# Patient Record
Sex: MALE | Race: WHITE | NOT HISPANIC OR LATINO | Employment: OTHER | URBAN - METROPOLITAN AREA
[De-identification: names, ages, dates, MRNs, and addresses within clinical notes are randomized per-mention and may not be internally consistent; named-entity substitution may affect disease eponyms.]

---

## 2023-12-28 DIAGNOSIS — N18.9 CHRONIC KIDNEY DISEASE, UNSPECIFIED CKD STAGE: Primary | ICD-10-CM

## 2023-12-28 RX ORDER — FUROSEMIDE 20 MG/1
20 TABLET ORAL 2 TIMES DAILY
Qty: 180 TABLET | Refills: 3 | Status: SHIPPED | OUTPATIENT
Start: 2023-12-28 | End: 2024-01-15 | Stop reason: WASHOUT

## 2024-01-01 ENCOUNTER — APPOINTMENT (OUTPATIENT)
Dept: RADIOLOGY | Facility: HOSPITAL | Age: 69
DRG: 870 | End: 2024-01-01
Payer: MEDICARE

## 2024-01-01 ENCOUNTER — APPOINTMENT (OUTPATIENT)
Dept: CARDIOLOGY | Facility: HOSPITAL | Age: 69
DRG: 870 | End: 2024-01-01
Payer: MEDICARE

## 2024-01-01 PROCEDURE — 71045 X-RAY EXAM CHEST 1 VIEW: CPT

## 2024-01-01 PROCEDURE — 93005 ELECTROCARDIOGRAM TRACING: CPT

## 2024-01-01 PROCEDURE — 93308 TTE F-UP OR LMTD: CPT

## 2024-01-01 PROCEDURE — 74018 RADEX ABDOMEN 1 VIEW: CPT

## 2024-01-01 PROCEDURE — 32555 ASPIRATE PLEURA W/ IMAGING: CPT

## 2024-01-01 PROCEDURE — 93321 DOPPLER ECHO F-UP/LMTD STD: CPT | Performed by: INTERNAL MEDICINE

## 2024-01-01 PROCEDURE — 93308 TTE F-UP OR LMTD: CPT | Performed by: INTERNAL MEDICINE

## 2024-01-01 PROCEDURE — 93321 DOPPLER ECHO F-UP/LMTD STD: CPT

## 2024-01-01 PROCEDURE — 70491 CT SOFT TISSUE NECK W/DYE: CPT

## 2024-01-01 PROCEDURE — 70450 CT HEAD/BRAIN W/O DYE: CPT

## 2024-01-10 ENCOUNTER — TRANSCRIBE ORDERS (OUTPATIENT)
Dept: NEPHROLOGY | Facility: CLINIC | Age: 69
End: 2024-01-10
Payer: MEDICARE

## 2024-01-10 DIAGNOSIS — E87.1 HYPONATREMIA: ICD-10-CM

## 2024-01-10 DIAGNOSIS — N18.2 CHRONIC RENAL FAILURE, STAGE 2 (MILD): ICD-10-CM

## 2024-01-11 ENCOUNTER — LAB (OUTPATIENT)
Dept: LAB | Facility: LAB | Age: 69
End: 2024-01-11
Payer: MEDICARE

## 2024-01-11 DIAGNOSIS — N18.2 CHRONIC RENAL FAILURE, STAGE 2 (MILD): ICD-10-CM

## 2024-01-11 DIAGNOSIS — E87.1 HYPONATREMIA: ICD-10-CM

## 2024-01-11 LAB
ANION GAP SERPL CALC-SCNC: 12 MMOL/L (ref 10–20)
BUN SERPL-MCNC: 20 MG/DL (ref 6–23)
CALCIUM SERPL-MCNC: 9.6 MG/DL (ref 8.6–10.3)
CHLORIDE SERPL-SCNC: 99 MMOL/L (ref 98–107)
CO2 SERPL-SCNC: 28 MMOL/L (ref 21–32)
CREAT SERPL-MCNC: 1.16 MG/DL (ref 0.5–1.3)
EGFRCR SERPLBLD CKD-EPI 2021: 68 ML/MIN/1.73M*2
GLUCOSE SERPL-MCNC: 106 MG/DL (ref 74–99)
POTASSIUM SERPL-SCNC: 4.4 MMOL/L (ref 3.5–5.3)
SODIUM SERPL-SCNC: 135 MMOL/L (ref 136–145)

## 2024-01-11 PROCEDURE — 80048 BASIC METABOLIC PNL TOTAL CA: CPT

## 2024-01-11 PROCEDURE — 36415 COLL VENOUS BLD VENIPUNCTURE: CPT

## 2024-01-15 ENCOUNTER — OFFICE VISIT (OUTPATIENT)
Dept: NEPHROLOGY | Facility: CLINIC | Age: 69
End: 2024-01-15
Payer: MEDICARE

## 2024-01-15 VITALS
DIASTOLIC BLOOD PRESSURE: 86 MMHG | BODY MASS INDEX: 35.31 KG/M2 | HEART RATE: 108 BPM | HEIGHT: 71 IN | SYSTOLIC BLOOD PRESSURE: 148 MMHG | WEIGHT: 252.2 LBS

## 2024-01-15 DIAGNOSIS — E87.1 HYPONATREMIA: Primary | ICD-10-CM

## 2024-01-15 PROBLEM — N18.2 CHRONIC KIDNEY DISEASE, STAGE 2 (MILD): Status: ACTIVE | Noted: 2024-01-15

## 2024-01-15 PROCEDURE — 1159F MED LIST DOCD IN RCRD: CPT | Performed by: CLINICAL NURSE SPECIALIST

## 2024-01-15 PROCEDURE — 99213 OFFICE O/P EST LOW 20 MIN: CPT | Performed by: CLINICAL NURSE SPECIALIST

## 2024-01-15 RX ORDER — SODIUM CHLORIDE 1 G/1
2 TABLET ORAL 2 TIMES DAILY
Qty: 240 TABLET | Refills: 11 | Status: SHIPPED | OUTPATIENT
Start: 2024-01-15

## 2024-01-15 RX ORDER — FUROSEMIDE 20 MG/1
1 TABLET ORAL 2 TIMES DAILY
COMMUNITY
Start: 2022-03-28 | End: 2024-04-15

## 2024-01-15 RX ORDER — SODIUM CHLORIDE 1 G/1
2 TABLET ORAL 2 TIMES DAILY
COMMUNITY
Start: 2022-03-28 | End: 2024-01-15 | Stop reason: SDUPTHER

## 2024-01-15 ASSESSMENT — ENCOUNTER SYMPTOMS
ENDOCRINE NEGATIVE: 1
NEUROLOGICAL NEGATIVE: 1
CARDIOVASCULAR NEGATIVE: 1
GASTROINTESTINAL NEGATIVE: 1
CONSTITUTIONAL NEGATIVE: 1
PSYCHIATRIC NEGATIVE: 1
RESPIRATORY NEGATIVE: 1
MUSCULOSKELETAL NEGATIVE: 1

## 2024-01-15 NOTE — ASSESSMENT & PLAN NOTE
Renal function is stable, his blood pressure is slightly high today in the office however he states he has not been watching it at home and while at work in 140s we will start to monitor his blood pressure at home and will call us if his blood pressure is greater than 140/80

## 2024-01-15 NOTE — PROGRESS NOTES
Subjective   Patient ID: Khoa Mitchell is a 68 y.o. male who presents for Follow-up (1 year ck/Review labs).  Being seen in follow-up for chronic kidney disease stage II and hyponatremia    Labs reviewed  Glucose 106  Electrolytes sodium 135, potassium 4.4, chloride 99, bicarb 28  Renal function with BUN of 20 and creatinine of 1.16, GFR is 68  Calcium 9.6    He is doing well  He is staying very active  He is not routinely watching his blood pressure at home however he states that it has been okay  He has no swelling, occasionally if he does when he is on his feet a lot he will wear his compression stockings which does help with this  He is tolerating his salt tablets and Lasix without difficulty          Review of Systems   Constitutional: Negative.    Respiratory: Negative.     Cardiovascular: Negative.    Gastrointestinal: Negative.    Endocrine: Negative.    Genitourinary: Negative.    Musculoskeletal: Negative.    Skin: Negative.    Neurological: Negative.    Psychiatric/Behavioral: Negative.         Objective   Physical Exam  Vitals reviewed.   Constitutional:       Appearance: Normal appearance.   HENT:      Head: Normocephalic.   Cardiovascular:      Rate and Rhythm: Normal rate and regular rhythm.   Pulmonary:      Effort: Pulmonary effort is normal.      Breath sounds: Normal breath sounds.   Abdominal:      Palpations: Abdomen is soft.   Musculoskeletal:         General: Normal range of motion.   Skin:     General: Skin is warm and dry.   Neurological:      Mental Status: He is alert and oriented to person, place, and time.   Psychiatric:         Mood and Affect: Mood normal.         Behavior: Behavior normal.         Assessment/Plan   Problem List Items Addressed This Visit             ICD-10-CM    Hyponatremia - Primary E87.1     Sodium is stable at 135, will continue with his salt tablet and Lasix, will continue to monitor his fluid intake  Repeat labs in 1 year         Relevant Medications    sodium  chloride 1,000 mg tablet    Other Relevant Orders    Basic metabolic panel    Follow Up In Nephrology     Hyponatremia/mixed picture  Cellulitis of the right lower extremity  Nicotine abuse  Chronic renal failure stage II        NICOLE Dobbs-EDI, DNP 01/15/24 10:08 AM

## 2024-01-15 NOTE — ASSESSMENT & PLAN NOTE
Sodium is stable at 135, will continue with his salt tablet and Lasix, will continue to monitor his fluid intake  Repeat labs in 1 year

## 2024-04-15 DIAGNOSIS — E87.1 HYPONATREMIA: Primary | ICD-10-CM

## 2024-04-15 RX ORDER — FUROSEMIDE 20 MG/1
20 TABLET ORAL 2 TIMES DAILY
Qty: 180 TABLET | Refills: 3 | Status: SHIPPED | OUTPATIENT
Start: 2024-04-15

## 2024-09-03 ENCOUNTER — TELEPHONE (OUTPATIENT)
Dept: NEPHROLOGY | Facility: CLINIC | Age: 69
End: 2024-09-03
Payer: MEDICARE

## 2024-09-03 NOTE — PROGRESS NOTES
Pt called states he is currently under the care of a Chiropractor for his frozen shoulder. He was seen today and noted to have swelling in his right arm, opposite the frozen shoulder. The Chiropractor advised him to contact his provider who prescribes his Lasix and see if a few extras could be sent to the pharmacy.

## 2024-09-07 ENCOUNTER — APPOINTMENT (OUTPATIENT)
Dept: RADIOLOGY | Facility: HOSPITAL | Age: 69
End: 2024-09-07
Payer: MEDICARE

## 2024-09-07 ENCOUNTER — HOSPITAL ENCOUNTER (INPATIENT)
Facility: HOSPITAL | Age: 69
End: 2024-09-07
Admitting: INTERNAL MEDICINE
Payer: MEDICARE

## 2024-09-07 ENCOUNTER — HOSPITAL ENCOUNTER (EMERGENCY)
Facility: HOSPITAL | Age: 69
Discharge: OTHER INSTITUTION | End: 2024-09-07
Attending: EMERGENCY MEDICINE
Payer: MEDICARE

## 2024-09-07 ENCOUNTER — APPOINTMENT (OUTPATIENT)
Dept: CARDIOLOGY | Facility: HOSPITAL | Age: 69
End: 2024-09-07
Payer: MEDICARE

## 2024-09-07 VITALS
DIASTOLIC BLOOD PRESSURE: 63 MMHG | RESPIRATION RATE: 18 BRPM | HEIGHT: 72 IN | WEIGHT: 213 LBS | TEMPERATURE: 97.5 F | SYSTOLIC BLOOD PRESSURE: 132 MMHG | HEART RATE: 87 BPM | OXYGEN SATURATION: 94 % | BODY MASS INDEX: 28.85 KG/M2

## 2024-09-07 DIAGNOSIS — I31.4 CARDIAC TAMPONADE: ICD-10-CM

## 2024-09-07 DIAGNOSIS — M84.411A: ICD-10-CM

## 2024-09-07 DIAGNOSIS — R91.8 LUNG MASS: Primary | ICD-10-CM

## 2024-09-07 DIAGNOSIS — J96.01 ACUTE HYPOXEMIC RESPIRATORY FAILURE (MULTI): ICD-10-CM

## 2024-09-07 DIAGNOSIS — J96.01 ACUTE HYPOXIC RESPIRATORY FAILURE (MULTI): ICD-10-CM

## 2024-09-07 DIAGNOSIS — I31.39 PERICARDIAL EFFUSION (HHS-HCC): ICD-10-CM

## 2024-09-07 DIAGNOSIS — J90 PLEURAL EFFUSION: ICD-10-CM

## 2024-09-07 DIAGNOSIS — C79.9 METASTATIC MALIGNANT NEOPLASM, UNSPECIFIED SITE (MULTI): ICD-10-CM

## 2024-09-07 DIAGNOSIS — C34.90 SMALL CELL LUNG CANCER: ICD-10-CM

## 2024-09-07 DIAGNOSIS — R60.9 SWELLING: ICD-10-CM

## 2024-09-07 DIAGNOSIS — C79.9 METASTATIC MALIGNANT NEOPLASM, UNSPECIFIED SITE (MULTI): Primary | ICD-10-CM

## 2024-09-07 DIAGNOSIS — R06.02 SHORTNESS OF BREATH: ICD-10-CM

## 2024-09-07 DIAGNOSIS — I45.9 HEART BLOCK: ICD-10-CM

## 2024-09-07 DIAGNOSIS — I48.0 PAROXYSMAL ATRIAL FIBRILLATION (MULTI): ICD-10-CM

## 2024-09-07 DIAGNOSIS — I87.1 SVC SYNDROME: ICD-10-CM

## 2024-09-07 DIAGNOSIS — J90 BILATERAL PLEURAL EFFUSION: ICD-10-CM

## 2024-09-07 LAB
ALBUMIN SERPL BCP-MCNC: 3.7 G/DL (ref 3.4–5)
ALP SERPL-CCNC: 75 U/L (ref 33–136)
ALT SERPL W P-5'-P-CCNC: 17 U/L (ref 10–52)
ANION GAP SERPL CALC-SCNC: 12 MMOL/L (ref 10–20)
APPEARANCE UR: CLEAR
AST SERPL W P-5'-P-CCNC: 21 U/L (ref 9–39)
BASE EXCESS BLDA CALC-SCNC: 11.1 MMOL/L (ref -2–3)
BASOPHILS # BLD AUTO: 0.04 X10*3/UL (ref 0–0.1)
BASOPHILS NFR BLD AUTO: 0.3 %
BILIRUB SERPL-MCNC: 0.8 MG/DL (ref 0–1.2)
BILIRUB UR STRIP.AUTO-MCNC: NEGATIVE MG/DL
BNP SERPL-MCNC: 224 PG/ML (ref 0–99)
BODY TEMPERATURE: 37 DEGREES CELSIUS
BUN SERPL-MCNC: 28 MG/DL (ref 6–23)
CALCIUM SERPL-MCNC: 9.6 MG/DL (ref 8.6–10.3)
CARDIAC TROPONIN I PNL SERPL HS: 10 NG/L (ref 0–20)
CHLORIDE SERPL-SCNC: 92 MMOL/L (ref 98–107)
CO2 SERPL-SCNC: 33 MMOL/L (ref 21–32)
COLOR UR: ABNORMAL
CREAT SERPL-MCNC: 1.08 MG/DL (ref 0.5–1.3)
EGFRCR SERPLBLD CKD-EPI 2021: 74 ML/MIN/1.73M*2
EOSINOPHIL # BLD AUTO: 0.06 X10*3/UL (ref 0–0.7)
EOSINOPHIL NFR BLD AUTO: 0.5 %
ERYTHROCYTE [DISTWIDTH] IN BLOOD BY AUTOMATED COUNT: 15.9 % (ref 11.5–14.5)
FLUAV RNA RESP QL NAA+PROBE: NOT DETECTED
FLUBV RNA RESP QL NAA+PROBE: NOT DETECTED
GLUCOSE SERPL-MCNC: 152 MG/DL (ref 74–99)
GLUCOSE UR STRIP.AUTO-MCNC: NORMAL MG/DL
HCO3 BLDA-SCNC: 37.4 MMOL/L (ref 22–26)
HCT VFR BLD AUTO: 35.8 % (ref 41–52)
HGB BLD-MCNC: 11.4 G/DL (ref 13.5–17.5)
HOLD SPECIMEN: NORMAL
HYALINE CASTS #/AREA URNS AUTO: ABNORMAL /LPF
IMM GRANULOCYTES # BLD AUTO: 0.07 X10*3/UL (ref 0–0.7)
IMM GRANULOCYTES NFR BLD AUTO: 0.6 % (ref 0–0.9)
INHALED O2 CONCENTRATION: 32 %
KETONES UR STRIP.AUTO-MCNC: NEGATIVE MG/DL
LACTATE SERPL-SCNC: 1.8 MMOL/L (ref 0.4–2)
LEUKOCYTE ESTERASE UR QL STRIP.AUTO: NEGATIVE
LYMPHOCYTES # BLD AUTO: 0.49 X10*3/UL (ref 1.2–4.8)
LYMPHOCYTES NFR BLD AUTO: 3.9 %
MCH RBC QN AUTO: 29.8 PG (ref 26–34)
MCHC RBC AUTO-ENTMCNC: 31.8 G/DL (ref 32–36)
MCV RBC AUTO: 94 FL (ref 80–100)
MONOCYTES # BLD AUTO: 0.91 X10*3/UL (ref 0.1–1)
MONOCYTES NFR BLD AUTO: 7.3 %
MUCOUS THREADS #/AREA URNS AUTO: ABNORMAL /LPF
NEUTROPHILS # BLD AUTO: 10.85 X10*3/UL (ref 1.2–7.7)
NEUTROPHILS NFR BLD AUTO: 87.4 %
NITRITE UR QL STRIP.AUTO: NEGATIVE
NRBC BLD-RTO: 0 /100 WBCS (ref 0–0)
OXYHGB MFR BLDA: 79.3 % (ref 94–98)
PCO2 BLDA: 55 MM HG (ref 38–42)
PH BLDA: 7.44 PH (ref 7.38–7.42)
PH UR STRIP.AUTO: 6 [PH]
PLATELET # BLD AUTO: 298 X10*3/UL (ref 150–450)
PO2 BLDA: 51 MM HG (ref 85–95)
POTASSIUM SERPL-SCNC: 4.2 MMOL/L (ref 3.5–5.3)
PROT SERPL-MCNC: 7.1 G/DL (ref 6.4–8.2)
PROT UR STRIP.AUTO-MCNC: ABNORMAL MG/DL
RBC # BLD AUTO: 3.82 X10*6/UL (ref 4.5–5.9)
RBC # UR STRIP.AUTO: NEGATIVE /UL
RBC #/AREA URNS AUTO: ABNORMAL /HPF
SAO2 % BLDA: 85 % (ref 94–100)
SARS-COV-2 RNA RESP QL NAA+PROBE: NOT DETECTED
SODIUM SERPL-SCNC: 133 MMOL/L (ref 136–145)
SP GR UR STRIP.AUTO: 1.02
UROBILINOGEN UR STRIP.AUTO-MCNC: NORMAL MG/DL
WBC # BLD AUTO: 12.4 X10*3/UL (ref 4.4–11.3)
WBC #/AREA URNS AUTO: ABNORMAL /HPF

## 2024-09-07 PROCEDURE — 70491 CT SOFT TISSUE NECK W/DYE: CPT | Performed by: RADIOLOGY

## 2024-09-07 PROCEDURE — 36600 WITHDRAWAL OF ARTERIAL BLOOD: CPT

## 2024-09-07 PROCEDURE — 87040 BLOOD CULTURE FOR BACTERIA: CPT | Mod: SAMLAB | Performed by: EMERGENCY MEDICINE

## 2024-09-07 PROCEDURE — 99285 EMERGENCY DEPT VISIT HI MDM: CPT

## 2024-09-07 PROCEDURE — 94762 N-INVAS EAR/PLS OXIMTRY CONT: CPT

## 2024-09-07 PROCEDURE — 2550000001 HC RX 255 CONTRASTS: Performed by: EMERGENCY MEDICINE

## 2024-09-07 PROCEDURE — 83880 ASSAY OF NATRIURETIC PEPTIDE: CPT | Performed by: EMERGENCY MEDICINE

## 2024-09-07 PROCEDURE — 71275 CT ANGIOGRAPHY CHEST: CPT

## 2024-09-07 PROCEDURE — 93005 ELECTROCARDIOGRAM TRACING: CPT

## 2024-09-07 PROCEDURE — 81001 URINALYSIS AUTO W/SCOPE: CPT | Performed by: EMERGENCY MEDICINE

## 2024-09-07 PROCEDURE — S4991 NICOTINE PATCH NONLEGEND: HCPCS | Performed by: EMERGENCY MEDICINE

## 2024-09-07 PROCEDURE — 2500000002 HC RX 250 W HCPCS SELF ADMINISTERED DRUGS (ALT 637 FOR MEDICARE OP, ALT 636 FOR OP/ED): Performed by: EMERGENCY MEDICINE

## 2024-09-07 PROCEDURE — 96374 THER/PROPH/DIAG INJ IV PUSH: CPT | Mod: 59

## 2024-09-07 PROCEDURE — 2500000004 HC RX 250 GENERAL PHARMACY W/ HCPCS (ALT 636 FOR OP/ED): Performed by: EMERGENCY MEDICINE

## 2024-09-07 PROCEDURE — 82805 BLOOD GASES W/O2 SATURATION: CPT | Performed by: EMERGENCY MEDICINE

## 2024-09-07 PROCEDURE — 80053 COMPREHEN METABOLIC PANEL: CPT | Performed by: EMERGENCY MEDICINE

## 2024-09-07 PROCEDURE — 2500000005 HC RX 250 GENERAL PHARMACY W/O HCPCS: Performed by: EMERGENCY MEDICINE

## 2024-09-07 PROCEDURE — 84100 ASSAY OF PHOSPHORUS: CPT

## 2024-09-07 PROCEDURE — 36415 COLL VENOUS BLD VENIPUNCTURE: CPT | Performed by: EMERGENCY MEDICINE

## 2024-09-07 PROCEDURE — 2020000001 HC ICU ROOM DAILY

## 2024-09-07 PROCEDURE — 99285 EMERGENCY DEPT VISIT HI MDM: CPT | Mod: 25

## 2024-09-07 PROCEDURE — 85025 COMPLETE CBC W/AUTO DIFF WBC: CPT | Performed by: EMERGENCY MEDICINE

## 2024-09-07 PROCEDURE — 87077 CULTURE AEROBIC IDENTIFY: CPT | Mod: SAMLAB | Performed by: EMERGENCY MEDICINE

## 2024-09-07 PROCEDURE — 99222 1ST HOSP IP/OBS MODERATE 55: CPT | Performed by: STUDENT IN AN ORGANIZED HEALTH CARE EDUCATION/TRAINING PROGRAM

## 2024-09-07 PROCEDURE — 83605 ASSAY OF LACTIC ACID: CPT | Performed by: EMERGENCY MEDICINE

## 2024-09-07 PROCEDURE — 87636 SARSCOV2 & INF A&B AMP PRB: CPT | Performed by: EMERGENCY MEDICINE

## 2024-09-07 PROCEDURE — 84484 ASSAY OF TROPONIN QUANT: CPT | Performed by: EMERGENCY MEDICINE

## 2024-09-07 PROCEDURE — 74177 CT ABD & PELVIS W/CONTRAST: CPT

## 2024-09-07 RX ORDER — POLYETHYLENE GLYCOL 3350 17 G/17G
17 POWDER, FOR SOLUTION ORAL DAILY
Status: DISCONTINUED | OUTPATIENT
Start: 2024-09-07 | End: 2024-09-07

## 2024-09-07 RX ORDER — DEXTROMETHORPHAN HYDROBROMIDE, GUAIFENESIN 5; 100 MG/5ML; MG/5ML
650 LIQUID ORAL EVERY 8 HOURS PRN
Status: ON HOLD | COMMUNITY

## 2024-09-07 RX ORDER — IBUPROFEN 200 MG
1 TABLET ORAL ONCE
Status: DISCONTINUED | OUTPATIENT
Start: 2024-09-07 | End: 2024-09-07 | Stop reason: HOSPADM

## 2024-09-07 RX ORDER — LORAZEPAM 2 MG/ML
0.5 INJECTION INTRAMUSCULAR ONCE
Status: COMPLETED | OUTPATIENT
Start: 2024-09-07 | End: 2024-09-07

## 2024-09-07 RX ORDER — IBUPROFEN 200 MG
200 TABLET ORAL EVERY 8 HOURS PRN
Status: ON HOLD | COMMUNITY

## 2024-09-07 RX ADMIN — IOHEXOL 80 ML: 350 INJECTION, SOLUTION INTRAVENOUS at 20:29

## 2024-09-07 RX ADMIN — IOHEXOL 70 ML: 350 INJECTION, SOLUTION INTRAVENOUS at 11:09

## 2024-09-07 RX ADMIN — Medication 3 L/MIN: at 09:15

## 2024-09-07 RX ADMIN — Medication 3 L/MIN: at 09:32

## 2024-09-07 RX ADMIN — NICOTINE 1 PATCH: 21 PATCH, EXTENDED RELEASE TRANSDERMAL at 12:47

## 2024-09-07 RX ADMIN — LORAZEPAM 0.5 MG: 2 INJECTION INTRAMUSCULAR; INTRAVENOUS at 12:47

## 2024-09-07 ASSESSMENT — LIFESTYLE VARIABLES
HAVE PEOPLE ANNOYED YOU BY CRITICIZING YOUR DRINKING: NO
EVER HAD A DRINK FIRST THING IN THE MORNING TO STEADY YOUR NERVES TO GET RID OF A HANGOVER: NO
HAVE YOU EVER FELT YOU SHOULD CUT DOWN ON YOUR DRINKING: NO
TOTAL SCORE: 0
EVER FELT BAD OR GUILTY ABOUT YOUR DRINKING: NO

## 2024-09-07 ASSESSMENT — ENCOUNTER SYMPTOMS
FEVER: 0
DYSURIA: 0
CHILLS: 0
CHEST TIGHTNESS: 0
HEADACHES: 0
ABDOMINAL PAIN: 0
NAUSEA: 0
SHORTNESS OF BREATH: 0
COUGH: 1

## 2024-09-07 ASSESSMENT — PAIN DESCRIPTION - LOCATION: LOCATION: SHOULDER

## 2024-09-07 ASSESSMENT — PAIN SCALES - GENERAL
PAINLEVEL_OUTOF10: 2
PAINLEVEL_OUTOF10: 7

## 2024-09-07 ASSESSMENT — PAIN DESCRIPTION - ORIENTATION: ORIENTATION: RIGHT

## 2024-09-07 ASSESSMENT — COLUMBIA-SUICIDE SEVERITY RATING SCALE - C-SSRS

## 2024-09-07 ASSESSMENT — PAIN - FUNCTIONAL ASSESSMENT: PAIN_FUNCTIONAL_ASSESSMENT: 0-10

## 2024-09-07 NOTE — ED PROVIDER NOTES
"HPI   Chief Complaint   Patient presents with    Shortness of Breath       HPI    This is a 69 year old male with no significant past medical history other than use of Lasix for occasional  lower extremity edema presented to [facility] today with right shoulder pain, swelling in the right arm, right facial swelling and subjective \"swelling throughout his whole body\"   He has also been more dyspneic with a weight loss of 30 lbs.   While at [facility], labs were significant for glucose level of 152, Na of 133, BNP of 224 with normal kidney function, negative troponin, WBC of 12.4  with H&H of 11.4 & 35.8  CT abdomen/pelvis showed,   Extensive malignancy/metastatic disease including presumed metastatic lesions in the liver, spleen, both adrenal glands, and the pancreas, multiple peritoneal nodules, soft tissue mass in the right gluteal region and lytic probable metastatic lesion in the right acetabulum.  CT angio chest showed,   Heterogenous soft tissue consistent with malignancy/neoplasm throughout the central/left upper chest with probable SVC obstruction, encasement and narrowing of the trachea/jose antonio,  left chest wall invasion. Multifocal likely metastatic lymphadenopathy and  permeative lytic lesion in the right scapula with pathologic fracture. Further evaluation recommended.  No gross central PE identified. Limited assessment for peripheral/basilar PE. Bibasilar infiltrates or atelectasis with pleural effusions, left-greater-than-right.  Pericardial effusion.    He was transferred to [facility] for further management.     Patient History   Past Medical History:   Diagnosis Date    Other specified health status     No pertinent past medical history     Past Surgical History:   Procedure Laterality Date    OTHER SURGICAL HISTORY  05/16/2022    No history of surgery     No family history on file.  Social History     Tobacco Use    Smoking status: Every Day     Types: Cigarettes    Smokeless tobacco: Never   Substance Use " Topics    Alcohol use: Never    Drug use: Never       Physical Exam   ED Triage Vitals [09/07/24 1640]   Temp Heart Rate Respirations BP   -- 67 16 138/76      Pulse Ox Temp src Heart Rate Source Patient Position   (!) 93 % -- -- Sitting      BP Location FiO2 (%)     -- --       Physical Exam  Constitutional:       Appearance: He is well-developed. He is obese.   HENT:      Head: Normocephalic and atraumatic.      Mouth/Throat:      Mouth: Mucous membranes are moist.      Comments: Right facial swelling   Eyes:      Extraocular Movements: Extraocular movements intact.      Pupils: Pupils are equal, round, and reactive to light.   Cardiovascular:      Rate and Rhythm: Normal rate and regular rhythm.   Pulmonary:      Effort: Pulmonary effort is normal.      Comments: Diminished   NO stridor   No drooling     Abdominal:      Palpations: Abdomen is soft.   Musculoskeletal:         General: Normal range of motion.      Cervical back: Normal range of motion and neck supple.   Skin:     General: Skin is warm and dry.   Neurological:      General: No focal deficit present.      Mental Status: He is alert and oriented to person, place, and time.   Psychiatric:         Mood and Affect: Mood normal.         Behavior: Behavior normal.           ED Course & MDM   ED Course as of 09/07/24 1823   Sat Sep 07, 2024   1649 Patient is a 69-year-old male who presents emergency department for shortness of breath.  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  States he initially went to [facility] as he was having right arm swelling.  States he has not had much of an appetite for the past few months.  He does have a cough that has had for quite some time.  Currently denies chest pain, shortness of breath, nausea, vomiting.  Patient was transferred from [facility] for admission with oncology and thoracic surgery evaluation.    General: Resting comfortably in bed. No  acute distress. Non-toxic.   Head: Atraumatic, normocephalic.  4 x 4 soft tissue mass right cheek anterior to the right ear.  Eyes: Sclera anicteric.  ENT: Mucous membranes moist.   Heart: Regular rate and rhythm.   Lungs: Coarse breath sounds bilaterally. normal respiratory pattern without conversational dyspnea or respiratory distress.   Abdomen: Soft, non-tender, non-distended, no guarding or rebound.   Neurologic: Awake and alert, normal speech and mental status. Moves all extremities equally well.  Skin: Warm and dry, no appreciable rash.   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 CT imaging from [facility] shows heterogeneous soft tissue malignancy/neoplasm in the central/left upper chest with probable SVC obstruction and encasement and narrowing of the trachea/jose antonio with left chest wall invasion.  There are multifocal metastatic lymphadenopathy and pathologic right scapular fracture.  Abdomen with extensive malignancy/metastatic disease with presumed metastatic lesions of the liver, spleen, both adrenal glands, pancreas, and multiple peritoneal nodules with a soft tissue mass in the right gluteal region and likely a metastatic lesion in the right acetabulum.  Pt to be admitted for further evaluation and monitoring. 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   Upon arrival to the ED, he is on a simple mask at 8L with O2 sat of 94% with no subjective dyspnea, no  stridor, no drooling and no obvious airway compromise.   I spoke with thoracis surgery who advised that they will see the patient as a consult but not the primary team. I spoke with oncology who advised me to reach out to medicine.   I reached out to medicine , Dr. Reyes who came to the ED and saw the patient and as long as ENT sees the patient and deemed his airway stable, they will admit him to the floor.   We also ordered a neck CT with contrast as well as a head CT to further evaluate the extend of the tumor.   He is currently hemodynamically stable with family at the bedside.   Awaiting the additional imaging.     ENT evaluated and his airway is patent though has secretions. I spoke with medicine who accepted the patient. Will continue to monitor the patient until a bed becomes available. The patient and family have been updated.   Staffed with Dr. Story.     Procedure  Procedures     Angi Jaquez, NICOLE-CNP, DNP  09/07/24 [id number]

## 2024-09-07 NOTE — ED TRIAGE NOTES
Pt arrived to ED via EMS transfer from [facility]. Pt endorsed from facial swelling and difficulty breathing. A mass was found in the pt lung with other mets. Pt vitals are stable and denies any needs at this time.

## 2024-09-07 NOTE — ED PROVIDER NOTES
HPI   Chief Complaint   Patient presents with    Facial Swelling     To ED per wheelchair c/o swelling all over into his face and SOB for weeks to months. He states that he started with R shoulder pain and R arm swelling about 3 months ago and has progressively gotten worse. He states that he has not really been eating and has lost about 30 lbs in 3 months. RA SpO2 is 77-80% so placed on 3L O2/NC and encouraged breathing techniques. Wet cough present and speaking in short sentances. EKG done at         Limitations to History: None    HPI:  69-year-old male presents with concern for multiple complaints.  Patient states that 3 months ago he began having right shoulder pain.  States he began having swelling in this arm.  Since that time had swelling throughout his body including his face.  Patient states he has become more short of breath.  Has had approximately a 30 pound weight loss.  Denies any fever, chills, nausea, vomiting, abdominal pain, urinary symptoms.      Additional History Obtained from: Wife at the bedside.    ------------------------------------------------------------------------------------------------------------------------------------------  Physical Exam:    VS: As documented in the triage note and EMR flowsheet from this visit were reviewed.    Appearance: Alert. cooperative,  in no acute distress.   Skin: Intact,  dry skin, no lesions, rash, petechiae or purpura.  Cyanotic.  Firm mass in the superior mandibular region.  Eyes: PERRLA, EOMs intact,  Conjunctiva pink with no redness or exudates.   HENT: Normocephalic, atraumatic. Nares patent. No intraoral lesions.   Neck: Supple, without meningismus. Trachea at midline. No lymphadenopathy.  Pulmonary: Clear bilaterally with good chest wall excursion. No rales, rhonchi or wheezing. No accessory muscle use or stridor.  Tachypneic.  Cardiac: Tachycardic and regular rhythm, no rubs, murmurs, or gallops.   Abdomen: Abdomen is soft, nontender, and  nondistended.  No palpable organomegaly.  No rebound or guarding.  No CVA tenderness. Nonsurgical abdomen.  Genitourinary: Exam deferred.  Musculoskeletal: Full range of motion.  Pulses full and equal. No cyanosis, clubbing.  Bilateral 2-3+ pitting edema.  Neurological:  Cranial nerves are grossly intact, grossly normal sensation, no weakness, no focal findings identified.  Psychiatric: Appropriate mood and affect.                Patient History   Past Medical History:   Diagnosis Date    Other specified health status     No pertinent past medical history     Past Surgical History:   Procedure Laterality Date    OTHER SURGICAL HISTORY  05/16/2022    No history of surgery     No family history on file.  Social History     Tobacco Use    Smoking status: Every Day     Types: Cigarettes    Smokeless tobacco: Never   Substance Use Topics    Alcohol use: Never    Drug use: Never       Physical Exam   ED Triage Vitals [09/07/24 0923]   Temperature Heart Rate Respirations BP   36.4 °C (97.5 °F) 100 (!) 28 137/68      Pulse Ox Temp src Heart Rate Source Patient Position   (!) 78 % -- -- --      BP Location FiO2 (%)     -- --       Physical Exam      ED Course & MDM   ED Course as of 09/07/24 1445   Sat Sep 07, 2024   1230 Case discussed with Dr. Weller, vascular surgery, who advised CT surgery would need consulted.   Then discussed with [facility] Dr. Seth, who accepted the patient for emergency evaluation.  [CL]   1253 Case discussed with Paloma Sky and Kendrick, oncology and thoracic surgery who advised admission to MICU with palliative radiation and possible airway intervention if needed.  [CL]      ED Course User Index  [CL] Gregorio Marshall DO         Diagnoses as of 09/07/24 1445   Lung mass   SVC syndrome   Bilateral pleural effusion   Acute hypoxemic respiratory failure (Multi)   Metastatic malignant neoplasm, unspecified site (Multi)   Pathological fracture of right scapula, initial encounter                 No data  recorded     [place] Coma Scale Score: 15 (09/07/24 0924 : Flori Reyes RN)                           Medical Decision Making  Labs Reviewed  CBC WITH AUTO DIFFERENTIAL - Abnormal     WBC                           12.4 (*)               nRBC                          0.0                    RBC                           3.82 (*)               Hemoglobin                    11.4 (*)               Hematocrit                    35.8 (*)               MCV                           94                     MCH                           29.8                   MCHC                          31.8 (*)               RDW                           15.9 (*)               Platelets                     298                    Neutrophils %                 87.4                   Immature Granulocytes %, Automated   0.6                    Lymphocytes %                 3.9                    Monocytes %                   7.3                    Eosinophils %                 0.5                    Basophils %                   0.3                    Neutrophils Absolute          10.85 (*)               Immature Granulocytes Absolute, Au*   0.07                   Lymphocytes Absolute          0.49 (*)               Monocytes Absolute            0.91                   Eosinophils Absolute          0.06                   Basophils Absolute            0.04                COMPREHENSIVE METABOLIC PANEL - Abnormal     Glucose                       152 (*)                Sodium                        133 (*)                Potassium                     4.2                    Chloride                      92 (*)                 Bicarbonate                   33 (*)                 Anion Gap                     12                     Urea Nitrogen                 28 (*)                 Creatinine                    1.08                   eGFR                          74                     Calcium                       9.6                    Albumin                        3.7                    Alkaline Phosphatase          75                     Total Protein                 7.1                    AST                           21                     Bilirubin, Total              0.8                    ALT                           17                  B-TYPE NATRIURETIC PEPTIDE - Abnormal     BNP                           224 (*)                    Narrative:    <100 pg/mL - Heart failure unlikely                  100-299 pg/mL - Intermediate probability of acute heart                                  failure exacerbation. Correlate with clinical                                  context and patient history.                    >=300 pg/mL - Heart Failure likely. Correlate with clinical                                  context and patient history.                                    BNP testing is performed using different testing methodology at [facility] than at other [facility]. Direct result comparisons should only be made within the same method.                     URINALYSIS WITH REFLEX CULTURE AND MICROSCOPIC - Abnormal     Color, Urine                                         Appearance, Urine             Clear                  Specific Gravity, Urine       1.019                  pH, Urine                     6.0                    Protein, Urine                30 (1+) (*)               Glucose, Urine                Normal                 Blood, Urine                  NEGATIVE                Ketones, Urine                NEGATIVE                Bilirubin, Urine              NEGATIVE                Urobilinogen, Urine           Normal                 Nitrite, Urine                NEGATIVE                Leukocyte Esterase, Urine     NEGATIVE             BLOOD GAS ARTERIAL - Abnormal     POCT pH, Arterial             7.44 (*)               POCT pCO2, Arterial           55 (*)                 POCT pO2, Arterial            51 (*)                  POCT SO2, Arterial            85 (*)                 POCT Oxy Hemoglobin, Arterial   79.3 (*)               POCT Base Excess, Arterial    11.1 (*)               POCT HCO3 Calculated, Arterial   37.4 (*)               Patient Temperature           37.0                   FiO2                          32                  URINALYSIS MICROSCOPIC WITH REFLEX CULTURE - Abnormal     WBC, Urine                    1-5                    RBC, Urine                    1-2                    Mucus, Urine                  FEW                    Hyaline Casts, Urine          OCCASIONAL (*)            LACTATE - Normal     Lactate                       1.8                        Narrative: Venipuncture immediately after or during the administration of Metamizole may lead to falsely low results. Testing should be performed immediately                  prior to Metamizole dosing.  TROPONIN I, HIGH SENSITIVITY - Normal     Troponin I, High Sensitivity   10                         Narrative: Less than 99th percentile of normal range cutoff-                  Female and children under 18 years old <14 ng/L; Male <21 ng/L: Negative                  Repeat testing should be performed if clinically indicated.                                     Female and children under 18 years old 14-50 ng/L; Male 21-50 ng/L:                  Consistent with possible cardiac damage and possible increased clinical                   risk. Serial measurements may help to assess extent of myocardial damage.                                     >50 ng/L: Consistent with cardiac damage, increased clinical risk and                  myocardial infarction. Serial measurements may help assess extent of                   myocardial damage.                                      NOTE: Children less than 1 year old may have higher baseline troponin                   levels and results should be interpreted in conjunction with the overall                   clinical  context.                                     NOTE: Troponin I testing is performed using a different                   testing methodology at [facility] than at other                   [facility]. Direct result comparisons should only                   be made within the same method.  SARS-COV-2 PCR - Normal     Coronavirus 2019, PCR                                    Narrative: This assay has received FDA Emergency Use Authorization (EUA) and is only authorized for the duration of time that circumstances exist to justify the authorization of the emergency use of in vitro diagnostic tests for the detection of SARS-CoV-2 virus and/or diagnosis of COVID-19 infection under section 564(b)(1) of the Act, 21 U.S.C. 360bbb-3(b)(1). This assay is an in vitro diagnostic nucleic acid amplification test for the qualitative detection of SARS-CoV-2 from nasopharyngeal specimens and has been validated for use at [facility]. Negative results do not preclude COVID-19 infections and should not be used as the sole basis for diagnosis, treatment, or other management decisions.                    INFLUENZA A AND B PCR - Normal     Flu A Result                                         Flu B Result                                             Narrative: This assay is an in vitro diagnostic multiplex nucleic acid amplification test for the detection and discrimination of Influenza A & B from nasopharyngeal specimens, and has been validated for use at [facility]. Negative results do not preclude Influenza A/B infections, and should not be used as the sole basis for diagnosis, treatment, or other management decisions. If Influenza A/B and RSV PCR results are negative, testing for Parainfluenza virus, Adenovirus and Metapneumovirus is routinely performed for [facility] [place] [place] inpatients, and is available on other patients by placing an add-on  request.  BLOOD CULTURE  BLOOD CULTURE  URINALYSIS WITH REFLEX CULTURE AND MICROSCOPIC         Narrative: The following orders were created for panel order Urinalysis with Reflex Culture and Microscopic.                  Procedure                               Abnormality         Status                                     ---------                               -----------         ------                                     Urinalysis with Reflex C...[[id number]]  Abnormal            Final result                               Extra Urine Gray Tube[[id number]]                            In process                                                   Please view results for these tests on the individual orders.  EXTRA URINE GRAY TUBE  CT angio chest for pulmonary embolism   Final Result    CHEST:          Heterogenous soft tissue consistent with malignancy/neoplasm    throughout the central/left upper chest with probable SVC    obstruction, encasement and narrowing of the trachea/jose antonio,  left    chest wall invasion. Multifocal likely metastatic lymphadenopathy and    permeative lytic lesion in the right scapula with pathologic    fracture. Further evaluation recommended.          No gross central PE identified. Limited assessment for    peripheral/basilar PE. Bibasilar infiltrates or atelectasis with    pleural effusions, left-greater-than-right.          Pericardial effusion.          ABDOMEN AND PELVIS:          Extensive malignancy/metastatic disease including presumed metastatic    lesions in the liver, spleen, both adrenal glands, and the pancreas,    multiple peritoneal nodules, soft tissue mass in the right gluteal    region and lytic probable metastatic lesion in the right acetabulum.          MACRO:    None.          Signed by: Natali Daigle 9/7/2024 11:56 AM    Dictation workstation:   [id number]     CT abdomen pelvis w IV contrast   Final Result    CHEST:          Heterogenous soft tissue consistent with  malignancy/neoplasm    throughout the central/left upper chest with probable SVC    obstruction, encasement and narrowing of the trachea/jose antonio,  left    chest wall invasion. Multifocal likely metastatic lymphadenopathy and    permeative lytic lesion in the right scapula with pathologic    fracture. Further evaluation recommended.          No gross central PE identified. Limited assessment for    peripheral/basilar PE. Bibasilar infiltrates or atelectasis with    pleural effusions, left-greater-than-right.          Pericardial effusion.          ABDOMEN AND PELVIS:          Extensive malignancy/metastatic disease including presumed metastatic    lesions in the liver, spleen, both adrenal glands, and the pancreas,    multiple peritoneal nodules, soft tissue mass in the right gluteal    region and lytic probable metastatic lesion in the right acetabulum.          MACRO:    None.          Signed by: Natali Daigle 9/7/2024 11:56 AM    Dictation workstation:   [id number]     Medical Decision Making:    Appears tachypneic and is hypoxemic upon arrival.  Placed on nasal cannula 3 L.  ABG shows hypoxemia without hypercapnia.  Lab work unremarkable.  Patient found to have a left-sided lung lesion with bilateral pleural effusions as well as SVC syndrome and encasement of the trachea as well as jose antonio.  Case discussed first with vascular surgery and then thoracic surgery as well as oncology in the emergency department to [facility].  Patient will be transferred for urgent evaluation.  Patient was switched to OxyMask during his stay given he is primarily breathing through his mouth.  Patient was given Ativan given some anxiety he was having as well as a nicotine patch.  Patient also found to have a pathologic right scapular fracture.  Patient is agreeable with transfer to [facility].  Transferred in stable condition.    Differential Diagnoses Considered: Pneumonia, pneumothorax,  mass, heart failure, pulmonary embolism    Independent Interpretation of Studies:  I independently interpreted: CT of the chest shows left-sided lung mass.    Escalation of Care:  Appropriate for transfer to higher level of care for urgent evaluation.    Discussion of Management with Other Providers:   I discussed the patient/results with: Emergency department physician, vascular surgery, oncology, thoracic surgery.          Procedure  ECG 12 lead    Performed by: Gregorio Mortensen DO  Authorized by: Gregorio Mortensen DO    ECG interpreted by ED Physician in the absence of a cardiologist: yes    Comments:      EKG interpreted by Dr. Gregorio Mortensen: Sinus tachycardia at a rate of 105 bpm.  Left bundle branch block.  CO interval 140 ms.  QTc of 523 ms.  Artifact.       Gregorio Mortensen DO  09/07/24 0522

## 2024-09-07 NOTE — Clinical Note
Patient Clipped and Prepped: apical chest. Prepped with ChloraPrep, a minimum of 3 minute dry time, longer if needed, no pooling noted, patient draped in sterile fashion.

## 2024-09-07 NOTE — Clinical Note
Patient Clipped and Prepped: subxiphoid process. Prepped with ChloraPrep, a minimum of 3 minute dry time, longer if needed, no pooling noted, patient draped in sterile fashion.

## 2024-09-07 NOTE — Clinical Note
R cheek soft tissue mass bx complete. 4 cores obtained. Dressing CDI. Total 1mg versed, 25mcg fentanyl given ivp. VSS t/o procedure. Report to ICU RN.

## 2024-09-08 ENCOUNTER — APPOINTMENT (OUTPATIENT)
Dept: RADIOLOGY | Facility: HOSPITAL | Age: 69
End: 2024-09-08
Payer: MEDICARE

## 2024-09-08 LAB
ABO GROUP (TYPE) IN BLOOD: NORMAL
ALBUMIN SERPL BCP-MCNC: 3.3 G/DL (ref 3.4–5)
ALBUMIN SERPL BCP-MCNC: 3.4 G/DL (ref 3.4–5)
ANION GAP BLDV CALCULATED.4IONS-SCNC: 9 MMOL/L (ref 10–25)
ANION GAP SERPL CALC-SCNC: 12 MMOL/L (ref 10–20)
ANION GAP SERPL CALC-SCNC: 16 MMOL/L (ref 10–20)
ANTIBODY SCREEN: NORMAL
APTT PPP: 28 SECONDS (ref 27–38)
BACTERIA BLD AEROBE CULT: ABNORMAL
BACTERIA BLD CULT: ABNORMAL
BACTERIA BLD CULT: NORMAL
BACTERIA SPEC RESP CULT: ABNORMAL
BASE EXCESS BLDV CALC-SCNC: 6.3 MMOL/L (ref -2–3)
BASOPHILS # BLD AUTO: 0.03 X10*3/UL (ref 0–0.1)
BASOPHILS NFR BLD AUTO: 0.3 %
BODY TEMPERATURE: 37 DEGREES CELSIUS
BUN SERPL-MCNC: 27 MG/DL (ref 6–23)
BUN SERPL-MCNC: 29 MG/DL (ref 6–23)
CA-I BLDV-SCNC: 1.27 MMOL/L (ref 1.1–1.33)
CALCIUM SERPL-MCNC: 9.1 MG/DL (ref 8.6–10.6)
CALCIUM SERPL-MCNC: 9.8 MG/DL (ref 8.6–10.6)
CHLORIDE BLDV-SCNC: 93 MMOL/L (ref 98–107)
CHLORIDE SERPL-SCNC: 93 MMOL/L (ref 98–107)
CHLORIDE SERPL-SCNC: 93 MMOL/L (ref 98–107)
CO2 SERPL-SCNC: 33 MMOL/L (ref 21–32)
CO2 SERPL-SCNC: 36 MMOL/L (ref 21–32)
CREAT SERPL-MCNC: 0.84 MG/DL (ref 0.5–1.3)
CREAT SERPL-MCNC: 0.98 MG/DL (ref 0.5–1.3)
EGFRCR SERPLBLD CKD-EPI 2021: 83 ML/MIN/1.73M*2
EGFRCR SERPLBLD CKD-EPI 2021: >90 ML/MIN/1.73M*2
EOSINOPHIL # BLD AUTO: 0.03 X10*3/UL (ref 0–0.7)
EOSINOPHIL NFR BLD AUTO: 0.3 %
ERYTHROCYTE [DISTWIDTH] IN BLOOD BY AUTOMATED COUNT: 15.9 % (ref 11.5–14.5)
EST. AVERAGE GLUCOSE BLD GHB EST-MCNC: 148 MG/DL
GLUCOSE BLD MANUAL STRIP-MCNC: 113 MG/DL (ref 74–99)
GLUCOSE BLD MANUAL STRIP-MCNC: 119 MG/DL (ref 74–99)
GLUCOSE BLD MANUAL STRIP-MCNC: 130 MG/DL (ref 74–99)
GLUCOSE BLD MANUAL STRIP-MCNC: 136 MG/DL (ref 74–99)
GLUCOSE BLD MANUAL STRIP-MCNC: 84 MG/DL (ref 74–99)
GLUCOSE BLDV-MCNC: 114 MG/DL (ref 74–99)
GLUCOSE SERPL-MCNC: 117 MG/DL (ref 74–99)
GLUCOSE SERPL-MCNC: 133 MG/DL (ref 74–99)
GRAM STN SPEC: ABNORMAL
HBA1C MFR BLD: 6.8 %
HCO3 BLDV-SCNC: 35.4 MMOL/L (ref 22–26)
HCT VFR BLD AUTO: 32.8 % (ref 41–52)
HCT VFR BLD EST: 34 % (ref 41–52)
HGB BLD-MCNC: 10.2 G/DL (ref 13.5–17.5)
HGB BLDV-MCNC: 11.2 G/DL (ref 13.5–17.5)
HOLD SPECIMEN: NORMAL
IMM GRANULOCYTES # BLD AUTO: 0.06 X10*3/UL (ref 0–0.7)
IMM GRANULOCYTES NFR BLD AUTO: 0.5 % (ref 0–0.9)
INHALED O2 CONCENTRATION: 100 %
INR PPP: 1.2 (ref 0.9–1.1)
LACTATE BLDV-SCNC: 2.7 MMOL/L (ref 0.4–2)
LDH SERPL L TO P-CCNC: 194 U/L (ref 84–246)
LYMPHOCYTES # BLD AUTO: 0.34 X10*3/UL (ref 1.2–4.8)
LYMPHOCYTES NFR BLD AUTO: 3.1 %
MAGNESIUM SERPL-MCNC: 2.12 MG/DL (ref 1.6–2.4)
MAGNESIUM SERPL-MCNC: 2.17 MG/DL (ref 1.6–2.4)
MCH RBC QN AUTO: 29.1 PG (ref 26–34)
MCHC RBC AUTO-ENTMCNC: 31.1 G/DL (ref 32–36)
MCV RBC AUTO: 94 FL (ref 80–100)
MONOCYTES # BLD AUTO: 0.82 X10*3/UL (ref 0.1–1)
MONOCYTES NFR BLD AUTO: 7.4 %
NEUTROPHILS # BLD AUTO: 9.82 X10*3/UL (ref 1.2–7.7)
NEUTROPHILS NFR BLD AUTO: 88.4 %
NRBC BLD-RTO: 0 /100 WBCS (ref 0–0)
OSMOLALITY SERPL: 294 MOSM/KG (ref 280–300)
OXYHGB MFR BLDV: 24.9 % (ref 45–75)
PCO2 BLDV: 77 MM HG (ref 41–51)
PEEP CMH2O: 8 CM H2O
PH BLDV: 7.27 PH (ref 7.33–7.43)
PHOSPHATE SERPL-MCNC: 3.1 MG/DL (ref 2.5–4.9)
PHOSPHATE SERPL-MCNC: 3.8 MG/DL (ref 2.5–4.9)
PHOSPHATE SERPL-MCNC: 4.4 MG/DL (ref 2.5–4.9)
PLATELET # BLD AUTO: 273 X10*3/UL (ref 150–450)
PO2 BLDV: 18 MM HG (ref 35–45)
POTASSIUM BLDV-SCNC: 4.7 MMOL/L (ref 3.5–5.3)
POTASSIUM SERPL-SCNC: 4.4 MMOL/L (ref 3.5–5.3)
POTASSIUM SERPL-SCNC: 4.6 MMOL/L (ref 3.5–5.3)
PROCALCITONIN SERPL-MCNC: 4.49 NG/ML
PROTHROMBIN TIME: 13.3 SECONDS (ref 9.8–12.8)
RBC # BLD AUTO: 3.5 X10*6/UL (ref 4.5–5.9)
RH FACTOR (ANTIGEN D): NORMAL
SAO2 % BLDV: 25 % (ref 45–75)
SODIUM BLDV-SCNC: 133 MMOL/L (ref 136–145)
SODIUM SERPL-SCNC: 136 MMOL/L (ref 136–145)
SODIUM SERPL-SCNC: 138 MMOL/L (ref 136–145)
TIDAL VOLUME: 500 ML
URATE SERPL-MCNC: 6.6 MG/DL (ref 4–7.5)
VENTILATOR RATE: 20 BPM
WBC # BLD AUTO: 11.1 X10*3/UL (ref 4.4–11.3)

## 2024-09-08 PROCEDURE — 31500 INSERT EMERGENCY AIRWAY: CPT | Mod: GC | Performed by: STUDENT IN AN ORGANIZED HEALTH CARE EDUCATION/TRAINING PROGRAM

## 2024-09-08 PROCEDURE — 2500000004 HC RX 250 GENERAL PHARMACY W/ HCPCS (ALT 636 FOR OP/ED)

## 2024-09-08 PROCEDURE — 0B938ZZ DRAINAGE OF RIGHT MAIN BRONCHUS, VIA NATURAL OR ARTIFICIAL OPENING ENDOSCOPIC: ICD-10-PCS | Performed by: STUDENT IN AN ORGANIZED HEALTH CARE EDUCATION/TRAINING PROGRAM

## 2024-09-08 PROCEDURE — 99291 CRITICAL CARE FIRST HOUR: CPT

## 2024-09-08 PROCEDURE — 0B918ZZ DRAINAGE OF TRACHEA, VIA NATURAL OR ARTIFICIAL OPENING ENDOSCOPIC: ICD-10-PCS | Performed by: STUDENT IN AN ORGANIZED HEALTH CARE EDUCATION/TRAINING PROGRAM

## 2024-09-08 PROCEDURE — 87116 MYCOBACTERIA CULTURE: CPT

## 2024-09-08 PROCEDURE — 36415 COLL VENOUS BLD VENIPUNCTURE: CPT

## 2024-09-08 PROCEDURE — 85025 COMPLETE CBC W/AUTO DIFF WBC: CPT

## 2024-09-08 PROCEDURE — 87077 CULTURE AEROBIC IDENTIFY: CPT

## 2024-09-08 PROCEDURE — 99223 1ST HOSP IP/OBS HIGH 75: CPT | Performed by: OTOLARYNGOLOGY

## 2024-09-08 PROCEDURE — 94002 VENT MGMT INPAT INIT DAY: CPT

## 2024-09-08 PROCEDURE — 0BH17EZ INSERTION OF ENDOTRACHEAL AIRWAY INTO TRACHEA, VIA NATURAL OR ARTIFICIAL OPENING: ICD-10-PCS | Performed by: STUDENT IN AN ORGANIZED HEALTH CARE EDUCATION/TRAINING PROGRAM

## 2024-09-08 PROCEDURE — 83930 ASSAY OF BLOOD OSMOLALITY: CPT

## 2024-09-08 PROCEDURE — 84145 PROCALCITONIN (PCT): CPT

## 2024-09-08 PROCEDURE — 51702 INSERT TEMP BLADDER CATH: CPT

## 2024-09-08 PROCEDURE — 87632 RESP VIRUS 6-11 TARGETS: CPT

## 2024-09-08 PROCEDURE — 71045 X-RAY EXAM CHEST 1 VIEW: CPT | Performed by: RADIOLOGY

## 2024-09-08 PROCEDURE — 5A0935A ASSISTANCE WITH RESPIRATORY VENTILATION, LESS THAN 24 CONSECUTIVE HOURS, HIGH NASAL FLOW/VELOCITY: ICD-10-PCS

## 2024-09-08 PROCEDURE — 84550 ASSAY OF BLOOD/URIC ACID: CPT

## 2024-09-08 PROCEDURE — 74018 RADEX ABDOMEN 1 VIEW: CPT | Performed by: RADIOLOGY

## 2024-09-08 PROCEDURE — 70450 CT HEAD/BRAIN W/O DYE: CPT | Performed by: RADIOLOGY

## 2024-09-08 PROCEDURE — 84132 ASSAY OF SERUM POTASSIUM: CPT

## 2024-09-08 PROCEDURE — 87305 ASPERGILLUS AG IA: CPT

## 2024-09-08 PROCEDURE — 87205 SMEAR GRAM STAIN: CPT

## 2024-09-08 PROCEDURE — 2500000005 HC RX 250 GENERAL PHARMACY W/O HCPCS: Performed by: INTERNAL MEDICINE

## 2024-09-08 PROCEDURE — 31500 INSERT EMERGENCY AIRWAY: CPT | Performed by: STUDENT IN AN ORGANIZED HEALTH CARE EDUCATION/TRAINING PROGRAM

## 2024-09-08 PROCEDURE — 83735 ASSAY OF MAGNESIUM: CPT

## 2024-09-08 PROCEDURE — 2020000001 HC ICU ROOM DAILY

## 2024-09-08 PROCEDURE — 0B978ZZ DRAINAGE OF LEFT MAIN BRONCHUS, VIA NATURAL OR ARTIFICIAL OPENING ENDOSCOPIC: ICD-10-PCS | Performed by: STUDENT IN AN ORGANIZED HEALTH CARE EDUCATION/TRAINING PROGRAM

## 2024-09-08 PROCEDURE — 86900 BLOOD TYPING SEROLOGIC ABO: CPT

## 2024-09-08 PROCEDURE — 5A1955Z RESPIRATORY VENTILATION, GREATER THAN 96 CONSECUTIVE HOURS: ICD-10-PCS | Performed by: STUDENT IN AN ORGANIZED HEALTH CARE EDUCATION/TRAINING PROGRAM

## 2024-09-08 PROCEDURE — 70450 CT HEAD/BRAIN W/O DYE: CPT

## 2024-09-08 PROCEDURE — 82947 ASSAY GLUCOSE BLOOD QUANT: CPT

## 2024-09-08 PROCEDURE — 85610 PROTHROMBIN TIME: CPT

## 2024-09-08 PROCEDURE — 87799 DETECT AGENT NOS DNA QUANT: CPT

## 2024-09-08 PROCEDURE — 93010 ELECTROCARDIOGRAM REPORT: CPT | Performed by: INTERNAL MEDICINE

## 2024-09-08 PROCEDURE — 88312 SPECIAL STAINS GROUP 1: CPT | Performed by: PATHOLOGY

## 2024-09-08 PROCEDURE — 83615 LACTATE (LD) (LDH) ENZYME: CPT

## 2024-09-08 PROCEDURE — 83605 ASSAY OF LACTIC ACID: CPT

## 2024-09-08 PROCEDURE — 87040 BLOOD CULTURE FOR BACTERIA: CPT

## 2024-09-08 PROCEDURE — 88112 CYTOPATH CELL ENHANCE TECH: CPT | Mod: TC,MCY

## 2024-09-08 PROCEDURE — 99222 1ST HOSP IP/OBS MODERATE 55: CPT | Performed by: NEUROLOGICAL SURGERY

## 2024-09-08 PROCEDURE — 86901 BLOOD TYPING SEROLOGIC RH(D): CPT

## 2024-09-08 PROCEDURE — 82435 ASSAY OF BLOOD CHLORIDE: CPT

## 2024-09-08 PROCEDURE — 31623 DX BRONCHOSCOPE/BRUSH: CPT | Performed by: STUDENT IN AN ORGANIZED HEALTH CARE EDUCATION/TRAINING PROGRAM

## 2024-09-08 PROCEDURE — 3E033XZ INTRODUCTION OF VASOPRESSOR INTO PERIPHERAL VEIN, PERCUTANEOUS APPROACH: ICD-10-PCS

## 2024-09-08 PROCEDURE — 2500000005 HC RX 250 GENERAL PHARMACY W/O HCPCS

## 2024-09-08 PROCEDURE — 0CJS8ZZ INSPECTION OF LARYNX, VIA NATURAL OR ARTIFICIAL OPENING ENDOSCOPIC: ICD-10-PCS

## 2024-09-08 PROCEDURE — 87015 SPECIMEN INFECT AGNT CONCNTJ: CPT

## 2024-09-08 PROCEDURE — 87102 FUNGUS ISOLATION CULTURE: CPT

## 2024-09-08 PROCEDURE — 0B9J8ZZ DRAINAGE OF LEFT LOWER LUNG LOBE, VIA NATURAL OR ARTIFICIAL OPENING ENDOSCOPIC: ICD-10-PCS | Performed by: STUDENT IN AN ORGANIZED HEALTH CARE EDUCATION/TRAINING PROGRAM

## 2024-09-08 PROCEDURE — 88112 CYTOPATH CELL ENHANCE TECH: CPT | Performed by: PATHOLOGY

## 2024-09-08 PROCEDURE — 83036 HEMOGLOBIN GLYCOSYLATED A1C: CPT

## 2024-09-08 PROCEDURE — 80069 RENAL FUNCTION PANEL: CPT

## 2024-09-08 RX ORDER — ROCURONIUM BROMIDE 10 MG/ML
INJECTION, SOLUTION INTRAVENOUS
Status: COMPLETED
Start: 2024-09-08 | End: 2024-09-08

## 2024-09-08 RX ORDER — ETOMIDATE 2 MG/ML
INJECTION INTRAVENOUS
Status: COMPLETED
Start: 2024-09-08 | End: 2024-09-08

## 2024-09-08 RX ORDER — ETOMIDATE 2 MG/ML
30 INJECTION INTRAVENOUS ONCE
Status: COMPLETED | OUTPATIENT
Start: 2024-09-08 | End: 2024-09-08

## 2024-09-08 RX ORDER — PHENYLEPHRINE HCL IN 0.9% NACL 0.4MG/10ML
SYRINGE (ML) INTRAVENOUS
Status: DISPENSED
Start: 2024-09-08 | End: 2024-09-08

## 2024-09-08 RX ORDER — CEFTRIAXONE 1 G/50ML
1 INJECTION, SOLUTION INTRAVENOUS EVERY 24 HOURS
Status: DISPENSED | OUTPATIENT
Start: 2024-09-08

## 2024-09-08 RX ORDER — LIDOCAINE HYDROCHLORIDE 20 MG/ML
5 INJECTION, SOLUTION INFILTRATION; PERINEURAL ONCE
Status: DISCONTINUED | OUTPATIENT
Start: 2024-09-08 | End: 2024-09-08

## 2024-09-08 RX ORDER — DEXMEDETOMIDINE HYDROCHLORIDE 4 UG/ML
.2-1.5 INJECTION, SOLUTION INTRAVENOUS CONTINUOUS
Status: DISCONTINUED | OUTPATIENT
Start: 2024-09-08 | End: 2024-09-08

## 2024-09-08 RX ORDER — FENTANYL CITRATE 50 UG/ML
INJECTION, SOLUTION INTRAMUSCULAR; INTRAVENOUS
Status: COMPLETED
Start: 2024-09-08 | End: 2024-09-08

## 2024-09-08 RX ORDER — ETOMIDATE 2 MG/ML
50 INJECTION INTRAVENOUS ONCE
Status: DISCONTINUED | OUTPATIENT
Start: 2024-09-08 | End: 2024-09-08

## 2024-09-08 RX ORDER — MIDAZOLAM HYDROCHLORIDE 1 MG/ML
INJECTION INTRAMUSCULAR; INTRAVENOUS
Status: COMPLETED
Start: 2024-09-08 | End: 2024-09-08

## 2024-09-08 RX ORDER — DEXTROSE 50 % IN WATER (D50W) INTRAVENOUS SYRINGE
25
Status: ACTIVE | OUTPATIENT
Start: 2024-09-08

## 2024-09-08 RX ORDER — ROCURONIUM BROMIDE 10 MG/ML
100 INJECTION, SOLUTION INTRAVENOUS ONCE
Status: COMPLETED | OUTPATIENT
Start: 2024-09-08 | End: 2024-09-08

## 2024-09-08 RX ORDER — DEXTROSE 50 % IN WATER (D50W) INTRAVENOUS SYRINGE
12.5
Status: ACTIVE | OUTPATIENT
Start: 2024-09-08

## 2024-09-08 RX ORDER — PROPOFOL 10 MG/ML
INJECTION, EMULSION INTRAVENOUS
Status: COMPLETED
Start: 2024-09-08 | End: 2024-09-08

## 2024-09-08 RX ORDER — FENTANYL CITRATE-0.9 % NACL/PF 10 MCG/ML
0-300 PLASTIC BAG, INJECTION (ML) INTRAVENOUS CONTINUOUS
Status: ACTIVE | OUTPATIENT
Start: 2024-09-08

## 2024-09-08 RX ORDER — NOREPINEPHRINE BITARTRATE/D5W 8 MG/250ML
PLASTIC BAG, INJECTION (ML) INTRAVENOUS
Status: COMPLETED
Start: 2024-09-08 | End: 2024-09-08

## 2024-09-08 RX ORDER — LIDOCAINE HYDROCHLORIDE 40 MG/ML
40 INJECTION, SOLUTION RETROBULBAR; TOPICAL ONCE
Status: DISCONTINUED | OUTPATIENT
Start: 2024-09-08 | End: 2024-09-08

## 2024-09-08 RX ORDER — ETOMIDATE 2 MG/ML
0.3 INJECTION INTRAVENOUS ONCE
Status: DISCONTINUED | OUTPATIENT
Start: 2024-09-08 | End: 2024-09-08

## 2024-09-08 RX ORDER — ENOXAPARIN SODIUM 100 MG/ML
40 INJECTION SUBCUTANEOUS EVERY 24 HOURS
Status: DISCONTINUED | OUTPATIENT
Start: 2024-09-08 | End: 2024-09-08

## 2024-09-08 RX ORDER — LIDOCAINE HYDROCHLORIDE ANHYDROUS 20 MG/ML
INJECTION, SOLUTION INFILTRATION
Status: DISPENSED
Start: 2024-09-08 | End: 2024-09-08

## 2024-09-08 RX ORDER — PROPOFOL 10 MG/ML
5-50 INJECTION, EMULSION INTRAVENOUS CONTINUOUS
Status: DISPENSED | OUTPATIENT
Start: 2024-09-08

## 2024-09-08 RX ORDER — MIDAZOLAM HYDROCHLORIDE 1 MG/ML
2 INJECTION INTRAMUSCULAR; INTRAVENOUS ONCE AS NEEDED
Status: COMPLETED | OUTPATIENT
Start: 2024-09-08 | End: 2024-09-08

## 2024-09-08 RX ORDER — DEXMEDETOMIDINE HYDROCHLORIDE 4 UG/ML
INJECTION, SOLUTION INTRAVENOUS
Status: COMPLETED
Start: 2024-09-08 | End: 2024-09-08

## 2024-09-08 RX ORDER — FENTANYL CITRATE-0.9 % NACL/PF 10 MCG/ML
PLASTIC BAG, INJECTION (ML) INTRAVENOUS
Status: COMPLETED
Start: 2024-09-08 | End: 2024-09-08

## 2024-09-08 RX ORDER — INSULIN LISPRO 100 [IU]/ML
0-5 INJECTION, SOLUTION INTRAVENOUS; SUBCUTANEOUS EVERY 4 HOURS
Status: DISCONTINUED | OUTPATIENT
Start: 2024-09-08 | End: 2024-09-08

## 2024-09-08 RX ORDER — FENTANYL CITRATE 50 UG/ML
50 INJECTION, SOLUTION INTRAMUSCULAR; INTRAVENOUS ONCE AS NEEDED
Status: COMPLETED | OUTPATIENT
Start: 2024-09-08 | End: 2024-09-08

## 2024-09-08 RX ORDER — NOREPINEPHRINE BITARTRATE/D5W 8 MG/250ML
0-.2 PLASTIC BAG, INJECTION (ML) INTRAVENOUS CONTINUOUS
Status: ACTIVE | OUTPATIENT
Start: 2024-09-08

## 2024-09-08 RX ADMIN — DEXMEDETOMIDINE HYDROCHLORIDE 0.2 MCG/KG/HR: 4 INJECTION, SOLUTION INTRAVENOUS at 12:00

## 2024-09-08 RX ADMIN — FENTANYL CITRATE 50 MCG: 50 INJECTION INTRAMUSCULAR; INTRAVENOUS at 11:55

## 2024-09-08 RX ADMIN — AZITHROMYCIN 500 MG: 500 INJECTION, POWDER, LYOPHILIZED, FOR SOLUTION INTRAVENOUS at 04:24

## 2024-09-08 RX ADMIN — Medication 25 MCG/HR: at 12:45

## 2024-09-08 RX ADMIN — ETOMIDATE 30 MG: 20 INJECTION, SOLUTION INTRAVENOUS at 12:00

## 2024-09-08 RX ADMIN — FENTANYL CITRATE 50 MCG: 50 INJECTION, SOLUTION INTRAMUSCULAR; INTRAVENOUS at 11:55

## 2024-09-08 RX ADMIN — ETOMIDATE 30 MG: 2 INJECTION INTRAVENOUS at 12:00

## 2024-09-08 RX ADMIN — MIDAZOLAM HYDROCHLORIDE 2 MG: 1 INJECTION INTRAMUSCULAR; INTRAVENOUS at 12:10

## 2024-09-08 RX ADMIN — ROCURONIUM BROMIDE 100 MG: 10 INJECTION INTRAVENOUS at 12:05

## 2024-09-08 RX ADMIN — Medication 8 L/MIN: at 00:19

## 2024-09-08 RX ADMIN — PROPOFOL 5 MCG/KG/MIN: 10 INJECTION, EMULSION INTRAVENOUS at 12:15

## 2024-09-08 RX ADMIN — MIDAZOLAM HYDROCHLORIDE 2 MG: 1 INJECTION, SOLUTION INTRAMUSCULAR; INTRAVENOUS at 12:10

## 2024-09-08 RX ADMIN — PROPOFOL 15 MCG/KG/MIN: 10 INJECTION, EMULSION INTRAVENOUS at 18:10

## 2024-09-08 RX ADMIN — Medication 8 L/MIN: at 08:00

## 2024-09-08 RX ADMIN — ROCURONIUM BROMIDE 100 MG: 10 INJECTION, SOLUTION INTRAVENOUS at 12:05

## 2024-09-08 RX ADMIN — Medication 0.01 MCG/KG/MIN: at 13:00

## 2024-09-08 RX ADMIN — CEFTRIAXONE SODIUM 1 G: 1 INJECTION, SOLUTION INTRAVENOUS at 04:23

## 2024-09-08 RX ADMIN — Medication 60 PERCENT: at 21:26

## 2024-09-08 RX ADMIN — SODIUM CHLORIDE, POTASSIUM CHLORIDE, SODIUM LACTATE AND CALCIUM CHLORIDE 500 ML: 600; 310; 30; 20 INJECTION, SOLUTION INTRAVENOUS at 11:45

## 2024-09-08 SDOH — SOCIAL STABILITY: SOCIAL INSECURITY: HAS ANYONE EVER THREATENED TO HURT YOUR FAMILY OR YOUR PETS?: NO

## 2024-09-08 SDOH — SOCIAL STABILITY: SOCIAL INSECURITY: ARE YOU OR HAVE YOU BEEN THREATENED OR ABUSED PHYSICALLY, EMOTIONALLY, OR SEXUALLY BY ANYONE?: NO

## 2024-09-08 SDOH — SOCIAL STABILITY: SOCIAL INSECURITY: ABUSE: ADULT

## 2024-09-08 SDOH — SOCIAL STABILITY: SOCIAL INSECURITY: DO YOU FEEL ANYONE HAS EXPLOITED OR TAKEN ADVANTAGE OF YOU FINANCIALLY OR OF YOUR PERSONAL PROPERTY?: NO

## 2024-09-08 SDOH — SOCIAL STABILITY: SOCIAL INSECURITY: DO YOU FEEL UNSAFE GOING BACK TO THE PLACE WHERE YOU ARE LIVING?: NO

## 2024-09-08 SDOH — SOCIAL STABILITY: SOCIAL INSECURITY: DOES ANYONE TRY TO KEEP YOU FROM HAVING/CONTACTING OTHER FRIENDS OR DOING THINGS OUTSIDE YOUR HOME?: NO

## 2024-09-08 SDOH — SOCIAL STABILITY: SOCIAL INSECURITY: ARE THERE ANY APPARENT SIGNS OF INJURIES/BEHAVIORS THAT COULD BE RELATED TO ABUSE/NEGLECT?: NO

## 2024-09-08 SDOH — SOCIAL STABILITY: SOCIAL INSECURITY: HAVE YOU HAD THOUGHTS OF HARMING ANYONE ELSE?: NO

## 2024-09-08 SDOH — SOCIAL STABILITY: SOCIAL INSECURITY: WERE YOU ABLE TO COMPLETE ALL THE BEHAVIORAL HEALTH SCREENINGS?: YES

## 2024-09-08 ASSESSMENT — ACTIVITIES OF DAILY LIVING (ADL)
GROOMING: NEEDS ASSISTANCE
DRESSING YOURSELF: NEEDS ASSISTANCE
FEEDING YOURSELF: NEEDS ASSISTANCE
TOILETING: NEEDS ASSISTANCE
LACK_OF_TRANSPORTATION: NO
JUDGMENT_ADEQUATE_SAFELY_COMPLETE_DAILY_ACTIVITIES: NO
WALKS IN HOME: NEEDS ASSISTANCE
HEARING - LEFT EAR: FUNCTIONAL
HEARING - RIGHT EAR: FUNCTIONAL
BATHING: NEEDS ASSISTANCE
PATIENT'S MEMORY ADEQUATE TO SAFELY COMPLETE DAILY ACTIVITIES?: NO
ADEQUATE_TO_COMPLETE_ADL: YES

## 2024-09-08 ASSESSMENT — COGNITIVE AND FUNCTIONAL STATUS - GENERAL
DAILY ACTIVITIY SCORE: 18
MOVING FROM LYING ON BACK TO SITTING ON SIDE OF FLAT BED WITH BEDRAILS: A LITTLE
HELP NEEDED FOR BATHING: A LITTLE
DRESSING REGULAR UPPER BODY CLOTHING: A LITTLE
TOILETING: A LITTLE
PERSONAL GROOMING: A LITTLE
PATIENT BASELINE BEDBOUND: NO
EATING MEALS: A LITTLE
MOBILITY SCORE: 18
MOVING TO AND FROM BED TO CHAIR: A LITTLE
TURNING FROM BACK TO SIDE WHILE IN FLAT BAD: A LITTLE
WALKING IN HOSPITAL ROOM: A LITTLE
DRESSING REGULAR LOWER BODY CLOTHING: A LITTLE
STANDING UP FROM CHAIR USING ARMS: A LITTLE
CLIMB 3 TO 5 STEPS WITH RAILING: A LITTLE

## 2024-09-08 ASSESSMENT — LIFESTYLE VARIABLES
SKIP TO QUESTIONS 9-10: 0
HOW OFTEN DO YOU HAVE 6 OR MORE DRINKS ON ONE OCCASION: NEVER
AUDIT-C TOTAL SCORE: 3
HOW OFTEN DO YOU HAVE A DRINK CONTAINING ALCOHOL: 2-4 TIMES A MONTH
AUDIT-C TOTAL SCORE: 3
HOW MANY STANDARD DRINKS CONTAINING ALCOHOL DO YOU HAVE ON A TYPICAL DAY: 3 OR 4

## 2024-09-08 ASSESSMENT — PAIN SCALES - GENERAL
PAINLEVEL_OUTOF10: 0 - NO PAIN

## 2024-09-08 ASSESSMENT — PAIN - FUNCTIONAL ASSESSMENT
PAIN_FUNCTIONAL_ASSESSMENT: 0-10
PAIN_FUNCTIONAL_ASSESSMENT: CPOT (CRITICAL CARE PAIN OBSERVATION TOOL)

## 2024-09-08 ASSESSMENT — PATIENT HEALTH QUESTIONNAIRE - PHQ9
2. FEELING DOWN, DEPRESSED OR HOPELESS: NOT AT ALL
1. LITTLE INTEREST OR PLEASURE IN DOING THINGS: NOT AT ALL
SUM OF ALL RESPONSES TO PHQ9 QUESTIONS 1 & 2: 0

## 2024-09-08 NOTE — PROCEDURES
Intubation    Date/Time: 9/8/2024 6:03 PM    Performed by: Parag Loaiza MD  Authorized by: Parag Loaiza MD    Consent:     Consent obtained:  Verbal    Consent given by:  Patient    Risks, benefits, and alternatives were discussed: yes      Risks discussed:  Laryngeal injury, bleeding and hypoxia    Alternatives discussed:  Delayed treatment  Universal protocol:     Procedure explained and questions answered to patient or proxy's satisfaction: yes      Relevant documents present and verified: yes      Test results available: yes      Imaging studies available: yes      Required blood products, implants, devices, and special equipment available: yes      Immediately prior to procedure, a time out was called: yes      Patient identity confirmed:  Hospital-assigned identification number and arm band  Pre-procedure details:     Indications: airway protection and respiratory failure      Patient status:  Awake    Mallampati score:  III    Obstruction: none      Neck mobility: normal      Pharmacologic strategy: RSI      Induction agents:  Etomidate    Paralytics:  Rocuronium  Procedure details:     Preoxygenation:  Bag valve mask (High flow nasal Cannula)    Number of attempts:  1  Successful intubation attempt details:     Intubation method:  Oral    Intubation technique: video assisted      Laryngoscope blade:  Hypercurved    Tube size (mm):  8.5    Tube type:  Cuffed    Tube visualized through cords: yes    Placement assessment:     Tube secured with:  Adhesive tape and ETT brown    Breath sounds:  Reduced on left    Placement verification: CXR verification and endoscopic      CXR findings:  Appropriate position  Post-procedure details:     Procedure completion:  Tolerated

## 2024-09-08 NOTE — HOSPITAL COURSE
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     Admitted to the medicine ICU on 09/07 due to continued respiratory distress. Imaging performed prior to admission notable for significant left lung cancer with compression of trachea and jose antonio. There is also lesions found cerebral hemispheres, right parotid gland, cervical lymphadenopathy, right scapula, lesion in the liver, pancreas, spleen, both adrenal glands, peritoneal nodules, right gluteus muscle, and right acetabulum. Blood cultures from 09/07 growing Streptococcus salivarius, started on Rocephin (09/07-) and azithromycin (09/07-). Otolaryngology and thoracic surgery consulted regarding airway compression and superior vena cava syndrome, both signed off recommending biopsy for staging. Consulted neurosurgery regarding hemorraghic lesions in the brain. Underwent endotracheal intubation on 09/08 due to respiratory distress and profound secretions. Started on Levophed (09/08-) due to drop in MAPs. Tentative plan to obtain biopsy for evaluation of small cell carcinoma versus lymphoma. On 9/09 TTE suggested brief RV diastolic collapse and brief RA collapse with concern for early tamponade. Cardiology consult placed and pericardiocentesis was administered.     Pericardiocentesis completed, 400 ml serous drainage, pericardial drain in place. Experienced transient heart block. Underwent TVP for 24 hours. Patient went into afib RVR with heart rate peaking to 170s. He was cardioverted twice with 200 J. He remained on amio gtt and his HR eventually reduced to low 100s. Cytology of pericardial fluid was significant for malignant cells. Patient went for IR biopsy, obtaining 4 cores from  his right cheek soft tissue mass. Small cell lung carcinoma confirmed on biopsy. Rad onc administered one fraction of radiation therapy on 9/13. Thoracentesis was completed on 9/13 to rule out infectious pleural effusion. 1100 ml of fluid removed and found to be exudative. Med onc proceeded with chemotherapy. Started urgent palliative chemotherapy on 9/14 with carboplatin and etoposide. Also started on allopurinol daily for TLS prophylaxis. Pericardial drain pulled on 9/15 to prevent infection.     Extubated to AirVo on 9/20.  Amio changed to maintenance dose of 20mg daily.  Supportive onc and primary team met with patient and POA Zita to discuss diagnosis and code status.  Pt. Wished to remain full code at that time.    Pt. Back in RVR on 9/21 and amio gtt resumed.  EP re consulted.    [place] Zita at bedside on 9/22.  Discussion was had regarding worsening mentation and oxygenation.  Zita agreed at that time to make pt. DNR/DNI.

## 2024-09-08 NOTE — CONSULTS
Ear Nose & Throat Consult Note    ENT DEPARTMENT CONSULTATION NOTE  Name: Khoa Mitchell  MRN: [id number]  : 1955  Consulting Attending: Amadou Christopher MD  Reason for Consult: Airway Concern    History of Present Illness  The patient is a 69 y.o. male with PMH T2DM and CKD stage 2 who presented to [facility] on 2024 for swelling of the right side of the face and right shoulder.    He has had multiple months of weakness and shortness of breath.  In the last day or so his right arm swelled significantly and his shortness of breath increased.  He has also had a large 2 to 3 cm right parotid mass protruding from his face for roughly a month.  He is a longtime 1 pack/day smoker since childhood.  Prior history of alcohol use, no alcohol use at this time.  No drug use..  He denies fever, chills, night sweats, unexplained weight loss.    ENT was consulted for airway evaluation.    Review of Systems  14 point review of systems completed and all negative except as noted in HPI.    Past Medical History  Past Medical History:   Diagnosis Date    Other specified health status     No pertinent past medical history       Past Surgical History  Past Surgical History:   Procedure Laterality Date    OTHER SURGICAL HISTORY  2022    No history of surgery       Allergies  No Known Allergies    Medications    Current Facility-Administered Medications:     azithromycin 500 mg in dextrose 5% 250 mL IV, 500 mg, intravenous, q24h, Lianne Hanks MD, Stopped at 24 0559    cefTRIAXone (Rocephin) 1 g in dextrose (iso) IV 50 mL, 1 g, intravenous, q24h, Lianne Hanks MD, Stopped at 24 0559    dexmedeTOMIDine (Precedex) sodium chloride 0.9% infusion  - Omnicell Override Pull, , , ,     dextrose 50 % injection 12.5 g, 12.5 g, intravenous, q15 min PRN, Lianne Hanks MD    dextrose 50 % injection 25 g, 25 g, intravenous, q15 min PRN, Lianne Hanks MD    etomidate (Amidate) injection  - Omnicell Override Pull, , , ,     fentaNYL PF  (Sublimaze) injection  - Omnicell Override Pull, , , ,     glucagon (Glucagen) injection 1 mg, 1 mg, intramuscular, q15 min PRN, Lianne Hanks MD    glucagon (Glucagen) injection 1 mg, 1 mg, intramuscular, q15 min PRN, Lianne Hanks MD    insulin lispro (HumaLOG) injection 0-5 Units, 0-5 Units, subcutaneous, q4h, Lianne Hanks MD    ketamine in NaCl, iso-osmotic injection  - Omnicell Override Pull, , , ,     lidocaine (Xylocaine) injection  - Omnicell Override Pull, , , ,     midazolam (Versed) injection  - Omnicell Override Pull, , , ,     norepinephrine in dextrose 5 % (Levophed) infusion  - Omnicell Override Pull, , , ,     oxygen (O2) therapy, , inhalation, Continuous PRN - O2/gases, Vitaly James MD, 10 L/min at 09/08/24 1005    phenylephrine HCl in 0.9% NaCl syringe  - Omnicell Override Pull, , , ,     rocuronium (ZeMuron) injection  - Omnicell Override Pull, , , ,     Family History  No family history on file.    Social History  Social History     Socioeconomic History    Marital status:      Spouse name: Not on file    Number of children: Not on file    Years of education: Not on file    Highest education level: Not on file   Occupational History    Not on file   Tobacco Use    Smoking status: Every Day     Types: Cigarettes    Smokeless tobacco: Never   Substance and Sexual Activity    Alcohol use: Never    Drug use: Never    Sexual activity: Not on file   Other Topics Concern    Not on file   Social History Narrative    Not on file     Social Determinants of Health     Financial Resource Strain: Low Risk  (9/8/2024)    Overall Financial Resource Strain (CARDIA)     Difficulty of Paying Living Expenses: Not hard at all   Food Insecurity: Not on file   Transportation Needs: No Transportation Needs (9/8/2024)    PRAPARE - Transportation     Lack of Transportation (Medical): No     Lack of Transportation (Non-Medical): No   Physical Activity: Not on file   Stress: Not on file   Social Connections: Not on  file   Intimate Partner Violence: Not on file   Housing Stability: Low Risk  (9/8/2024)    Housing Stability Vital Sign     Unable to Pay for Housing in the Last Year: No     Number of Times Moved in the Last Year: 0     Homeless in the Last Year: No       Vital Signs  Heart Rate:  [67-96]   Temp:  [35.8 °C (96.4 °F)-36.3 °C (97.3 °F)]   Resp:  [11-28]   BP: ()/(56-86)   Height:  [182.9 cm (6')]   Weight:  [97.1 kg (214 lb)]   SpO2:  [80 %-100 %]     Physical Examination  GEN: The patient appears stated age in no acute distress  VOICE: Voice is hoarse and wet with evidence of secretions, not significantly breathy or asthenic  RESP: Unlabored on 8 L facemask with no audible stertor or stridor  CV: Clinically well perfused   EYES: EOM grossly intact with scleral icterus  NEURO: Alert and oriented with no focal deficits and CN II-XII symmetric and intact bilaterally  HEAD: Scalp is normocephalic and atraumatic  FACE: No abrasions or lacerations, no maxillary or mandibular stepoffs, 2 to 3 cm right parotid mass protruding from face  SAL: No parotid or submandibular masses or tenderness to palpation and clear saliva expressed from ducts  EARS: Normal external ears, external auditory canals  NOSE: External nose appears normal, no significant septal deviation, anterior rhinoscopy limited with no active bleeding or lesions  OC: Normal lips, normal buccal mucosa, normal alveolar ridge, normal floor of mouth, floor mouth is soft, normal tongue, normal hard palate, normal retromolar trigone  OP: Tonsils, normal pharyngeal walls, normal soft palate  NECK: Trachea midline, no significant lymphadenopathy, neck is soft    Radiology Reviewed  I have personally reviewed the CT chest which showed extensive metastatic disease.      PROCEDURE NOTE:  Nasopharyngolaryngoscopy    For better visualization because of concern for airway compromise, a flexible fiberoptic nasopharyngolaryngoscopy was performed. Patient was correctly  identified and verbal consent obtained.  After topical anesthesia with atomized 4% Lidocaine with Afrin, the flexible scope was introduced into the right  naris.    The following areas were visualized:  Nasal septum, turbinates, nasopharynx, oropharynx, hypopharynx, soft palate, base of tongue, epiglottis, and larynx.    These structures were found to be normal with the following notable findings:     -Grossly patent airway  -Grossly aspirating secretions, heavy secretion burden of mucus and saliva  -Left vocal cord hypomobile or completely immobile  -Desatted to 85% during scope exam      Assessment  Khoa Mitchell is a 69 y.o. male who presents with facial and arm swelling, imaging shows malignancy/neoplasm through the upper chest, possible SVC syndrome. Clinical examination as well as ancillary testing is most consistent with left lower cord hypomobility and gross aspiration of secretions, likely related to metastatic malignancy, no obvious head neck etiology.  The patient's airway is grossly patent but there is evidence of gross aspiration of mucus and secretions along with L vocal cord immobility and he is requiring 8 L supplemental O2 via facemask.    Recommendations  -Dispo (floor vs MICU) per ED and medical teams  -Recommend SLP eval for diet recs as he is grossly aspirating  - regarding airway, patient would benefit from L vocal cord injection secondary to aspiration of secretions  - regarding the patient's R parotid mass, it is uncertain whether this is parotid mass of primary origin or the result of metastases, regardless, patient would benefit from fine-needle aspiration to be performed during this hospitalization      Maria Luisa Michele MD - PGY2  Otolaryngology - Head & Neck Surgery  [facility]    ENT Consult pager: [phone]  ENT Peds pager: [phone]  ENT Head & Neck Surgery Phone: [phone]  ENT subspecialty team: Maurice individual resident who wrote today's note  ENT Outpatient scheduling number:  [phone]  Please Page if Urgent

## 2024-09-08 NOTE — PROGRESS NOTES
ENT scoped the patient, no significant airway narrowing, does have some pooling of secretions. CT head shows brain mets with potential hemorrhage, I communicated with inpatient team and they will follow up on result. Inpatient team did upgrade to MICU given persistent hypoxia, continues on facemask comfortably. Admitted to ICU in stable but critical condition.

## 2024-09-08 NOTE — H&P
History Of Present Illness  Khoa Mitchell is a 68 yo male with PMHx of T2DM, CKD2 (baseline Cr 1.2) admitted for concern for SVC syndrome likely 2/2 to new diagnosis of metastatic cancer.    Around 3 months ago, he began having right shoulder pain, originally attributed to frozen shoulder. Was getting massage therapy and seeing a chiropractor. Around 4 days ago, he began having progressive swelling of the RUE and R shoulder pain. Became increasingly more short of breath with noticeable ansarca, primarily in his face. Reports having lost 30-40lb since January. Also noted to have 2x3cm R parotid gland mass for the past few weeks.    Presented originally 9/7AM to [facility], noted to be in respiratory distress, placed on 8L face mask. CT PE notable soft tissue mass throughout the central/left upper chest encasing the trachea/jose antonio with left chest wall invasion, lymphadenopathy, lytic lesion in right scapula. CT AP with metastatic lesions in the liver, spleen, adrenal glands, pancreas, multiple peritoneal nodules, soft tissue mass in the right gluteal region and lytic lesions in the right acetabulum. Transferred to [facility] for escalation of care.    At [facility], ENT consulted, scoped at bedside with patent airway, but evidence of gross aspiration. Noted to have purulent secretions in the mask. CT head performed, notable for multiple hyperdense lesions concerning for hemorrhagic metastases, largest [id number]. CT surgery consulted, no surgical evaluation at this time. CT soft tissue with multiple soft tissue masses within the parotid gland, enlarged cervical lymph nodes, redemonstration of large soft tissue mass in the left lung extending to the thyroid gland. Transferred to the MICU for airway watch given likely SVC syndrome.    In the MICU, pt on 10L NC, noted to have multiple secretions around his mouth. NSGY consulted for concern for hemorrhagic conversion, recommending repeat CT to evaluate progression.    Of note,  discussed with patient at bedside, no known family members or spouse. Has a good friend, Zita, that is not HPOA, but wants her to help make medical decisions for her. Did not have a good number for Zita, did not have his own phone at bedside, potentially Zita took it. Said she should visit tomorrow.    At [facility]:  - Vitals:   T 36.4C, , /68, RR 28, SpO2 94 on 8L face mask  - Labs:   CBC: WBC 12.4, Hgb 11.4 (baseline 11-12), plt 298   BMP: Na 133, K 4.2, Cl 92, HCO3 33, BUN 28, Cr 1.08, glu 152   LFT: Ca 9.6, tprot 7.1, alb 3.7, alkphos 75, AST 21, ALT 17, tbili 0.8   hs-TnI 10, , lactate 1.8   ABG 7.44/55/51   BCx x2 drawn   UA negative nitrites, negative leuk esterase, 1-5 WBCs    EKG: Sinus tachycardia  bpm, LBBB with frequent PVCs, QTc 523ms    Imagin/7 CT PE:  IMPRESSION:  CHEST:      Heterogenous soft tissue consistent with malignancy/neoplasm  throughout the central/left upper chest with probable SVC  obstruction, encasement and narrowing of the trachea/jose antonio,  left  chest wall invasion. Multifocal likely metastatic lymphadenopathy and  permeative lytic lesion in the right scapula with pathologic  fracture. Further evaluation recommended.      No gross central PE identified. Limited assessment for  peripheral/basilar PE. Bibasilar infiltrates or atelectasis with  pleural effusions, left-greater-than-right.      Pericardial effusion.     CT AP:  ABDOMEN AND PELVIS:  Extensive malignancy/metastatic disease including presumed metastatic  lesions in the liver, spleen, both adrenal glands, and the pancreas,  multiple peritoneal nodules, soft tissue mass in the right gluteal  region and lytic probable metastatic lesion in the right acetabulum.     CT head w/o contrast:  IMPRESSION:  Multiple rounded hyperdense lesions throughout bilateral cerebral  hemispheres as described above concerning for hyperdense versus  hemorrhagic metastasis given patient's clinical history and  the  diffuse nature of the lesions. Intraparenchymal hemorrhage is also a  consideration. The largest hyperdense lesion is within the left  frontal lobe and measures 2.7 x 2.0 x 2.0 cm with slight adjacent  mass effect on the frontal horn of the left lateral ventricle and  mild effacement of the adjacent sulci. No midline shift. Further  evaluation with dedicated contrast-enhanced MRI is recommended.    9/7 CT neck w/ contrast:  IMPRESSION:  1. Multiple soft tissue masses within the right parotid gland as  described above. Multiple enlarged cervical lymph nodes. Findings  highly concerning for malignancy either primary versus metastatic.  2. Large soft tissue mass in the left lung apex is partially imaged  extending to the anterior mediastinum with loss of fat plane with the  inferior margin of the thyroid gland. Please refer to same-day CTA  chest for detailed report of the lungs.  3. Additional findings as described above.     Past Medical History  As above    Surgical History  Reviewed and noncontributory     Social History  0.5-2PPD for the past 40 years, denies alcohol or other recreational drug use.    Family History  No family hx of cancer     Allergies  Patient has no known allergies.    Review of Systems  10 point review of systems was performed and is otherwise negative except as noted in HPI.     Physical Exam  Constitutional:       General: He is not in acute distress.     Appearance: He is not toxic-appearing.   HENT:      Mouth/Throat:      Comments: Purulent secretions noted around mouth  Eyes:      Extraocular Movements: Extraocular movements intact.      Pupils: Pupils are equal, round, and reactive to light.   Cardiovascular:      Rate and Rhythm: Normal rate and regular rhythm.   Pulmonary:      Comments: Wheezing noticed in all lobes  Abdominal:      General: Abdomen is flat. There is no distension.      Palpations: Abdomen is soft.      Tenderness: There is no abdominal tenderness.    Musculoskeletal:         General: Swelling (RUE swelling, facial swelling) present.   Skin:     General: Skin is warm and dry.      Comments: Grossly swollen RUE with facial swelling  Noted parotid gland mass, hard, nontender to palpation  RLE with chronic wounds (reportedly from cellulitis 2 years prior)   Neurological:      Mental Status: He is alert and oriented to person, place, and time.        Last Recorded Vitals  /65   Pulse 93   Resp (!) 21   Wt 97.1 kg (214 lb)   SpO2 96%     Relevant Results      Assessment/Plan   Assessment & Plan  Metastatic malignant neoplasm, unspecified site (Multi)    Assessment & Plan  Khoa Mitchell is a 70 yo male with PMHx of T2DM, CKD2 (baseline Cr 1.2) admitted for concern for SVC syndrome likely 2/2 to new diagnosis of metastatic cancer. In the MICU for airway watch, currently on 10L NC. CT head with multiple hyperdense lesions concerning for hemorrhagic conversion, NGSY following, pending repeat CT head. Started on ceftriaxone/azithro (9/8-*) for potential post-obstructive pneumonia.    NEUROLOGIC  #Multiple brain lesions  ::CT head performed, notable for multiple hyperdense lesions concerning for hemorrhagic metastases, largest [id number]  -Given likely metastatic cancer, concern for hemorrhagic transformation  -Repeat CT head after 6 hours to monitor  -MRI presurgical perfusion and fingerprinting brain, C/T/L w/ w/o contrast when able to lie flat  -Holding dvt ppx post-bleed day 2  -NSGY following, appreciate recs    CARDIOVASCULAR  #Pericardial effusion  Presumably malignant given extensive cancer hx  ::CT chest with pericardial effusion measuring 15mm in thickness  -Limited TTE to better characterize  -Currently HDS (other than respiratory status), CTM    PULMONARY  #Acute hypoxic respiratory failure  Likely 2/2 to SVC syndrome from presumed metastatic cancer with potential component of post-obstructive pneumonia given secretions  ::Baseline  RA  -Ceftriaxone/azithro (9/8-*)  -ENT following, appreciate recs    RENAL/  #Hyponatremia  Unclear etiology, potentially SIADH from cancer vs hypervolemic hyponatremia  ::Admission Na 133  ::Home salt tabs and water restriction, lasix 20mg per outpt nephro  -Serum osm, urine osm, urine lytes    #CKD2a  ::Baseline Cr 1.1-1.2  -At baseline, CTM    GASTROINTESTINAL  -No acute issues    ENDOCRINE  #T2DM  -Repeat A1C  -SSI q4h while NPO    HEME/ONC  #Metastatic cancer  #SVC syndrome  Unknown primary, potentially lung vs head/neck  ::CT imaging with multiple lesions - multiple brain mets, soft tissue mass in lung  ::CT AP with lesions in liver, spleen, both adrenal glands, pancreas, multiple peritoneal nodules, R gluteal mass, R acetabulum  ::CT head with multiple lesions  -Airway watch in the MICU  -Consider rad/onc consult on Monday for palliative radiation  -Will need diagnosis, likely through IR biopsy. May need onc input for likely primary to determine target    #Leukocytosis  ::Reactive from SVC vs aspiration event  -Fu Bcx, procalcitonin    INFECTIOUS DISEASE  #Concern for post-obstructive pneumonia  -Fu sputum cx, Bcx    F: none  E: replete PRN  N: NPO iso possible intervention  A: PIV  DVT ppx: SCDs, holding chemical iso potential hemorrhagic conversion  GI ppx: none    O2: 10L NC  Gtt: none  Abx: none    Code Status: Full code (confirmed on admission)  Surrogate Medical Decision-maker: no family members, wants Zita to help with decisions; no number in chart       Lianne Hanks MD

## 2024-09-08 NOTE — PROGRESS NOTES
Medical Intensive Care - Daily Progress Note   Subjective    Khoa Mitchell is a 69 y.o. year old male patient admitted on 9/7/2024 with following ICU needs: acute hypoxemic respiratory failure, worsening from severe metastatic burden requiring endotracheal intubation    Interval History:  Patient couldn't answer many questions this morning due to lethargy. Reported that he was mostly sleepy, denied being in any pain.     Meds    Scheduled medications  azithromycin, 500 mg, intravenous, q24h  cefTRIAXone, 1 g, intravenous, q24h  insulin lispro, 0-5 Units, subcutaneous, q4h  lidocaine, , ,   lidocaine PF, 40 mg, inhalation, Once  phenylephrine HCl in 0.9% NaCl, , ,       Continuous medications  dexmedeTOMIDine, 0.2-1.5 mcg/kg/hr, Last Rate: Stopped (09/08/24 1230)  fentaNYL, 0-300 mcg/hr, Last Rate: 25 mcg/hr (09/08/24 1245)  norepinephrine, 0-0.2 mcg/kg/min, Last Rate: 0.01 mcg/kg/min (09/08/24 1300)  propofol, 5-50 mcg/kg/min, Last Rate: 5 mcg/kg/min (09/08/24 1215)      PRN medications  PRN medications: dextrose, dextrose, glucagon, glucagon, lidocaine, oxygen, phenylephrine HCl in 0.9% NaCl     Objective    Blood pressure 134/67, pulse 86, temperature 35.8 °C (96.4 °F), resp. rate 21, height 1.829 m (6'), weight 97.1 kg (214 lb), SpO2 99%.     Physical Exam  Constitutional:       General: He is awake.      Appearance: He is obese.      Interventions: Nasal cannula in place.      Comments: Patient with nasal cannula running at 8 L/min this morning, reports that he was very sleepy. However, very pleasant overall.    HENT:      Head:      Salivary Glands: Right salivary gland is diffusely enlarged.      Comments: Significant plethora of the head including neck, face, and periorbital edema. Evidence of plum shaped mass on right jaw.   Cardiovascular:      Rate and Rhythm: Normal rate.      Heart sounds: Normal heart sounds, S1 normal and S2 normal.   Musculoskeletal:      Right upper arm: Swelling and edema present.       Left upper arm: Swelling and edema present.      Right lower le+ Pitting Edema present.      Left lower le+ Pitting Edema present.      Comments: The upper extremities are significantly edematous, hands are slightly cold to touch. There is also some noticeable asymmetry where right upper extremity is larger than left.    Lymphadenopathy:      Cervical:      Right cervical: No superficial cervical adenopathy.     Left cervical: No superficial cervical adenopathy.      Upper Body:      Right upper body: No supraclavicular adenopathy.      Left upper body: No supraclavicular adenopathy.      Comments: Couldn't fully appreciate given plethora of neck and head.    Neurological:      Mental Status: He is alert.   Psychiatric:         Behavior: Behavior is cooperative.            Intake/Output Summary (Last 24 hours) at 2024 1443  Last data filed at 2024 0900  Gross per 24 hour   Intake 300 ml   Output 0 ml   Net 300 ml     Labs:   Results from last 72 hours   Lab Units 24  0047 24  0927   SODIUM mmol/L 136 133*   POTASSIUM mmol/L 4.6 4.2   CHLORIDE mmol/L 93* 92*   CO2 mmol/L 36* 33*   BUN mg/dL 29* 28*   CREATININE mg/dL 0.98 1.08   GLUCOSE mg/dL 133* 152*   CALCIUM mg/dL 9.1 9.6   ANION GAP mmol/L 12 12   EGFR mL/min/1.73m*2 83 74   PHOSPHORUS mg/dL 4.4 3.8      Results from last 72 hours   Lab Units 24  0047 24  0927   WBC AUTO x10*3/uL 11.1 12.4*   HEMOGLOBIN g/dL 10.2* 11.4*   HEMATOCRIT % 32.8* 35.8*   PLATELETS AUTO x10*3/uL 273 298   NEUTROS PCT AUTO % 88.4 87.4   LYMPHS PCT AUTO % 3.1 3.9   MONOS PCT AUTO % 7.4 7.3   EOS PCT AUTO % 0.3 0.5      Results from last 72 hours   Lab Units 24  0924   POCT PH, ARTERIAL pH 7.44*   POCT PCO2, ARTERIAL mm Hg 55*   POCT PO2, ARTERIAL mm Hg 51*   POCT SO2, ARTERIAL % 85*     Results from last 72 hours   Lab Units 24  1359   POCT PH, VENOUS pH 7.27*   POCT PCO2, VENOUS mm Hg 77*   POCT PO2, VENOUS mm Hg 18*        Micro/ID:      Lab Results   Component Value Date    BLOODCULT Loaded on Instrument - Culture in progress 09/08/2024    BLOODCULT Loaded on Instrument - Culture in progress 09/08/2024       Summary of key imaging results from the last 24 hours  None.     Assessment and Plan     Assessment: Khoa Mitchell is a 69 y.o. year old male patient admitted on 9/7/2024 with acute hypoxemic respiratory failure, worsening from severe metastatic burden requiring endotracheal intubation    Mechanical Ventilation: none  Sedation/Analgesia:  none  Restraints: no    Summary for 09/08/24  :  Patient arrived on high flow nasal cannula at 8 L/min. Due to desaturation and profound respiratory secretions, patient was endotracheally intubated in the AM. Performed bronchoscopy and obtained tracheal aspirate washes for further studies.   Planning on determining best biopsy site for cancer diagnosis (small cell carcinoma versus lymphoma) for radiation/chemotherapy given SVC syndrome.     Plan:  NEUROLOGY/PSYCH:    #Multiple Ovoid Hyper-densities in Brain Parenchyma, c/f Brain Metastasis  :: His CT-head imaging notable for multiple ovoid lesions that are likely hemorrhagic, there is no concern for midline shift at the current moment. Neurosurgery consulted regarding further recommendations for lesions. Lesions are consistent with metastatic cancer of unknown primary. Will continue to hold anticoagulation for now.   - Consulted Neurosurgery, appreciate recommendations     #Sedation, Endotracheal Intubation  :: Intubation on 09/08-, sedation management as below  - Start Precedex gtt  - Start fentanyl gtt  - Start propofol gtt     CARDIOVASCULAR:    #Shock, Septic Shock from Bacteremia   :: Following intubation, patient's had drop in MAP to 50s,now requiring Levophed. Blood cultures draw now growing Streptococcus salivarius, will continue with current management.   - C/w Levophed   - Pending transthoracic echocardiogram     #Pericardial Effusion, 15-mm  ::  CT-imaging notable for pericardial effusion with largest diameter at 15-mm in thickness. Clinically, auscultation of heart sounds were normal. Imaging also notable for atherosclerotic changes of coronary arteries. Mildly elevated BNP indicates neoplastic burden on heart, pending transthoracic echocardiogram.   - Pending transthoracic echocardiogram     PULMONARY:    #Acute Hypoxemic Respiratory Failure d/t Metastatic [place] on Respiratory Tract Anatomy  #Superior Vena Cava Syndrome  :: Presented with respiratory distress requiring simple face mask at 8 L/min, subsequently endotracheally intubated on 09/08- due to increasing respiratory secretions and hypoxia despite nasal cannula.   :: Imaging most pertinently notable for significant neoplastic burden in left lung field, moderate left pleural effusion. Extensive left hilar encasement of aorta. Encasement of distal trachea and jose antonio, which overall correlates with his significant respiratory distress. Significant burden of neoplastic process explains his swelling.   :: Both otolaryngology and thoracic surgery consulted and recommend tissue biopsy, no surgical intervention.   :: Bronchoscopy on 09/08 notable for extensive secretions and pus, throat is significantly edematous. Procalcitonin elevated indicating infectious/inflammatory process in lungs. Pending further laboratory studies.   - Pending respiratory viral panel  - Pending fungal culture tracheal aspirate culture   - Pending respiratory tracheal aspirate culture   - Pending cytological analysis from tracheal aspirate  - Pending acid-fast stain from tracheal aspirate  - Pending Aspergillus galactomann   - Pending Pneumocystis jiroveci PCR     Vent Mode: Pressure control/assist control  FiO2 (%):  [52 %-100 %] 90 %  S RR:  [20] 20  S VT:  [500 mL] 500 mL  PEEP/CPAP (cm H2O):  [8 cm H20] 8 cm H20  MAP (cm H2O):  [15-16] 15     RENAL/GENITOURINARY:    #Hyponatremia, Asymptomatic, Mild, c/f Syndrome of  Innapropriate ADH Release   #Chronic Kidney Disease Stage 2  :: Previously hospitalized in 03/2022 for cellulitis and found to have hyponatremia and acute kidney injury. Since then, he has followed nephrology clinic for his mild hyponatremia. Last visit 01/2024 given salt tablet and Lasix.   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   - Pending urine osmolarity  - Pending urine electrolytes     GASTROENTEROLOGY:  There are no active issues.     ENDOCRINOLOGY:    #Diabetes Mellitus Type 2 (A1C 6.8% 09/2024)   :: His A1C on this admission within diabetic range, appears to not be on any home medications. Continue with sliding scale insulin now.   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  #Superior Vena Cava Syndrome   #Multi-Organ System Metastasis   :: Reported 9-month history of unintentional weight loss with cigarette smoking history. Imaging notable for profound left lung mass with compression of trachea/jose antonio. There are further metastatic lesions in the brain parenchyma, right parotid gland, cervical lymphadenopathy, right scapula, liver, spleen, adrenal glands bilaterally, pancreas, peritoneal nodules, right gluteal, and right acetabulum.   :: Unclear primary origin but small cell carcinoma is favoured, given smoking history, burden in left lung, and unique metastasis to the adrenal glands. Lymphoma could be favoured given lymph nodes involvement. If small cell, already M1C given significant metastatic burden.   :: Plan to engage medical oncology or interventional radiology to determine best site of biopsy.   - Plan to consult medical oncology or interventional radiology     #Anemia, Normocytic  :: Low hemoglobin levels on admission, but this is stable from prior admission in 2022.   - Continue to  monitor     SKIN:  There are no active issues.     MUSCULOSKELETAL:  There are no active issues.     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 Will continue patient on current antibiotic management plan and continue to follow blood cultures.   - C/w Rocephin 1 g Q24H IV   - C/w azithromycin 500 mg Q24H IV     ICU Check List       ICU Liberation: Intervention:   Assess, Prevent, Manage Pain CPOT 0 None   Both SAT and SBT [] SAT  [] SBT 30-60 min [] Extubate to NC  [] Extubate to NIV  [] Discuss Trach   Choice of analgesia and sedation RASS: -2  Fentanyl, Propofol, and Precedex None    Delirium: Assess, prevent and manage CAM - None    Early Mobility and Exercise  [x] PT /OT consult   Family Engagement and Empowerment []Family updated []SW consult     FEN  Fluids: PRN  Electrolytes: PRN  Nutrition: NPO  Prophylaxis:  DVT ppx: contraindicated   GI ppx: none  Bowel care: none   Access: Peripheral IV (left), endotracheal tube (09/08-)   Social:  Code: Full Code    HPOA: there is no HCPOA, only contact is girlfriend Zita Campbell [phone]  Disposition: Medicine ICU     Arthur Browne MD   09/08/24 at 2:43 PM     Disclaimer: Documentation completed with the information available at the time of input. The times in the chart may not be reflective of actual patient care times, interventions, or procedures. Documentation occurs after the physical care of the patient.

## 2024-09-08 NOTE — CONSULTS
Consults    Date of Service:  9/8/2024 Attending Provider:  Amadou Christopher MD;Vitaly James MD     Reason for Consultation:  Khoa Mitchell is being seen today for a consult requested by Amadou Christopher MD;Vitaly James MD for brain mets.      Subjective   History of Present Illness:  Khoa is a 69 y.o. male with h/o hyponatremia on salt tabs and lasix, CKD 2, tobacco use, RLE cellulitis p/w R face and arm swelling c/f SVC syndrome, CTH multiple hemorrhagic masses in LF, L parietooccipital and L temporal lobes, largest LF 2x2.7cm, CT CAP large L chest/central mass, probable SVC obstruction, extensive metastatic disease    Patient states he has been having R face and RUE swelling since Wednesday and has not worsened. He does not take any AP or AC. He reports seeing a doctor regularly for his Na levels and is compliant with salt tabs and lasix. He Denies dizziness, confusion, headache, double vision, blurry vision, n/v, numbness, weakness, tingling, imbalance.     Review of Systems   10 point ROS is obtained and negative except the ones mentioned in the HPI    Objective     Vitals:  Vitals:    09/07/24 2330   BP: 126/65   Pulse: 93   Resp: (!) 21   SpO2: 96%         Exam:  Constitutional: Sitting in chair   Resp: symmetric chest rise, on HFNC  Cardio: well perfused  GI: nondistended  MSK: limited ROM of RUE  Neuro: A&Ox3  Cranial Nerves II-XII: OU3R, EOMI, Face symmetric, Facial SILT, Palate/Tongue midline and symmetric, shoulder shrugs symmetric, hearing intact to finger rubs bilaterally  Motor: LUE 5/5 RUE prox 2 dist 4+ (limited d/t pain and edema)  LLE 5/5  RLE 5/5  Sensation: SILT throughout all extremities  No Hoffmans or Clonus, 1+ patellar  Psych: appropriate mood  Skin: R buccal edema, RUE edema, BLE pitting edema w/ skin discoloration    Medical History  Past Medical History:   Diagnosis Date    Other specified health status     No pertinent past medical history       Surgical History  Past Surgical  History:   Procedure Laterality Date    OTHER SURGICAL HISTORY  05/16/2022    No history of surgery        Medications  Current Outpatient Medications   Medication Instructions    acetaminophen (TYLENOL 8 HOUR) 650 mg, oral, Every 8 hours PRN, Do not crush, chew, or split.    furosemide (LASIX) 20 mg, oral, 2 times daily    ibuprofen 200 mg, oral, Every 8 hours PRN    mv-min/folic/K1/lycopen/lutein (CENTRUM SILVER MEN ORAL) 1 tablet, oral, Daily    sodium chloride 2 g, oral, 2 times daily        Diagnostic Results:    Lab Results   Component Value Date    WBC 12.4 (H) 09/07/2024    HGB 11.4 (L) 09/07/2024    HCT 35.8 (L) 09/07/2024    MCV 94 09/07/2024     09/07/2024     Lab Results   Component Value Date    CREATININE 1.08 09/07/2024    BUN 28 (H) 09/07/2024     (L) 09/07/2024    K 4.2 09/07/2024    CL 92 (L) 09/07/2024    CO2 33 (H) 09/07/2024     Lab Results   Component Value Date    INR 1.0 03/19/2022    PROTIME 11.2 03/19/2022       === 09/07/24 ===    CT SOFT TISSUE NECK W IV CONTRAST    - Impression -  1. Multiple soft tissue masses within the right parotid gland as  described above. Multiple enlarged cervical lymph nodes. Findings  highly concerning for malignancy either primary versus metastatic.  2. Large soft tissue mass in the left lung apex is partially imaged  extending to the anterior mediastinum with loss of fat plane with the  inferior margin of the thyroid gland. Please refer to same-day CTA  chest for detailed report of the lungs.  3. Additional findings as described above.      I personally reviewed the images/study and resident's interpretation  and I agree with the findings as stated by Neida Mcmullen MD (resident  radiologist). This study was analyzed and interpreted at [facility], [place], [place].    MACRO:  None    Signed by: Christina Reardon 9/7/2024 9:24 PM  Dictation workstation:   [id number]        Assessment/Plan   Assessment:  Khoa bueno  69 y.o. male with h/o hyponatremia on salt tabs and lasix, CKD 2, RLE cellulitis p/w R face and arm swelling c/f SVC syndrome, CTH multiple hemorrhagic masses in LF, L parietooccipital and L temporal lobes, largest LF 2x2.7cm, CT CAP extensive metastatic disease    Plan:  Please obtain repeat non-contrast CT head for stability of hemorrhagic lesions  Recommend MRI presurgical perfusion and fingerprinting when medically stable/able to lay flat  Consider MRI cervical thoracic and lumbar spine w/wo contrast to assess metastatic burden  Agree with IR for tissue biopsy  Recommend radiation oncology consult  Recommend discussion at tumor board  Hold dvt ppx at this time, ok to start on post-bleed day 2     Yusef Gurrola MD  Plan not finalized until note signed by attending

## 2024-09-08 NOTE — CONSULTS
"THORACIC SURGERY CONSULTATION NOTE    Khoa Mitchell   1955   [id number]     Inpatient consult to Thoracic Surgery  Consult performed by: Evangelina Saldana DO  Consult ordered by: Amadou Christopher MD      Reason For Consult  Newly found lung/mediastinal mass, SVC syndrome    History Of Present Illness  Khoa Mitchell is a 69 y.o. male with history of CKD, PVD who presented to [facility] today for worsening right arm and right facial swelling. Patient underwent CT imaging which demonstrated a large mass consistency with malignancy/neoplasm of the central/left upper chest with probable SVC obstruction, encasement and narrowing of the trachea/jose antonio, left chest wall invasion as well as evidence of presumed metastatic lesions throughout the abdomen. Patient was transferred to [facility] [place] for further evaluation. Patient reports that he has had issues with generalized swelling for months. He mostly has noticed the swelling about his face that has been getting worse. He developed \"frozen\" shoulder of his right arm a few weeks ago for which he has gotten massage and chiropractic therapy for. He started developing swelling of the right arm approximately 4 days ago. He was having difficulty with moving and he was encourage by his friends to be evaluated. Patient denies any issues like this previously. He does take lasix for chronic leg swelling. Denies history of breathing problems. Chronic smoker, smokes 1/2 pack per day for last 40 years. Denies fevers, chills, chest pain, or shortness of breath.       Past Medical History  Past Medical History:   Diagnosis Date    Other specified health status     No pertinent past medical history       Surgical History  Past Surgical History:   Procedure Laterality Date    OTHER SURGICAL HISTORY  05/16/2022    No history of surgery       Medications  Current Facility-Administered Medications on File Prior to Encounter   Medication Dose Route Frequency Provider Last Rate Last Admin    [COMPLETED] " iohexol (OMNIPaque) 350 mg iodine/mL solution 70 mL  70 mL intravenous Once in imaging Gregorio DICKSON Pakodion, DO   70 mL at 09/07/24 1109    [COMPLETED] LORazepam (Ativan) injection 0.5 mg  0.5 mg intravenous Once Gregorio DICKSON Pakodion, DO   0.5 mg at 09/07/24 1247    [DISCONTINUED] nicotine (Nicoderm CQ) 21 mg/24 hr patch 1 patch  1 patch transdermal Once Gregoriocatalino Marshall DO   1 patch at 09/07/24 1247    [DISCONTINUED] oxygen (O2) therapy   inhalation Continuous PRN - O2/gases Gregorio Marshall DO   3 L/min at 09/07/24 0932    [DISCONTINUED] oxygen (O2) therapy   inhalation Continuous PRN - O2/gases Gregorio Marshall, DO         Current Outpatient Medications on File Prior to Encounter   Medication Sig Dispense Refill    acetaminophen (Tylenol 8 HOUR) 650 mg ER tablet Take 1 tablet (650 mg) by mouth every 8 hours if needed for mild pain (1 - 3). Do not crush, chew, or split.      furosemide (Lasix) 20 mg tablet take 1 tablet by mouth twice a day 180 tablet 3    ibuprofen 200 mg tablet Take 1 tablet (200 mg) by mouth every 8 hours if needed for mild pain (1 - 3).      mv-min/folic/K1/lycopen/lutein (CENTRUM SILVER MEN ORAL) Take 1 tablet by mouth once daily.      sodium chloride 1,000 mg tablet Take 2 tablets (2 g) by mouth 2 times a day. 240 tablet 11       Allergies  Patient has no known allergies.     Social History  He reports that he has been smoking cigarettes. He has never used smokeless tobacco. He reports that he does not drink alcohol and does not use drugs.    Family History  No family history on file.     Review of Systems   Constitutional:  Negative for chills and fever.   Respiratory:  Positive for cough. Negative for chest tightness and shortness of breath.    Cardiovascular:  Positive for leg swelling. Negative for chest pain.   Gastrointestinal:  Negative for abdominal pain and nausea.   Genitourinary:  Negative for dysuria.   Neurological:  Negative for headaches.       Last Recorded  Vitals  Blood pressure 147/73, pulse 95, resp. rate 13, height 1.829 m (6'), weight 97.1 kg (214 lb), SpO2 (!) 92%.     Physical Exam  HENT:      Head: Atraumatic.      Comments: Palpable firm non-mobile mass of the right parotid that extends to below the jaw. Fullness of the right face and neck.  Eyes:      Extraocular Movements: Extraocular movements intact.   Neck:      Comments: Trachea midline  Cardiovascular:      Rate and Rhythm: Normal rate and regular rhythm.      Pulses: Normal pulses.   Pulmonary:      Effort: Pulmonary effort is normal.      Comments: On 8L nasal cannula. No increased work of breathing.   Abdominal:      Palpations: Abdomen is soft.      Tenderness: There is no abdominal tenderness.   Musculoskeletal:         General: Swelling present.      Cervical back: Neck supple.      Comments: Right arm swelling and decreased range of motion, palpable pulse, non-tender. Mild swelling of the left upper extremity. Mild bilateral lower extremity swelling.    Skin:     General: Skin is warm and dry.   Neurological:      General: No focal deficit present.      Mental Status: He is alert.          Relevant Results:  Labs:  Results for orders placed or performed during the hospital encounter of 09/07/24 (from the past 24 hour(s))   BLOOD GAS ARTERIAL   Result Value Ref Range    POCT pH, Arterial 7.44 (H) 7.38 - 7.42 pH    POCT pCO2, Arterial 55 (H) 38 - 42 mm Hg    POCT pO2, Arterial 51 (L) 85 - 95 mm Hg    POCT SO2, Arterial 85 (L) 94 - 100 %    POCT Oxy Hemoglobin, Arterial 79.3 (L) 94.0 - 98.0 %    POCT Base Excess, Arterial 11.1 (H) -2.0 - 3.0 mmol/L    POCT HCO3 Calculated, Arterial 37.4 (H) 22.0 - 26.0 mmol/L    Patient Temperature 37.0 degrees Celsius    FiO2 32 %   CBC and Auto Differential   Result Value Ref Range    WBC 12.4 (H) 4.4 - 11.3 x10*3/uL    nRBC 0.0 0.0 - 0.0 /100 WBCs    RBC 3.82 (L) 4.50 - 5.90 x10*6/uL    Hemoglobin 11.4 (L) 13.5 - 17.5 g/dL    Hematocrit 35.8 (L) 41.0 - 52.0 %     MCV 94 80 - 100 fL    MCH 29.8 26.0 - 34.0 pg    MCHC 31.8 (L) 32.0 - 36.0 g/dL    RDW 15.9 (H) 11.5 - 14.5 %    Platelets 298 150 - 450 x10*3/uL    Neutrophils % 87.4 40.0 - 80.0 %    Immature Granulocytes %, Automated 0.6 0.0 - 0.9 %    Lymphocytes % 3.9 13.0 - 44.0 %    Monocytes % 7.3 2.0 - 10.0 %    Eosinophils % 0.5 0.0 - 6.0 %    Basophils % 0.3 0.0 - 2.0 %    Neutrophils Absolute 10.85 (H) 1.20 - 7.70 x10*3/uL    Immature Granulocytes Absolute, Automated 0.07 0.00 - 0.70 x10*3/uL    Lymphocytes Absolute 0.49 (L) 1.20 - 4.80 x10*3/uL    Monocytes Absolute 0.91 0.10 - 1.00 x10*3/uL    Eosinophils Absolute 0.06 0.00 - 0.70 x10*3/uL    Basophils Absolute 0.04 0.00 - 0.10 x10*3/uL   Comprehensive Metabolic Panel   Result Value Ref Range    Glucose 152 (H) 74 - 99 mg/dL    Sodium 133 (L) 136 - 145 mmol/L    Potassium 4.2 3.5 - 5.3 mmol/L    Chloride 92 (L) 98 - 107 mmol/L    Bicarbonate 33 (H) 21 - 32 mmol/L    Anion Gap 12 10 - 20 mmol/L    Urea Nitrogen 28 (H) 6 - 23 mg/dL    Creatinine 1.08 0.50 - 1.30 mg/dL    eGFR 74 >60 mL/min/1.73m*2    Calcium 9.6 8.6 - 10.3 mg/dL    Albumin 3.7 3.4 - 5.0 g/dL    Alkaline Phosphatase 75 33 - 136 U/L    Total Protein 7.1 6.4 - 8.2 g/dL    AST 21 9 - 39 U/L    Bilirubin, Total 0.8 0.0 - 1.2 mg/dL    ALT 17 10 - 52 U/L   Lactate   Result Value Ref Range    Lactate 1.8 0.4 - 2.0 mmol/L   Blood Culture    Specimen: Peripheral Venipuncture; Blood culture   Result Value Ref Range    Blood Culture Loaded on Instrument - Culture in progress    Troponin I, High Sensitivity   Result Value Ref Range    Troponin I, High Sensitivity 10 0 - 20 ng/L   B-Type Natriuretic Peptide   Result Value Ref Range     (H) 0 - 99 pg/mL   Light Blue Top   Result Value Ref Range    Extra Tube Hold for add-ons.    SST TOP   Result Value Ref Range    Extra Tube Hold for add-ons.    Sars-CoV-2 PCR   Result Value Ref Range    Coronavirus 2019, PCR Not Detected Not Detected   Influenza A, and B PCR    Result Value Ref Range    Flu A Result Not Detected Not Detected    Flu B Result Not Detected Not Detected   Blood Culture    Specimen: Peripheral Venipuncture; Blood culture   Result Value Ref Range    Blood Culture Loaded on Instrument - Culture in progress    Urinalysis with Reflex Culture and Microscopic   Result Value Ref Range    Color, Urine Light-Yellow Light-Yellow, Yellow, Dark-Yellow    Appearance, Urine Clear Clear    Specific Gravity, Urine 1.019 1.005 - 1.035    pH, Urine 6.0 5.0, 5.5, 6.0, 6.5, 7.0, 7.5, 8.0    Protein, Urine 30 (1+) (A) NEGATIVE, 10 (TRACE), 20 (TRACE) mg/dL    Glucose, Urine Normal Normal mg/dL    Blood, Urine NEGATIVE NEGATIVE    Ketones, Urine NEGATIVE NEGATIVE mg/dL    Bilirubin, Urine NEGATIVE NEGATIVE    Urobilinogen, Urine Normal Normal mg/dL    Nitrite, Urine NEGATIVE NEGATIVE    Leukocyte Esterase, Urine NEGATIVE NEGATIVE   Urinalysis Microscopic   Result Value Ref Range    WBC, Urine 1-5 1-5, NONE /HPF    RBC, Urine 1-2 NONE, 1-2, 3-5 /HPF    Mucus, Urine FEW Reference range not established. /LPF    Hyaline Casts, Urine OCCASIONAL (A) NONE /LPF       Imaging:  CT angio chest for pulmonary embolism, CT abdomen pelvis w IV contrast  Narrative: Interpreted By:  Natali Daigle,   STUDY:  CT ABDOMEN PELVIS W IV CONTRAST; CT ANGIO CHEST FOR PULMONARY  EMBOLISM;  9/7/2024 11:09 am      INDICATION:  Signs/Symptoms:weight loss; Signs/Symptoms:dyspnea.          COMPARISON:  None.      ACCESSION NUMBER(S):  [id number]; [id number]      ORDERING CLINICIAN:  CARLOS PARR      TECHNIQUE:  CT of the chest, abdomen, and pelvis was performed. Sagittal and  coronal reconstructions were generated. CT chest performed with  pulmonary embolism protocol. Multiplanar reconstructions of the  pulmonary vessels were created on an independent workstation and  provided for review. 70 cc Omnipaque 350 intravenous contrast given  for the examination.      FINDINGS:  CHEST:              CHEST  WALL AND LOWER NECK: Superficial chest wall excluded from  imaging field. Several left axillary nodes measuring up to 1.8 cm.  Probable soft tissue edema. Multiple small opacified superficial  collateral vessels throughout the left chest.      MEDIASTINUM AND ANANT:  Extensive abnormal slightly heterogenous soft  tissue throughout the anterosuperior mediastinum, upper left chest  and left hilum encasing the aortic arch and great vessels as well as  the left hilar vessels. There is also encasement and probable  narrowing of the distal trachea and jose antonio. The brachiocephalic vein  and superior SVC are not separately definable. 2.5 cm subcarinal  node. 2.1 cm right hilar node. 1.4 cm right retrocrural node.      HEART AND VESSELS:  No central pulmonary artery filling defect/PE  however limited assessment for basilar/peripheral PE due to motion  artifact and timing of IV contrast bolus. There is abrupt cutoff of  the left brachiocephalic vein with no discernible superior SVC and  dense opacification of numerous small collateral vessels throughout  the left chest wall and along the diaphragm. The heart is normal in  size. Pericardial effusion measuring 15 mm in thickness anteriorly.  Multifocal atherosclerotic calcifications including the coronary  arteries.      LUNGS, PLEURA, LARGE AIRWAYS:  Again large central upper chest mass  involving the left hilum with opacification of the anterior/superior  left chest and probable nodular component extending into the lateral  left mid chest and through the lateral 4/5 rib interspace into the  anterior chest wall (image 68/158). Moderate left pleural effusion.  Patchy infiltrate or atelectasis in small remaining aerated segment  of the left lung base. Coarse infiltrates scattered in the right lung  base with air bronchograms. 12 mm right upper lobe nodule image  64/158. Small right pleural effusion. Again upper chest mass encases  and likely narrows the distal trachea/jose antonio and  main bronchi.      BONES: Probable lytic permeative lesion of the right scapula with  comminuted fracture and adjacent callus.          ABDOMEN/PELVIS:              ABDOMINAL ORGANS:      LIVER: Heterogenous 4.4 x 3.6 cm mass in the anterior/superior right  lobe image 28/167.      GALL BLADDER AND BILIARY TREE: No calcified gallstone. No significant  biliary dilatation.      SPLEEN: 4.8 cm ill-defined hypodense lesion in the inferior pole. 3.4  cm heterogenous lesion contiguous with or exophytic from the inferior  pole      PANCREAS: Slightly heterogenous masslike fullness of the inferior  head/uncinate process measuring approximately 3.7 cm image 58/167.  1.8 cm hypodense/cystic lesion in the anterior neck image 53/167. No  significant ductal dilatation.      ADRENALS: 3.9 x 2.7 cm left adrenal mass. 3.3 x 2.9 cm right adrenal  mass.      KIDNEYS AND URETERS: No renal mass or hydronephrosis within limits of  nephrogram phase images.      BOWEL: No abnormally dilated small bowel loops. Fecal residue in the  right colon. Rectum is distended with gas and fecal debris.      PERITONEUM, RETROPERITONEUM, NODES: Several peritoneal nodules for  example measuring 1.7 cm right mid abdomen image 82/167 and 2.1 cm  left mid abdomen image 72/167. 1.8 cm peripancreatic node image  59/167. 2.6 x 3.2 cm aortocaval node image 65/167 likely slightly  indenting/displacing the cava. No significant free fluid. No free air.      VESSELS:  Multifocal atherosclerotic calcifications. No abdominal  aortic aneurysm.      PELVIS: Urinary bladder is moderately distended without focal wall  thickening. No gross prostate enlargement. Partially imaged rounded  possible high-riding testis in the right scrotum on the most caudal  image.      ABDOMINAL WALL: 4.7 x 2.4 cm heterogenous subcutaneous nodule in the  upper right gluteal region image 97/167. Mild soft tissue edema. No  sizable abdominal wall hernia.      BONES: Approximately 4.7 cm lytic  lesion in the inferior right  acetabulum eroding the overlying acetabular cortex. Small associated  soft tissue component.      Impression: CHEST:      Heterogenous soft tissue consistent with malignancy/neoplasm  throughout the central/left upper chest with probable SVC  obstruction, encasement and narrowing of the trachea/jose antonio,  left  chest wall invasion. Multifocal likely metastatic lymphadenopathy and  permeative lytic lesion in the right scapula with pathologic  fracture. Further evaluation recommended.      No gross central PE identified. Limited assessment for  peripheral/basilar PE. Bibasilar infiltrates or atelectasis with  pleural effusions, left-greater-than-right.      Pericardial effusion.      ABDOMEN AND PELVIS:      Extensive malignancy/metastatic disease including presumed metastatic  lesions in the liver, spleen, both adrenal glands, and the pancreas,  multiple peritoneal nodules, soft tissue mass in the right gluteal  region and lytic probable metastatic lesion in the right acetabulum.      MACRO:  None.      Signed by: Natali Daigle 9/7/2024 11:56 AM  Dictation workstation:   [id number]      Assessment and Plan  Principal Problem:    Metastatic malignant neoplasm, unspecified site (Multi)    69 y.o. male who presented with facial and arm swelling with imaging showing malignancy/neoplasm through the upper chest, possible SVC syndrome, encasement and narrowing of the trachea/jose antonio, as well as evidence of presumed metastatic lesions throughout the abdomen.     Recommendations:  -No acute Thoracic surgical intervention at this time  -Consider steroids for symptom management  -Recommend IR consult for biopsy for tissue diagnosis  -Recommend Rad Onc consult for possible radiation  -Patient may benefit from early palliative care consult    Discussed with attending Dr. Kirk Saldana, DO - PGY4  Thoracic Surgery

## 2024-09-08 NOTE — CARE PLAN
Problem: Skin  Goal: Decreased wound size/increased tissue granulation at next dressing change  Outcome: Progressing  Flowsheets (Taken 9/8/2024 0819)  Decreased wound size/increased tissue granulation at next dressing change:   Promote sleep for wound healing   Protective dressings over bony prominences  Goal: Participates in plan/prevention/treatment measures  Outcome: Progressing  Flowsheets (Taken 9/8/2024 0819)  Participates in plan/prevention/treatment measures:   Discuss with provider PT/OT consult   Elevate heels   Increase activity/out of bed for meals  Goal: Prevent/manage excess moisture  Outcome: Progressing  Flowsheets (Taken 9/8/2024 0819)  Prevent/manage excess moisture:   Monitor for/manage infection if present   Moisturize dry skin  Goal: Prevent/minimize sheer/friction injuries  Outcome: Progressing  Flowsheets (Taken 9/8/2024 0819)  Prevent/minimize sheer/friction injuries:   Complete micro-shifts as needed if patient unable. Adjust patient position to relieve pressure points, not a full turn   Increase activity/out of bed for meals   Turn/reposition every 2 hours/use positioning/transfer devices   Use pull sheet   Utilize specialty bed per algorithm  Goal: Promote/optimize nutrition  Outcome: Progressing  Flowsheets (Taken 9/8/2024 0819)  Promote/optimize nutrition: Monitor/record intake including meals  Goal: Promote skin healing  Outcome: Progressing  Flowsheets (Taken 9/8/2024 0819)  Promote skin healing:   Assess skin/pad under line(s)/device(s)   Protective dressings over bony prominences   Turn/reposition every 2 hours/use positioning/transfer devices

## 2024-09-08 NOTE — PROCEDURES
Bronchoscopy Post-Procedure Note    The patient had appropriate oxygen, blood pressure, heart rate, and respiratory rate monitoring applied and monitored continuously throughout the procedure.  The bronchoscope was inserted into a pre-existing 8.5 endotracheal tube.  This was advanced to the jose antonio.  Large amount of thick green secretions  present in the trachea. Secretions were suctioned and cleared.  We surveyed the right side first.  There was a large amount of green secretions present in the right main bronchus and bronchus intermedius.  These were suctioned. We then surveyed the left side. Endobronchial lesion was seen in the distal LMSB obstruction > 50% of the upper lobe bronchus.  There was a large amount of green secretions present in the left main bronchus and left lower lobe bronchus. These were suctioned without complication.  The bronchoscope was then removed, and the procedure was terminated.  The patient tolerated the procedure well.

## 2024-09-09 ENCOUNTER — APPOINTMENT (OUTPATIENT)
Dept: CARDIOLOGY | Facility: HOSPITAL | Age: 69
End: 2024-09-09
Payer: MEDICARE

## 2024-09-09 VITALS
WEIGHT: 214 LBS | HEIGHT: 72 IN | RESPIRATION RATE: 21 BRPM | BODY MASS INDEX: 28.99 KG/M2 | HEART RATE: 85 BPM | OXYGEN SATURATION: 97 % | SYSTOLIC BLOOD PRESSURE: 96 MMHG | DIASTOLIC BLOOD PRESSURE: 56 MMHG | TEMPERATURE: 96.4 F

## 2024-09-09 PROBLEM — I45.9 HEART BLOCK: Status: ACTIVE | Noted: 2024-01-01

## 2024-09-09 PROBLEM — I31.39 PERICARDIAL EFFUSION (HHS-HCC): Status: ACTIVE | Noted: 2024-01-01

## 2024-09-09 LAB
ALBUMIN FLD-MCNC: 2.5 G/DL
ALBUMIN SERPL BCP-MCNC: 2.2 G/DL (ref 3.4–5)
ALBUMIN SERPL BCP-MCNC: 2.7 G/DL (ref 3.4–5)
ALBUMIN SERPL BCP-MCNC: 3 G/DL (ref 3.4–5)
ANION GAP BLDA CALCULATED.4IONS-SCNC: 8 MMO/L (ref 10–25)
ANION GAP SERPL CALC-SCNC: 16 MMOL/L (ref 10–20)
APTT PPP: 21 SECONDS (ref 27–38)
APTT PPP: 27 SECONDS (ref 27–38)
ATRIAL RATE: 96 BPM
BASE EXCESS BLDA CALC-SCNC: 5.8 MMOL/L (ref -2–3)
BASOPHILS # BLD AUTO: 0.01 X10*3/UL (ref 0–0.1)
BASOPHILS # BLD AUTO: 0.02 X10*3/UL (ref 0–0.1)
BASOPHILS NFR BLD AUTO: 0.1 %
BASOPHILS NFR BLD AUTO: 0.2 %
BASOPHILS NFR FLD MANUAL: 0 %
BILIRUB FLD-MCNC: 2.4 MG/DL
BLASTS NFR FLD MANUAL: 0 %
BODY TEMPERATURE: 37 DEGREES CELSIUS
BUN SERPL-MCNC: 23 MG/DL (ref 6–23)
BUN SERPL-MCNC: 32 MG/DL (ref 6–23)
BUN SERPL-MCNC: 33 MG/DL (ref 6–23)
CA-I BLDA-SCNC: 1.24 MMOL/L (ref 1.1–1.33)
CALCIUM SERPL-MCNC: 7.1 MG/DL (ref 8.6–10.6)
CALCIUM SERPL-MCNC: 8.9 MG/DL (ref 8.6–10.6)
CALCIUM SERPL-MCNC: 9.2 MG/DL (ref 8.6–10.6)
CHLORIDE BLDA-SCNC: 98 MMOL/L (ref 98–107)
CHLORIDE SERPL-SCNC: 104 MMOL/L (ref 98–107)
CHLORIDE SERPL-SCNC: 94 MMOL/L (ref 98–107)
CHLORIDE SERPL-SCNC: 96 MMOL/L (ref 98–107)
CHOLEST FLD-MCNC: 106 MG/DL
CLARITY FLD: ABNORMAL
CO2 SERPL-SCNC: 24 MMOL/L (ref 21–32)
CO2 SERPL-SCNC: 29 MMOL/L (ref 21–32)
CO2 SERPL-SCNC: 31 MMOL/L (ref 21–32)
COLOR FLD: ABNORMAL
CREAT SERPL-MCNC: 0.76 MG/DL (ref 0.5–1.3)
CREAT SERPL-MCNC: 0.95 MG/DL (ref 0.5–1.3)
CREAT SERPL-MCNC: 1.01 MG/DL (ref 0.5–1.3)
EGFRCR SERPLBLD CKD-EPI 2021: 80 ML/MIN/1.73M*2
EGFRCR SERPLBLD CKD-EPI 2021: 86 ML/MIN/1.73M*2
EGFRCR SERPLBLD CKD-EPI 2021: >90 ML/MIN/1.73M*2
EJECTION FRACTION APICAL 4 CHAMBER: 18.6
EJECTION FRACTION: 33 %
EOSINOPHIL # BLD AUTO: 0.01 X10*3/UL (ref 0–0.7)
EOSINOPHIL # BLD AUTO: 0.01 X10*3/UL (ref 0–0.7)
EOSINOPHIL # BLD AUTO: 0.03 X10*3/UL (ref 0–0.7)
EOSINOPHIL # BLD AUTO: 0.04 X10*3/UL (ref 0–0.7)
EOSINOPHIL NFR BLD AUTO: 0.1 %
EOSINOPHIL NFR BLD AUTO: 0.1 %
EOSINOPHIL NFR BLD AUTO: 0.2 %
EOSINOPHIL NFR BLD AUTO: 0.3 %
EOSINOPHIL NFR FLD MANUAL: 1 %
ERYTHROCYTE [DISTWIDTH] IN BLOOD BY AUTOMATED COUNT: 16 % (ref 11.5–14.5)
ERYTHROCYTE [DISTWIDTH] IN BLOOD BY AUTOMATED COUNT: 16.1 % (ref 11.5–14.5)
ERYTHROCYTE [DISTWIDTH] IN BLOOD BY AUTOMATED COUNT: 16.2 % (ref 11.5–14.5)
ERYTHROCYTE [DISTWIDTH] IN BLOOD BY AUTOMATED COUNT: 16.4 % (ref 11.5–14.5)
GLUCOSE BLD MANUAL STRIP-MCNC: 119 MG/DL (ref 74–99)
GLUCOSE BLD MANUAL STRIP-MCNC: 131 MG/DL (ref 74–99)
GLUCOSE BLD MANUAL STRIP-MCNC: 132 MG/DL (ref 74–99)
GLUCOSE BLD MANUAL STRIP-MCNC: 133 MG/DL (ref 74–99)
GLUCOSE BLD MANUAL STRIP-MCNC: 161 MG/DL (ref 74–99)
GLUCOSE BLDA-MCNC: 156 MG/DL (ref 74–99)
GLUCOSE FLD-MCNC: 122 MG/DL
GLUCOSE SERPL-MCNC: 109 MG/DL (ref 74–99)
GLUCOSE SERPL-MCNC: 140 MG/DL (ref 74–99)
GLUCOSE SERPL-MCNC: 147 MG/DL (ref 74–99)
HCO3 BLDA-SCNC: 29.8 MMOL/L (ref 22–26)
HCT VFR BLD AUTO: 26.1 % (ref 41–52)
HCT VFR BLD AUTO: 28.8 % (ref 41–52)
HCT VFR BLD AUTO: 28.8 % (ref 41–52)
HCT VFR BLD AUTO: 29.6 % (ref 41–52)
HCT VFR BLD EST: 30 % (ref 41–52)
HGB BLD-MCNC: 8.2 G/DL (ref 13.5–17.5)
HGB BLD-MCNC: 9.4 G/DL (ref 13.5–17.5)
HGB BLD-MCNC: 9.5 G/DL (ref 13.5–17.5)
HGB BLD-MCNC: 9.6 G/DL (ref 13.5–17.5)
HGB BLDA-MCNC: 9.9 G/DL (ref 13.5–17.5)
HOLD SPECIMEN: NORMAL
IMM GRANULOCYTES # BLD AUTO: 0.06 X10*3/UL (ref 0–0.7)
IMM GRANULOCYTES # BLD AUTO: 0.06 X10*3/UL (ref 0–0.7)
IMM GRANULOCYTES # BLD AUTO: 0.09 X10*3/UL (ref 0–0.7)
IMM GRANULOCYTES # BLD AUTO: 0.09 X10*3/UL (ref 0–0.7)
IMM GRANULOCYTES NFR BLD AUTO: 0.5 % (ref 0–0.9)
IMM GRANULOCYTES NFR BLD AUTO: 0.6 % (ref 0–0.9)
IMM GRANULOCYTES NFR BLD AUTO: 0.7 % (ref 0–0.9)
IMM GRANULOCYTES NFR BLD AUTO: 0.8 % (ref 0–0.9)
IMMATURE GRANULOCYTES IN FLUID: 0 %
INHALED O2 CONCENTRATION: 100 %
INR PPP: 1.3 (ref 0.9–1.1)
INR PPP: 1.3 (ref 0.9–1.1)
LACTATE BLDA-SCNC: 1.5 MMOL/L (ref 0.4–2)
LACTATE BLDV-SCNC: 3.4 MMOL/L (ref 0.4–2)
LACTATE SERPL-SCNC: 1.4 MMOL/L (ref 0.4–2)
LDH FLD L TO P-CCNC: 315 U/L
LEFT VENTRICULAR OUTFLOW TRACT DIAMETER: 2.1 CM
LYMPHOCYTES # BLD AUTO: 0.26 X10*3/UL (ref 1.2–4.8)
LYMPHOCYTES # BLD AUTO: 0.29 X10*3/UL (ref 1.2–4.8)
LYMPHOCYTES # BLD AUTO: 0.37 X10*3/UL (ref 1.2–4.8)
LYMPHOCYTES # BLD AUTO: 0.38 X10*3/UL (ref 1.2–4.8)
LYMPHOCYTES NFR BLD AUTO: 2.5 %
LYMPHOCYTES NFR BLD AUTO: 2.8 %
LYMPHOCYTES NFR BLD AUTO: 3 %
LYMPHOCYTES NFR BLD AUTO: 3.3 %
LYMPHOCYTES NFR FLD MANUAL: 16 %
MAGNESIUM SERPL-MCNC: 1.64 MG/DL (ref 1.6–2.4)
MAGNESIUM SERPL-MCNC: 2.38 MG/DL (ref 1.6–2.4)
MAGNESIUM SERPL-MCNC: 2.51 MG/DL (ref 1.6–2.4)
MCH RBC QN AUTO: 29.5 PG (ref 26–34)
MCH RBC QN AUTO: 29.6 PG (ref 26–34)
MCH RBC QN AUTO: 29.8 PG (ref 26–34)
MCH RBC QN AUTO: 30.6 PG (ref 26–34)
MCHC RBC AUTO-ENTMCNC: 31.4 G/DL (ref 32–36)
MCHC RBC AUTO-ENTMCNC: 32.1 G/DL (ref 32–36)
MCHC RBC AUTO-ENTMCNC: 32.6 G/DL (ref 32–36)
MCHC RBC AUTO-ENTMCNC: 33.3 G/DL (ref 32–36)
MCV RBC AUTO: 89 FL (ref 80–100)
MCV RBC AUTO: 92 FL (ref 80–100)
MCV RBC AUTO: 94 FL (ref 80–100)
MCV RBC AUTO: 95 FL (ref 80–100)
MONOCYTES # BLD AUTO: 0.74 X10*3/UL (ref 0.1–1)
MONOCYTES # BLD AUTO: 0.85 X10*3/UL (ref 0.1–1)
MONOCYTES # BLD AUTO: 0.94 X10*3/UL (ref 0.1–1)
MONOCYTES # BLD AUTO: 1.1 X10*3/UL (ref 0.1–1)
MONOCYTES NFR BLD AUTO: 7.5 %
MONOCYTES NFR BLD AUTO: 7.9 %
MONOCYTES NFR BLD AUTO: 8 %
MONOCYTES NFR BLD AUTO: 8.8 %
MONOS+MACROS NFR FLD MANUAL: 28 %
NEUTROPHILS # BLD AUTO: 10.43 X10*3/UL (ref 1.2–7.7)
NEUTROPHILS # BLD AUTO: 10.92 X10*3/UL (ref 1.2–7.7)
NEUTROPHILS # BLD AUTO: 8.28 X10*3/UL (ref 1.2–7.7)
NEUTROPHILS # BLD AUTO: 9.97 X10*3/UL (ref 1.2–7.7)
NEUTROPHILS NFR BLD AUTO: 87.1 %
NEUTROPHILS NFR BLD AUTO: 88.3 %
NEUTROPHILS NFR BLD AUTO: 88.4 %
NEUTROPHILS NFR BLD AUTO: 88.4 %
NEUTROPHILS NFR FLD MANUAL: 49 %
NRBC BLD-RTO: 0 /100 WBCS (ref 0–0)
OTHER CELLS NFR FLD MANUAL: 6 %
OXYHGB MFR BLDA: 95.9 % (ref 94–98)
P AXIS: 74 DEGREES
P OFFSET: 194 MS
P ONSET: 136 MS
PCO2 BLDA: 40 MM HG (ref 38–42)
PH BLDA: 7.48 PH (ref 7.38–7.42)
PHOSPHATE SERPL-MCNC: 2.6 MG/DL (ref 2.5–4.9)
PHOSPHATE SERPL-MCNC: 3.2 MG/DL (ref 2.5–4.9)
PHOSPHATE SERPL-MCNC: 3.9 MG/DL (ref 2.5–4.9)
PLASMA CELLS NFR FLD MANUAL: 0 %
PLATELET # BLD AUTO: 204 X10*3/UL (ref 150–450)
PLATELET # BLD AUTO: 226 X10*3/UL (ref 150–450)
PLATELET # BLD AUTO: 245 X10*3/UL (ref 150–450)
PLATELET # BLD AUTO: 268 X10*3/UL (ref 150–450)
PO2 BLDA: 93 MM HG (ref 85–95)
POTASSIUM BLDA-SCNC: 4.1 MMOL/L (ref 3.5–5.3)
POTASSIUM FLD-SCNC: 4.4 MMOL/L
POTASSIUM SERPL-SCNC: 3.7 MMOL/L (ref 3.5–5.3)
POTASSIUM SERPL-SCNC: 4.3 MMOL/L (ref 3.5–5.3)
POTASSIUM SERPL-SCNC: 4.4 MMOL/L (ref 3.5–5.3)
PR INTERVAL: 148 MS
PROT FLD-MCNC: 4.3 G/DL
PROTHROMBIN TIME: 14.3 SECONDS (ref 9.8–12.8)
PROTHROMBIN TIME: 14.5 SECONDS (ref 9.8–12.8)
Q ONSET: 210 MS
QRS COUNT: 16 BEATS
QRS DURATION: 162 MS
QT INTERVAL: 418 MS
QTC CALCULATION(BAZETT): 528 MS
QTC FREDERICIA: 489 MS
R AXIS: 67 DEGREES
RBC # BLD AUTO: 2.75 X10*6/UL (ref 4.5–5.9)
RBC # BLD AUTO: 3.07 X10*6/UL (ref 4.5–5.9)
RBC # BLD AUTO: 3.22 X10*6/UL (ref 4.5–5.9)
RBC # BLD AUTO: 3.24 X10*6/UL (ref 4.5–5.9)
RBC # FLD MANUAL: ABNORMAL /UL
SAO2 % BLDA: 99 % (ref 94–100)
SODIUM BLDA-SCNC: 132 MMOL/L (ref 136–145)
SODIUM FLD-SCNC: 136 MMOL/L
SODIUM SERPL-SCNC: 137 MMOL/L (ref 136–145)
SODIUM SERPL-SCNC: 137 MMOL/L (ref 136–145)
SODIUM SERPL-SCNC: 140 MMOL/L (ref 136–145)
T AXIS: -52 DEGREES
T OFFSET: 419 MS
TOTAL CELLS COUNTED FLD: 100
UREA NIT FLD-MCNC: 32 MG/DL
VENTRICULAR RATE: 96 BPM
WBC # BLD AUTO: 11.3 X10*3/UL (ref 4.4–11.3)
WBC # BLD AUTO: 11.8 X10*3/UL (ref 4.4–11.3)
WBC # BLD AUTO: 12.5 X10*3/UL (ref 4.4–11.3)
WBC # BLD AUTO: 9.4 X10*3/UL (ref 4.4–11.3)
WBC # FLD MANUAL: 4365 /UL

## 2024-09-09 PROCEDURE — 82042 OTHER SOURCE ALBUMIN QUAN EA: CPT | Performed by: STUDENT IN AN ORGANIZED HEALTH CARE EDUCATION/TRAINING PROGRAM

## 2024-09-09 PROCEDURE — 84520 ASSAY OF UREA NITROGEN: CPT | Performed by: STUDENT IN AN ORGANIZED HEALTH CARE EDUCATION/TRAINING PROGRAM

## 2024-09-09 PROCEDURE — 83540 ASSAY OF IRON: CPT

## 2024-09-09 PROCEDURE — 37799 UNLISTED PX VASCULAR SURGERY: CPT

## 2024-09-09 PROCEDURE — 33210 INSERT ELECTRD/PM CATH SNGL: CPT

## 2024-09-09 PROCEDURE — 88112 CYTOPATH CELL ENHANCE TECH: CPT | Performed by: PATHOLOGY

## 2024-09-09 PROCEDURE — 99222 1ST HOSP IP/OBS MODERATE 55: CPT | Performed by: INTERNAL MEDICINE

## 2024-09-09 PROCEDURE — 84302 ASSAY OF SWEAT SODIUM: CPT | Performed by: STUDENT IN AN ORGANIZED HEALTH CARE EDUCATION/TRAINING PROGRAM

## 2024-09-09 PROCEDURE — 93321 DOPPLER ECHO F-UP/LMTD STD: CPT | Performed by: INTERNAL MEDICINE

## 2024-09-09 PROCEDURE — 87070 CULTURE OTHR SPECIMN AEROBIC: CPT | Performed by: STUDENT IN AN ORGANIZED HEALTH CARE EDUCATION/TRAINING PROGRAM

## 2024-09-09 PROCEDURE — 84133 ASSAY OF URINE POTASSIUM: CPT | Performed by: STUDENT IN AN ORGANIZED HEALTH CARE EDUCATION/TRAINING PROGRAM

## 2024-09-09 PROCEDURE — 83605 ASSAY OF LACTIC ACID: CPT | Performed by: STUDENT IN AN ORGANIZED HEALTH CARE EDUCATION/TRAINING PROGRAM

## 2024-09-09 PROCEDURE — 82945 GLUCOSE OTHER FLUID: CPT | Performed by: STUDENT IN AN ORGANIZED HEALTH CARE EDUCATION/TRAINING PROGRAM

## 2024-09-09 PROCEDURE — 33016 PERICARDIOCENTESIS W/IMAGING: CPT

## 2024-09-09 PROCEDURE — 89050 BODY FLUID CELL COUNT: CPT | Performed by: STUDENT IN AN ORGANIZED HEALTH CARE EDUCATION/TRAINING PROGRAM

## 2024-09-09 PROCEDURE — 80069 RENAL FUNCTION PANEL: CPT

## 2024-09-09 PROCEDURE — 0W9D3ZZ DRAINAGE OF PERICARDIAL CAVITY, PERCUTANEOUS APPROACH: ICD-10-PCS | Performed by: STUDENT IN AN ORGANIZED HEALTH CARE EDUCATION/TRAINING PROGRAM

## 2024-09-09 PROCEDURE — 88305 TISSUE EXAM BY PATHOLOGIST: CPT | Performed by: PATHOLOGY

## 2024-09-09 PROCEDURE — 85025 COMPLETE CBC W/AUTO DIFF WBC: CPT

## 2024-09-09 PROCEDURE — 87205 SMEAR GRAM STAIN: CPT | Performed by: STUDENT IN AN ORGANIZED HEALTH CARE EDUCATION/TRAINING PROGRAM

## 2024-09-09 PROCEDURE — 94003 VENT MGMT INPAT SUBQ DAY: CPT

## 2024-09-09 PROCEDURE — 2500000004 HC RX 250 GENERAL PHARMACY W/ HCPCS (ALT 636 FOR OP/ED)

## 2024-09-09 PROCEDURE — 85610 PROTHROMBIN TIME: CPT

## 2024-09-09 PROCEDURE — 93010 ELECTROCARDIOGRAM REPORT: CPT | Performed by: INTERNAL MEDICINE

## 2024-09-09 PROCEDURE — 83735 ASSAY OF MAGNESIUM: CPT | Performed by: STUDENT IN AN ORGANIZED HEALTH CARE EDUCATION/TRAINING PROGRAM

## 2024-09-09 PROCEDURE — 82247 BILIRUBIN TOTAL: CPT | Performed by: STUDENT IN AN ORGANIZED HEALTH CARE EDUCATION/TRAINING PROGRAM

## 2024-09-09 PROCEDURE — 83735 ASSAY OF MAGNESIUM: CPT

## 2024-09-09 PROCEDURE — 36415 COLL VENOUS BLD VENIPUNCTURE: CPT

## 2024-09-09 PROCEDURE — 93321 DOPPLER ECHO F-UP/LMTD STD: CPT

## 2024-09-09 PROCEDURE — 84132 ASSAY OF SERUM POTASSIUM: CPT | Performed by: STUDENT IN AN ORGANIZED HEALTH CARE EDUCATION/TRAINING PROGRAM

## 2024-09-09 PROCEDURE — 82465 ASSAY BLD/SERUM CHOLESTEROL: CPT | Performed by: STUDENT IN AN ORGANIZED HEALTH CARE EDUCATION/TRAINING PROGRAM

## 2024-09-09 PROCEDURE — 2500000005 HC RX 250 GENERAL PHARMACY W/O HCPCS

## 2024-09-09 PROCEDURE — 5A2204Z RESTORATION OF CARDIAC RHYTHM, SINGLE: ICD-10-PCS

## 2024-09-09 PROCEDURE — 82728 ASSAY OF FERRITIN: CPT

## 2024-09-09 PROCEDURE — 85610 PROTHROMBIN TIME: CPT | Performed by: STUDENT IN AN ORGANIZED HEALTH CARE EDUCATION/TRAINING PROGRAM

## 2024-09-09 PROCEDURE — 84157 ASSAY OF PROTEIN OTHER: CPT | Performed by: STUDENT IN AN ORGANIZED HEALTH CARE EDUCATION/TRAINING PROGRAM

## 2024-09-09 PROCEDURE — 99291 CRITICAL CARE FIRST HOUR: CPT

## 2024-09-09 PROCEDURE — 2500000005 HC RX 250 GENERAL PHARMACY W/O HCPCS: Performed by: INTERNAL MEDICINE

## 2024-09-09 PROCEDURE — 88104 CYTOPATH FL NONGYN SMEARS: CPT | Performed by: PATHOLOGY

## 2024-09-09 PROCEDURE — 37799 UNLISTED PX VASCULAR SURGERY: CPT | Performed by: STUDENT IN AN ORGANIZED HEALTH CARE EDUCATION/TRAINING PROGRAM

## 2024-09-09 PROCEDURE — 88112 CYTOPATH CELL ENHANCE TECH: CPT | Mod: TC,MCY | Performed by: STUDENT IN AN ORGANIZED HEALTH CARE EDUCATION/TRAINING PROGRAM

## 2024-09-09 PROCEDURE — 33017 PRCRD DRG 6YR+ W/O CGEN CAR: CPT

## 2024-09-09 PROCEDURE — 71045 X-RAY EXAM CHEST 1 VIEW: CPT | Performed by: RADIOLOGY

## 2024-09-09 PROCEDURE — 2720000007 HC OR 272 NO HCPCS

## 2024-09-09 PROCEDURE — 2500000001 HC RX 250 WO HCPCS SELF ADMINISTERED DRUGS (ALT 637 FOR MEDICARE OP)

## 2024-09-09 PROCEDURE — 85730 THROMBOPLASTIN TIME PARTIAL: CPT

## 2024-09-09 PROCEDURE — 85025 COMPLETE CBC W/AUTO DIFF WBC: CPT | Performed by: STUDENT IN AN ORGANIZED HEALTH CARE EDUCATION/TRAINING PROGRAM

## 2024-09-09 PROCEDURE — 2020000001 HC ICU ROOM DAILY

## 2024-09-09 PROCEDURE — C1756 CATH, PACING, TRANSESOPH: HCPCS

## 2024-09-09 PROCEDURE — 82947 ASSAY GLUCOSE BLOOD QUANT: CPT

## 2024-09-09 PROCEDURE — C1729 CATH, DRAINAGE: HCPCS

## 2024-09-09 PROCEDURE — 83615 LACTATE (LD) (LDH) ENZYME: CPT | Performed by: STUDENT IN AN ORGANIZED HEALTH CARE EDUCATION/TRAINING PROGRAM

## 2024-09-09 PROCEDURE — 93308 TTE F-UP OR LMTD: CPT | Performed by: INTERNAL MEDICINE

## 2024-09-09 RX ORDER — PHENYLEPHRINE 10 MG/250 ML(40 MCG/ML)IN 0.9 % SOD.CHLORIDE INTRAVENOUS
0-2 CONTINUOUS
Status: DISCONTINUED | OUTPATIENT
Start: 2024-09-09 | End: 2024-09-10

## 2024-09-09 RX ORDER — LIDOCAINE HYDROCHLORIDE 10 MG/ML
INJECTION, SOLUTION EPIDURAL; INFILTRATION; INTRACAUDAL; PERINEURAL AS NEEDED
Status: DISCONTINUED | OUTPATIENT
Start: 2024-09-09 | End: 2024-09-09 | Stop reason: HOSPADM

## 2024-09-09 RX ORDER — POTASSIUM CHLORIDE 1.5 G/1.58G
20 POWDER, FOR SOLUTION ORAL ONCE
Status: COMPLETED | OUTPATIENT
Start: 2024-09-09 | End: 2024-09-09

## 2024-09-09 RX ORDER — DOPAMINE HYDROCHLORIDE 160 MG/100ML
INJECTION, SOLUTION INTRAVENOUS CONTINUOUS PRN
Status: DISCONTINUED | OUTPATIENT
Start: 2024-09-09 | End: 2024-09-09 | Stop reason: HOSPADM

## 2024-09-09 RX ORDER — DIGOXIN 0.25 MG/ML
125 INJECTION INTRAMUSCULAR; INTRAVENOUS ONCE
Status: DISCONTINUED | OUTPATIENT
Start: 2024-09-10 | End: 2024-09-09

## 2024-09-09 RX ORDER — MAGNESIUM SULFATE HEPTAHYDRATE 40 MG/ML
2 INJECTION, SOLUTION INTRAVENOUS ONCE
Status: COMPLETED | OUTPATIENT
Start: 2024-09-09 | End: 2024-09-09

## 2024-09-09 RX ORDER — PHENYLEPHRINE HCL IN 0.9% NACL 0.4MG/10ML
SYRINGE (ML) INTRAVENOUS
Status: DISPENSED
Start: 2024-09-09 | End: 2024-09-10

## 2024-09-09 RX ORDER — DIGOXIN 0.25 MG/ML
250 INJECTION INTRAMUSCULAR; INTRAVENOUS ONCE
Status: DISCONTINUED | OUTPATIENT
Start: 2024-09-09 | End: 2024-09-09

## 2024-09-09 RX ADMIN — Medication 25 MCG/HR: at 17:28

## 2024-09-09 RX ADMIN — CEFTRIAXONE SODIUM 1 G: 1 INJECTION, SOLUTION INTRAVENOUS at 03:39

## 2024-09-09 RX ADMIN — MAGNESIUM SULFATE HEPTAHYDRATE 2 G: 40 INJECTION, SOLUTION INTRAVENOUS at 08:10

## 2024-09-09 RX ADMIN — PROPOFOL 10 MCG/KG/MIN: 10 INJECTION, EMULSION INTRAVENOUS at 08:10

## 2024-09-09 RX ADMIN — POTASSIUM CHLORIDE 20 MEQ: 1.5 POWDER, FOR SOLUTION ORAL at 08:10

## 2024-09-09 RX ADMIN — AMIODARONE HYDROCHLORIDE 1 MG/MIN: 1.8 INJECTION, SOLUTION INTRAVENOUS at 22:17

## 2024-09-09 RX ADMIN — AZITHROMYCIN 500 MG: 500 INJECTION, POWDER, LYOPHILIZED, FOR SOLUTION INTRAVENOUS at 04:19

## 2024-09-09 RX ADMIN — SODIUM CHLORIDE, POTASSIUM CHLORIDE, SODIUM LACTATE AND CALCIUM CHLORIDE 500 ML: 600; 310; 30; 20 INJECTION, SOLUTION INTRAVENOUS at 02:14

## 2024-09-09 RX ADMIN — AMIODARONE HYDROCHLORIDE 300 MG: 1.5 INJECTION, SOLUTION INTRAVENOUS at 22:30

## 2024-09-09 RX ADMIN — Medication 45 PERCENT: at 07:50

## 2024-09-09 RX ADMIN — SODIUM CHLORIDE, POTASSIUM CHLORIDE, SODIUM LACTATE AND CALCIUM CHLORIDE 1000 ML: 600; 310; 30; 20 INJECTION, SOLUTION INTRAVENOUS at 22:18

## 2024-09-09 RX ADMIN — SODIUM CHLORIDE, SODIUM LACTATE, POTASSIUM CHLORIDE, AND CALCIUM CHLORIDE 500 ML: 600; 310; 30; 20 INJECTION, SOLUTION INTRAVENOUS at 09:00

## 2024-09-09 ASSESSMENT — PAIN - FUNCTIONAL ASSESSMENT
PAIN_FUNCTIONAL_ASSESSMENT: CPOT (CRITICAL CARE PAIN OBSERVATION TOOL)
PAIN_FUNCTIONAL_ASSESSMENT: CPOT (CRITICAL CARE PAIN OBSERVATION TOOL)

## 2024-09-09 ASSESSMENT — PAIN SCALES - GENERAL: PAINLEVEL_OUTOF10: 0 - NO PAIN

## 2024-09-09 NOTE — PROGRESS NOTES
Communication Note    Patient Name: Khoa Mitchell  MRN: [id number]  Today's Date: 9/9/2024   Room: 20/20-A    Discipline: Physical Therapy      Missed Visit: Yes  Missed Visit Reason:  (PT evaluation received and reviewed, patient with MAPS in 50's and receiving bedside ECHO.  Will monitor and follow up as medically appropriate.)      09/09/24 at 10:16 AM   Emir Duffy, PT   Rehab Office: [phone]

## 2024-09-09 NOTE — POST-PROCEDURE NOTE
Physician Transition of Care Summary  Invasive Cardiovascular Lab    Procedure Date: 9/9/2024  Attending:    * Invasive Cardiologist Valencia - Primary  Resident/Fellow/Other Assistant: Surgeons and Role:     * González Winslow MD - Fellow    Indications:   Pre-op Diagnosis      * Metastatic malignant neoplasm, unspecified site (Multi) [C79.9]     * Pericardial effusion ([facility]) [I31.39]    Post-procedure diagnosis:   Post-op Diagnosis     * Metastatic malignant neoplasm, unspecified site (Multi) [C79.9]     * Pericardial effusion ([facility]) [I31.39]    Procedure(s):   Pericardiocentesis  28790 - MD PERICARDIOCENTESIS W/IMG GUIDANCE WHEN PERFORMED        Procedure Findings:     Pericardiocentesis- subcostal approach, 400 ml serous drainage. Pericardial drain in place, to gravity. Labs sent. Echocardiogram used during and post procedure, with resolution of tamponade.     Complications:    None    Stents/Implants:   Implants       No implant documentation for this case.            Anticoagulation/Antiplatelet Plan:   None    Estimated Blood Loss:   5 mL    Anesthesia: Moderate Sedation Anesthesia Staff: No anesthesia staff entered.    Any Specimen(s) Removed:   No specimens collected during this procedure.    Disposition:     MICU      Electronically signed by: Tray Faulkner MD, 9/9/2024 1:45 PM

## 2024-09-09 NOTE — PROGRESS NOTES
Occupational Therapy    Communication Note    Patient Name: Khoa Mitchell  MRN: [id number]  Today's Date: 9/9/2024   Room: 14/14-A    Discipline: Occupational Therapy      Missed Visit: Yes  Missed Visit Reason:  (Patient transferred to CICU from MICU; will attempt OT next visit as appropriate.)      09/09/24 at 3:08 PM   Dona Seymour OT   Rehab Office: [phone]

## 2024-09-09 NOTE — PROGRESS NOTES
SOCIAL WORK NOTE   Travon , Tamar ([phone]) was provided updates. Social work to follow.  DOUG Childress, LISW-S ([id number])

## 2024-09-09 NOTE — CONSULTS
Nutrition Initial Assessment:   Nutrition Assessment    Reason for Assessment: Admission nursing screening  MST= 5    Patient is a 69 y.o. male presenting with R-sided facial and shoulder swelling.  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  He is intubated, sedated with fentanyl and propofol.  Per RN, plan is for biopsy today of mass so patient will stay intubated for the biopsy.    Past medical history includes T2DM, CKD2 (baseline Cr 1.2)     Pt has an OGT in place for enteral access.  Propofol rate is 5.8mls/hr which provides 139kcals daily from fat.  RN reports that pt was struggling with coughing/swallowing difficulty pre-intubation so will likely benefit from a SLP eval once extubated.        Anthropometrics:  Height: 182.9 cm (6')   Weight: 99.2 kg (218 lb 11.1 oz)   BMI (Calculated): 29.65  IBW/kg (Dietitian Calculated): 81 kg  Percent of IBW: 122 %     Weight History:   Wt Readings from Last 4 Encounters:   09/09/24 99.2 kg (218 lb 11.1 oz)   09/07/24 96.6 kg (213 lb)   01/15/24 114 kg (252 lb 3.2 oz)   01/13/23 105 kg (231 lb 6.4 oz)     H&P notes a 30-40# weight loss since Jan 2024.  Per EMR records, this would be a loss of 13% in about 8 months.      Weight Change %:       Nutrition Focused Physical Exam Findings:  Pt appears to be swollen all over his body.    Subcutaneous Fat Loss:   Orbital Fat Pads: Well nourished (slightly bulging fat pads)  Buccal Fat Pads: Well nourished (full, rounded cheeks)  Triceps: Well nourished (ample fat tissue)  Muscle Wasting:  Temporalis: Well nourished (well-defined muscle)  Pectoralis (Clavicular Region): Well nourished (clavicle not visible)  Deltoid/Trapezius: Well nourished (rounded appearance at arm, shoulder, neck)  Quadriceps: Well nourished (well developed, well rounded)  Gastrocnemius: Well nourished (well developed bulbous muscle)  Edema:  Edema: +2 mild  Edema Location: generalized  Physical Findings:  Skin: Negative  "(although noted that pt's legs are red or very tan)    Nutrition Significant Labs:  BMP Trend:   Results from last 7 days   Lab Units 09/09/24  0342 09/08/24 1747 09/08/24  0047 09/07/24  0927   GLUCOSE mg/dL 109* 117* 133* 152*   CALCIUM mg/dL 7.1* 9.8 9.1 9.6   SODIUM mmol/L 140 138 136 133*   POTASSIUM mmol/L 3.7 4.4 4.6 4.2   CO2 mmol/L 24 33* 36* 33*   CHLORIDE mmol/L 104 93* 93* 92*   BUN mg/dL 23 27* 29* 28*   CREATININE mg/dL 0.76 0.84 0.98 1.08    , A1C:  Lab Results   Component Value Date    HGBA1C 6.8 (H) 09/08/2024   , BG POCT trend:   Results from last 7 days   Lab Units 09/09/24  1127 09/09/24  0753 09/08/24  1526 09/08/24  1404 09/08/24  0755   POCT GLUCOSE mg/dL 132* 131* 84 113* 130*    , Renal Lab Trend:   Results from last 7 days   Lab Units 09/09/24  0342 09/08/24 1747 09/08/24  0047 09/07/24  0927 09/07/24  0927   POTASSIUM mmol/L 3.7 4.4 4.6  --  4.2   PHOSPHORUS mg/dL 2.6 3.1 4.4  --  3.8   SODIUM mmol/L 140 138 136  --  133*   MAGNESIUM mg/dL 1.64 2.17 2.12   < >  --    EGFR mL/min/1.73m*2 >90 >90 83  --  74   BUN mg/dL 23 27* 29*  --  28*   CREATININE mg/dL 0.76 0.84 0.98  --  1.08    < > = values in this interval not displayed.    , Vit D: No results found for: \"VITD25\"     Nutrition Specific Medications:  Scheduled medications  azithromycin, 500 mg, intravenous, q24h  cefTRIAXone, 1 g, intravenous, q24h      Continuous medications  fentaNYL, 0-300 mcg/hr, Last Rate: 25 mcg/hr (09/09/24 0800)  norepinephrine, 0-0.2 mcg/kg/min, Last Rate: Stopped (09/08/24 2300)  propofol, 5-50 mcg/kg/min, Last Rate: 10 mcg/kg/min (09/09/24 0810)      PRN medications  PRN medications: dextrose, dextrose, glucagon, glucagon, oxygen     I/O:    ; Stool Appearance: Unable to assess (09/08/24 1600)      No diet order in place at this time.     Estimated Needs:   Total Energy Estimated Needs (kCal):  (4875-3359 while intubated; 0585-4121 once extubated)  Method for Estimating Needs: MV= 9.5, RMR= 1913; MSJ= " 1795  Total Protein Estimated Needs (g):  ()  Method for Estimating Needs: 1.2-1.4 x 81kg            Nutrition Diagnosis   Malnutrition Diagnosis  Patient has Malnutrition Diagnosis: Yes  Diagnosis Status: New  Malnutrition Diagnosis: Moderate malnutrition related to chronic disease or condition  As Evidenced by: suspect pt with inadequate PO intake PTA in light of a 13% weight loss in the last 8 months; edema/swelling may be masking any wasting of fat/muscle        Nutrition Interventions/Recommendations         Nutrition Prescription:   For tube feed if pt stays intubated: Glucerna 1.5@ start rate of 20mls/hr.  While on current rate of propofol, increase by 10mls q6hrs until goal rate of 50mls/hr reached.  Once off of propofol, increase to new goal rate of 55mls/hr.   Check Vitamin D level.  Would consult SLP once pt has been extubated.           Nutrition Interventions:    TF at goal with propofol= 1939kcals, 99grams protein  TF at goal without propofol= 1980kcals, 109grams protein       Nutrition Education:   Not appropriate       Nutrition Monitoring and Evaluation   Food/Nutrient Related History Monitoring  Monitoring and Evaluation Plan: Enteral and parenteral nutrition intake  Criteria: TF to start in the next 48hrs       Time Spent/Follow-up Reminder:   Time Spent (min): 60 minutes  Last Date of Nutrition Visit: 09/09/24  Nutrition Follow-Up Needed?: Dietitian to reassess per policy  Follow up Comment: RADHA johnson-- TF recs

## 2024-09-09 NOTE — PROGRESS NOTES
Medical Intensive Care - Daily Progress Note   Subjective    Khoa Mitchell is a 69 y.o. year old male patient admitted on 2024 with following ICU needs: acute hypoxemic respiratory failure, worsening from severe metastatic burden requiring endotracheal intubation     Interval History:  Patient was intubated, however responsive to commands at bedside. He denied being in any pain.     Meds    Scheduled medications  azithromycin, 500 mg, intravenous, q24h  cefTRIAXone, 1 g, intravenous, q24h      Continuous medications  fentaNYL, 0-300 mcg/hr, Last Rate: 25 mcg/hr (24 1900)  norepinephrine, 0-0.2 mcg/kg/min, Last Rate: Stopped (24 2300)  propofol, 5-50 mcg/kg/min, Last Rate: 10 mcg/kg/min (24 0200)      PRN medications  PRN medications: dextrose, dextrose, glucagon, glucagon, oxygen     Objective    Blood pressure 101/62, pulse 81, temperature 37 °C (98.6 °F), resp. rate 20, height 1.829 m (6'), weight 99.2 kg (218 lb 11.1 oz), SpO2 97%.     Physical Exam  Constitutional:       General: He is awake.      Appearance: He is obese.      Interventions: Nasal cannula in place.      Comments: Patient intubated. Responsive to commands.   HENT:      Head:      Salivary Glands: Right salivary gland is diffusely enlarged.      Comments: Significant plethora of the head including neck, face, and periorbital edema. Evidence of plum shaped mass on right jaw.   Cardiovascular:      Rate and Rhythm: Normal rate.      Heart sounds: Normal heart sounds, S1 normal and S2 normal.   Comments: Pericardial drain in place.   Musculoskeletal:      Right upper arm: Swelling and edema present.      Left upper arm: Swelling and edema present.      Right lower le+ Pitting Edema present.      Left lower le+ Pitting Edema present.      Comments: The upper extremities are significantly edematous, hands are slightly cold to touch. There is also some noticeable asymmetry where right upper extremity is larger than left.     Lymphadenopathy:      Cervical:      Right cervical: No superficial cervical adenopathy.     Left cervical: No superficial cervical adenopathy.      Upper Body:      Right upper body: No supraclavicular adenopathy.      Left upper body: No supraclavicular adenopathy.   Neurological:      Mental Status: He is alert.   Psychiatric:         Behavior: Behavior is cooperative.         Intake/Output Summary (Last 24 hours) at 9/9/2024 0645  Last data filed at 9/9/2024 0600  Gross per 24 hour   Intake 1499.29 ml   Output 875 ml   Net 624.29 ml     Labs:   Results from last 72 hours   Lab Units 09/09/24  0342 09/08/24  1747 09/08/24  0047   SODIUM mmol/L 140 138 136   POTASSIUM mmol/L 3.7 4.4 4.6   CHLORIDE mmol/L 104 93* 93*   CO2 mmol/L 24 33* 36*   BUN mg/dL 23 27* 29*   CREATININE mg/dL 0.76 0.84 0.98   GLUCOSE mg/dL 109* 117* 133*   CALCIUM mg/dL 7.1* 9.8 9.1   ANION GAP mmol/L 16 16 12   EGFR mL/min/1.73m*2 >90 >90 83   PHOSPHORUS mg/dL 2.6 3.1 4.4      Results from last 72 hours   Lab Units 09/09/24  0517 09/09/24  0342 09/08/24  0047   WBC AUTO x10*3/uL 11.3 9.4 11.1   HEMOGLOBIN g/dL 9.4* 8.2* 10.2*   HEMATOCRIT % 28.8* 26.1* 32.8*   PLATELETS AUTO x10*3/uL 226 204 273   NEUTROS PCT AUTO % 88.4 88.4 88.4   LYMPHS PCT AUTO % 3.3 2.8 3.1   MONOS PCT AUTO % 7.5 7.9 7.4   EOS PCT AUTO % 0.1 0.1 0.3      Results from last 72 hours   Lab Units 09/07/24  0924   POCT PH, ARTERIAL pH 7.44*   POCT PCO2, ARTERIAL mm Hg 55*   POCT PO2, ARTERIAL mm Hg 51*   POCT SO2, ARTERIAL % 85*     Results from last 72 hours   Lab Units 09/08/24  1359   POCT PH, VENOUS pH 7.27*   POCT PCO2, VENOUS mm Hg 77*   POCT PO2, VENOUS mm Hg 18*        Micro/ID:     Lab Results   Component Value Date    BLOODCULT No growth at 1 day 09/08/2024    BLOODCULT No growth at 1 day 09/08/2024       Summary of key imaging results from the last 24 hours  9/9 TTE suggested brief RV diastolic collapse and brief RA collapse with concern for early  tamponade    Assessment and Plan     Assessment: Khoa Mitchell is a 69 y.o. year old male patient admitted on 9/7/2024 with acute hypoxemic respiratory failure, worsening from severe metastatic burden requiring endotracheal intubation.    Mechanical Ventilation: < 4 days  Sedation/Analgesia:  Propofol, Fentanyl   Restraints: no    Summary for 09/09/24  :  Patient urine output dropping ON, MAP decreased to 50s, given 500 ml LR  TTE suggested brief RV diastolic collapse and brief RA collapse with concern for early tamponade. Cardiology consult placed and pericardiocentesis was administered.   Pericardiocentesis completed, 400 ml serous drainage, pericardial drain in place. Experienced transient heart block. TVP for 24 hours with threshold 0.2, output 10 mAmp and VVI rate of 60, per EP.   Biopsy pending for cancer diagnosis (small cell carcinoma versus lymphoma) to determine radiation/chemotherapy given SVC syndrome.     Plan:  NEUROLOGY/PSYCH:     #Multiple Ovoid Hyper-densities in Brain Parenchyma, c/f Brain Metastasis  :: His CT-head imaging notable for multiple ovoid lesions that are likely hemorrhagic, there is no concern for midline shift at the current moment. Neurosurgery consulted regarding further recommendations for lesions. Lesions are consistent with metastatic cancer of unknown primary. Will continue to hold anticoagulation for now.   - Consulted Neurosurgery, appreciate recommendations      #Sedation, Endotracheal Intubation  :: Intubation on 09/08-, sedation management as below  - C/w fentanyl gtt  - C/w propofol gtt      CARDIOVASCULAR:     #Shock, Septic Shock from Bacteremia   :: Following intubation, patient's had drop in MAP to 50s,now requiring Levophed. Blood cultures draw now growing Streptococcus salivarius, will continue with current management.   - C/w Levophed   - Pending transthoracic echocardiogram     #Pericardial Effusion, 15-mm  :: CT-imaging notable for pericardial effusion with largest  diameter at 15-mm in thickness. Clinically, auscultation of heart sounds were normal. Imaging also notable for atherosclerotic changes of coronary arteries. Mildly elevated BNP indicates neoplastic burden on heart, pending transthoracic echocardiogram.   :: 9/9 TTE notable for RV and RA diastolic collapse and early cardiac tamponade   - Pericardiocentesis complete, 400 ml serous drainage, pericardial drain in place      PULMONARY:    #Acute Hypoxemic Respiratory Failure d/t Metastatic [place] on Respiratory Tract Anatomy  #Superior Vena Cava Syndrome  :: Presented with respiratory distress requiring simple face mask at 8 L/min, subsequently endotracheally intubated on 09/08- due to increasing respiratory secretions and hypoxia despite nasal cannula.   :: Imaging most pertinently notable for significant neoplastic burden in left lung field, moderate left pleural effusion. Extensive left hilar encasement of aorta. Encasement of distal trachea and jose antonio, which overall correlates with his significant respiratory distress. Significant burden of neoplastic process explains his swelling.   :: Both otolaryngology and thoracic surgery consulted and recommend tissue biopsy, no surgical intervention.   :: Bronchoscopy on 09/08 notable for extensive secretions and pus, throat is significantly edematous. Procalcitonin elevated indicating infectious/inflammatory process in lungs. Pending further laboratory studies.   - Pending respiratory viral panel  - Pending fungal culture tracheal aspirate culture   - Pending respiratory tracheal aspirate culture   - Pending cytological analysis from tracheal aspirate  - Pending acid-fast stain from tracheal aspirate  - Pending Aspergillus galactomann   - Pending Pneumocystis jiroveci PCR      Vent Mode: Pressure control/assist control  FiO2 (%):  [52 %-100 %] 90 %  S RR:  [20] 20  S VT:  [500 mL] 500 mL  PEEP/CPAP (cm H2O):  [8 cm H20] 8 cm H20  MAP (cm H2O):  [15-16] 15       RENAL/GENITOURINARY:    #Hyponatremia, Asymptomatic, Mild, c/f Syndrome of Innapropriate ADH Release   #Chronic Kidney Disease Stage 2  :: Previously hospitalized in 03/2022 for cellulitis and found to have hyponatremia and acute kidney injury. Since then, he has followed nephrology clinic for his mild hyponatremia. Last visit 01/2024 given salt tablet and Lasix.   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   - Pending urine osmolarity  - Pending urine electrolytes      GASTROENTEROLOGY:  There are no active issues.      ENDOCRINOLOGY:    #Diabetes Mellitus Type 2 (A1C 6.8% 09/2024)   :: His A1C on this admission within diabetic range, appears to not be on any home medications. Continue with sliding scale insulin now.   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  #Superior Vena Cava Syndrome   #Multi-Organ System Metastasis   :: Reported 9-month history of unintentional weight loss with cigarette smoking history. Imaging notable for profound left lung mass with compression of trachea/jose antonio. There are further metastatic lesions in the brain parenchyma, right parotid gland, cervical lymphadenopathy, right scapula, liver, spleen, adrenal glands bilaterally, pancreas, peritoneal nodules, right gluteal, and right acetabulum.   :: Unclear primary origin but small cell carcinoma is favoured, given smoking history, burden in left lung, and unique metastasis to the adrenal glands. Lymphoma could be favoured given lymph nodes involvement. If small cell, already M1C given significant metastatic burden.   :: Plan to engage medical oncology or interventional radiology to determine best site of biopsy.   - Plan to consult medical oncology or interventional radiology     #Anemia, Normocytic  :: Low hemoglobin levels on  admission, but this is stable from prior admission in 2022.   - Continue to monitor      SKIN:  There are no active issues.      MUSCULOSKELETAL:  There are no active issues.      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 Will continue patient on current antibiotic management plan and continue to follow blood cultures.   - C/w Rocephin 1 g Q24H IV   - C/w azithromycin 500 mg Q24H IV       ICU Check List     FEN  Fluids: PRN  Electrolytes: PRN  Nutrition: NPO  Prophylaxis:  DVT ppx: contraindicated   GI ppx: none  Bowel care: none   Access: Peripheral IV (left), endotracheal tube (09/08-)   Social:  Code: Full Code    HPOA: there is no HCPOA, only contact is keven Campbell [phone]  Disposition: Medicine ICU    Enid Chambers MD   09/09/24 at 6:45 AM     Disclaimer: Documentation completed with the information available at the time of input. The times in the chart may not be reflective of actual patient care times, interventions, or procedures. Documentation occurs after the physical care of the patient.

## 2024-09-09 NOTE — PRE-SEDATION DOCUMENTATION
Sedation Plan    ASA 4     Mallampati class: unable to assess.    Plan/risks discussed with: Emergency contact.

## 2024-09-09 NOTE — CONSULTS
Reason for Consult: complete heart block after pericardiocentesis     Subjective   69 y.o. male w/ hx of T2DM, CKD2 (baseline Cr 1.2) admitted for concern for SVC syndrome likely 2/2 to new diagnosis of metastatic cancer. Course c/b acute hypoxic resp failure s/p intubation and moderate pericardial effusion with early tamponade physiology s/p pericardiocentesis and post-procedure development of CHB requiring TVP.     Patient is currently intubated so history is obtained from chart review. Per documentation, pt presented to [facility] on 09/07 with 3-4 months history of worsening R shoulder pain, RUE swelling, 30-40lbs weight loss, progressively worsening dyspnea and R parotid mass. Upon presentation was in acute hypoxic resp failure requiring 8-10L HFNC. Work-up with CT chest showed soft tissue mass throughout the central/left upper chest encasing the trachea/jose antonio with left chest wall invasion, lymphadenopathy, lytic lesion in right scapula. CT AP with metastatic lesions in the liver, spleen, adrenal glands, pancreas, multiple peritoneal nodules, soft tissue mass in the right gluteal region and lytic lesions in the right acetabulum. Transferred to [facility] for further management of newly diagnosed metastatic disease.     Upon admission, further diagnostic tests obtained including CTH notable for multiple hyperdense lesions concerning for hemorrhagic metastases, largest 2.7 x 2 x 2cm, CT  neck with multiple soft tissue masses within the parotid gland, enlarged cervical lymph nodes, redemonstration of large soft tissue mass in the left lung extending to the thyroid gland. NT consulted, scoped at bedside with patent airway, but evidence of gross aspiration. Noted to have purulent secretions in the mask. Due to increasing O2 requirements, patient was transferred to MICU. He was intubated for airway protection.     NSGY consulted for concern for hemorrhagic conversion, recommending repeat CT to evaluate progression, no  immediate surgical plans. Patient also underwent bronchoscopy that showed large amount of green secretion every where and endobronchial lesion was seen in the distal LMSB obstruction > 50% of the upper lobe bronchus. Patient started on Abx for c/f post-obstructive PNA.    He also had a TTE done yesterday that showed EF 30-35%, global hypokinesis, moderate pericardial effusion, Mild diastolic collapse of the right ventricle and brief right atrium diastolic collapse, raising c/f of early echocardiographic tamponade with buckling of RV free wall during late diastolic and brief end diastole RA collapse. Cardiology was consulted and patient underwent pericardiocentesis in cath lab today. 400 ml fluid removed and post-procedure TTE shows small effusion. No RHC performed. Immediatly post-procedure patient developed hypotension and CHB and started on IV epi, levo, dobutamine as well as transvenous pacemaker. He was transferred to CICU and EP consulted for further management.     Of note, shortly after moving to CICU, patient's CHB spontaneously resolved and he is now in NSR with rate in 80s.     Review of Systems  Pertinent items are noted in HPI.     Objective   Visit Vitals  /56   Pulse 80   Temp 36.1 °C (97 °F)   Resp 20   Ht 1.829 m (6')   Wt 99.2 kg (218 lb 11.1 oz)   SpO2 92%   BMI 29.66 kg/m²   Smoking Status Every Day   BSA 2.24 m²        Physical Exam  General: awake, alert, no acute distress  HEENT: no scleral icterus, no JVD  CV: RRR, no MRG  Resp: CTA b/l, no wheezes, rales, or rhonchi  Abd: soft, NT/ND  LE: no edema, no cyanosis  Neuo: grossly normal  Psych: pleasant      Cardiographics  ECG:  complete heart block  .  Results for orders placed during the hospital encounter of 09/07/24    Transthoracic Echo (TTE) Limited      PHYSICIAN INTERPRETATION:  Left Ventricle: The left ventricle was not well visualized. The left ventricular ejection fraction could not be measured. The left ventricular cavity size  was not assessed. Left ventricular diastolic filling was not assessed.  Left Atrium: The left atrium was not assessed.  Right Ventricle: The right ventricle is small in size. There is normal right ventricular global systolic function.  Right Atrium: The right atrium was not well visualized.  Aortic Valve: The aortic valve was not well visualized. Aortic valve regurgitation was not assessed.  Mitral Valve: The mitral valve is normal in structure. Mitral valve regurgitation was not assessed.  Tricuspid Valve: The tricuspid valve is structurally normal. Tricuspid regurgitation was not assessed. The right ventricular systolic pressure is unable to be estimated.  Pulmonic Valve: The pulmonic valve was not assessed. Pulmonic valve regurgitation was not assessed.  Pericardium: There is a moderately sized pericardial effusion. There is a suggestion of brief RV diastolic collapse and brief RA collapse on pre-tap images. Tap and post-tap images were obtained in the 4-C view only. Trivial to small effusion on final images.  Aorta: The aortic root was not well visualized.  In comparison to the previous echocardiogram(s): Although previous studies are available for review, a comparison with the current echocardiogram is not feasible due to its limited scope or image quality.      CONCLUSIONS:  1. The left ventricle was not well visualized. The left ventricular ejection fraction could not be measured.  2. There is normal right ventricular global systolic function.  3. There is a moderate pericardial effusion.  4. There is a suggestion of brief RV diastolic collapse and brief RA collapse on pre-tap images. Tap and post-tap images were obtained in the 4-C view only. Trivial to small effusion on final images.  5. Very limited 2D study only before and during pericardiocentesis.      Imaging  CHEST CTA:      Heterogenous soft tissue consistent with malignancy/neoplasm  throughout the central/left upper chest with probable  "SVC  obstruction, encasement and narrowing of the trachea/jose antonio,  left  chest wall invasion. Multifocal likely metastatic lymphadenopathy and  permeative lytic lesion in the right scapula with pathologic  fracture. Further evaluation recommended.      No gross central PE identified. Limited assessment for  peripheral/basilar PE. Bibasilar infiltrates or atelectasis with  pleural effusions, left-greater-than-right.      Pericardial effusion.    Lab Review   Lab Results   Component Value Date     09/09/2024     09/08/2024     09/08/2024    K 3.7 09/09/2024    K 4.4 09/08/2024    K 4.6 09/08/2024    CO2 24 09/09/2024    CO2 33 (H) 09/08/2024    CO2 36 (H) 09/08/2024    BUN 23 09/09/2024    BUN 27 (H) 09/08/2024    BUN 29 (H) 09/08/2024    CREATININE 0.76 09/09/2024    CREATININE 0.84 09/08/2024    CREATININE 0.98 09/08/2024    GLUCOSE 109 (H) 09/09/2024    GLUCOSE 117 (H) 09/08/2024    GLUCOSE 133 (H) 09/08/2024    CALCIUM 7.1 (L) 09/09/2024    CALCIUM 9.8 09/08/2024    CALCIUM 9.1 09/08/2024     No results found for: \"CKTOTAL\", \"CKMB\", \"CKMBINDEX\", \"TROPONINI\"  Lab Results   Component Value Date    WBC 11.3 09/09/2024    WBC 9.4 09/09/2024    WBC 11.1 09/08/2024    HGB 9.4 (L) 09/09/2024    HGB 8.2 (L) 09/09/2024    HGB 10.2 (L) 09/08/2024    HCT 28.8 (L) 09/09/2024    HCT 26.1 (L) 09/09/2024    HCT 32.8 (L) 09/08/2024    MCV 94 09/09/2024    MCV 95 09/09/2024    MCV 94 09/08/2024     09/09/2024     09/09/2024     09/08/2024       Troponin I, High Sensitivity   Date/Time Value Ref Range Status   09/07/2024 09:27 AM 10 0 - 20 ng/L Final     BNP   Date/Time Value Ref Range Status   09/07/2024 09:27  (H) 0 - 99 pg/mL Final         Assessment/Plan   69 y.o. male w/ hx of T2DM, CKD2 (baseline Cr 1.2) admitted for concern for SVC syndrome likely 2/2 to new diagnosis of metastatic cancer. Course c/b acute hypoxic resp failure s/p intubation and moderate pericardial effusion with " early tamponade physiology s/p pericardiocentesis and post-procedure development of CHB requiring TVP. EP consulted for further management,     Impression:  - Moderate pericardial effusion with early tamponade physiology (likely metastatic given newly diagnosed metastatic disease with mets to brain, chest wall, mediastinal LAD, multiple organs in abdomen, parotids etc)  - s/p pericardiocentesis and post-procedure complication on CHB with hypotension requiring  inotropes and TVP  - spontaneous resolution of CHB and patient is now in NSR with intrinsic rate in 80-90s.     Recommendations:  - Continue TVP for another 24 hours with threshold 0.2, output 10 mAmp and VVI rate of 60.   - If no further heart blocks in next 24 hours, TVP can be removed.     EP will cont to follow.     Thee Clancy MD, MS, FACP   Heart Failure Fellow,  [facility]      Thank you for involving us in the care of this patient. Above recommendations discussed with Dr. Gardiner. If further questions arise, please page the EP consult pager at [phone] on weekdays 7AM - 6PM and weekends 7AM - 2PM, or at [phone] at all other times. The EP device nurse can be reached at pager [phone] during regular business hours M-F.Reason for Consult:

## 2024-09-09 NOTE — CARE PLAN
Problem: Skin  Goal: Decreased wound size/increased tissue granulation at next dressing change  9/9/2024 1519 by Ronna Servin RN  Outcome: Progressing  Flowsheets (Taken 9/9/2024 1519)  Decreased wound size/increased tissue granulation at next dressing change:   Promote sleep for wound healing   Utilize specialty bed per algorithm   Protective dressings over bony prominences  9/9/2024 1519 by Ronna Servin RN  Outcome: Progressing  Flowsheets (Taken 9/9/2024 1519)  Decreased wound size/increased tissue granulation at next dressing change:   Promote sleep for wound healing   Utilize specialty bed per algorithm   Protective dressings over bony prominences  Goal: Participates in plan/prevention/treatment measures  9/9/2024 1519 by Ronna Servin RN  Outcome: Progressing  Flowsheets (Taken 9/9/2024 1519)  Participates in plan/prevention/treatment measures: Elevate heels  9/9/2024 1519 by Ronna Servin RN  Outcome: Progressing  Flowsheets (Taken 9/9/2024 1519)  Participates in plan/prevention/treatment measures: Elevate heels  Goal: Prevent/manage excess moisture  9/9/2024 1519 by Ronna Servin RN  Outcome: Progressing  Flowsheets (Taken 9/9/2024 1519)  Prevent/manage excess moisture: Monitor for/manage infection if present  9/9/2024 1519 by Ronna Servin RN  Outcome: Progressing  Flowsheets (Taken 9/9/2024 1519)  Prevent/manage excess moisture: Monitor for/manage infection if present  Goal: Prevent/minimize sheer/friction injuries  9/9/2024 1519 by Ronna Servin RN  Outcome: Progressing  Flowsheets (Taken 9/9/2024 1519)  Prevent/minimize sheer/friction injuries:   Turn/reposition every 2 hours/use positioning/transfer devices   Use pull sheet   Utilize specialty bed per algorithm   Complete micro-shifts as needed if patient unable. Adjust patient position to relieve pressure points, not a full turn  9/9/2024 1519 by Ronna Servin RN  Outcome: Progressing  Flowsheets (Taken 9/9/2024  1519)  Prevent/minimize sheer/friction injuries:   Turn/reposition every 2 hours/use positioning/transfer devices   Use pull sheet   Utilize specialty bed per algorithm   Complete micro-shifts as needed if patient unable. Adjust patient position to relieve pressure points, not a full turn  Goal: Promote/optimize nutrition  9/9/2024 1519 by Ronna Servin, RN  Outcome: Progressing  Flowsheets (Taken 9/9/2024 1519)  Promote/optimize nutrition: Monitor/record intake including meals  9/9/2024 1519 by Ronna Servin RN  Outcome: Progressing  Flowsheets (Taken 9/9/2024 1519)  Promote/optimize nutrition: Monitor/record intake including meals  Goal: Promote skin healing  9/9/2024 1519 by Ronna Servin RN  Outcome: Progressing  Flowsheets (Taken 9/9/2024 1519)  Promote skin healing:   Protective dressings over bony prominences   Rotate device position/do not position patient on device   Turn/reposition every 2 hours/use positioning/transfer devices  9/9/2024 1519 by Ronna Servin RN  Outcome: Progressing  Flowsheets (Taken 9/9/2024 1519)  Promote skin healing:   Protective dressings over bony prominences   Rotate device position/do not position patient on device   Turn/reposition every 2 hours/use positioning/transfer devices

## 2024-09-09 NOTE — H&P
69 year old M with SCV likely 2/2 new diagnosis of metastatic cancer. Found to have cardiac tamponade on echocardiogram. Hypotensive. Emergency contact on file- Thang Gama called. He is patient's . To his knowledge, patient does not have a spouse and not in touch with kids. Thang consented for procedure. Dr. Winslow witnessed consent.

## 2024-09-10 ENCOUNTER — APPOINTMENT (OUTPATIENT)
Dept: RADIOLOGY | Facility: HOSPITAL | Age: 69
End: 2024-09-10
Payer: MEDICARE

## 2024-09-10 LAB
ALBUMIN SERPL BCP-MCNC: 2.8 G/DL (ref 3.4–5)
ANION GAP BLDA CALCULATED.4IONS-SCNC: 9 MMO/L (ref 10–25)
ANION GAP SERPL CALC-SCNC: 15 MMOL/L (ref 10–20)
ATRIAL RATE: 102 BPM
ATRIAL RATE: 150 BPM
ATRIAL RATE: 170 BPM
ATRIAL RATE: 36 BPM
BACTERIA BLD AEROBE CULT: ABNORMAL
BACTERIA BLD CULT: ABNORMAL
BACTERIA SPEC RESP CULT: ABNORMAL
BACTERIA SPEC RESP CULT: ABNORMAL
BASE EXCESS BLDA CALC-SCNC: 5.9 MMOL/L (ref -2–3)
BASOPHILS # BLD AUTO: 0.02 X10*3/UL (ref 0–0.1)
BASOPHILS NFR BLD AUTO: 0.2 %
BODY TEMPERATURE: 37 DEGREES CELSIUS
BUN SERPL-MCNC: 30 MG/DL (ref 6–23)
CA-I BLDA-SCNC: 1.25 MMOL/L (ref 1.1–1.33)
CALCIUM SERPL-MCNC: 8.9 MG/DL (ref 8.6–10.6)
CHLORIDE BLDA-SCNC: 99 MMOL/L (ref 98–107)
CHLORIDE SERPL-SCNC: 95 MMOL/L (ref 98–107)
CHLORIDE UR-SCNC: <15 MMOL/L
CHLORIDE/CREATININE (MMOL/G) IN URINE: NORMAL
CO2 SERPL-SCNC: 30 MMOL/L (ref 21–32)
CREAT SERPL-MCNC: 0.97 MG/DL (ref 0.5–1.3)
CREAT UR-MCNC: 158.9 MG/DL (ref 20–370)
EGFRCR SERPLBLD CKD-EPI 2021: 84 ML/MIN/1.73M*2
EOSINOPHIL # BLD AUTO: 0.03 X10*3/UL (ref 0–0.7)
EOSINOPHIL NFR BLD AUTO: 0.3 %
ERYTHROCYTE [DISTWIDTH] IN BLOOD BY AUTOMATED COUNT: 15.9 % (ref 11.5–14.5)
FERRITIN SERPL-MCNC: 351 NG/ML (ref 20–300)
FUNGUS SPEC CULT: ABNORMAL
FUNGUS SPEC FUNGUS STN: ABNORMAL
GLUCOSE BLD MANUAL STRIP-MCNC: 142 MG/DL (ref 74–99)
GLUCOSE BLD MANUAL STRIP-MCNC: 155 MG/DL (ref 74–99)
GLUCOSE BLD MANUAL STRIP-MCNC: 162 MG/DL (ref 74–99)
GLUCOSE BLD MANUAL STRIP-MCNC: 168 MG/DL (ref 74–99)
GLUCOSE BLD MANUAL STRIP-MCNC: 173 MG/DL (ref 74–99)
GLUCOSE BLD MANUAL STRIP-MCNC: 182 MG/DL (ref 74–99)
GLUCOSE BLDA-MCNC: 151 MG/DL (ref 74–99)
GLUCOSE SERPL-MCNC: 148 MG/DL (ref 74–99)
GRAM STN SPEC: ABNORMAL
HCO3 BLDA-SCNC: 29.7 MMOL/L (ref 22–26)
HCT VFR BLD AUTO: 28.3 % (ref 41–52)
HCT VFR BLD EST: 29 % (ref 41–52)
HGB BLD-MCNC: 9.8 G/DL (ref 13.5–17.5)
HGB BLDA-MCNC: 9.7 G/DL (ref 13.5–17.5)
IMM GRANULOCYTES # BLD AUTO: 0.09 X10*3/UL (ref 0–0.7)
IMM GRANULOCYTES NFR BLD AUTO: 0.8 % (ref 0–0.9)
INHALED O2 CONCENTRATION: 80 %
IRON SATN MFR SERPL: ABNORMAL %
IRON SERPL-MCNC: <10 UG/DL (ref 35–150)
LABORATORY COMMENT REPORT: NORMAL
LABORATORY COMMENT REPORT: NORMAL
LACTATE BLDA-SCNC: 1.3 MMOL/L (ref 0.4–2)
LYMPHOCYTES # BLD AUTO: 0.37 X10*3/UL (ref 1.2–4.8)
LYMPHOCYTES NFR BLD AUTO: 3.1 %
MAGNESIUM SERPL-MCNC: 2.27 MG/DL (ref 1.6–2.4)
MCH RBC QN AUTO: 29.9 PG (ref 26–34)
MCHC RBC AUTO-ENTMCNC: 34.6 G/DL (ref 32–36)
MCV RBC AUTO: 86 FL (ref 80–100)
MONOCYTES # BLD AUTO: 1.2 X10*3/UL (ref 0.1–1)
MONOCYTES NFR BLD AUTO: 10 %
NEUTROPHILS # BLD AUTO: 10.25 X10*3/UL (ref 1.2–7.7)
NEUTROPHILS NFR BLD AUTO: 85.6 %
NRBC BLD-RTO: 0 /100 WBCS (ref 0–0)
OSMOLALITY UR: 785 MOSM/KG (ref 200–1200)
OXYHGB MFR BLDA: 93.4 % (ref 94–98)
P AXIS: 66 DEGREES
P AXIS: 70 DEGREES
P OFFSET: 189 MS
P ONSET: 142 MS
PATH REPORT.FINAL DX SPEC: NORMAL
PATH REPORT.GROSS SPEC: NORMAL
PATH REPORT.RELEVANT HX SPEC: NORMAL
PATH REPORT.TOTAL CANCER: NORMAL
PATH REVIEW-CELL CT,FLUID: NORMAL
PCO2 BLDA: 39 MM HG (ref 38–42)
PH BLDA: 7.49 PH (ref 7.38–7.42)
PHOSPHATE SERPL-MCNC: 3.2 MG/DL (ref 2.5–4.9)
PLATELET # BLD AUTO: 271 X10*3/UL (ref 150–450)
PO2 BLDA: 77 MM HG (ref 85–95)
POTASSIUM BLDA-SCNC: 4 MMOL/L (ref 3.5–5.3)
POTASSIUM SERPL-SCNC: 4.2 MMOL/L (ref 3.5–5.3)
POTASSIUM UR-SCNC: 70 MMOL/L
POTASSIUM/CREAT UR-RTO: 44 MMOL/G CREAT
PR INTERVAL: 138 MS
PROCALCITONIN SERPL-MCNC: 5.33 NG/ML
Q ONSET: 208 MS
Q ONSET: 211 MS
Q ONSET: 212 MS
Q ONSET: 212 MS
QRS COUNT: 17 BEATS
QRS COUNT: 24 BEATS
QRS COUNT: 27 BEATS
QRS COUNT: 6 BEATS
QRS DURATION: 140 MS
QRS DURATION: 146 MS
QRS DURATION: 156 MS
QRS DURATION: 168 MS
QT INTERVAL: 298 MS
QT INTERVAL: 304 MS
QT INTERVAL: 388 MS
QT INTERVAL: 596 MS
QTC CALCULATION(BAZETT): 460 MS
QTC CALCULATION(BAZETT): 466 MS
QTC CALCULATION(BAZETT): 497 MS
QTC CALCULATION(BAZETT): 505 MS
QTC FREDERICIA: 401 MS
QTC FREDERICIA: 422 MS
QTC FREDERICIA: 463 MS
QTC FREDERICIA: 502 MS
R AXIS: 43 DEGREES
R AXIS: 75 DEGREES
R AXIS: 93 DEGREES
R AXIS: 99 DEGREES
RBC # BLD AUTO: 3.28 X10*6/UL (ref 4.5–5.9)
RESIDENT REVIEW: NORMAL
SAO2 % BLDA: 96 % (ref 94–100)
SODIUM BLDA-SCNC: 134 MMOL/L (ref 136–145)
SODIUM SERPL-SCNC: 136 MMOL/L (ref 136–145)
SODIUM UR-SCNC: 14 MMOL/L
SODIUM/CREAT UR-RTO: 9 MMOL/G CREAT
T AXIS: -3 DEGREES
T AXIS: -56 DEGREES
T AXIS: -68 DEGREES
T AXIS: 46 DEGREES
T OFFSET: 361 MS
T OFFSET: 364 MS
T OFFSET: 405 MS
T OFFSET: 506 MS
TIBC SERPL-MCNC: ABNORMAL UG/DL
UIBC SERPL-MCNC: 183 UG/DL (ref 110–370)
VENTRICULAR RATE: 102 BPM
VENTRICULAR RATE: 147 BPM
VENTRICULAR RATE: 161 BPM
VENTRICULAR RATE: 36 BPM
WBC # BLD AUTO: 12 X10*3/UL (ref 4.4–11.3)

## 2024-09-10 PROCEDURE — 83935 ASSAY OF URINE OSMOLALITY: CPT

## 2024-09-10 PROCEDURE — 2500000004 HC RX 250 GENERAL PHARMACY W/ HCPCS (ALT 636 FOR OP/ED)

## 2024-09-10 PROCEDURE — 84132 ASSAY OF SERUM POTASSIUM: CPT

## 2024-09-10 PROCEDURE — 99291 CRITICAL CARE FIRST HOUR: CPT

## 2024-09-10 PROCEDURE — 80053 COMPREHEN METABOLIC PANEL: CPT

## 2024-09-10 PROCEDURE — 88112 CYTOPATH CELL ENHANCE TECH: CPT | Performed by: PATHOLOGY

## 2024-09-10 PROCEDURE — 2500000005 HC RX 250 GENERAL PHARMACY W/O HCPCS

## 2024-09-10 PROCEDURE — 94799 UNLISTED PULMONARY SVC/PX: CPT

## 2024-09-10 PROCEDURE — 82040 ASSAY OF SERUM ALBUMIN: CPT

## 2024-09-10 PROCEDURE — 84145 PROCALCITONIN (PCT): CPT

## 2024-09-10 PROCEDURE — 83735 ASSAY OF MAGNESIUM: CPT

## 2024-09-10 PROCEDURE — 82436 ASSAY OF URINE CHLORIDE: CPT

## 2024-09-10 PROCEDURE — 87081 CULTURE SCREEN ONLY: CPT

## 2024-09-10 PROCEDURE — 31645 BRNCHSC W/THER ASPIR 1ST: CPT | Mod: GC | Performed by: INTERNAL MEDICINE

## 2024-09-10 PROCEDURE — 71045 X-RAY EXAM CHEST 1 VIEW: CPT

## 2024-09-10 PROCEDURE — 0BJ08ZZ INSPECTION OF TRACHEOBRONCHIAL TREE, VIA NATURAL OR ARTIFICIAL OPENING ENDOSCOPIC: ICD-10-PCS | Performed by: STUDENT IN AN ORGANIZED HEALTH CARE EDUCATION/TRAINING PROGRAM

## 2024-09-10 PROCEDURE — 85025 COMPLETE CBC W/AUTO DIFF WBC: CPT

## 2024-09-10 PROCEDURE — 31645 BRNCHSC W/THER ASPIR 1ST: CPT | Performed by: INTERNAL MEDICINE

## 2024-09-10 PROCEDURE — 84466 ASSAY OF TRANSFERRIN: CPT

## 2024-09-10 PROCEDURE — 88305 TISSUE EXAM BY PATHOLOGIST: CPT | Performed by: PATHOLOGY

## 2024-09-10 PROCEDURE — 88112 CYTOPATH CELL ENHANCE TECH: CPT | Mod: TC,MCY

## 2024-09-10 PROCEDURE — 84100 ASSAY OF PHOSPHORUS: CPT

## 2024-09-10 PROCEDURE — 94003 VENT MGMT INPAT SUBQ DAY: CPT

## 2024-09-10 PROCEDURE — 2020000001 HC ICU ROOM DAILY

## 2024-09-10 PROCEDURE — 82947 ASSAY GLUCOSE BLOOD QUANT: CPT

## 2024-09-10 PROCEDURE — 71045 X-RAY EXAM CHEST 1 VIEW: CPT | Performed by: RADIOLOGY

## 2024-09-10 PROCEDURE — 37799 UNLISTED PX VASCULAR SURGERY: CPT

## 2024-09-10 PROCEDURE — 36620 INSERTION CATHETER ARTERY: CPT | Mod: GC | Performed by: STUDENT IN AN ORGANIZED HEALTH CARE EDUCATION/TRAINING PROGRAM

## 2024-09-10 RX ORDER — CEFTRIAXONE 1 G/50ML
1 INJECTION, SOLUTION INTRAVENOUS EVERY 24 HOURS
Status: DISCONTINUED | OUTPATIENT
Start: 2024-09-10 | End: 2024-09-15

## 2024-09-10 RX ORDER — PANTOPRAZOLE SODIUM 40 MG/10ML
40 INJECTION, POWDER, LYOPHILIZED, FOR SOLUTION INTRAVENOUS
Status: DISCONTINUED | OUTPATIENT
Start: 2024-09-10 | End: 2024-09-24 | Stop reason: HOSPADM

## 2024-09-10 RX ORDER — ESOMEPRAZOLE MAGNESIUM 40 MG/1
40 GRANULE, DELAYED RELEASE ORAL
Status: DISCONTINUED | OUTPATIENT
Start: 2024-09-10 | End: 2024-09-24 | Stop reason: HOSPADM

## 2024-09-10 RX ORDER — PANTOPRAZOLE SODIUM 40 MG/1
40 TABLET, DELAYED RELEASE ORAL
Status: DISCONTINUED | OUTPATIENT
Start: 2024-09-10 | End: 2024-09-16

## 2024-09-10 RX ADMIN — CEFTRIAXONE SODIUM 1 G: 1 INJECTION, SOLUTION INTRAVENOUS at 19:50

## 2024-09-10 RX ADMIN — AMIODARONE HYDROCHLORIDE 1 MG/MIN: 1.8 INJECTION, SOLUTION INTRAVENOUS at 06:25

## 2024-09-10 RX ADMIN — AMIODARONE HYDROCHLORIDE 0.5 MG/MIN: 1.8 INJECTION, SOLUTION INTRAVENOUS at 12:56

## 2024-09-10 RX ADMIN — Medication 60 PERCENT: at 09:45

## 2024-09-10 RX ADMIN — Medication 80 PERCENT: at 07:00

## 2024-09-10 RX ADMIN — PIPERACILLIN SODIUM AND TAZOBACTAM SODIUM 4.5 G: 4; .5 INJECTION, SOLUTION INTRAVENOUS at 03:20

## 2024-09-10 RX ADMIN — PROPOFOL 5 MCG/KG/MIN: 10 INJECTION, EMULSION INTRAVENOUS at 00:26

## 2024-09-10 RX ADMIN — PIPERACILLIN SODIUM AND TAZOBACTAM SODIUM 4.5 G: 4; .5 INJECTION, SOLUTION INTRAVENOUS at 15:51

## 2024-09-10 RX ADMIN — PIPERACILLIN SODIUM AND TAZOBACTAM SODIUM 4.5 G: 4; .5 INJECTION, SOLUTION INTRAVENOUS at 09:03

## 2024-09-10 RX ADMIN — Medication 60 PERCENT: at 13:00

## 2024-09-10 RX ADMIN — AMIODARONE HYDROCHLORIDE 1 MG/MIN: 1.8 INJECTION, SOLUTION INTRAVENOUS at 00:26

## 2024-09-10 RX ADMIN — PANTOPRAZOLE SODIUM 40 MG: 40 INJECTION, POWDER, FOR SOLUTION INTRAVENOUS at 06:26

## 2024-09-10 RX ADMIN — AZITHROMYCIN 500 MG: 500 INJECTION, POWDER, LYOPHILIZED, FOR SOLUTION INTRAVENOUS at 06:25

## 2024-09-10 RX ADMIN — Medication 50 MCG/HR: at 10:59

## 2024-09-10 SDOH — ECONOMIC STABILITY: INCOME INSECURITY: HOW HARD IS IT FOR YOU TO PAY FOR THE VERY BASICS LIKE FOOD, HOUSING, MEDICAL CARE, AND HEATING?: NOT HARD AT ALL

## 2024-09-10 SDOH — SOCIAL STABILITY: SOCIAL INSECURITY
WITHIN THE LAST YEAR, HAVE YOU BEEN KICKED, HIT, SLAPPED, OR OTHERWISE PHYSICALLY HURT BY YOUR PARTNER OR EX-PARTNER?: NO

## 2024-09-10 SDOH — ECONOMIC STABILITY: FOOD INSECURITY: WITHIN THE PAST 12 MONTHS, YOU WORRIED THAT YOUR FOOD WOULD RUN OUT BEFORE YOU GOT MONEY TO BUY MORE.: NEVER TRUE

## 2024-09-10 SDOH — SOCIAL STABILITY: SOCIAL INSECURITY: WITHIN THE LAST YEAR, HAVE YOU BEEN AFRAID OF YOUR PARTNER OR EX-PARTNER?: NO

## 2024-09-10 SDOH — ECONOMIC STABILITY: TRANSPORTATION INSECURITY
IN THE PAST 12 MONTHS, HAS LACK OF TRANSPORTATION KEPT YOU FROM MEETINGS, WORK, OR FROM GETTING THINGS NEEDED FOR DAILY LIVING?: NO

## 2024-09-10 SDOH — ECONOMIC STABILITY: INCOME INSECURITY: IN THE LAST 12 MONTHS, WAS THERE A TIME WHEN YOU WERE NOT ABLE TO PAY THE MORTGAGE OR RENT ON TIME?: NO

## 2024-09-10 SDOH — SOCIAL STABILITY: SOCIAL INSECURITY
WITHIN THE LAST YEAR, HAVE TO BEEN RAPED OR FORCED TO HAVE ANY KIND OF SEXUAL ACTIVITY BY YOUR PARTNER OR EX-PARTNER?: NO

## 2024-09-10 SDOH — ECONOMIC STABILITY: INCOME INSECURITY: IN THE PAST 12 MONTHS, HAS THE ELECTRIC, GAS, OIL, OR WATER COMPANY THREATENED TO SHUT OFF SERVICE IN YOUR HOME?: NO

## 2024-09-10 SDOH — ECONOMIC STABILITY: HOUSING INSECURITY: AT ANY TIME IN THE PAST 12 MONTHS, WERE YOU HOMELESS OR LIVING IN A SHELTER (INCLUDING NOW)?: NO

## 2024-09-10 SDOH — ECONOMIC STABILITY: FOOD INSECURITY: WITHIN THE PAST 12 MONTHS, THE FOOD YOU BOUGHT JUST DIDN'T LAST AND YOU DIDN'T HAVE MONEY TO GET MORE.: NEVER TRUE

## 2024-09-10 SDOH — SOCIAL STABILITY: SOCIAL INSECURITY: WITHIN THE LAST YEAR, HAVE YOU BEEN HUMILIATED OR EMOTIONALLY ABUSED IN OTHER WAYS BY YOUR PARTNER OR EX-PARTNER?: NO

## 2024-09-10 SDOH — ECONOMIC STABILITY: TRANSPORTATION INSECURITY
IN THE PAST 12 MONTHS, HAS THE LACK OF TRANSPORTATION KEPT YOU FROM MEDICAL APPOINTMENTS OR FROM GETTING MEDICATIONS?: NO

## 2024-09-10 SDOH — ECONOMIC STABILITY: HOUSING INSECURITY: IN THE PAST 12 MONTHS, HOW MANY TIMES HAVE YOU MOVED WHERE YOU WERE LIVING?: 0

## 2024-09-10 ASSESSMENT — ACTIVITIES OF DAILY LIVING (ADL): LACK_OF_TRANSPORTATION: NO

## 2024-09-10 ASSESSMENT — PAIN - FUNCTIONAL ASSESSMENT
PAIN_FUNCTIONAL_ASSESSMENT: CPOT (CRITICAL CARE PAIN OBSERVATION TOOL)

## 2024-09-10 ASSESSMENT — PAIN SCALES - GENERAL
PAINLEVEL_OUTOF10: 0 - NO PAIN

## 2024-09-10 NOTE — CARE PLAN
Problem: Safety - Medical Restraint  Goal: Remains free of injury from restraints (Restraint for Interference with Medical Device)  9/10/2024 1940 by Nils Jolley RN  Outcome: Progressing  Flowsheets (Taken 9/10/2024 0555)  Remains free of injury from restraints (restraint for interference with medical device):   Determine that other, less restrictive measures have been tried or would not be effective before applying the restraint   Evaluate the patient's condition at the time of restraint application   Inform patient/family regarding the reason for restraint   Every 2 hours: Monitor safety, psychosocial status, comfort, nutrition and hydration  9/10/2024 0555 by Nils Jolley RN  Outcome: Progressing  Flowsheets (Taken 9/10/2024 0555)  Remains free of injury from restraints (restraint for interference with medical device):   Determine that other, less restrictive measures have been tried or would not be effective before applying the restraint   Evaluate the patient's condition at the time of restraint application   Inform patient/family regarding the reason for restraint   Every 2 hours: Monitor safety, psychosocial status, comfort, nutrition and hydration     Problem: Safety - Medical Restraint  Goal: Free from restraint(s) (Restraint for Interference with Medical Device)  9/10/2024 1940 by Nils Jolley RN  Outcome: Progressing  Flowsheets (Taken 9/10/2024 0555)  Free from restraint(s) (restraint for interference with medical device):   ONCE/SHIFT or MINIMUM Every 12 hours: Assess and document the continuing need for restraints   Every 24 hours: Continued use of restraint requires Licensed Independent Practitioner to perform face to face examination and written order   Identify and implement measures to help patient regain control  9/10/2024 0555 by Nils Jolley RN  Outcome: Progressing  Flowsheets (Taken 9/10/2024 0555)  Free from restraint(s) (restraint for interference with medical  device):   ONCE/SHIFT or MINIMUM Every 12 hours: Assess and document the continuing need for restraints   Every 24 hours: Continued use of restraint requires Licensed Independent Practitioner to perform face to face examination and written order   Identify and implement measures to help patient regain control

## 2024-09-10 NOTE — PROGRESS NOTES
Communication Note    Patient Name: Khoa Mitchell  MRN: [id number]  Today's Date: 9/10/2024   Room: 18/18-A    Discipline: Physical Therapy      Missed Visit: Yes  Missed Visit Reason:  (PT evaluation received and reviewed, patient FiO2 80% with increased pressor support.  Will continue to monitor and follow up as medically appropriate.)      09/10/24 at 8:55 AM   Emir Duffy, PT   Rehab Office: [phone]

## 2024-09-10 NOTE — PROGRESS NOTES
Medical Intensive Care - Daily Progress Note   Subjective    Khoa Mitchell is a 69 y.o. year old male patient admitted on 9/7/2024 with following ICU needs:  acute hypoxemic respiratory failure, worsening from severe metastatic burden requiring endotracheal intubation     Interval History:  Patient is intubated and responsive to commands at bedside. He denied being in any pain. In NSR. HR momentarily spikes to 140s.     Meds    Scheduled medications  azithromycin, 500 mg, intravenous, q24h  pantoprazole, 40 mg, oral, Daily before breakfast   Or  esomeprazole, 40 mg, nasoduodenal tube, Daily before breakfast   Or  pantoprazole, 40 mg, intravenous, Daily before breakfast  phenylephrine HCl in 0.9% NaCl, , ,   piperacillin-tazobactam, 4.5 g, intravenous, q6h      Continuous medications  amiodarone, 0.5-1 mg/min, Last Rate: 1 mg/min (09/10/24 0625)  fentaNYL, 0-300 mcg/hr, Last Rate: 50 mcg/hr (09/10/24 0600)  norepinephrine, 0-0.2 mcg/kg/min, Last Rate: 0.04 mcg/kg/min (09/10/24 0600)  propofol, 5-50 mcg/kg/min, Last Rate: 5 mcg/kg/min (09/10/24 0320)      PRN medications  PRN medications: dextrose, dextrose, glucagon, glucagon, oxygen, phenylephrine HCl in 0.9% NaCl     Objective    Blood pressure 95/66, pulse 91, temperature 36.5 °C (97.7 °F), resp. rate 20, height 1.829 m (6'), weight 96.5 kg (212 lb 11.9 oz), SpO2 94%.     Physical Exam  Constitutional:       General: He is awake.      Appearance: He is obese.      Interventions: Nasal cannula in place.      Comments: Patient intubated. Responsive to commands.   HENT:      Head:      Salivary Glands: Right salivary gland is diffusely enlarged.      Comments: Significant plethora of the head including neck, face, and periorbital edema. Evidence of plum shaped mass on right jaw.   Cardiovascular:      Rate and Rhythm: Normal rate.      Heart sounds: Normal heart sounds, S1 normal and S2 normal.   Comments: Pericardial drain in place.   Musculoskeletal:      Right  upper arm: Swelling and edema present.      Left upper arm: Swelling and edema present.      Right lower le+ Pitting Edema present.      Left lower le+ Pitting Edema present.      Comments: The upper extremities are significantly edematous, hands are warm to touch. There is also some noticeable asymmetry where right upper extremity is larger than left.    Lymphadenopathy:      Cervical:      Right cervical: No superficial cervical adenopathy.     Left cervical: No superficial cervical adenopathy.      Upper Body:      Right upper body: No supraclavicular adenopathy.      Left upper body: No supraclavicular adenopathy.   Neurological:      Mental Status: He is alert.   Psychiatric:         Behavior: Behavior is cooperative.      Intake/Output Summary (Last 24 hours) at 9/10/2024 0645  Last data filed at 9/10/2024 0625  Gross per 24 hour   Intake 2772.11 ml   Output 905 ml   Net 1867.11 ml     Labs:   Results from last 72 hours   Lab Units 24  1513 09/09/24  0342   SODIUM mmol/L 137 137 140   POTASSIUM mmol/L 4.4 4.3 3.7   CHLORIDE mmol/L 96* 94* 104   CO2 mmol/L 29 31 24   BUN mg/dL 32* 33* 23   CREATININE mg/dL 0.95 1.01 0.76   GLUCOSE mg/dL 147* 140* 109*   CALCIUM mg/dL 8.9 9.2 7.1*   ANION GAP mmol/L 16 16 16   EGFR mL/min/1.73m*2 86 80 >90   PHOSPHORUS mg/dL 3.2 3.9 2.6      Results from last 72 hours   Lab Units 24  1513 24  0517   WBC AUTO x10*3/uL 12.5* 11.8* 11.3   HEMOGLOBIN g/dL 9.5* 9.6* 9.4*   HEMATOCRIT % 29.6* 28.8* 28.8*   PLATELETS AUTO x10*3/uL 268 245 226   NEUTROS PCT AUTO % 87.1 88.3 88.4   LYMPHS PCT AUTO % 3.0 2.5 3.3   MONOS PCT AUTO % 8.8 8.0 7.5   EOS PCT AUTO % 0.2 0.3 0.1      Results from last 72 hours   Lab Units 09/10/24  0540 244 2424   POCT PH, ARTERIAL pH 7.49* 7.48* 7.44*   POCT PCO2, ARTERIAL mm Hg 39 40 55*   POCT PO2, ARTERIAL mm Hg 77* 93 51*   POCT SO2, ARTERIAL % 96 99 85*     Results from last 72  hours   Lab Units 09/08/24  1359   POCT PH, VENOUS pH 7.27*   POCT PCO2, VENOUS mm Hg 77*   POCT PO2, VENOUS mm Hg 18*        Micro/ID:     Lab Results   Component Value Date    BLOODCULT No growth at 2 days 09/08/2024    BLOODCULT No growth at 2 days 09/08/2024       Summary of key imaging results from the last 24 hours  Slight interval improvement in left upper lung aeration with otherwise persistent near-complete opacification of the left hemithorax likely representing a combination of residual atelectasis/consolidation and layering effusion. Also significant stable small right pleural effusion.    Assessment and Plan     Assessment & Plan  Khoa Mitchell is a 68 yo male with PMHx of T2DM, CKD2 (baseline Cr 1.2) originally admitted for acute hypoxic respiratory failure 2/2 to postobstructive pneumonia from SVC from new diagnosis of malignancy. Course complicated by worsening respiratory failure requiring intubation 9/8 with subsequent BAL with suctioning of thick green secretions. 9/9 TTE with moderate pericardial effusion, findings suggestive of early tamponade. Transferred to the CICU, underwent pericardiocentesis subsequently complicated by complete heart block requiring TVP, then with afib RVR which chemically converted with amiodarone. Repeat CXR with complete opacification of the left lung. Treated with ceftriaxone (9/8-9/10), broadened to zosyn (9/10-*) given worsening infiltrates, azithromycin (9/8-9/10).    Mechanical Ventilation: < 4 days  Sedation/Analgesia:  Propofol, Fentanyl   Restraints: no    Summary for 09/10/24:  Pericardial drainage taken for cytology  Possible biopsy tomorrow depending on cytology results for cancer diagnosis (small cell carcinoma versus lymphoma) to determine radiation/chemotherapy given SVC syndrome       NEUROLOGIC  #Intubated, sedated  -Propofol 5, fentanyl 25  -Daily SATs as oxygen requirements decrease     #Multiple brain lesions  ::CT head performed, notable for multiple  hyperdense lesions concerning for hemorrhagic metastases, largest [id number]  ::Repeat CT head without evidence of hemorrhagic transformation  -Holding dvt ppx post-bleed day 2 (9/10 AM)  -Per NSGY, recommend MRI presurgical perfusion and fingerprinting brain, C/T/L w/ w/o contrast when able to lie flat; will page them when done     CARDIOVASCULAR  #Shock  Likely septic with concern for worsening post-obstructive pneumonia and mucus plugging as well as worsening pleural effusion  -Given worsening effusion and pneumonia, broadened to zosyn (9/10-*)  -Fu Bcx, BAL cx, sputum cx  -Infectious workup as below     #Pericardial effusion  #Complete heart block  Likely malignant given evidence of multiple probably metastatic lesions  ::CT chest with pericardial effusion measuring 15mm in thickness  ::9/9 TTE with moderate pericardial effusion, brief RV and RA diastolic collapse, concerning for early tamponade  ::9/9 pericardiocentesis with 400cc serosanguinous fluid, complicated by CHB requiring TVP  -Monitor pericardial drain output  -Pending pericardial cytology  -TVP x 24 hours, threshold 0.2, output 10 mAmp and VVI rate of 60  -Cardiology, EP following; appreciate recs     #Afib with RVR - resolved  Likely 2/2 to worsening infection and effusion  ::s/p amio bolus  -Continue amio gtt     PULMONARY  #Acute hypoxic respiratory failure  Originally likely 2/2 to post-obstructive pneumonia, underwent BAL with evidence of multiple secretions. Now complicated by complete opacification of the left lung concerning for worsening mucus plugging vs worsening pleural effusion vs combination.  ::Baseline RA  ::ENT scoped - patent airway with gross aspiration of secretions; hypomobile left vocal cord  ::9/7 BCx 2/4 Strep salivarius, likely contaminant, very uncommon cause of bacteremia  ::9/9 CXR with complete opacification of the left lung and R mediastinal shift  -Fu BAL cx, BCx, sputum cx  -fu Aspergillus galactomannan, PJP PCR, AFB,  fungal cx  -s/p ceftriaxone (9/8-9/10), started zosyn (9/10-*) given worsening infiltrates, continue azithro (9/8-9/10)  -Plan for bronch to evaluate mucus plug then thoracentesis later  -ENT following, appreciate recs     RENAL/  #Hyponatremia, resolved   Likely SIADH 2/2 to cancer, which would be consistent with lung SCC vs hypervolemic hyponatremia  ::Admission Na 133  ::Home salt tabs and water restriction, lasix 20mg per outpt nephro  ::Serum osm 294  ::Urine osm 785   ::Urine lytes WNL   -Continue to monitor      #CKD2a  ::Baseline Cr 1.1-1.2  -At baseline, CTM     GASTROINTESTINAL  -No acute issues     ENDOCRINE  #T2DM  ::9/8 A1C 6.8  -SSI q4h while NPO     HEME/ONC  #Metastatic cancer  #SVC syndrome  Unknown primary, 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 May need onc input for likely primary to determine target  -Consider rad/onc consult when more stable for palliative radiation after tissue diagnosis     #Normocytic anemia  Likely AOCD 2/2 to malignancy vs SARA given elevated RDW  ::Baseline 11-12, now 8-9   -Iron, TIBC, Tsat, ferritin      INFECTIOUS DISEASE  #Concern for post-obstructive pneumonia  -Fu sputum cx, Bcx as above  -Continue zosyn (9/10-*), azithromycin (9/8-*)     F: none  E: K>4, Phosph>3, Mg>2  N: NPO for thoracentesis  A: PIV  DVT ppx: holding off iso potential hemorrhagic conversion, thoracentesis  GI ppx: esomeprazole     O2: VC RR 20//PEEP 8/FiO2 80%  Gtt: amiodarone, levophed 0.03  Abx: zosyn/azithro    Code Status: Full code  Surrogate Medical Decision-maker: no family able to serve as NOK, but Zita Campbell (friend) [phone] able to assist  with interpreting patient's wishes           ICU Check List   FEN  Fluids: PRN  Electrolytes: PRN  Nutrition: NPO  Prophylaxis:  DVT ppx: contraindicated   GI ppx: none  Bowel care: none   Access: Peripheral IV (left), endotracheal tube (09/08-)   Social:  Code: Full Code    HPOA: there is no HCPOA, only contact is girlfriend Zita Campbell [phone]  Disposition: Medicine ICU      Hardware:         Arterial Line 09/10/24 Left Radial (Active)   Placement Date/Time: 09/10/24 (c) 0105   Size: 20 G  Orientation: Left  Location: Radial  Site Prep: Alcohol  Local Anesthetic: Injectable  Insertion attempts: 1  Securement Method: Transparent dressing  Patient Tolerance: Tolerated well   Number of days: 0       Arterial Sheath 09/09/24 1425 7 Fr. Right (Active)   Placement Date/Time: 09/09/24 1425   Hand Hygiene Completed: Yes  Sheath Size: 7 Fr.  Line Orientation: Right  Sutured: Yes  Local Anesthetic: Injectable  Site Prep: Alcohol;Chlorhexidine    Number of days: 0       ETT  8.5 mm (Active)   Placement Date/Time: 09/08/24 1208   Hand Hygiene Completed: Yes  Mask Ventilation: Vent by mask + OA or adjuvant +/- NMBA  ETT Type: ETT - single  Single Lumen Tube Size: 8.5 mm  Cuffed: Yes  Laryngoscope: Jose  Blade Size: 4  Location: Oral  ...   Number of days: 1       Urethral Catheter Straight-tip 18 Fr. (Active)   Placement Date/Time: 09/08/24 1245   Hand Hygiene Completed: Yes  Catheter Type: Straight-tip  Tube Size (Fr.): 18 Fr.  Catheter Balloon Size: 10 mL  Urine Returned: Yes   Number of days: 1       Open Drain Inferior;Medial;Midline Pericardial (Active)   Placement Date/Time: 09/09/24 1341   Hand Hygiene Completed: Yes  Orientation: Inferior;Medial;Midline  Location: Pericardial  Size (Fr.): 8 Fr.   Number of days: 0       NG/OG/Feeding Tube OG - [place] sump 18 Fr Center mouth (Active)   Placement Date/Time: 09/08/24 1230   Hand Hygiene Completed: Yes  Type of Tube: NG/OG Tube  Tube Length: 65 cm  Tube Type:  OG - [place] sump  NG/OG Tube Size: 18 Fr  Tube Location: Center mouth   Number of days: 1       Social:  Code: Full Code    HPOA: there is no HCPOA, only contact is girlfriend Zita Campbell [phone]  Disposition: Medicine ICU    Enid Chambers MD   09/10/24 at 6:45 AM     Disclaimer: Documentation completed with the information available at the time of input. The times in the chart may not be reflective of actual patient care times, interventions, or procedures. Documentation occurs after the physical care of the patient.

## 2024-09-10 NOTE — PROCEDURES
Arterial Line Insertion    Date/Time: 9/10/2024 1:05 AM    Performed by: Sultana Mckee MD  Authorized by: Rylee Balderrama MD    Consent:     Consent obtained:  Emergent situation    Risks, benefits, and alternatives were discussed: yes      Risks discussed:  Bleeding  Universal protocol:     Procedure explained and questions answered to patient or proxy's satisfaction: no      Relevant documents present and verified: no      Test results available: no      Imaging studies available: no      Required blood products, implants, devices, and special equipment available: no      Site/side marked: no      Immediately prior to procedure, a time out was called: no      Patient identity confirmed:  Verbally with patient  Indications:     Indications: hemodynamic monitoring    Pre-procedure details:     Skin preparation:  Alcohol  Sedation:     Sedation type:  None  Anesthesia:     Anesthesia method:  Local infiltration    Local anesthetic:  Lidocaine 1% w/o epi  Procedure details:     Location:  L radial    Needle gauge:  20 G    Placement technique:  Ultrasound guided    Number of attempts:  1    Transducer: waveform confirmed    Post-procedure details:     Post-procedure:  Sterile dressing applied    CMS:  Normal    Procedure completion:  Tolerated well, no immediate complications

## 2024-09-10 NOTE — PROGRESS NOTES
Subjective   Overnight, developed wide complex tachycardia (Afib w aberrancy), received 200 J synchronized shock but no significant change in HR. Then received IV amio push and started on amio drip. This morning, he is reverted back to NSR and rate controlled.       Objective   Visit Vitals  BP (!) 92/42   Pulse 88   Temp (!) 38.8 °C (101.8 °F)   Resp 20   Ht 1.829 m (6')   Wt 96.5 kg (212 lb 11.9 oz)   SpO2 94%   BMI 28.85 kg/m²   Smoking Status Every Day   BSA 2.21 m²      Physical Exam  General: awake, alert, no acute distress, currently intubated but on sedation vacation   HEENT: no scleral icterus, no JVD  CV: RRR, no MRG  Resp: CTA b/l, no wheezes, rales, or rhonchi  Abd: soft, NT/ND  LE: no edema, no cyanosis  Neuo: grossly normal  Psych: pleasant      Cardiographics  Echocardiogram: Results for orders placed during the hospital encounter of 09/07/24    Transthoracic Echo (TTE) Limited    PHYSICIAN INTERPRETATION:  Left Ventricle: The left ventricle was not well visualized. The left ventricular ejection fraction could not be measured. The left ventricular cavity size was not assessed. Left ventricular diastolic filling was not assessed.  Left Atrium: The left atrium was not assessed.  Right Ventricle: The right ventricle is small in size. There is normal right ventricular global systolic function.  Right Atrium: The right atrium was not well visualized.  Aortic Valve: The aortic valve was not well visualized. Aortic valve regurgitation was not assessed.  Mitral Valve: The mitral valve is normal in structure. Mitral valve regurgitation was not assessed.  Tricuspid Valve: The tricuspid valve is structurally normal. Tricuspid regurgitation was not assessed. The right ventricular systolic pressure is unable to be estimated.  Pulmonic Valve: The pulmonic valve was not assessed. Pulmonic valve regurgitation was not assessed.  Pericardium: There is a moderately sized pericardial effusion. There is a suggestion of  brief RV diastolic collapse and brief RA collapse on pre-tap images. Tap and post-tap images were obtained in the 4-C view only. Trivial to small effusion on final images.  Aorta: The aortic root was not well visualized.  In comparison to the previous echocardiogram(s): Although previous studies are available for review, a comparison with the current echocardiogram is not feasible due to its limited scope or image quality.      CONCLUSIONS:  1. The left ventricle was not well visualized. The left ventricular ejection fraction could not be measured.  2. There is normal right ventricular global systolic function.  3. There is a moderate pericardial effusion.  4. There is a suggestion of brief RV diastolic collapse and brief RA collapse on pre-tap images. Tap and post-tap images were obtained in the 4-C view only. Trivial to small effusion on final images.  5. Very limited 2D study only before and during pericardiocentesis.       Imaging    CHEST CTA:  Heterogenous soft tissue consistent with malignancy/neoplasm  throughout the central/left upper chest with probable SVC  obstruction, encasement and narrowing of the trachea/jose antonio,  left  chest wall invasion. Multifocal likely metastatic lymphadenopathy and  permeative lytic lesion in the right scapula with pathologic  fracture. Further evaluation recommended.      No gross central PE identified. Limited assessment for  peripheral/basilar PE. Bibasilar infiltrates or atelectasis with  pleural effusions, left-greater-than-right.      Pericardial effusion.    Lab Review   Lab Results   Component Value Date     09/10/2024     09/09/2024     09/09/2024    K 4.2 09/10/2024    K 4.4 09/09/2024    K 4.3 09/09/2024    CO2 30 09/10/2024    CO2 29 09/09/2024    CO2 31 09/09/2024    BUN 30 (H) 09/10/2024    BUN 32 (H) 09/09/2024    BUN 33 (H) 09/09/2024    CREATININE 0.97 09/10/2024    CREATININE 0.95 09/09/2024    CREATININE 1.01 09/09/2024    GLUCOSE 148 (H)  "09/10/2024    GLUCOSE 147 (H) 09/09/2024    GLUCOSE 140 (H) 09/09/2024    CALCIUM 8.9 09/10/2024    CALCIUM 8.9 09/09/2024    CALCIUM 9.2 09/09/2024     No results found for: \"CKTOTAL\", \"CKMB\", \"CKMBINDEX\", \"TROPONINI\"  Lab Results   Component Value Date    WBC 12.0 (H) 09/10/2024    WBC 12.5 (H) 09/09/2024    WBC 11.8 (H) 09/09/2024    HGB 9.8 (L) 09/10/2024    HGB 9.5 (L) 09/09/2024    HGB 9.6 (L) 09/09/2024    HCT 28.3 (L) 09/10/2024    HCT 29.6 (L) 09/09/2024    HCT 28.8 (L) 09/09/2024    MCV 86 09/10/2024    MCV 92 09/09/2024    MCV 89 09/09/2024     09/10/2024     09/09/2024     09/09/2024       Troponin I, High Sensitivity   Date/Time Value Ref Range Status   09/07/2024 09:27 AM 10 0 - 20 ng/L Final     BNP   Date/Time Value Ref Range Status   09/07/2024 09:27  (H) 0 - 99 pg/mL Final          Assessment/Plan     69 y.o. male w/ hx of T2DM, CKD2 (baseline Cr 1.2) admitted for concern for SVC syndrome likely 2/2 to new diagnosis of metastatic cancer. Course c/b acute hypoxic resp failure s/p intubation and moderate pericardial effusion with early tamponade physiology s/p pericardiocentesis and post-procedure development of CHB requiring TVP. EP consulted for further management,      Impression:  - Moderate pericardial effusion with early tamponade physiology (likely metastatic given newly diagnosed metastatic disease with mets to brain, chest wall, mediastinal LAD, multiple organs in abdomen, parotids etc)  - s/p pericardiocentesis 09/09 and post-procedure complication on CHB with hypotension requiring inotropes and TVP  - spontaneous resolution of CHB and patient in NSR with intrinsic rate in 80-90s.   - 09/09 PM developed wide complex tachycardia (Afib w aberrancy), received 200 J synchronized shock but no significant change in HR. Quiet on tele afterwards.   - Started on IV amio drip and currently rate controlled in NSR.      Recommendations:  - TVP wires can be removed,  - " Continue amio for rhythm control for atrial fibrillation with aberrancy. Can be switched to PO at the discretion of ICU team.  - Ideally should be anticoagulated after the electrical shock he received last night. However, intracranial lesions with propensity of hemorrhagic conversion might pose barrier. Once cleared from NSGY standpoint, consider starting AC.     EP will sign off. Recommendations discussed with ICU team.    Please re-consult with new questions.      Thee Clancy MD, MS, FACP   Heart Failure Fellow,  [facility]     Thank you for involving us in the care of this patient. Above recommendations discussed with Dr. Gardiner. If further questions arise, please page the EP consult pager at [phone] on weekdays 7AM - 6PM and weekends 7AM - 2PM, or at [phone] at all other times. The EP device nurse can be reached at pager [phone] during regular business hours M-F.Reason for Consult:

## 2024-09-10 NOTE — CARE PLAN
Problem: Skin  Goal: Decreased wound size/increased tissue granulation at next dressing change  Outcome: Progressing  Flowsheets (Taken 9/10/2024 0556)  Decreased wound size/increased tissue granulation at next dressing change:   Promote sleep for wound healing   Protective dressings over bony prominences     Problem: Skin  Goal: Participates in plan/prevention/treatment measures  Outcome: Progressing  Flowsheets (Taken 9/10/2024 0556)  Participates in plan/prevention/treatment measures: Elevate heels     Problem: Skin  Goal: Prevent/manage excess moisture  Outcome: Progressing  Flowsheets (Taken 9/9/2024 1519 by Ronna Servin RN)  Prevent/manage excess moisture: Monitor for/manage infection if present     Problem: Skin  Goal: Prevent/minimize sheer/friction injuries  Outcome: Progressing  Flowsheets (Taken 9/10/2024 0556)  Prevent/minimize sheer/friction injuries:   Complete micro-shifts as needed if patient unable. Adjust patient position to relieve pressure points, not a full turn   Increase activity/out of bed for meals   Use pull sheet   HOB 30 degrees or less   Turn/reposition every 2 hours/use positioning/transfer devices     Problem: Skin  Goal: Promote/optimize nutrition  Outcome: Progressing  Flowsheets (Taken 9/10/2024 0556)  Promote/optimize nutrition: Monitor/record intake including meals     Problem: Skin  Goal: Promote skin healing  Outcome: Progressing  Flowsheets (Taken 9/10/2024 0556)  Promote skin healing:   Assess skin/pad under line(s)/device(s)   Protective dressings over bony prominences   Turn/reposition every 2 hours/use positioning/transfer devices   Ensure correct size (line/device) and apply per  instructions   Rotate device position/do not position patient on device

## 2024-09-10 NOTE — H&P
History Of Present Illness  Khoa Mitchell is a 70 yo male with PMHx of T2DM, CKD2 (baseline Cr 1.2) originally admitted for acute hypoxic respiratory failure 2/2 to postobstructive pneumonia from SVC from new diagnosis of malignancy.    Originally presented 9/7AM to [facility] with 3 months of shoulder pain followed by 4 days of progressive RUE and facial swelling. CT PE with new mass in the left upper chest encasing the left chest wall, lymphadenopathy, lytic lesion in the right scapula. CT AP with metastatic lesions in the liver, spleen, adrenal glands, pancreas, multiple peritoneal nodules, soft tissue mass in the right gluteal region and lytic lesions in the right acetabulum. Transferred to [facility] for escalation of care.    AT [facility], admitted to the MICU for concern for SVC syndrome. ENT scoped, patent airway but evidence of gross aspiration. CT surgery consulted, no surgical evaluation at this time. CT soft tissue with multiple soft tissue masses within the parotid gland, enlarged cervical lymph nodes, redemonstration of large soft tissue mass in the left lung extending to the thyroid gland. CT head performed, notable for multiple hyperdense lesions concerning for hemorrhagic metastases, largest [id number]. NSGY also engaged, repeat CT head with stable hyperattenuating masses. Had worsening respiratory distress, intubated 9/8 with subsequent BAL during which there was suctioning of thick green secretions, primarily in the left main bronchus and left lower lobe bronchus. 9/9 TTE with evidence of moderate pericardial effusion with brief RV and RA diastolic collapse concerning for early cardiac tamponade. Transferred to the CICU, underwent pericardioentesis with 400cc serous drainage, complicated by complete heart block, started on TVP.     Around 9PM 9/9, had wide complex tachycardia to the 170s, bolus'ed 300 of amio, underwent synchronized cardioversion at 200J without significant changes in HR. ABG 7.48/40/93,  lactate 1.5. EKG showed afib with LBBB and RVR. CXR with significant white out of the left lung, worsened from prior. Converted back to NSR with amio gtt and 1L LR. Transferred back to the MICU given resolution of tamponade.     Past Medical History  As above    Surgical History  He has a past surgical history that includes Other surgical history (05/16/2022).     Social History  0.5-2PPD for the past 40 years, denies alcohol or other recreational drug use.    Family History  No family hx of cancer     Allergies  Patient has no known allergies.    Review of Systems  10 point review of systems was performed and is otherwise negative except as noted in HPI.      Physical Exam  Constitutional:       Comments: Intubated, sedated   HENT:      Mouth/Throat:      Comments: 2x3cm parotid gland, hard, immobile, nontender to palpation  Eyes:      Pupils: Pupils are equal, round, and reactive to light.   Cardiovascular:      Rate and Rhythm: Normal rate and regular rhythm.      Comments: Pericardial drain c/d/i with serosanguinous drainage  Pulmonary:      Comments: On ventilator  Decreased breath sounds on LLL  Abdominal:      General: Abdomen is flat. There is no distension.      Palpations: Abdomen is soft.      Tenderness: There is no guarding.   Musculoskeletal:      Comments: Bilateral lower extremity edema  Gross anasarca, notable facial swelling   Skin:     General: Skin is warm and dry.   Neurological:      Comments: Sedated, follows commands            Last Recorded Vitals  BP 95/66   Pulse 85   Temp 37 °C (98.6 °F)   Resp 20   Wt 99.2 kg (218 lb 11.1 oz)   SpO2 92%     Relevant Results         Assessment/Plan   Assessment & Plan  Metastatic malignant neoplasm, unspecified site (Multi)    Pericardial effusion (HHS-HCC)    Heart block      Assessment & Plan  Khoa Mitchell is a 70 yo male with PMHx of T2DM, CKD2 (baseline Cr 1.2) originally admitted for acute hypoxic respiratory failure 2/2 to postobstructive  pneumonia from SVC from new diagnosis of malignancy. Course complicated by worsening respiratory failure requiring intubation 9/8 with subsequent BAL with suctioning of thick green secretions. 9/9 TTE with moderate pericardial effusion, findings suggestive of early tamponade. Transferred to the CICU, underwent pericardiocentesis subsequently complicated by complete heart block requiring TVP, then with afib RVR which chemically converted with amiodarone. Repeat CXR with complete opacification of the left lung. Treated with ceftriaxone (9/8-9/10), broadened to zosyn (9/10-*) given worsening infiltrates, azithromycin (9/8-9/10).    NEUROLOGIC  #Intubated, sedated  -Propofol 5, fentanyl 25  -Daily SATs as oxygen requirements decrease    #Multiple brain lesions  ::CT head performed, notable for multiple hyperdense lesions concerning for hemorrhagic metastases, largest [id number]  ::Repeat CT head without evidence of hemorrhagic transformation  -Holding dvt ppx post-bleed day 2 (9/10 AM)  -Per NSGY, recommend MRI presurgical perfusion and fingerprinting brain, C/T/L w/ w/o contrast when able to lie flat; will page them when done     CARDIOVASCULAR  #Shock  Likely septic with concern for worsening post-obstructive pneumonia and mucus plugging as well as worsening pleural effusion  -Given worsening effusion and pneumonia, broadened to zosyn (9/10-*)  -Fu Bcx, BAL cx, sputum cx  -Infectious workup as below    #Pericardial effusion  #Complete heart block  Likely malignant given evidence of multiple probably metastatic lesions  ::CT chest with pericardial effusion measuring 15mm in thickness  ::9/9 TTE with moderate pericardial effusion, brief RV and RA diastolic collapse, concerning for early tamponade  ::9/9 pericardiocentesis with 400cc serosanguinous fluid, complicated by CHB requiring TVP  -Monitor pericardial drain output  -Follow-up pericardial cytology  -TVP x 24 hours, threshold 0.2, output 10 mAmp and VVI rate of  60  -Cardiology, EP following; appreciate recs    #Afib with RVR - resolved  Likely 2/2 to worsening infection and effusion  ::s/p amio bolus  -Continue amio gtt    PULMONARY  #Acute hypoxic respiratory failure  Originally likely 2/2 to post-obstructive pneumonia, underwent BAL with evidence of multiple secretions. Now complicated by complete opacification of the left lung concerning for worsening mucus plugging vs worsening pleural effusion vs combination.  ::Baseline RA  ::ENT scoped - patent airway with gross aspiration of secretions; hypomobile left vocal cord  ::9/7 BCx 2/4 Strep salivarius, likely contaminant, very uncommon cause of bacteremia  ::9/9 CXR with complete opacification of the left lung and R mediastinal shift  -Fu BAL cx, BCx, sputum cx  -fu Aspergillus galactomannan, PJP PCR, AFB, fungal cx  -s/p ceftriaxone (9/8-9/10), started zosyn (9/10-*) given worsening infiltrates, continue azithro (9/8-9/10)  -Plan for bronch to evaluate mucus plug then thoracentesis later  -ENT following, appreciate recs     RENAL/  #Hyponatremia  Likely SIADH 2/2 to cancer, which would be consistent with lung SCC vs hypervolemic hyponatremia  ::Admission Na 133  ::Home salt tabs and water restriction, lasix 20mg per outpt nephro  ::Serum osm 294  -Pending urine osm, urine lytes     #CKD2a  ::Baseline Cr 1.1-1.2  -At baseline, CTM     GASTROINTESTINAL  -No acute issues     ENDOCRINE  #T2DM  ::9/8 A1C 6.8  -SSI q4h while NPO     HEME/ONC  #Metastatic cancer  #SVC syndrome  Unknown primary, 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 May need onc input for likely primary to determine  target  -Consider rad/onc consult when more stable for palliative radiation after tissue diagnosis    #Normocytic anemia  Likely AOCD 2/2 to malignancy vs SARA given elevated RDW  ::Baseline 11-12, now 8-9   -Iron, TIBC, Tsat, ferritin     INFECTIOUS DISEASE  #Concern for post-obstructive pneumonia  -Fu sputum cx, Bcx as above  -Continue zosyn (9/10-*), azithromycin (9/8-*)    F: none  E: K>4, Phosph>3, Mg>2  N: NPO for thoracentesis  A: PIV  DVT ppx: holding off iso potential hemorrhagic conversion, thoracentesis  GI ppx: esomeprazole    O2: VC RR 20//PEEP 8/FiO2 80%  Gtt: amiodarone, levophed 0.03  Abx: zosyn/azithro    Code Status: Full code  Surrogate Medical Decision-maker: no family able to serve as NOK, but Zita Campbell (friend) [phone] able to assist with interpreting patient's wishes       Lianne Hanks MD

## 2024-09-10 NOTE — SIGNIFICANT EVENT
Please call back when MRI brain with/without contrast is done.     Thank you for allowing us to participate in the care of this patient. Will sign off at this time. Please page [phone] with any questions or concerns.    Malachi Aguiar MD  PGY-2 Neurosurgery  6:04 AM

## 2024-09-10 NOTE — NURSING NOTE
"Attempted to contact patient friend \"Zita\" from H&P note in attempt to complete MRI screening form. Number is disconnected.  "

## 2024-09-10 NOTE — PROGRESS NOTES
Occupational Therapy    Communication Note    Patient Name: Khoa Mitchell  MRN: [id number]  Today's Date: 9/10/2024   Room: 18/18-A    Discipline: Occupational Therapy      Missed Visit: Yes  Missed Visit Reason:  (Patient on 80% FiO2 on vent, Amio + Levo; will hold and attempt next visit as appropriate.)      09/10/24 at 8:59 AM   Dona Seymour, OT   Rehab Office: [phone]

## 2024-09-10 NOTE — PROGRESS NOTES
SOCIAL WORK NOTE     09/10/24 5629   Discharge Planning   Living Arrangements Alone   Support Systems Friends/neighbors   Assistance Needed independent per friend   Type of Residence Private residence   Financial Resource Strain   How hard is it for you to pay for the very basics like food, housing, medical care, and heating? Not hard   Housing Stability   In the last 12 months, was there a time when you were not able to pay the mortgage or rent on time? N   In the past 12 months, how many times have you moved where you were living? 0   At any time in the past 12 months, were you homeless or living in a shelter (including now)? N   Transportation Needs   In the past 12 months, has lack of transportation kept you from medical appointments or from getting medications? no   In the past 12 months, has lack of transportation kept you from meetings, work, or from getting things needed for daily living? No     NOK: friends listed  DME: none   Home Care: none    HD: denied  PCP: denied per friend   Additional information:  CONCHITA spoke with friends Zita and Thang for assessment (please see flowsheets for further details). Zita is a friend x2 years and Thang is a , now living in [place]. Patient lives alone and is independent at baseline prior to admission. When prompted about NOK, no legal spouse, living siblings or parents. He does have estranged children. Endy Mitchell in  ( unknown). And daughter Breanna last name unknown,  to man stationed in [place]. Social work to follow.     UPDATE: [employer] emergency alert service is unable to locate service members without full name, branch, and .  left for [facility].   Maite Paul, DOUG, LISW-S ([phone])

## 2024-09-10 NOTE — CONSULTS
Reason For Consult  Axillary lymph node biopsy    History Of Present Illness  Khoa Mitchell is a 69 y.o. male with a history of T2DM and CKD2 (baseline Cr 1.2) was initially admitted for acute hypoxic respiratory failure due to SVC syndrome from a newly diagnosed malignancy. He first presented on 9/7 at [facility] with three months of shoulder pain and progressive right upper extremity and facial swelling. Imaging revealed multi-organ metastasis, and he was transferred to [facility] for escalation of care.  ENT scoped his airway, which was found to be patent. On 9/8, he developed worsening respiratory distress and was intubated. A TTE on 9/9 revealed a pericardial effusion with signs of cardiac tamponade. Subsequently, he developed wide complex tachycardia, received amiodarone, and underwent synchronized cardioversion. Surgical oncology consulted for axillary lymph node biopsy for cancer diagnosis      Past Medical History  He has a past medical history of Other specified health status.    Surgical History  He has a past surgical history that includes Other surgical history (05/16/2022).     Social History  He reports that he has been smoking cigarettes. He has never used smokeless tobacco. He reports that he does not drink alcohol and does not use drugs.    Family History  No family history on file.     Allergies  Patient has no known allergies.         Physical Exam  Physical Exam  Constitutional:       Comments: Intubated    HENT:      Head:      Comments: Significant swelling of the head and neck   Cardiovascular:      Comments: Pericardial drain in place  Genitourinary:     Comments: Perea in place  Musculoskeletal:      Comments: Significant swelling UE more in the right than the left  No palpable axillary lymph nodes b/l   Skin:     General: Skin is warm and dry.   Neurological:      Comments: Mildly sedated, responding to commands           Last Recorded Vitals  Blood pressure (!) 92/42, pulse 93, temperature  37.4 °C (99.3 °F), resp. rate 20, height 1.829 m (6'), weight 96.5 kg (212 lb 11.9 oz), SpO2 94%.    Relevant Results  Results for orders placed or performed during the hospital encounter of 09/07/24 (from the past 24 hour(s))   POCT GLUCOSE   Result Value Ref Range    POCT Glucose 119 (H) 74 - 99 mg/dL   Blood Gas Arterial Full Panel   Result Value Ref Range    POCT pH, Arterial 7.48 (H) 7.38 - 7.42 pH    POCT pCO2, Arterial 40 38 - 42 mm Hg    POCT pO2, Arterial 93 85 - 95 mm Hg    POCT SO2, Arterial 99 94 - 100 %    POCT Oxy Hemoglobin, Arterial 95.9 94.0 - 98.0 %    POCT Hematocrit Calculated, Arterial 30.0 (L) 41.0 - 52.0 %    POCT Sodium, Arterial 132 (L) 136 - 145 mmol/L    POCT Potassium, Arterial 4.1 3.5 - 5.3 mmol/L    POCT Chloride, Arterial 98 98 - 107 mmol/L    POCT Ionized Calcium, Arterial 1.24 1.10 - 1.33 mmol/L    POCT Glucose, Arterial 156 (H) 74 - 99 mg/dL    POCT Lactate, Arterial 1.5 0.4 - 2.0 mmol/L    POCT Base Excess, Arterial 5.8 (H) -2.0 - 3.0 mmol/L    POCT HCO3 Calculated, Arterial 29.8 (H) 22.0 - 26.0 mmol/L    POCT Hemoglobin, Arterial 9.9 (L) 13.5 - 17.5 g/dL    POCT Anion Gap, Arterial 8 (L) 10 - 25 mmo/L    Patient Temperature 37.0 degrees Celsius    FiO2 100 %   Renal function panel   Result Value Ref Range    Glucose 147 (H) 74 - 99 mg/dL    Sodium 137 136 - 145 mmol/L    Potassium 4.4 3.5 - 5.3 mmol/L    Chloride 96 (L) 98 - 107 mmol/L    Bicarbonate 29 21 - 32 mmol/L    Anion Gap 16 10 - 20 mmol/L    Urea Nitrogen 32 (H) 6 - 23 mg/dL    Creatinine 0.95 0.50 - 1.30 mg/dL    eGFR 86 >60 mL/min/1.73m*2    Calcium 8.9 8.6 - 10.6 mg/dL    Phosphorus 3.2 2.5 - 4.9 mg/dL    Albumin 2.7 (L) 3.4 - 5.0 g/dL   CBC and Auto Differential   Result Value Ref Range    WBC 12.5 (H) 4.4 - 11.3 x10*3/uL    nRBC 0.0 0.0 - 0.0 /100 WBCs    RBC 3.22 (L) 4.50 - 5.90 x10*6/uL    Hemoglobin 9.5 (L) 13.5 - 17.5 g/dL    Hematocrit 29.6 (L) 41.0 - 52.0 %    MCV 92 80 - 100 fL    MCH 29.5 26.0 - 34.0 pg     MCHC 32.1 32.0 - 36.0 g/dL    RDW 16.4 (H) 11.5 - 14.5 %    Platelets 268 150 - 450 x10*3/uL    Neutrophils % 87.1 40.0 - 80.0 %    Immature Granulocytes %, Automated 0.7 0.0 - 0.9 %    Lymphocytes % 3.0 13.0 - 44.0 %    Monocytes % 8.8 2.0 - 10.0 %    Eosinophils % 0.2 0.0 - 6.0 %    Basophils % 0.2 0.0 - 2.0 %    Neutrophils Absolute 10.92 (H) 1.20 - 7.70 x10*3/uL    Immature Granulocytes Absolute, Automated 0.09 0.00 - 0.70 x10*3/uL    Lymphocytes Absolute 0.38 (L) 1.20 - 4.80 x10*3/uL    Monocytes Absolute 1.10 (H) 0.10 - 1.00 x10*3/uL    Eosinophils Absolute 0.03 0.00 - 0.70 x10*3/uL    Basophils Absolute 0.02 0.00 - 0.10 x10*3/uL   Magnesium   Result Value Ref Range    Magnesium 2.38 1.60 - 2.40 mg/dL   Lactate   Result Value Ref Range    Lactate 1.4 0.4 - 2.0 mmol/L   Coagulation Screen   Result Value Ref Range    Protime 14.5 (H) 9.8 - 12.8 seconds    INR 1.3 (H) 0.9 - 1.1    aPTT 27 27 - 38 seconds   Iron and TIBC   Result Value Ref Range    Iron <10 (L) 35 - 150 ug/dL    UIBC 183 110 - 370 ug/dL    TIBC      % Saturation     Ferritin   Result Value Ref Range    Ferritin 351 (H) 20 - 300 ng/mL   POCT GLUCOSE   Result Value Ref Range    POCT Glucose 161 (H) 74 - 99 mg/dL   POCT GLUCOSE   Result Value Ref Range    POCT Glucose 155 (H) 74 - 99 mg/dL   Osmolality, urine   Result Value Ref Range    Osmolality, Urine Random 785 200 - 1,200 mOsm/kg   Urine electrolytes   Result Value Ref Range    Sodium, Urine Random 14 mmol/L    Sodium/Creatinine Ratio 9 Not established. mmol/g Creat    Potassium, Urine Random 70 mmol/L    Potassium/Creatinine Ratio 44 Not established mmol/g Creat    Chloride, Urine Random <15 mmol/L    Chloride/Creatinine Ratio      Creatinine, Urine Random 158.9 20.0 - 370.0 mg/dL   POCT GLUCOSE   Result Value Ref Range    POCT Glucose 142 (H) 74 - 99 mg/dL   CBC and Auto Differential   Result Value Ref Range    WBC 12.0 (H) 4.4 - 11.3 x10*3/uL    nRBC 0.0 0.0 - 0.0 /100 WBCs    RBC 3.28 (L)  4.50 - 5.90 x10*6/uL    Hemoglobin 9.8 (L) 13.5 - 17.5 g/dL    Hematocrit 28.3 (L) 41.0 - 52.0 %    MCV 86 80 - 100 fL    MCH 29.9 26.0 - 34.0 pg    MCHC 34.6 32.0 - 36.0 g/dL    RDW 15.9 (H) 11.5 - 14.5 %    Platelets 271 150 - 450 x10*3/uL    Neutrophils % 85.6 40.0 - 80.0 %    Immature Granulocytes %, Automated 0.8 0.0 - 0.9 %    Lymphocytes % 3.1 13.0 - 44.0 %    Monocytes % 10.0 2.0 - 10.0 %    Eosinophils % 0.3 0.0 - 6.0 %    Basophils % 0.2 0.0 - 2.0 %    Neutrophils Absolute 10.25 (H) 1.20 - 7.70 x10*3/uL    Immature Granulocytes Absolute, Automated 0.09 0.00 - 0.70 x10*3/uL    Lymphocytes Absolute 0.37 (L) 1.20 - 4.80 x10*3/uL    Monocytes Absolute 1.20 (H) 0.10 - 1.00 x10*3/uL    Eosinophils Absolute 0.03 0.00 - 0.70 x10*3/uL    Basophils Absolute 0.02 0.00 - 0.10 x10*3/uL   Renal function panel   Result Value Ref Range    Glucose 148 (H) 74 - 99 mg/dL    Sodium 136 136 - 145 mmol/L    Potassium 4.2 3.5 - 5.3 mmol/L    Chloride 95 (L) 98 - 107 mmol/L    Bicarbonate 30 21 - 32 mmol/L    Anion Gap 15 10 - 20 mmol/L    Urea Nitrogen 30 (H) 6 - 23 mg/dL    Creatinine 0.97 0.50 - 1.30 mg/dL    eGFR 84 >60 mL/min/1.73m*2    Calcium 8.9 8.6 - 10.6 mg/dL    Phosphorus 3.2 2.5 - 4.9 mg/dL    Albumin 2.8 (L) 3.4 - 5.0 g/dL   Magnesium   Result Value Ref Range    Magnesium 2.27 1.60 - 2.40 mg/dL   Blood Gas Arterial Full Panel   Result Value Ref Range    POCT pH, Arterial 7.49 (H) 7.38 - 7.42 pH    POCT pCO2, Arterial 39 38 - 42 mm Hg    POCT pO2, Arterial 77 (L) 85 - 95 mm Hg    POCT SO2, Arterial 96 94 - 100 %    POCT Oxy Hemoglobin, Arterial 93.4 (L) 94.0 - 98.0 %    POCT Hematocrit Calculated, Arterial 29.0 (L) 41.0 - 52.0 %    POCT Sodium, Arterial 134 (L) 136 - 145 mmol/L    POCT Potassium, Arterial 4.0 3.5 - 5.3 mmol/L    POCT Chloride, Arterial 99 98 - 107 mmol/L    POCT Ionized Calcium, Arterial 1.25 1.10 - 1.33 mmol/L    POCT Glucose, Arterial 151 (H) 74 - 99 mg/dL    POCT Lactate, Arterial 1.3 0.4 - 2.0  mmol/L    POCT Base Excess, Arterial 5.9 (H) -2.0 - 3.0 mmol/L    POCT HCO3 Calculated, Arterial 29.7 (H) 22.0 - 26.0 mmol/L    POCT Hemoglobin, Arterial 9.7 (L) 13.5 - 17.5 g/dL    POCT Anion Gap, Arterial 9 (L) 10 - 25 mmo/L    Patient Temperature 37.0 degrees Celsius    FiO2 80 %   Procalcitonin   Result Value Ref Range    Procalcitonin 5.33 (H) <=0.07 ng/mL   Electrocardiogram, 12-lead PRN ACS symptoms   Result Value Ref Range    Ventricular Rate 102 BPM    Atrial Rate 102 BPM    VA Interval 138 ms    QRS Duration 156 ms    QT Interval 388 ms    QTC Calculation(Bazett) 505 ms    P Axis 66 degrees    R Axis 75 degrees    T Axis -3 degrees    QRS Count 17 beats    Q Onset 211 ms    P Onset 142 ms    P Offset 189 ms    T Offset 405 ms    QTC Fredericia 463 ms   Electrocardiogram, 12-lead PRN ACS symptoms   Result Value Ref Range    Ventricular Rate 147 BPM    Atrial Rate 150 BPM    QRS Duration 146 ms    QT Interval 298 ms    QTC Calculation(Bazett) 466 ms    R Axis 93 degrees    T Axis -56 degrees    QRS Count 24 beats    Q Onset 212 ms    T Offset 361 ms    QTC Fredericia 401 ms   Electrocardiogram, 12-lead PRN ACS symptoms   Result Value Ref Range    Ventricular Rate 161 BPM    Atrial Rate 170 BPM    QRS Duration 140 ms    QT Interval 304 ms    QTC Calculation(Bazett) 497 ms    R Axis 99 degrees    T Axis -68 degrees    QRS Count 27 beats    Q Onset 212 ms    T Offset 364 ms    QTC Fredericia 422 ms   POCT GLUCOSE   Result Value Ref Range    POCT Glucose 182 (H) 74 - 99 mg/dL   POCT GLUCOSE   Result Value Ref Range    POCT Glucose 173 (H) 74 - 99 mg/dL   POCT GLUCOSE   Result Value Ref Range    POCT Glucose 168 (H) 74 - 99 mg/dL           Assessment/Plan     69 year old male admitted to the MICU for respiratory distress with newly diagnosed metastatic cancer, surgical oncology consulted for axillary lymph node biopsy for cancer diagnosis. Patient is critically ill with no palpable axillary lymph nodes  appreciated on exam    Plan:  Recommend IR consult for biopsy first, if unable to get adequate sample tissue, contact surgical oncology for excisional lymph node biopsy in the OR      Rebecca Trevizo DMD  Surgical oncology   [facility] service team pager [phone]

## 2024-09-10 NOTE — CARE PLAN
Problem: Skin  Goal: Decreased wound size/increased tissue granulation at next dressing change  9/10/2024 1940 by Nils Jolley RN  Outcome: Progressing  Flowsheets (Taken 9/10/2024 0556)  Decreased wound size/increased tissue granulation at next dressing change:   Promote sleep for wound healing   Protective dressings over bony prominences  9/10/2024 0556 by Nils Jolley RN  Outcome: Progressing  Flowsheets (Taken 9/10/2024 0556)  Decreased wound size/increased tissue granulation at next dressing change:   Promote sleep for wound healing   Protective dressings over bony prominences     Problem: Skin  Goal: Participates in plan/prevention/treatment measures  9/10/2024 1940 by Nils Jolley RN  Outcome: Progressing  Flowsheets (Taken 9/10/2024 0556)  Participates in plan/prevention/treatment measures: Elevate heels  9/10/2024 0556 by Nils Jolley RN  Outcome: Progressing  Flowsheets (Taken 9/10/2024 0556)  Participates in plan/prevention/treatment measures: Elevate heels     Problem: Skin  Goal: Prevent/manage excess moisture  9/10/2024 1940 by Nils Jolley RN  Outcome: Progressing  Flowsheets (Taken 9/9/2024 1519 by Ronna Servin RN)  Prevent/manage excess moisture: Monitor for/manage infection if present  9/10/2024 0556 by Nisl Jolley RN  Outcome: Progressing  Flowsheets (Taken 9/9/2024 1519 by Ronna Servin RN)  Prevent/manage excess moisture: Monitor for/manage infection if present     Problem: Skin  Goal: Prevent/minimize sheer/friction injuries  9/10/2024 1940 by Nils Jolley RN  Outcome: Progressing  Flowsheets (Taken 9/10/2024 0556)  Prevent/minimize sheer/friction injuries:   Complete micro-shifts as needed if patient unable. Adjust patient position to relieve pressure points, not a full turn   Increase activity/out of bed for meals   Use pull sheet   HOB 30 degrees or less   Turn/reposition every 2 hours/use positioning/transfer devices  9/10/2024 0556 by Nils PERRY  DAVID Jolley  Outcome: Progressing  Flowsheets (Taken 9/10/2024 0556)  Prevent/minimize sheer/friction injuries:   Complete micro-shifts as needed if patient unable. Adjust patient position to relieve pressure points, not a full turn   Increase activity/out of bed for meals   Use pull sheet   HOB 30 degrees or less   Turn/reposition every 2 hours/use positioning/transfer devices     Problem: Skin  Goal: Promote/optimize nutrition  9/10/2024 1940 by Nils Jolley RN  Outcome: Progressing  Flowsheets (Taken 9/10/2024 0556)  Promote/optimize nutrition: Monitor/record intake including meals  9/10/2024 0556 by Nils Jolley RN  Outcome: Progressing  Flowsheets (Taken 9/10/2024 0556)  Promote/optimize nutrition: Monitor/record intake including meals     Problem: Skin  Goal: Promote skin healing  9/10/2024 1940 by Nils Jolley RN  Outcome: Progressing  Flowsheets (Taken 9/10/2024 0556)  Promote skin healing:   Assess skin/pad under line(s)/device(s)   Protective dressings over bony prominences   Turn/reposition every 2 hours/use positioning/transfer devices   Ensure correct size (line/device) and apply per  instructions   Rotate device position/do not position patient on device  9/10/2024 0556 by Nils Jolley RN  Outcome: Progressing  Flowsheets (Taken 9/10/2024 0556)  Promote skin healing:   Assess skin/pad under line(s)/device(s)   Protective dressings over bony prominences   Turn/reposition every 2 hours/use positioning/transfer devices   Ensure correct size (line/device) and apply per  instructions   Rotate device position/do not position patient on device

## 2024-09-10 NOTE — SIGNIFICANT EVENT
Called to bedside for wide complex tachycardia up to the 170s.  Was given 300 of Amio and placed on the pads. MAPS started to drop into the low 60s. Patient was intubated but was still able to shake head yes and no. Decision was made to synchronize cardiovert at 200 J. After shock there was no significant change to HR. EKG obtained that showed Afib with LBBB and RVR. He was started on an amio gtt at 1/hr and given 1L of LR. He converted to NSR with known LBBB. CXR obtained showed significant white out of right lung that has progressed since this am.

## 2024-09-10 NOTE — CARE PLAN
Problem: Safety - Medical Restraint  Goal: Remains free of injury from restraints (Restraint for Interference with Medical Device)  Outcome: Progressing  Flowsheets (Taken 9/10/2024 0555)  Remains free of injury from restraints (restraint for interference with medical device):   Determine that other, less restrictive measures have been tried or would not be effective before applying the restraint   Evaluate the patient's condition at the time of restraint application   Inform patient/family regarding the reason for restraint   Every 2 hours: Monitor safety, psychosocial status, comfort, nutrition and hydration  Goal: Free from restraint(s) (Restraint for Interference with Medical Device)  Outcome: Progressing  Flowsheets (Taken 9/10/2024 0555)  Free from restraint(s) (restraint for interference with medical device):   ONCE/SHIFT or MINIMUM Every 12 hours: Assess and document the continuing need for restraints   Every 24 hours: Continued use of restraint requires Licensed Independent Practitioner to perform face to face examination and written order   Identify and implement measures to help patient regain control

## 2024-09-10 NOTE — SIGNIFICANT EVENT
Spoke to ethics regarding pt decision maker. Pt has friends listed as emergency contact, but does have estranged children. Per ethics discussion, OK to pursue biopsy for diagnosis of likely malignancy. OK to discuss this with friends who are listed as emergency contact. Pending biopsy results, would convene panel to make any decision about starting inpatient chemo.

## 2024-09-10 NOTE — PROCEDURES
Bronchoscopy Report    INDICATION:   Left lung opacification in setting of pneumonia and pleural effusion    BRONCHOSCOPIST:   MD Dr. Erika Mccall        FINDINGS:  After obtaining informed consent and performing a time out, the patient was anesthetized and an ET tube was placed by anesthesia.  The flexible bronchoscope was then introduced through the advanced airway, and an airway examination was performed.    The ET tube is in good position.  The trachea is of normal caliber. The jose antonio is sharp. The tracheobronchial tree of the bilateral lung(s) was examined to at least the first subsegmental level.  Large amount of thick secretions in bilateral lungs but worse on left side. Narrowing of the trachea, left and right main bronchus.       COMPLICATIONS:  Afib with RVR post procedure, resolved with time after procedure

## 2024-09-11 ENCOUNTER — APPOINTMENT (OUTPATIENT)
Dept: CARDIOLOGY | Facility: HOSPITAL | Age: 69
End: 2024-09-11
Payer: MEDICARE

## 2024-09-11 LAB
ACID FAST STN SPEC: NORMAL
ALBUMIN SERPL BCP-MCNC: 2.7 G/DL (ref 3.4–5)
ALBUMIN SERPL BCP-MCNC: 2.8 G/DL (ref 3.4–5)
ALP SERPL-CCNC: 68 U/L (ref 33–136)
ALT SERPL W P-5'-P-CCNC: 11 U/L (ref 10–52)
ANION GAP SERPL CALC-SCNC: 17 MMOL/L (ref 10–20)
AST SERPL W P-5'-P-CCNC: 22 U/L (ref 9–39)
BACTERIA BLD CULT: NORMAL
BASOPHILS # BLD AUTO: 0.03 X10*3/UL (ref 0–0.1)
BASOPHILS NFR BLD AUTO: 0.2 %
BILIRUB DIRECT SERPL-MCNC: 1 MG/DL (ref 0–0.3)
BILIRUB SERPL-MCNC: 1.9 MG/DL (ref 0–1.2)
BUN SERPL-MCNC: 28 MG/DL (ref 6–23)
CALCIUM SERPL-MCNC: 8.7 MG/DL (ref 8.6–10.6)
CARDIAC TROPONIN I PNL SERPL HS: 20 NG/L (ref 0–53)
CHLORIDE SERPL-SCNC: 93 MMOL/L (ref 98–107)
CO2 SERPL-SCNC: 28 MMOL/L (ref 21–32)
CREAT SERPL-MCNC: 1.14 MG/DL (ref 0.5–1.3)
EGFRCR SERPLBLD CKD-EPI 2021: 69 ML/MIN/1.73M*2
EJECTION FRACTION APICAL 4 CHAMBER: 40.4
EJECTION FRACTION: 42 %
EOSINOPHIL # BLD AUTO: 0.05 X10*3/UL (ref 0–0.7)
EOSINOPHIL NFR BLD AUTO: 0.4 %
ERYTHROCYTE [DISTWIDTH] IN BLOOD BY AUTOMATED COUNT: 16.5 % (ref 11.5–14.5)
GLUCOSE BLD MANUAL STRIP-MCNC: 141 MG/DL (ref 74–99)
GLUCOSE BLD MANUAL STRIP-MCNC: 141 MG/DL (ref 74–99)
GLUCOSE BLD MANUAL STRIP-MCNC: 142 MG/DL (ref 74–99)
GLUCOSE BLD MANUAL STRIP-MCNC: 150 MG/DL (ref 74–99)
GLUCOSE BLD MANUAL STRIP-MCNC: 161 MG/DL (ref 74–99)
GLUCOSE BLD MANUAL STRIP-MCNC: 163 MG/DL (ref 74–99)
GLUCOSE SERPL-MCNC: 151 MG/DL (ref 74–99)
HCT VFR BLD AUTO: 28.7 % (ref 41–52)
HGB BLD-MCNC: 9.5 G/DL (ref 13.5–17.5)
IMM GRANULOCYTES # BLD AUTO: 0.1 X10*3/UL (ref 0–0.7)
IMM GRANULOCYTES NFR BLD AUTO: 0.8 % (ref 0–0.9)
LABORATORY COMMENT REPORT: NORMAL
LABORATORY COMMENT REPORT: NORMAL
LYMPHOCYTES # BLD AUTO: 0.49 X10*3/UL (ref 1.2–4.8)
LYMPHOCYTES NFR BLD AUTO: 4 %
MAGNESIUM SERPL-MCNC: 2.2 MG/DL (ref 1.6–2.4)
MCH RBC QN AUTO: 28.6 PG (ref 26–34)
MCHC RBC AUTO-ENTMCNC: 33.1 G/DL (ref 32–36)
MCV RBC AUTO: 86 FL (ref 80–100)
MONOCYTES # BLD AUTO: 1.18 X10*3/UL (ref 0.1–1)
MONOCYTES NFR BLD AUTO: 9.7 %
MYCOBACTERIUM SPEC CULT: NORMAL
NEUTROPHILS # BLD AUTO: 10.26 X10*3/UL (ref 1.2–7.7)
NEUTROPHILS NFR BLD AUTO: 84.9 %
NRBC BLD-RTO: 0 /100 WBCS (ref 0–0)
PATH REPORT.FINAL DX SPEC: NORMAL
PATH REPORT.GROSS SPEC: NORMAL
PATH REPORT.RELEVANT HX SPEC: NORMAL
PATH REPORT.TOTAL CANCER: NORMAL
PHOSPHATE SERPL-MCNC: 3.5 MG/DL (ref 2.5–4.9)
PLATELET # BLD AUTO: 264 X10*3/UL (ref 150–450)
POTASSIUM SERPL-SCNC: 3.7 MMOL/L (ref 3.5–5.3)
PROT SERPL-MCNC: 5.3 G/DL (ref 6.4–8.2)
RBC # BLD AUTO: 3.32 X10*6/UL (ref 4.5–5.9)
RIGHT VENTRICLE PEAK SYSTOLIC PRESSURE: 36.6 MMHG
SODIUM SERPL-SCNC: 134 MMOL/L (ref 136–145)
TRANSFERRIN SERPL-MCNC: 154 MG/DL (ref 200–360)
WBC # BLD AUTO: 12.1 X10*3/UL (ref 4.4–11.3)

## 2024-09-11 PROCEDURE — 99221 1ST HOSP IP/OBS SF/LOW 40: CPT | Performed by: PHYSICIAN ASSISTANT

## 2024-09-11 PROCEDURE — 2500000004 HC RX 250 GENERAL PHARMACY W/ HCPCS (ALT 636 FOR OP/ED)

## 2024-09-11 PROCEDURE — 2500000004 HC RX 250 GENERAL PHARMACY W/ HCPCS (ALT 636 FOR OP/ED): Performed by: STUDENT IN AN ORGANIZED HEALTH CARE EDUCATION/TRAINING PROGRAM

## 2024-09-11 PROCEDURE — 82947 ASSAY GLUCOSE BLOOD QUANT: CPT

## 2024-09-11 PROCEDURE — 84484 ASSAY OF TROPONIN QUANT: CPT

## 2024-09-11 PROCEDURE — 99291 CRITICAL CARE FIRST HOUR: CPT | Performed by: NURSE PRACTITIONER

## 2024-09-11 PROCEDURE — 2500000005 HC RX 250 GENERAL PHARMACY W/O HCPCS

## 2024-09-11 PROCEDURE — 93308 TTE F-UP OR LMTD: CPT | Performed by: INTERNAL MEDICINE

## 2024-09-11 PROCEDURE — 83735 ASSAY OF MAGNESIUM: CPT

## 2024-09-11 PROCEDURE — 99223 1ST HOSP IP/OBS HIGH 75: CPT | Performed by: STUDENT IN AN ORGANIZED HEALTH CARE EDUCATION/TRAINING PROGRAM

## 2024-09-11 PROCEDURE — 2020000001 HC ICU ROOM DAILY

## 2024-09-11 PROCEDURE — 85025 COMPLETE CBC W/AUTO DIFF WBC: CPT

## 2024-09-11 PROCEDURE — 3E0G76Z INTRODUCTION OF NUTRITIONAL SUBSTANCE INTO UPPER GI, VIA NATURAL OR ARTIFICIAL OPENING: ICD-10-PCS

## 2024-09-11 PROCEDURE — 93010 ELECTROCARDIOGRAM REPORT: CPT | Performed by: INTERNAL MEDICINE

## 2024-09-11 PROCEDURE — 93325 DOPPLER ECHO COLOR FLOW MAPG: CPT | Performed by: INTERNAL MEDICINE

## 2024-09-11 PROCEDURE — 93321 DOPPLER ECHO F-UP/LMTD STD: CPT | Performed by: INTERNAL MEDICINE

## 2024-09-11 PROCEDURE — 2500000001 HC RX 250 WO HCPCS SELF ADMINISTERED DRUGS (ALT 637 FOR MEDICARE OP)

## 2024-09-11 PROCEDURE — 94669 MECHANICAL CHEST WALL OSCILL: CPT

## 2024-09-11 PROCEDURE — 93308 TTE F-UP OR LMTD: CPT

## 2024-09-11 PROCEDURE — 80069 RENAL FUNCTION PANEL: CPT

## 2024-09-11 PROCEDURE — 37799 UNLISTED PX VASCULAR SURGERY: CPT

## 2024-09-11 PROCEDURE — 93325 DOPPLER ECHO COLOR FLOW MAPG: CPT

## 2024-09-11 RX ORDER — DIGOXIN 0.25 MG/ML
500 INJECTION INTRAMUSCULAR; INTRAVENOUS ONCE
Status: DISCONTINUED | OUTPATIENT
Start: 2024-09-11 | End: 2024-09-11 | Stop reason: DRUGHIGH

## 2024-09-11 RX ORDER — POTASSIUM CHLORIDE 14.9 MG/ML
20 INJECTION INTRAVENOUS ONCE
Status: COMPLETED | OUTPATIENT
Start: 2024-09-11 | End: 2024-09-11

## 2024-09-11 RX ORDER — POLYETHYLENE GLYCOL 3350 17 G/17G
17 POWDER, FOR SOLUTION ORAL DAILY
Status: DISCONTINUED | OUTPATIENT
Start: 2024-09-11 | End: 2024-09-20

## 2024-09-11 RX ORDER — DIGOXIN 0.25 MG/ML
125 INJECTION INTRAMUSCULAR; INTRAVENOUS ONCE
Status: DISCONTINUED | OUTPATIENT
Start: 2024-09-11 | End: 2024-09-11

## 2024-09-11 RX ORDER — DIGOXIN 0.25 MG/ML
250 INJECTION INTRAMUSCULAR; INTRAVENOUS ONCE
Status: COMPLETED | OUTPATIENT
Start: 2024-09-11 | End: 2024-09-11

## 2024-09-11 RX ORDER — DIGOXIN 0.25 MG/ML
250 INJECTION INTRAMUSCULAR; INTRAVENOUS ONCE
Status: DISCONTINUED | OUTPATIENT
Start: 2024-09-11 | End: 2024-09-11 | Stop reason: DRUGHIGH

## 2024-09-11 RX ORDER — AMOXICILLIN 250 MG
2 CAPSULE ORAL 2 TIMES DAILY
Status: DISCONTINUED | OUTPATIENT
Start: 2024-09-11 | End: 2024-09-20

## 2024-09-11 RX ORDER — POTASSIUM CHLORIDE 14.9 MG/ML
INJECTION INTRAVENOUS
Status: COMPLETED
Start: 2024-09-11 | End: 2024-09-11

## 2024-09-11 RX ADMIN — Medication 50 MCG/HR: at 18:15

## 2024-09-11 RX ADMIN — SENNOSIDES AND DOCUSATE SODIUM 2 TABLET: 50; 8.6 TABLET ORAL at 20:48

## 2024-09-11 RX ADMIN — CEFTRIAXONE SODIUM 1 G: 1 INJECTION, SOLUTION INTRAVENOUS at 16:20

## 2024-09-11 RX ADMIN — PANTOPRAZOLE SODIUM 40 MG: 40 INJECTION, POWDER, FOR SOLUTION INTRAVENOUS at 07:51

## 2024-09-11 RX ADMIN — PROPOFOL 15 MCG/KG/MIN: 10 INJECTION, EMULSION INTRAVENOUS at 07:51

## 2024-09-11 RX ADMIN — AMIODARONE HYDROCHLORIDE 0.5 MG/MIN: 1.8 INJECTION, SOLUTION INTRAVENOUS at 11:57

## 2024-09-11 RX ADMIN — AMIODARONE HYDROCHLORIDE 0.5 MG/MIN: 1.8 INJECTION, SOLUTION INTRAVENOUS at 02:00

## 2024-09-11 RX ADMIN — POLYETHYLENE GLYCOL 3350 17 G: 17 POWDER, FOR SOLUTION ORAL at 20:48

## 2024-09-11 RX ADMIN — POTASSIUM CHLORIDE 20 MEQ: 14.9 INJECTION INTRAVENOUS at 07:00

## 2024-09-11 RX ADMIN — DIGOXIN 250 MCG: 0.25 INJECTION INTRAMUSCULAR; INTRAVENOUS at 07:55

## 2024-09-11 RX ADMIN — Medication 60 PERCENT: at 19:43

## 2024-09-11 RX ADMIN — SODIUM CHLORIDE, POTASSIUM CHLORIDE, SODIUM LACTATE AND CALCIUM CHLORIDE 1000 ML: 600; 310; 30; 20 INJECTION, SOLUTION INTRAVENOUS at 07:35

## 2024-09-11 RX ADMIN — POTASSIUM CHLORIDE 20 MEQ: 14.9 INJECTION, SOLUTION INTRAVENOUS at 07:00

## 2024-09-11 ASSESSMENT — PAIN - FUNCTIONAL ASSESSMENT
PAIN_FUNCTIONAL_ASSESSMENT: CPOT (CRITICAL CARE PAIN OBSERVATION TOOL)
PAIN_FUNCTIONAL_ASSESSMENT: CPOT (CRITICAL CARE PAIN OBSERVATION TOOL)
PAIN_FUNCTIONAL_ASSESSMENT: 0-10
PAIN_FUNCTIONAL_ASSESSMENT: 0-10
PAIN_FUNCTIONAL_ASSESSMENT: CPOT (CRITICAL CARE PAIN OBSERVATION TOOL)

## 2024-09-11 ASSESSMENT — PAIN SCALES - GENERAL
PAINLEVEL_OUTOF10: 0 - NO PAIN

## 2024-09-11 NOTE — CONSULTS
ETHICS CONSULTATION NOTE    CONSULT REQUESTER: Physician : Meet Thibodeaux MD    ETHICS QUESTION: Ethics was consulted to assist in decision-making on behalf of Mr. Mitchell in light of the fact that he is currently being considered a patient without proxy (PWP)    BACKGROUND:    Process Steps: Information was gathered via chart review and through discussion with Dr. Thibodeaux of the primary medical team.    Ethically Relevant Medical Information: Mr. Khoa Mitchell is a 70 y/o gentleman with a PMH of T2DM and CKD. He was initially admitted for acute hypoxic respiratory failure and newly diagnosed malignancy. His course has been complicated by worsening respiratory status which has required intubation.    Code Status: Full code    Capacity/Decision-Making Considerations: Mr. Mitchell is currently intubated and unable to participate in medical decision-making.    Advance Directives/Family/Support System: No ACP documents on file. Mr. Mitchell has no available NOK to assist in decision-making, though he does have two friends, Zita and Sander, who he has designated as emergency contacts and with whom he wishes for medical information to be shared. It was reported to me that Mr. Mitchell does have adult children but that he is estranged from them and they have so far been unreachable by the medical team despite best efforts.    Other Consult-Specific Information: Ethics was engaged in order to assist in decision-making given that Mr. Mitchell has no NOK available. It was reported to me that the team believes that Mr. Mitchell is in need of a biopsy in order to arrive at a precise diagnosis of the newly discovered malignancy and to establish recommendations for a potential treatment plan. In my discussion with Dr. Thibodeaux, risks and benefits of the biopsy were reviewed.    ETHICS DISCUSSION & ANALYSIS:    Rec 1: When patients lack the ability to make decisions for themselves, it is ethically incumbent on the medical team to formulate a plan of  care that is consistent with the patient's values and preferences or which promotes the patient's best interests when their values are unknown.     Rec 2-3: In cases such as Mr. Sparkss, when a patient lacks the ability to make decisions and has no available HCPOA or NOK to make decisions, the patient's plan of care is decided in accordance with  policy 6.16.1 which requires that the PWP subcommittee be convened to make decisions on behalf of the patient.    ETHICS RECOMMENDATIONS:    Given that the diagnostic biopsy presents relatively small risk to the patient, is clearly beneficial to his overall care, and is not clearly inconsistent with his know values and preferences, it is ethically supportable to move forward with the procedure.  Once results of the biopsy are returned and treatment options are established, the team should reach back out to ethics so that a meeting of the PWP committee can be convened to help decide on next steps. If Mr. Mitchell regains capacity in the interim, then he should be allowed to make decisions for himself regarding his plan of care.  If Mr. Mitchell should need any emergent interventions while he remains a PWP, these interventions may be provided by utilizing two-physician agreement. In such cases, two physicians should document clearly in his chart that the intervention in question is necessary in order to prevent significant harm or death.    FOLLOW UP:   The Ethics Consultation Service remains available.  Please call with any questions or additional concerns..  The Ethics Consultation Service (ECS) can be reached by paging [phone]..     Naveed Acosta, PhD

## 2024-09-11 NOTE — CARE PLAN
The patient's goals for the shift include      The clinical goals for the shift include patient will be weaned off levo and maintain map >65      Problem: Skin  Goal: Promote/optimize nutrition  Outcome: Not Progressing  Flowsheets (Taken 9/11/2024 1107)  Promote/optimize nutrition: Discuss with provider if NPO > 2 days     Problem: Safety - Medical Restraint  Goal: Free from restraint(s) (Restraint for Interference with Medical Device)  Outcome: Not Progressing     Problem: Skin  Goal: Participates in plan/prevention/treatment measures  Outcome: Progressing  Flowsheets (Taken 9/11/2024 1107)  Participates in plan/prevention/treatment measures: Elevate heels  Goal: Promote skin healing  Outcome: Progressing  Flowsheets (Taken 9/11/2024 1107)  Promote skin healing: Turn/reposition every 2 hours/use positioning/transfer devices     Problem: Safety - Medical Restraint  Goal: Remains free of injury from restraints (Restraint for Interference with Medical Device)  Outcome: Progressing     Problem: Skin  Goal: Prevent/manage excess moisture  Outcome: Met  Goal: Prevent/minimize sheer/friction injuries  Outcome: Met  Flowsheets (Taken 9/11/2024 1107)  Prevent/minimize sheer/friction injuries:   HOB 30 degrees or less   Turn/reposition every 2 hours/use positioning/transfer devices   Use pull sheet     Problem: Skin  Goal: Promote/optimize nutrition  Outcome: Not Progressing  Flowsheets (Taken 9/11/2024 1107)  Promote/optimize nutrition: Discuss with provider if NPO > 2 days     Problem: Safety - Medical Restraint  Goal: Free from restraint(s) (Restraint for Interference with Medical Device)  Outcome: Not Progressing

## 2024-09-11 NOTE — PROGRESS NOTES
Occupational Therapy    Communication Note    Patient Name: Khoa Mitchell  MRN: [id number]  Today's Date: 9/11/2024   Room: 18/18-A    Discipline: Occupational Therapy      Missed Visit: Yes  Missed Visit Reason:  (Patient with episodes of SVT this AM with HR in 160s-170s; will hold and attempt OT next visit as appropriate.)      09/11/24 at 8:42 AM   Dona Seymour OT   Rehab Office: [phone]

## 2024-09-11 NOTE — PROGRESS NOTES
Subjective Data:  Patient seen this AM, remains intubated but awake and alert, able to answer yes/ no questions. Denies CP or SOB.     Overnight Events:    None      Objective Data:  Last Recorded Vitals:  Vitals:    09/11/24 1100 09/11/24 1200 09/11/24 1213 09/11/24 1300   BP:       BP Location:       Patient Position:       Pulse: 92 88  86   Resp: 18 21 19 21   Temp: 36.7 °C (98.1 °F)      TempSrc: Temporal      SpO2: 95% 96% 95% 95%   Weight:       Height:           Last Labs:  CBC - 9/11/2024:  4:43 AM  12.1 9.5 264    28.7      CMP - 9/11/2024:  4:43 AM  8.7 5.3 22 --- 1.9   3.5 2.8 11 68      PTT - 9/9/2024: 10:01 PM  1.3   14.5 27     TROPHS   Date/Time Value Ref Range Status   09/11/2024 04:43 AM 20 0 - 53 ng/L Final   09/07/2024 09:27 AM 10 0 - 20 ng/L Final     BNP   Date/Time Value Ref Range Status   09/07/2024 09:27  0 - 99 pg/mL Final     HGBA1C   Date/Time Value Ref Range Status   09/08/2024 12:47 AM 6.8 see below % Final   03/20/2022 05:39 AM 6.3 % Final     Comment:          Diagnosis of Diabetes-Adults   Non-Diabetic: < or = 5.6%   Increased risk for developing diabetes: 5.7-6.4%   Diagnostic of diabetes: > or = 6.5%  .       Monitoring of Diabetes                Age (y)     Therapeutic Goal (%)   Adults:          >18           <7.0   Pediatrics:    13-18           <7.5                   7-12           <8.0                   0- 6            7.5-8.5   American Diabetes Association. Diabetes Care 33(S1), Jan 2010.        Last I/O:  I/O last 3 completed shifts:  In: 2959.6 (30.9 mL/kg) [I.V.:1359.6 (14.2 mL/kg); IV Piggyback:1600]  Out: 1600 (16.7 mL/kg) [Urine:1365 (0.4 mL/kg/hr); Drains:235]  Weight: 95.7 kg     Past Cardiology Tests (Last 3 Years):  EKG:  Electrocardiogram, 12-lead PRN ACS symptoms 09/10/2024 (Preliminary)      Electrocardiogram, 12-lead PRN ACS symptoms 09/10/2024 (Preliminary)      Electrocardiogram, 12-lead PRN ACS symptoms 09/10/2024 (Preliminary)      Electrocardiogram,  12-lead PRN ACS symptoms 09/09/2024 (Preliminary)      Electrocardiogram, 12-lead PRN ACS symptoms 09/09/2024      Electrocardiogram, 12-lead PRN ACS symptoms 09/09/2024 (Preliminary)      ECG 12 lead 09/07/2024 (Preliminary)    Echo:  Transthoracic Echo (TTE) Limited 09/09/2024      Transthoracic Echo (TTE) Limited 09/09/2024      Transthoracic Echo (TTE) Limited 09/09/2024    Ejection Fractions:  EF   Date/Time Value Ref Range Status   09/09/2024 09:57 AM 33 %      Cath:  Cardiac Catheterization Procedure 09/09/2024    Stress Test:  No results found for this or any previous visit from the past 1095 days.    Cardiac Imaging:  No results found for this or any previous visit from the past 1095 days.      Inpatient Medications:  Scheduled medications   Medication Dose Route Frequency    cefTRIAXone  1 g intravenous q24h    digoxin  125 mcg intravenous Once    digoxin  125 mcg intravenous Once    pantoprazole  40 mg oral Daily before breakfast    Or    esomeprazole  40 mg nasoduodenal tube Daily before breakfast    Or    pantoprazole  40 mg intravenous Daily before breakfast    polyethylene glycol  17 g nasogastric tube Daily    sennosides-docusate sodium  2 tablet nasogastric tube BID     PRN medications   Medication    dextrose    dextrose    glucagon    glucagon    oxygen     Continuous Medications   Medication Dose Last Rate    amiodarone  0.5-1 mg/min 0.5 mg/min (09/11/24 1157)    fentaNYL  0-300 mcg/hr 25 mcg/hr (09/11/24 1200)    norepinephrine  0-0.2 mcg/kg/min 0.022 mcg/kg/min (09/11/24 1236)    propofol  5-50 mcg/kg/min Stopped (09/11/24 0945)       Physical Exam:  General: intubated, awake and alert, appears comfortable, lying in bed  Skin: warm and dry   Head/ neck: no JVD seen at 90 degrees  Cardiac: RRR, subcostal pericardial drain in place, insertion site C/D/I; scant serosang drainage in drainage bag   Pulm: intubated  GI: soft, nontender   Extremities: no LE edema   Neuro: no focal neuro deficits    Psych: cooperative      Assessment/Plan     Khoa Mitchell is a 69 y.o. male with PMH of T2DM, CKD2 (baseline Cr 1.2) admitted for concern for SVC syndrome likely 2/2 to new diagnosis of metastatic cancer. Course c/b acute hypoxic resp failure s/p intubation and moderate pericardial effusion with early tamponade physiology s/p pericardiocentesis and post-procedure development of CHB requiring TVP (now discontinued with recovery of conduction).     9/9  Pericardiocentesis- subcostal approach, 400 ml serous drainage. Pericardial drain in place, to gravity. Labs sent. Echocardiogram used during and post procedure, with resolution of tamponade.     Pericardial Effusion  - Initial TTE on 9/9 showed a moderate pericardial effusion with early findings of tamponade   - s/p successful ultrasound- and fluoroscopy-guided pericardiacentesis of 400 ml serous fluid removal on 9/9  - 9/9 TTE post-pericardiocentesis showed trivial pericardial effusion  - pericardial fluid sent for analysis  - Pericardial drain was sutured in place and attached to gravity  - 110 cc drained in last 24 hours; will reassess output tomorrow and determine if drain can be removed vs longterm plan for reaccumulating pericardial effusion      Recommendations:  encourage frequent position changes to facilitate optimal drainage of effusion, if able   will consider drain removal when <50 cc out in 24 hour period and no residual fluid seen on limited echo  Primary team to follow up pericardial fluid analysis  interventional cardiology will continue to follow, will defer further care to primary team       Code Status:  Full Code    I spent 30 minutes in the professional and overall care of this patient.    Interventional Cardiology will follow.     Yennifer Keating CNP  Interventional Cardiology     General Cardiology Consult Pager: [phone] (weekday 7AM-6PM and weekend 7AM-2PM)and other: [phone]  EP Consult Pager: [phone] (weekday 7AM-6PM and weekend 7AM-2PM) and other:  [phone]  EP Device Nurse Pager: [phone] (weekday 7AM-4PM)  Advanced Heart Failure Consult Pager: [phone] anytime  CICU Fellow Pager: [phone] anytime  Endovascular /Limb Salvage Team Pager: [phone] for day coverage (8am-5pm)  Interventional Cardiology Fellow Pager: [phone] (7AM-5PM) [facility]: [facility] [id number]  Structural Heart Team Pager: [phone] anytime  Night [facility]: [facility] [id number]     Yennifer Keating, APRN-CNP

## 2024-09-11 NOTE — CONSULTS
"Inpatient consult to Cardiology  Consult performed by: Devang Fraire MD  Consult ordered by: Rylee Balderrama MD        History Of Present Illness:    Khoa Mitchell is a 69 y.o. male with PMH of T2DM, CKD2 (baseline Cr 1.2) admitted for concern for SVC syndrome likely 2/2 to new diagnosis of metastatic cancer. Course c/b acute hypoxic resp failure s/p intubation and moderate pericardial effusion with early tamponade physiology s/p pericardiocentesis and post-procedure development of CHB requiring TVP (now discontinued with recovery of conduction).    Per MICU H&P:  \"Originally presented 9/7AM to [facility] with 3 months of shoulder pain followed by 4 days of progressive RUE and facial swelling. CT PE with new mass in the left upper chest encasing the left chest wall, lymphadenopathy, lytic lesion in the right scapula. CT AP with metastatic lesions in the liver, spleen, adrenal glands, pancreas, multiple peritoneal nodules, soft tissue mass in the right gluteal region and lytic lesions in the right acetabulum. Transferred to [facility] for escalation of care.     AT [facility], admitted to the MICU for concern for SVC syndrome. ENT scoped, patent airway but evidence of gross aspiration. CT surgery consulted, no surgical evaluation at this time. CT soft tissue with multiple soft tissue masses within the parotid gland, enlarged cervical lymph nodes, redemonstration of large soft tissue mass in the left lung extending to the thyroid gland. CT head performed, notable for multiple hyperdense lesions concerning for hemorrhagic metastases, largest [id number]. NSGY also engaged, repeat CT head with stable hyperattenuating masses. Had worsening respiratory distress, intubated 9/8 with subsequent BAL during which there was suctioning of thick green secretions, primarily in the left main bronchus and left lower lobe bronchus. 9/9 TTE with evidence of moderate pericardial effusion with brief RV and RA diastolic collapse concerning for " "early cardiac tamponade. Transferred to the CICU, underwent pericardioentesis with 400cc serous drainage, complicated by complete heart block, started on TVP.      Around 9PM 9/9, had wide complex tachycardia to the 170s, bolus'ed 300 of amio, underwent synchronized cardioversion at 200J without significant changes in HR. ABG 7.48/40/93, lactate 1.5. EKG showed afib with LBBB and RVR. CXR with significant white out of the left lung, worsened from prior. Converted back to NSR with amio gtt and 1L LR. Transferred back to the MICU given resolution of tamponade.\"    Since 9/10 transfer back to the MICU, TVP was removed and patient remains in NSR. Anticoagulation post-shock has been held due to risk of hemorrhagic convertsion of intracranial lesions. On 9/11 AM, the patient had recurrent a fib with rates to 170s, externally shocked x2 with IV amio gtt and digoxin load. Now in NSR again. Pericardial fluid cytology was positive for malignant cells. Primary team still investigating liekly primary lung cancer. Remains intubated and sedated and on 0.03 of norepi.        Last Recorded Vitals:  Vitals:    09/11/24 0850 09/11/24 0900 09/11/24 1000 09/11/24 1100   BP:       BP Location:       Patient Position:       Pulse:  109 100 92   Resp: 21 19 22 18   Temp:       TempSrc:       SpO2:  94% 94% 95%   Weight:       Height:           Physical Exam:  GEN: Lying in hospital bed, intubated but awake  HEENT: ET tube in place  CV: RRR, nl S1/2, no mrg  RESP: intubated, mechanical breath sounds with some wheeze  ABD: Soft, NTND  EXT: WWP, no peripheral edema  NEURO: Awake and able to nod yes/no  SKIN: no rashes observed    Last Labs:  CBC - 9/11/2024:  4:43 AM  12.1 9.5 264    28.7      CMP - 9/11/2024:  4:43 AM  8.7 5.3 22 --- 1.9   3.5 2.8 11 68      PTT - 9/9/2024: 10:01 PM  1.3   14.5 27     Troponin I, High Sensitivity   Date/Time Value Ref Range Status   09/07/2024 09:27 AM 10 0 - 20 ng/L Final     Troponin I, High Sensitivity " ([facility])   Date/Time Value Ref Range Status   2024 04:43 AM 20 0 - 53 ng/L Final     BNP   Date/Time Value Ref Range Status   2024 09:27  (H) 0 - 99 pg/mL Final     Hemoglobin A1C   Date/Time Value Ref Range Status   2024 12:47 AM 6.8 (H) see below % Final   2022 05:39 AM 6.3 (A) % Final     Comment:          Diagnosis of Diabetes-Adults   Non-Diabetic: < or = 5.6%   Increased risk for developing diabetes: 5.7-6.4%   Diagnostic of diabetes: > or = 6.5%  .       Monitoring of Diabetes                Age (y)     Therapeutic Goal (%)   Adults:          >18           <7.0   Pediatrics:    13-18           <7.5                   7-12           <8.0                   0- 6            7.5-8.5   American Diabetes Association. Diabetes Care 33(S1), 2010.        Last I/O:  I/O last 3 completed shifts:  In: 2959.6 (30.9 mL/kg) [I.V.:1359.6 (14.2 mL/kg); IV Piggyback:1600]  Out: 1600 (16.7 mL/kg) [Urine:1365 (0.4 mL/kg/hr); Drains:235]  Weight: 95.7 kg     Past Cardiology Tests (Last 3 Years):  EK/9: sinus tachycardia with known LBBB      Echo:  Transthoracic Echo (TTE) Limited 2024  CONCLUSIONS:   1. Poorly visualized anatomical structures due to suboptimal image quality.   2. Endocardial definition and arrhythmia preclude accurate assessment of LVEF.   3. Unable to determine right ventricular systolic function.   4. There is a trivial pericardial effusion.   5. Compared with study dated 2024, the previous study was done michaela-pericardiocentesis, effusion remains trivial and both studies are significantly technically limited.    Transthoracic Echo (TTE) Limited 2024  CONCLUSIONS:   1. The left ventricle was not well visualized. The left ventricular ejection fraction could not be measured.   2. There is normal right ventricular global systolic function.   3. There is a moderate pericardial effusion.   4. There is a suggestion of brief RV diastolic collapse and brief RA collapse  on pre-tap images. Tap and post-tap images were obtained in the 4-C view only. Trivial to small effusion on final images.   5. Very limited 2D study only before and during pericardiocentesis.    Transthoracic Echo (TTE) Limited 09/09/2024  CONCLUSIONS:   1. The left ventricular systolic function is moderately decreased, with a visually estimated ejection fraction of 30-35%.   2. There is global hypokinesis of the left ventricle with minor regional variations.   3. Left ventricular diastolic filling was indeterminate.   4. There is normal right ventricular global systolic function.   5. Mild diastolic collapse of the right ventricle and brief right atrium diastolic collapse.   6. There is a moderate pericardial effusion.   7. The pulmonary artery is not well visualized.   8. The inferior vena cava appears moderately dilated.   9. There are findings of early echocardiographic tamponade with buckling of RV free wall during late diastolic and brief end diastole RA collapse. The quality of the transvalvular Doppler signals was not clear enough to measure respirophasic change in ventricular filling.    Ejection Fractions:  EF   Date/Time Value Ref Range Status   09/09/2024 09:57 AM 33 %      Cath:  Cardiac Catheterization Procedure 09/09/2024    Stress Test:  No results found for this or any previous visit from the past 1095 days.    Cardiac Imaging:  No results found for this or any previous visit from the past 1095 days.      Past Medical History:  He has a past medical history of Other specified health status.    Past Surgical History:  He has a past surgical history that includes Other surgical history (05/16/2022).      Social History:  He reports that he has been smoking cigarettes. He has never used smokeless tobacco. He reports that he does not drink alcohol and does not use drugs.    Family History:  No family history on file.     Allergies:  Patient has no known allergies.    Inpatient Medications:  Scheduled  medications   Medication Dose Route Frequency    cefTRIAXone  1 g intravenous q24h    digoxin  125 mcg intravenous Once    digoxin  125 mcg intravenous Once    pantoprazole  40 mg oral Daily before breakfast    Or    esomeprazole  40 mg nasoduodenal tube Daily before breakfast    Or    pantoprazole  40 mg intravenous Daily before breakfast    polyethylene glycol  17 g nasogastric tube Daily    sennosides-docusate sodium  2 tablet nasogastric tube BID     PRN medications   Medication    dextrose    dextrose    glucagon    glucagon    oxygen     Continuous Medications   Medication Dose Last Rate    amiodarone  0.5-1 mg/min 0.5 mg/min (09/11/24 0700)    fentaNYL  0-300 mcg/hr 25 mcg/hr (09/11/24 0700)    norepinephrine  0-0.2 mcg/kg/min 0.03 mcg/kg/min (09/11/24 1110)    propofol  5-50 mcg/kg/min Stopped (09/11/24 0945)     Outpatient Medications:  Current Outpatient Medications   Medication Instructions    acetaminophen (TYLENOL 8 HOUR) 650 mg, oral, Every 8 hours PRN, Do not crush, chew, or split.    furosemide (LASIX) 20 mg, oral, 2 times daily    ibuprofen 200 mg, oral, Every 8 hours PRN    mv-min/folic/K1/lycopen/lutein (CENTRUM SILVER MEN ORAL) 1 tablet, oral, Daily    sodium chloride 2 g, oral, 2 times daily          Assessment/Plan   Khoa Mitchell is a 69 y.o. male with PMH of T2DM, CKD2 (baseline Cr 1.2) admitted for concern for SVC syndrome likely 2/2 to new diagnosis of metastatic cancer. Course c/b acute hypoxic resp failure s/p intubation and moderate pericardial effusion with early tamponade physiology s/p pericardiocentesis and post-procedure development of CHB requiring TVP (now discontinued with recovery of conduction).    # Malignant pericardial effusion  # Cardiac tamponade, resolved s/p pericardiocentesis/drain  Cytology is positive for malignant cells, though team still working up primary cancer. Drain output over the past 24h has been >100mL  - leave pericardial drain unclamped for now  - drain  mgmt per interventional cardiology note 9/11  - if ongoing drain output, may need to consider CT surgery consult regarding pericardial window (if within the patient's goals of care)    # HFrEF  Unclear chronicity or etiology at this point. Depending on the patient's prognosis and goals of care/clinical course this admission, will discuss further workup and ischemic evaluation when more stable.  - hold off initiating GDMT in the setting of septic shock  - consider further work up/ischemic eval pending clinical course this admission    Thank you for involving us in this patient's care. Recommendations discussed with Dr. Regalado.     General Cardiology Consult Pager: [phone] (weekday 7AM-6PM and weekend 7AM-2PM) and other: [phone]  EP Consult Pager: [phone] (weekday 7AM-6PM and weekend 7AM-2PM) and other: [phone]  CICU Fellow Pager: [phone] anytime  EP Device Nurse Pager: [phone] (weekday 7AM-4PM)  Advanced Heart Failure Consult Pager: [phone] anytime    Devang Fraire MD  Cardiology Fellow

## 2024-09-11 NOTE — PROGRESS NOTES
Subjective   Tele with few episodes of NSVT and intermittent Afib since yesterday.     This morning, went into Afib w RVR again along with increase in pressor requirement indicating hemodynamic instability, received 2 shocks x 200J but HR remained elevated. Then received IV dig 250 mcg. Afterwards spontaneously reverted to NSR and HR in 80-90s.    Seen at bedside, sedation off but remains intubated. He is alert and nodding/shaking his head to questions. Denies pain.        Objective   Visit Vitals  BP (!) 92/42   Pulse 82   Temp 36.7 °C (98.1 °F) (Temporal)   Resp 20   Ht 1.829 m (6')   Wt 95.7 kg (210 lb 15.7 oz)   SpO2 93%   BMI 28.61 kg/m²   Smoking Status Every Day   BSA 2.21 m²      Physical Exam  General: awake, alert, no acute distress, currently intubated but on sedation vacation   HEENT: no scleral icterus, no JVD  CV: RRR, no MRG  Resp: CTA b/l, no wheezes, rales, or rhonchi  Abd: soft, NT/ND  LE: no edema, no cyanosis  Neuo: grossly normal  Psych: pleasant      Cardiographics  Echocardiogram: Results for orders placed during the hospital encounter of 09/07/24    Transthoracic Echo (TTE) Limited    PHYSICIAN INTERPRETATION:  Left Ventricle: The left ventricle was not well visualized. The left ventricular ejection fraction could not be measured. The left ventricular cavity size was not assessed. Left ventricular diastolic filling was not assessed.  Left Atrium: The left atrium was not assessed.  Right Ventricle: The right ventricle is small in size. There is normal right ventricular global systolic function.  Right Atrium: The right atrium was not well visualized.  Aortic Valve: The aortic valve was not well visualized. Aortic valve regurgitation was not assessed.  Mitral Valve: The mitral valve is normal in structure. Mitral valve regurgitation was not assessed.  Tricuspid Valve: The tricuspid valve is structurally normal. Tricuspid regurgitation was not assessed. The right ventricular systolic pressure  is unable to be estimated.  Pulmonic Valve: The pulmonic valve was not assessed. Pulmonic valve regurgitation was not assessed.  Pericardium: There is a moderately sized pericardial effusion. There is a suggestion of brief RV diastolic collapse and brief RA collapse on pre-tap images. Tap and post-tap images were obtained in the 4-C view only. Trivial to small effusion on final images.  Aorta: The aortic root was not well visualized.  In comparison to the previous echocardiogram(s): Although previous studies are available for review, a comparison with the current echocardiogram is not feasible due to its limited scope or image quality.      CONCLUSIONS:  1. The left ventricle was not well visualized. The left ventricular ejection fraction could not be measured.  2. There is normal right ventricular global systolic function.  3. There is a moderate pericardial effusion.  4. There is a suggestion of brief RV diastolic collapse and brief RA collapse on pre-tap images. Tap and post-tap images were obtained in the 4-C view only. Trivial to small effusion on final images.  5. Very limited 2D study only before and during pericardiocentesis.       Imaging    CHEST CTA:  Heterogenous soft tissue consistent with malignancy/neoplasm  throughout the central/left upper chest with probable SVC  obstruction, encasement and narrowing of the trachea/jose antonio,  left  chest wall invasion. Multifocal likely metastatic lymphadenopathy and  permeative lytic lesion in the right scapula with pathologic  fracture. Further evaluation recommended.      No gross central PE identified. Limited assessment for  peripheral/basilar PE. Bibasilar infiltrates or atelectasis with  pleural effusions, left-greater-than-right.      Pericardial effusion.    Lab Review   Lab Results   Component Value Date     (L) 09/11/2024     09/10/2024     09/09/2024    K 3.7 09/11/2024    K 4.2 09/10/2024    K 4.4 09/09/2024    CO2 28 09/11/2024    CO2  "30 09/10/2024    CO2 29 09/09/2024    BUN 28 (H) 09/11/2024    BUN 30 (H) 09/10/2024    BUN 32 (H) 09/09/2024    CREATININE 1.14 09/11/2024    CREATININE 0.97 09/10/2024    CREATININE 0.95 09/09/2024    GLUCOSE 151 (H) 09/11/2024    GLUCOSE 148 (H) 09/10/2024    GLUCOSE 147 (H) 09/09/2024    CALCIUM 8.7 09/11/2024    CALCIUM 8.9 09/10/2024    CALCIUM 8.9 09/09/2024     No results found for: \"CKTOTAL\", \"CKMB\", \"CKMBINDEX\", \"TROPONINI\"  Lab Results   Component Value Date    WBC 12.1 (H) 09/11/2024    WBC 12.0 (H) 09/10/2024    WBC 12.5 (H) 09/09/2024    HGB 9.5 (L) 09/11/2024    HGB 9.8 (L) 09/10/2024    HGB 9.5 (L) 09/09/2024    HCT 28.7 (L) 09/11/2024    HCT 28.3 (L) 09/10/2024    HCT 29.6 (L) 09/09/2024    MCV 86 09/11/2024    MCV 86 09/10/2024    MCV 92 09/09/2024     09/11/2024     09/10/2024     09/09/2024       Troponin I, High Sensitivity   Date/Time Value Ref Range Status   09/07/2024 09:27 AM 10 0 - 20 ng/L Final     Troponin I, High Sensitivity ([facility])   Date/Time Value Ref Range Status   09/11/2024 04:43 AM 20 0 - 53 ng/L Final     BNP   Date/Time Value Ref Range Status   09/07/2024 09:27  (H) 0 - 99 pg/mL Final          Assessment/Plan     69 y.o. male w/ hx of T2DM, CKD2 (baseline Cr 1.2) admitted for concern for SVC syndrome likely 2/2 to new diagnosis of metastatic cancer. Course c/b acute hypoxic resp failure s/p intubation and moderate pericardial effusion with early tamponade physiology s/p pericardiocentesis and post-procedure development of CHB requiring TVP. EP consulted for further management. Course c/b Afib with RVR (failed electrical cardioversion attempts x 3) now on Amio gtt and reverted to NSR with intermittent Afib on going.      Impression:  - Moderate pericardial effusion with early tamponade physiology (likely metastatic given newly diagnosed metastatic disease with mets to brain, chest wall, mediastinal LAD, multiple organs in abdomen, parotids etc)  - s/p " pericardiocentesis 09/09 and post-procedure complication on CHB with hypotension requiring inotropes and TVP  - spontaneous resolution of CHB and patient in NSR with intrinsic rate in 80-90s. TVP removed.   - 09/09 pm: developed wide complex tachycardia (Afib w aberrancy), received 200 J synchronized shock but no significant change in HR. Started on IV amio drip.  - 09/11 am: again developed RVR with hemodynamic instability, received 2 shocks x 200 J, no change in HR, also received dig 250 mcg. Later spontaneously reverted to NSR      Recommendations:  - Continue amio for rhythm control for atrial fibrillation with aberrancy. Can be switched to PO at the discretion of ICU team.  - Stop digoxin as currently in NSR and rate controlled.   - Ideally should be anticoagulated after the electrical shocks. However, intracranial lesions with propensity of hemorrhagic conversion might pose barrier. Once cleared from NSGY standpoint, consider starting AC.     EP will cont to follow. Recommendations discussed with ICU team.      Thee Clancy MD, MS, FACP   Heart Failure Fellow,  [facility]     Thank you for involving us in the care of this patient. Above recommendations discussed with Dr. Gardiner. If further questions arise, please page the EP consult pager at [phone] on weekdays 7AM - 6PM and weekends 7AM - 2PM, or at [phone] at all other times. The EP device nurse can be reached at pager [phone] during regular business hours M-F.Reason for Consult:

## 2024-09-11 NOTE — INDIVIDUALIZED OVERALL PLAN OF CARE NOTE
BRIEF PLAN:    69yoM admitted for acute hypoxic respiratory failure iso SVC syndrome 2/2 presumed carcinoma of unknown primary [mediastinal mass w/presumed mets to brain w/hemorrhagic mets/bones/liver/spleen/adrenal gland], pneumonia requiring invasive mechanical ventilation.   09/09: pericardial tamponade s/p pericardiocentesis [awaiting studies] c/b AVB s/p TVP.   09/10: AF RVR s/p cardioversion overnight; remains off of systemic AC due to hemorrhagic mets. MRI incompatible with TVP; too be removed. Awaiting pericardial studies [cytology]. Ethics consult. Micro culture positivity.   09/11: AF s/p cardioversion x 2 shocks x 200J. Digoxin 250mcg. Otherwise, stable. Did not go for biopsy.   09/13: small-cell lung cancer. Stimulation. Going for radiation. Consented for chemotherapy. Radiation x 1, thoracentesis performed.   09/16: pericardial drain removed 09/15. Vent changes [dec TV/PEEP/driving pressure]. Trying to wean vasoactives. AF control - dig levels low.  JENNA - trivial pericardial effusion. Thoracentesis 1000 yellow fluid. Peripheral pressors - use another PIV. Consider chest tube if re-accumulation.   09/18: Filgastrim per med onc. GOC Friday. Rad Onc eval Monday    #Hemorrhagic masses: NSGY. Presumably metastatic lesions. Neuroimaging. MRI presurgical perfusion and fingerprinting when stable; MRI cervical/thoracic/lumbar w/wo contrast to assess for metastatic burden. IR biopsy.     #Pericardial effusion: IC. cytology positive for malignancy. Drain management.     #AF: EP. Amio convert to amio PO when able. AC when NSGY permits    #Mediastinal mass: IR biopsy anticipated 09/11; if unable, surgical oncology excisional lymph node biopsy in OR.     #Respiratory failure: Intubated. Pneumonia. Pleural effusion. Resp Cultures - Strep agalactiae, abundant PMNs, moderate mixed g+/g- [colonization].     #Pleural effusion: consider thoracentesis.     #Bacteremia: strep salivarius [likely contaminant]; gram positive  cocci p/c's. Ceftriaxone.     #Patient without proxy: Ethics.      PLAN   NSGY imaging when able   Pericardial per IC   Mediastinal mass bx today   Consider thoracaentesis to relieve oxygenation, review CXR   Bacteremia - ceftriaxone days   AF > IV until stability in interventions     Consultants   Cardiology-Intervention  Cardiology-EP    Ethics  Neurosurgery  Otolaryngology   Pulmonology  Surgical Oncology  Med Onc   Radiation Onc  Thoracic Surgery    Pertinent DATA   CTH: multiple rounded hyperdense lesions throughout bilateral cerebral hemispheres concerning for hemorrhagic mets.     CT CAP: Heterogenous soft tissue consistent with malignancy/neoplasm  throughout the central/left upper chest with probable SVC  obstruction, encasement and narrowing of the trachea/jose antonio,  left  chest wall invasion. Multifocal likely metastatic lymphadenopathy and  permeative lytic lesion in the right scapula with pathologic  fracture. Extensive malignancy/metastatic disease including presumed metastatic lesions in the liver, spleen, both adrenal glands, and the pancreas,  multiple peritoneal nodules, soft tissue mass in the right gluteal  region and lytic probable metastatic lesion in the right acetabulum.    Micro       TIMELINE  Date of MICU Admission: 09/10     CHECKLIST    01 Sedation:   Fentanyl  02 Vent: Yes   03 Vasoactives:  Amioadarone   Norepinephrine   04 Nutrition: ***    05. RRT: None   06. ABx:   Ceftriaxone   07. Steroids: none   08. Diuretics: none   09. AC: none   10. Lines/Drains:  NG/OG/Feeding Tube   ETT 09/08 -   Pericardial drain   External urinary catheter   LUE   RUE: PIV x 4; Arterial Line    RLE   LLE   11. Bowel Regimen: senna-docusate    12. Code Status: Full Code     TRANSITION of CARE ITEMS    ***    LEARNING POINTS FOR TEAM    1 AF RVR  Problem The most important intervention for critically ill patients with AF is usually treating causes AF; risk of getting overly focused on AA and cardioversion.       HELP AF: universal stabilization package    #H - Hemodynamic optimization:  --Discontinue beta-adrenergic vasopressors (e.g., epinephrine, dobutamine) as they may increase heart rate.  --For hypotension, use vasoconstrictors without beta stimulation (e.g., phenylephrine, vasopressin).  #E - Electrolytes:  --Aggressively treat hypokalemia and hypomagnesemia.  --Administer empirical magnesium sulfate (2-4 grams) if renal function is normal, even before lab results return..  #L - Lung (Respiratory) support:  --Lung support for hypoxemia/respiratory distress (CPAP, BiPAP, HFNC) aggressively.  #P - Pain/Anxiety management:  --Adequately manage distress to reduce sympathetic tone.  --Consider dexmedetomidine for uncontrolled anxiety as it reduces heart rate and sympathetic tone.  #A - Address volume status:  --Diurese if volume overload is causing atrial dilation.  --Administer fluids only if nuris hypovolemia is present (rare in ICU).  #F - Find other causes:  --Focus broadly on other causes of instability (sepsis, PE, thyrotoxicosis, shock).    2 Bacteremia   -Streptococci:  ---PCN susceptible ABCG   ---Viridans > endocarditis [mitis, mutans]; otherwise transient from PO source   ---Pneumonia: CAP or meningitis algorithms   ---Anginosus/intermedius/constellatus should prompt search for abscess      -Enterococci   --Faecalis nearly 100% susceptible   --Faecium often VRE, use daptomycin susceptibilities pending     3 Strep Salivarius  --Streptococcus salivarius is a gram-positive, facultative anaerobe found in the oral cavity and respiratory tract.  --It is an opportunistic pathogen in immunocompromised patients despite being common in healthy individuals.  --It colonizes dental plaques early in life and forms part of the oral microbiota.  --S. salivarius is prevalent in the oropharyngeal tract and can colonize the lungs in cystic fibrosis patients.    Frequency of Clinically Significant Isolates: 32% of Streptococcus  salivarius blood isolates are considered clinically significant, unlike 97% for Streptococcus bovis.   --Evidence: “Seventeen of the 52 (32%) S. salivarius isolates recovered were considered clinically significant, compared with 62 of the 64 (97%) S. bovis isolates”(retrieve).    Association with Mucosal Disruption or Serious Diseases: Streptococcus salivarius bacteremia often occurs in patients with significant mucosal disruption or serious underlying diseases.  --Evidence: “Bacteremia caused by S. salivarius occurred mostly in patients who showed relevant disruption of the mucous membranes and/or serious underlying diseases”(retrieve).    Lower Endocarditis Risk: Streptococcus salivarius bacteremia is associated with a lower incidence of endocarditis compared to Streptococcus bovis I.  --Evidence: “When compared with S. bovis I bacteremia, S. salivarius bacteremia was associated with lower rates of endocarditis (18% vs. 74%)”(retrieve).    No Association with Colon Tumors: Unlike Streptococcus bovis I, Streptococcus salivarius bacteremia shows no association with colon tumors.  --Evidence: “Streptococcus salivarius bacteremia was associated with lower rates of colon tumors (0% vs. 57%)”(retrieve).    Higher Association with Non-Colon Cancer: Streptococcus salivarius bacteremia has a higher association with non-colon cancers.  --Evidence: “A higher rate of noncolon cancer (53%) was found in patients with S. salivarius bacteremia”(retrieve).    Lower Hepatobiliary Origin: Streptococcus salivarius bacteremia is less frequently associated with a hepatobiliary origin compared to Streptococcus bovis II.  --Evidence: “Bacteremia of hepatobiliary origin was found in 12% of the S. salivarius cases compared to 50% for S. bovis II”(retrieve).    Penicillin Resistance: 31% of Streptococcus salivarius isolates show resistance to penicillin, which is significantly higher than Streptococcus bovis.  --Evidence: “The rate of  penicillin resistance was 31% among S. salivarius isolates and 0% among S. bovis isolates”(retrieve).    Higher Resistance to Other Antibiotics: Streptococcus salivarius shows resistance to several other antibiotics like erythromycin and clindamycin.  --Evidence: “51% of the S. salivarius isolates were not susceptible to erythromycin, and 29% were not susceptible to clindamycin”(retrieve).    Nosocomial Infections: 41% of Streptococcus salivarius bacteremia cases are hospital-acquired.  --Evidence: “41% of the bacteremia occurrences were nosocomial in origin”(retrieve).    Association with Cancer and Neutropenia: Streptococcus salivarius bacteremia is often observed in cancer patients, particularly those with neutropenia.  --Evidence: “Most of our patients with S. salivarius bacteremia had predisposing local factors such as mucosal disruption and/or serious underlying diseases, such as malignancy”(retrieve).    4 Anayeli Albicans     ALICIA Allan, ALPESH Wilkinson, & WILLIE Adan (2017). The significance of Candida in the human respiratory tract: Our evolving understanding. Pathogens and Disease, 75(4), [id number].    --Candida as a Common Fungal Pathogen: Candida is often found in the respiratory tract, but its potential harm is unclear.    --Colonization vs. Infection: Candida is usually harmless but may contribute to disease, challenging past beliefs.    --Clinical Observations and Risks: Critically ill patients with Candida face longer hospital stays and higher risks, but it rarely causes pneumonia.    --Diagnostic Challenges: It's difficult to tell whether Candida is harmless or causing infection with current tests.    --Candida-Bacteria Interactions: Candida may worsen bacterial infections like those from Pseudomonas aeruginosa, leading to worse outcomes.    --Antifungal Treatment: Antifungal treatment for respiratory Candida is debated, with uncertain benefits or potential harm.    5 Amiodarone   Amiodarone  loading is done to rapidly achieve therapeutic concentrations due to its large volume of distribution [stays in tissues longer] and long half-life [slow accumulation - delays steady state - delays stable therapeutic concentration in bloodstream]. This accelerates the onset of its antiarrhythmic effects, preventing arrhythmia recurrence. Without loading, it could take months to reach steady-state levels.    Atrial Fibrillation (AF):    Oral: 600-800 mg daily (in divided doses) until a total of 6-10 grams is reached, then reduce to 200 mg daily for maintenance.    IV: 150 mg over 10 minutes, followed by 1 mg/min for 6 hours, then 0.5 mg/min for 18 hours.    Therapeutic concentration is the drug level in the bloodstream that provides the desired effect without causing significant side effects or toxicity.    6 digoxin   Levels low > lets dose digoxin or either amiodarone bolus once more       TLS labs   PUKE Calcium   Phosphorous Uric Acid Potassium elevated  Calcium decreased     Follow-up up these!     Prognosis SCLC   Close to 70% of patients with small cell lung cancer have disseminated disease at the time of presentation.  At this stage, the cancer is not curable, and the prognosis is poor. Even with chemotherapy, the majority of patients are dead within 24 months, and less than 2% are alive at five years. For individuals with localized lung disease, treatment with chemotherapy and radiation does offer a better survival of 80% at two years, but less than 15% are alive at five years. [18][19][20](Level 2) Factors that affect survival include:    Loss of body weight more than 10% of baseline  Hyponatremia  Disease recurrence  Poor performance status

## 2024-09-11 NOTE — PROGRESS NOTES
Medical Intensive Care - Daily Progress Note   Subjective    Khoa Mitchell is a 69 y.o. year old male patient admitted on 9/7/2024 with following ICU needs:  acute hypoxemic respiratory failure, worsening from severe metastatic burden requiring endotracheal intubation     Interval History:  Patient is intubated and responsive to commands at bedside. Entered afib RVR. Cardioverted x 2 a 200 J. He remained on amio gtt. HR dropped to low 100s. Continues to be in afib. Set to get biopsy tomorrow.   Meds    Scheduled medications  cefTRIAXone, 1 g, intravenous, q24h  pantoprazole, 40 mg, oral, Daily before breakfast   Or  esomeprazole, 40 mg, nasoduodenal tube, Daily before breakfast   Or  pantoprazole, 40 mg, intravenous, Daily before breakfast      Continuous medications  amiodarone, 0.5-1 mg/min, Last Rate: 0.5 mg/min (09/11/24 0400)  fentaNYL, 0-300 mcg/hr, Last Rate: 50 mcg/hr (09/11/24 0400)  norepinephrine, 0-0.2 mcg/kg/min, Last Rate: 0.06 mcg/kg/min (09/11/24 0300)  propofol, 5-50 mcg/kg/min, Last Rate: 15 mcg/kg/min (09/11/24 0400)      PRN medications  PRN medications: dextrose, dextrose, glucagon, glucagon, oxygen     Objective    Blood pressure (!) 92/42, pulse 95, temperature 36.7 °C (98.1 °F), resp. rate 20, height 1.829 m (6'), weight 96.5 kg (212 lb 11.9 oz), SpO2 95%.     Physical Exam  Constitutional:       General: He is awake.      Appearance: He is obese.      Interventions: Nasal cannula in place.      Comments: Patient intubated. Responsive to commands.   HENT:      Head:      Salivary Glands: Right salivary gland is diffusely enlarged.      Comments: Significant plethora of the head including neck, face, and periorbital edema. Evidence of plum shaped mass on right jaw.   Cardiovascular:      Rate and Rhythm: Normal rate.      Heart sounds: Normal heart sounds, S1 normal and S2 normal.   Comments: Pericardial drain in place.   Musculoskeletal:      Right upper arm: Swelling and edema present.       Left upper arm: Swelling and edema present.      Right lower le+ Pitting Edema present.      Left lower le+ Pitting Edema present.      Comments: The upper extremities are significantly more edematous, hands are warm to touch. There is also some noticeable asymmetry where right upper extremity is larger than left.    Lymphadenopathy:      Cervical:      Right cervical: No superficial cervical adenopathy.     Left cervical: No superficial cervical adenopathy.      Upper Body:      Right upper body: No supraclavicular adenopathy.      Left upper body: No supraclavicular adenopathy.   Neurological:      Mental Status: He is alert.   Psychiatric:         Behavior: Behavior is cooperative.      Intake/Output Summary (Last 24 hours) at 2024 0621  Last data filed at 2024 0500  Gross per 24 hour   Intake 1438.93 ml   Output 885 ml   Net 553.93 ml     Labs:   Results from last 72 hours   Lab Units 09/10/24  0540 24  2201 24  1513   SODIUM mmol/L 136 137 137   POTASSIUM mmol/L 4.2 4.4 4.3   CHLORIDE mmol/L 95* 96* 94*   CO2 mmol/L 30 29 31   BUN mg/dL 30* 32* 33*   CREATININE mg/dL 0.97 0.95 1.01   GLUCOSE mg/dL 148* 147* 140*   CALCIUM mg/dL 8.9 8.9 9.2   ANION GAP mmol/L 15 16 16   EGFR mL/min/1.73m*2 84 86 80   PHOSPHORUS mg/dL 3.2 3.2 3.9      Results from last 72 hours   Lab Units 24  0443 09/10/24  0540 24  2201   WBC AUTO x10*3/uL 12.1* 12.0* 12.5*   HEMOGLOBIN g/dL 9.5* 9.8* 9.5*   HEMATOCRIT % 28.7* 28.3* 29.6*   PLATELETS AUTO x10*3/uL 264 271 268   NEUTROS PCT AUTO % 84.9 85.6 87.1   LYMPHS PCT AUTO % 4.0 3.1 3.0   MONOS PCT AUTO % 9.7 10.0 8.8   EOS PCT AUTO % 0.4 0.3 0.2      Results from last 72 hours   Lab Units 09/10/24  0540 24  2134   POCT PH, ARTERIAL pH 7.49* 7.48*   POCT PCO2, ARTERIAL mm Hg 39 40   POCT PO2, ARTERIAL mm Hg 77* 93   POCT SO2, ARTERIAL % 96 99     Results from last 72 hours   Lab Units 24  1359   POCT PH, VENOUS pH 7.27*   POCT PCO2,  VENOUS mm Hg 77*   POCT PO2, VENOUS mm Hg 18*        Micro/ID:     Lab Results   Component Value Date    BLOODCULT No growth at 3 days 09/08/2024    BLOODCULT No growth at 3 days 09/08/2024       Summary of key imaging results from the last 24 hours  IMPRESSION:  1.  Slight interval improvement in left upper lung aeration with  otherwise persistent near-complete opacification of the left  hemithorax likely representing a combination of residual  atelectasis/consolidation and layering effusion.  2. Stable small right pleural effusion.  3. Medical devices as above.    Assessment and Plan     Assessment: Khoa Mitchell is a 70 yo male with PMHx of T2DM, CKD2 (baseline Cr 1.2) originally admitted for acute hypoxic respiratory failure 2/2 to postobstructive pneumonia from SVC from new diagnosis of malignancy. Course complicated by worsening respiratory failure requiring intubation 9/8 with subsequent BAL with suctioning of thick green secretions. 9/9 TTE with moderate pericardial effusion, findings suggestive of early tamponade. Transferred to the CICU, underwent pericardiocentesis subsequently complicated by complete heart block requiring TVP, then with afib RVR which chemically converted with amiodarone. Repeat CXR with complete opacification of the left lung. Treated with ceftriaxone (9/8-9/10), broadened to zosyn (9/10-*) given worsening infiltrates, azithromycin (9/8-9/10).     Mechanical Ventilation: < 4 days  Sedation/Analgesia: Fentanyl   Restraints: no      Summary for 09/11/24  :  Patient went into afib RVR with heart rate peaking to 170s. He was cardioverted twice with 200 J. He remained on amio gtt and his HR eventually reduced to low 100s.   Cytology of pericardial fluid was significant for malignant cells. The pt is scheduled for biopsy tomorrow.   Pending MRI   Trending liver enzymes and bilirubin          NEUROLOGIC  #Intubated, sedated  -Propofol 5, fentanyl 25  -Daily SATs as oxygen requirements  decrease     #Multiple brain lesions  ::CT head performed, notable for multiple hyperdense lesions concerning for hemorrhagic metastases, largest [id number]  ::Repeat CT head without evidence of hemorrhagic transformation  -Holding dvt ppx post-bleed day 2 (9/10 AM)  -Per NSGY, recommend MRI presurgical perfusion and fingerprinting brain, C/T/L w/ w/o contrast when able to lie flat; will page them when done     CARDIOVASCULAR  #Shock  Likely septic with concern for worsening post-obstructive pneumonia and mucus plugging as well as worsening pleural effusion  -Given worsening effusion and pneumonia, broadened to zosyn (9/10-*)  -Fu Bcx, BAL cx, sputum cx  -Infectious workup as below     #Pericardial effusion  #Complete heart block  Likely malignant given evidence of multiple probably metastatic lesions  ::CT chest with pericardial effusion measuring 15mm in thickness  ::9/9 TTE with moderate pericardial effusion, brief RV and RA diastolic collapse, concerning for early tamponade  ::9/9 pericardiocentesis with 400cc serosanguinous fluid, complicated by CHB requiring TVP  -Monitor pericardial drain output  -Pending pericardial cytology  -TVP x 24 hours, threshold 0.2, output 10 mAmp and VVI rate of 60  -Cardiology, EP following; appreciate recs     #Afib with RVR - resolved  Likely 2/2 to worsening infection and effusion  ::s/p amio bolus  -Continue amio gtt     PULMONARY  #Acute hypoxic respiratory failure  Originally likely 2/2 to post-obstructive pneumonia, underwent BAL with evidence of multiple secretions. Now complicated by complete opacification of the left lung concerning for worsening mucus plugging vs worsening pleural effusion vs combination.  ::Baseline RA  ::ENT scoped - patent airway with gross aspiration of secretions; hypomobile left vocal cord  ::9/7 BCx 2/4 Strep salivarius, likely contaminant, very uncommon cause of bacteremia  ::9/9 CXR with complete opacification of the left lung and R mediastinal  shift  -Fu BAL cx, BCx, sputum cx  -fu Aspergillus galactomannan, PJP PCR, AFB, fungal cx  -s/p ceftriaxone (9/8-**)  -Plan for bronch to evaluate mucus plug then thoracentesis later  -ENT following, appreciate recs     Vent Mode: Volume control/assist control  FiO2 (%):  [60 %-100 %] 60 %  S RR:  [20] 20  S VT:  [500 mL] 500 mL  PEEP/CPAP (cm H2O):  [8 cm H20] 8 cm H20  MAP (cm H2O):  [14-15] 14     RENAL/  #Hyponatremia, resolved   Likely SIADH 2/2 to cancer, which would be consistent with lung SCC vs hypervolemic hyponatremia  ::Admission Na 133  ::Home salt tabs and water restriction, lasix 20mg per outpt nephro  ::Serum osm 294  ::Urine osm 785   ::Urine lytes WNL   -Continue to monitor      #CKD2a  ::Baseline Cr 1.1-1.2  -At baseline, CTM     GASTROINTESTINAL  -No acute issues     ENDOCRINE  #T2DM  ::9/8 A1C 6.8  -SSI q4h while NPO     HEME/ONC  #Metastatic cancer  #SVC syndrome  Unknown primary, potentially SCC lung vs head/neck  ::CT imaging with multiple lesions - multiple brain mets, soft tissue mass in lung  ::CT AP with lesions in liver, spleen, both adrenal glands, pancreas, multiple peritoneal nodules, R gluteal mass, R acetabulum  ::CT head with multiple lesions  :: Pericardial fluid significant for malignant cells; BAL cytology  -IR biopsy tomorrow   -Follow up with med onc after tissue diagnosis    -Will consider one fraction treatment to the mediastinum     #Normocytic anemia  Likely AOCD 2/2 to malignancy vs SARA given elevated RDW  ::Baseline 11-12, now 8-9   -Iron, TIBC, Tsat, ferritin      INFECTIOUS DISEASE  #Concern for post-obstructive pneumonia  -Fu sputum cx, Bcx as above  -C/w ceftriaxone (9/10-*)     F: none  E: K>4, Phosph>3, Mg>2  N: NPO for thoracentesis  A: PIV  DVT ppx: holding off iso potential hemorrhagic conversion, thoracentesis  GI ppx: esomeprazole     O2: VC RR 20//PEEP 8/FiO2 80%  Gtt: amiodarone, levophed 0.03  Abx: zosyn/mandoro    Code Status: Full code  Surrogate  Medical Decision-maker: no family able to serve as NOK, but Zita Campbell (friend) [phone] able to assist with interpreting patient's wishes       ICU Check List       ICU Liberation: Intervention:   Assess, Prevent, Manage Pain CPOT -    Both SAT and SBT [] SAT  [] SBT 30-60 min [] Extubate to NC  [] Extubate to NIV  [] Discuss Trach   Choice of analgesia and sedation RASS: 0  none    Delirium: Assess, prevent and manage CAM -    Early Mobility and Exercise  [] PT /OT consult   Family Engagement and Empowerment []Family updated []SW consult     FEN  Fluids: none  Electrolytes: K>4, Phosph>3, Mg>2   Nutrition: NPO for thoracentesis   Access: PIV   Prophylaxis:  DVT ppx: holding off iso potential hemorrhagic conversion, thoracentesis   GI ppx: esomeprazole     O2: VC RR 20//PEEP 8/FiO2 80%  Gtt: amiodarone, levophed 0.03  Abx: zosyn/azithro    Hardware:         Arterial Line 09/10/24 Left Radial (Active)   Placement Date/Time: 09/10/24 (c) 0105   Size: 20 G  Orientation: Left  Location: Radial  Site Prep: Alcohol  Local Anesthetic: Injectable  Insertion attempts: 1  Securement Method: Transparent dressing  Patient Tolerance: Tolerated well   Number of days: 1       Arterial Sheath 09/09/24 1425 7 Fr. Right (Active)   Placement Date/Time: 09/09/24 1425   Hand Hygiene Completed: Yes  Sheath Size: 7 Fr.  Line Orientation: Right  Sutured: Yes  Local Anesthetic: Injectable  Site Prep: Alcohol;Chlorhexidine    Number of days: 1       ETT  8.5 mm (Active)   Placement Date/Time: 09/08/24 1208   Hand Hygiene Completed: Yes  Mask Ventilation: Vent by mask + OA or adjuvant +/- NMBA  ETT Type: ETT - single  Single Lumen Tube Size: 8.5 mm  Cuffed: Yes  Laryngoscope: Jose  Blade Size: 4  Location: Oral  ...   Number of days: 2       Urethral Catheter Straight-tip 18 Fr. (Active)   Placement Date/Time: 09/08/24 1245   Hand Hygiene Completed: Yes  Catheter Type: Straight-tip  Tube Size (Fr.): 18 Fr.  Catheter  Balloon Size: 10 mL  Urine Returned: Yes   Number of days: 2       Open Drain Inferior;Medial;Midline Pericardial (Active)   Placement Date/Time: 09/09/24 1341   Hand Hygiene Completed: Yes  Orientation: Inferior;Medial;Midline  Location: Pericardial  Size (Fr.): 8 Fr.   Number of days: 1       NG/OG/Feeding Tube OG - [place] sump 18 Fr Center mouth (Active)   Placement Date/Time: 09/08/24 1230   Hand Hygiene Completed: Yes  Type of Tube: NG/OG Tube  Tube Length: 65 cm  Tube Type: OG - [place] sump  NG/OG Tube Size: 18 Fr  Tube Location: Center mouth   Number of days: 2       Social:  Code Status: Full code  Surrogate Medical Decision-maker: no family able to serve as NOK, but Zita Campbell (friend) [phone] able to assist with interpreting patient's wishes    Enid Chambers MD   09/11/24 at 6:21 AM     Disclaimer: Documentation completed with the information available at the time of input. The times in the chart may not be reflective of actual patient care times, interventions, or procedures. Documentation occurs after the physical care of the patient.

## 2024-09-12 ENCOUNTER — APPOINTMENT (OUTPATIENT)
Dept: RADIOLOGY | Facility: HOSPITAL | Age: 69
End: 2024-09-12
Payer: MEDICARE

## 2024-09-12 LAB
ABO GROUP (TYPE) IN BLOOD: NORMAL
ALBUMIN SERPL BCP-MCNC: 2.5 G/DL (ref 3.4–5)
ANION GAP SERPL CALC-SCNC: 15 MMOL/L (ref 10–20)
ANTIBODY SCREEN: NORMAL
BACTERIA BLD CULT: NORMAL
BACTERIA BLD CULT: NORMAL
BASOPHILS # BLD AUTO: 0.03 X10*3/UL (ref 0–0.1)
BASOPHILS NFR BLD AUTO: 0.3 %
BUN SERPL-MCNC: 28 MG/DL (ref 6–23)
CALCIUM SERPL-MCNC: 8.4 MG/DL (ref 8.6–10.6)
CHLORIDE SERPL-SCNC: 96 MMOL/L (ref 98–107)
CO2 SERPL-SCNC: 30 MMOL/L (ref 21–32)
CREAT SERPL-MCNC: 0.98 MG/DL (ref 0.5–1.3)
EGFRCR SERPLBLD CKD-EPI 2021: 83 ML/MIN/1.73M*2
EOSINOPHIL # BLD AUTO: 0.13 X10*3/UL (ref 0–0.7)
EOSINOPHIL NFR BLD AUTO: 1.3 %
ERYTHROCYTE [DISTWIDTH] IN BLOOD BY AUTOMATED COUNT: 16.4 % (ref 11.5–14.5)
GLUCOSE BLD MANUAL STRIP-MCNC: 123 MG/DL (ref 74–99)
GLUCOSE BLD MANUAL STRIP-MCNC: 140 MG/DL (ref 74–99)
GLUCOSE BLD MANUAL STRIP-MCNC: 143 MG/DL (ref 74–99)
GLUCOSE BLD MANUAL STRIP-MCNC: 145 MG/DL (ref 74–99)
GLUCOSE BLD MANUAL STRIP-MCNC: 147 MG/DL (ref 74–99)
GLUCOSE SERPL-MCNC: 138 MG/DL (ref 74–99)
HAPTOGLOB SERPL NEPH-MCNC: 338 MG/DL (ref 30–200)
HCT VFR BLD AUTO: 27.9 % (ref 41–52)
HGB BLD-MCNC: 9.7 G/DL (ref 13.5–17.5)
IMM GRANULOCYTES # BLD AUTO: 0.1 X10*3/UL (ref 0–0.7)
IMM GRANULOCYTES NFR BLD AUTO: 1 % (ref 0–0.9)
LYMPHOCYTES # BLD AUTO: 0.48 X10*3/UL (ref 1.2–4.8)
LYMPHOCYTES NFR BLD AUTO: 4.6 %
MAGNESIUM SERPL-MCNC: 2.18 MG/DL (ref 1.6–2.4)
MCH RBC QN AUTO: 29.6 PG (ref 26–34)
MCHC RBC AUTO-ENTMCNC: 34.8 G/DL (ref 32–36)
MCV RBC AUTO: 85 FL (ref 80–100)
MONOCYTES # BLD AUTO: 0.94 X10*3/UL (ref 0.1–1)
MONOCYTES NFR BLD AUTO: 9.1 %
NEUTROPHILS # BLD AUTO: 8.7 X10*3/UL (ref 1.2–7.7)
NEUTROPHILS NFR BLD AUTO: 83.7 %
NRBC BLD-RTO: 0 /100 WBCS (ref 0–0)
PHOSPHATE SERPL-MCNC: 3.6 MG/DL (ref 2.5–4.9)
PLATELET # BLD AUTO: ABNORMAL 10*3/UL
POTASSIUM SERPL-SCNC: 3.9 MMOL/L (ref 3.5–5.3)
RBC # BLD AUTO: 3.28 X10*6/UL (ref 4.5–5.9)
RH FACTOR (ANTIGEN D): NORMAL
SODIUM SERPL-SCNC: 137 MMOL/L (ref 136–145)
STAPHYLOCOCCUS SPEC CULT: NORMAL
WBC # BLD AUTO: 10.4 X10*3/UL (ref 4.4–11.3)

## 2024-09-12 PROCEDURE — 20206 BIOPSY MUSCLE PERQ NEEDLE: CPT | Mod: RT

## 2024-09-12 PROCEDURE — 37799 UNLISTED PX VASCULAR SURGERY: CPT | Performed by: STUDENT IN AN ORGANIZED HEALTH CARE EDUCATION/TRAINING PROGRAM

## 2024-09-12 PROCEDURE — 2500000004 HC RX 250 GENERAL PHARMACY W/ HCPCS (ALT 636 FOR OP/ED): Performed by: STUDENT IN AN ORGANIZED HEALTH CARE EDUCATION/TRAINING PROGRAM

## 2024-09-12 PROCEDURE — 80069 RENAL FUNCTION PANEL: CPT | Performed by: STUDENT IN AN ORGANIZED HEALTH CARE EDUCATION/TRAINING PROGRAM

## 2024-09-12 PROCEDURE — 94669 MECHANICAL CHEST WALL OSCILL: CPT

## 2024-09-12 PROCEDURE — 37799 UNLISTED PX VASCULAR SURGERY: CPT

## 2024-09-12 PROCEDURE — 2500000005 HC RX 250 GENERAL PHARMACY W/O HCPCS

## 2024-09-12 PROCEDURE — 2500000001 HC RX 250 WO HCPCS SELF ADMINISTERED DRUGS (ALT 637 FOR MEDICARE OP)

## 2024-09-12 PROCEDURE — 2500000004 HC RX 250 GENERAL PHARMACY W/ HCPCS (ALT 636 FOR OP/ED)

## 2024-09-12 PROCEDURE — 51701 INSERT BLADDER CATHETER: CPT

## 2024-09-12 PROCEDURE — 85025 COMPLETE CBC W/AUTO DIFF WBC: CPT

## 2024-09-12 PROCEDURE — 88189 FLOWCYTOMETRY/READ 16 & >: CPT | Performed by: STUDENT IN AN ORGANIZED HEALTH CARE EDUCATION/TRAINING PROGRAM

## 2024-09-12 PROCEDURE — 99152 MOD SED SAME PHYS/QHP 5/>YRS: CPT

## 2024-09-12 PROCEDURE — 2020000001 HC ICU ROOM DAILY

## 2024-09-12 PROCEDURE — 83735 ASSAY OF MAGNESIUM: CPT

## 2024-09-12 PROCEDURE — 2720000007 HC OR 272 NO HCPCS

## 2024-09-12 PROCEDURE — 88342 IMHCHEM/IMCYTCHM 1ST ANTB: CPT | Performed by: STUDENT IN AN ORGANIZED HEALTH CARE EDUCATION/TRAINING PROGRAM

## 2024-09-12 PROCEDURE — 83010 ASSAY OF HAPTOGLOBIN QUANT: CPT

## 2024-09-12 PROCEDURE — 94003 VENT MGMT INPAT SUBQ DAY: CPT

## 2024-09-12 PROCEDURE — 80069 RENAL FUNCTION PANEL: CPT

## 2024-09-12 PROCEDURE — 0HB1XZX EXCISION OF FACE SKIN, EXTERNAL APPROACH, DIAGNOSTIC: ICD-10-PCS | Performed by: STUDENT IN AN ORGANIZED HEALTH CARE EDUCATION/TRAINING PROGRAM

## 2024-09-12 PROCEDURE — 88185 FLOWCYTOMETRY/TC ADD-ON: CPT | Mod: TC | Performed by: STUDENT IN AN ORGANIZED HEALTH CARE EDUCATION/TRAINING PROGRAM

## 2024-09-12 PROCEDURE — 88341 IMHCHEM/IMCYTCHM EA ADD ANTB: CPT | Mod: TC,SUR | Performed by: STUDENT IN AN ORGANIZED HEALTH CARE EDUCATION/TRAINING PROGRAM

## 2024-09-12 PROCEDURE — 99291 CRITICAL CARE FIRST HOUR: CPT | Performed by: NURSE PRACTITIONER

## 2024-09-12 PROCEDURE — 99232 SBSQ HOSP IP/OBS MODERATE 35: CPT | Performed by: NURSE PRACTITIONER

## 2024-09-12 PROCEDURE — 83735 ASSAY OF MAGNESIUM: CPT | Performed by: STUDENT IN AN ORGANIZED HEALTH CARE EDUCATION/TRAINING PROGRAM

## 2024-09-12 PROCEDURE — 82947 ASSAY GLUCOSE BLOOD QUANT: CPT

## 2024-09-12 PROCEDURE — 88307 TISSUE EXAM BY PATHOLOGIST: CPT | Performed by: STUDENT IN AN ORGANIZED HEALTH CARE EDUCATION/TRAINING PROGRAM

## 2024-09-12 PROCEDURE — 86900 BLOOD TYPING SEROLOGIC ABO: CPT

## 2024-09-12 PROCEDURE — 88341 IMHCHEM/IMCYTCHM EA ADD ANTB: CPT | Performed by: STUDENT IN AN ORGANIZED HEALTH CARE EDUCATION/TRAINING PROGRAM

## 2024-09-12 RX ORDER — MIDAZOLAM HYDROCHLORIDE 1 MG/ML
INJECTION INTRAMUSCULAR; INTRAVENOUS
Status: COMPLETED | OUTPATIENT
Start: 2024-09-12 | End: 2024-09-12

## 2024-09-12 RX ORDER — PROPOFOL 10 MG/ML
0-20 INJECTION, EMULSION INTRAVENOUS CONTINUOUS
Status: DISCONTINUED | OUTPATIENT
Start: 2024-09-12 | End: 2024-09-13

## 2024-09-12 RX ORDER — FENTANYL CITRATE 50 UG/ML
INJECTION, SOLUTION INTRAMUSCULAR; INTRAVENOUS
Status: COMPLETED | OUTPATIENT
Start: 2024-09-12 | End: 2024-09-12

## 2024-09-12 RX ADMIN — SENNOSIDES AND DOCUSATE SODIUM 2 TABLET: 50; 8.6 TABLET ORAL at 21:00

## 2024-09-12 RX ADMIN — CEFTRIAXONE SODIUM 1 G: 1 INJECTION, SOLUTION INTRAVENOUS at 18:00

## 2024-09-12 RX ADMIN — AMIODARONE HYDROCHLORIDE 0.5 MG/MIN: 1.8 INJECTION, SOLUTION INTRAVENOUS at 13:01

## 2024-09-12 RX ADMIN — PANTOPRAZOLE SODIUM 40 MG: 40 INJECTION, POWDER, FOR SOLUTION INTRAVENOUS at 06:37

## 2024-09-12 RX ADMIN — Medication 0.02 MCG/KG/MIN: at 13:01

## 2024-09-12 RX ADMIN — AMIODARONE HYDROCHLORIDE 0.5 MG/MIN: 1.8 INJECTION, SOLUTION INTRAVENOUS at 00:36

## 2024-09-12 RX ADMIN — PROPOFOL 10 MCG/KG/MIN: 10 INJECTION, EMULSION INTRAVENOUS at 17:45

## 2024-09-12 RX ADMIN — MIDAZOLAM HYDROCHLORIDE 1 MG: 1 INJECTION, SOLUTION INTRAMUSCULAR; INTRAVENOUS at 11:00

## 2024-09-12 RX ADMIN — Medication 75 MCG/HR: at 14:31

## 2024-09-12 RX ADMIN — FENTANYL CITRATE 25 MCG: 50 INJECTION, SOLUTION INTRAMUSCULAR; INTRAVENOUS at 11:00

## 2024-09-12 RX ADMIN — Medication 60 PERCENT: at 19:47

## 2024-09-12 ASSESSMENT — PAIN SCALES - GENERAL
PAINLEVEL_OUTOF10: 0 - NO PAIN

## 2024-09-12 ASSESSMENT — PAIN - FUNCTIONAL ASSESSMENT
PAIN_FUNCTIONAL_ASSESSMENT: CPOT (CRITICAL CARE PAIN OBSERVATION TOOL)

## 2024-09-12 NOTE — CONSULTS
Wound Care Consult     Visit Date: 9/12/2024      Patient Name: Khoa Mitchell         MRN: [id number]           YOB: 1955     Reason for Consult: assess gluteal fold              Pertinent Labs:   Albumin   Date Value Ref Range Status   09/12/2024 2.5 (L) 3.4 - 5.0 g/dL Final     Albumin, Fluid   Date Value Ref Range Status   09/09/2024 2.5 Not established g/dL Final       Wound Assessment:  Wound 09/09/24 Moisture Associated Skin Damage Sacrum (Active)   Wound Image   09/12/24 1325   Dressing Silicone border dressing 09/12/24 0800   Dressing Status Clean;Dry;Occlusive 09/12/24 0800       Wound 09/12/24  Face Right (Active)       Wound Team Summary Assessment: Patient has blanchable purple discoloration in gluteal fold coccyx to rectum. Denies pain, tenderness   Recommendation:   Cleanse buttocks. Pat dry.  Apply Triad wound dressing  Turn ever 2 hours.   Maintain EHOB waffle mattress and Air taps     Place preventative mepilex over sacrum     Wound Team Plan: please review recommendations      Bozena FOY   9/12/2024  7:37 PM

## 2024-09-12 NOTE — PROGRESS NOTES
Communication Note    Patient Name: Khoa Mitchell  MRN: [id number]  Today's Date: 9/12/2024   Room: 18/18-A    Discipline: Physical Therapy      Missed Visit: Yes  Missed Visit Reason:  (PT evaluation received and reviewed, patient pending further medical workup at this time.  Will continue to monitor and follow up as appropriate.)      09/12/24 at 9:06 AM   Emir Duffy, PT   Rehab Office: [phone]

## 2024-09-12 NOTE — INDIVIDUALIZED OVERALL PLAN OF CARE NOTE
Patient was on fentanyl drip. Friend tried to make him sign power of .  We explained that he can't sign until we reassess his mental status off sedation.    The friend was agreeable and understanding and the power of  document was not signed.

## 2024-09-12 NOTE — POST-PROCEDURE NOTE
Interventional Radiology Brief Postprocedure Note    Attending: Lucio New MD    Assistant: Wm Pires MD    Diagnosis: Metastatic cancer    Description of procedure: US guided x4 core biopsies of the right cheek soft tissue mass. Left mediastinum/lung scanned but highly vascular and patient unable to lay flat. Therefore given high risk of decompensation and bleeding and same system malignant process the right upper mandibular/anterior to parotid soft tissue mass biopsied.      No immediate complications.    Anesthesia:  MAC    Complications: None    Estimated Blood Loss: minimal    Medications  As of 09/12/24 1122      polyethylene glycol (Glycolax, Miralax) packet 17 g (g) Total dose:  0 g* Dosing weight:  97.1   *Administration not included in total     Date/Time Rate/Dose/Volume Action       09/07/24 2004 *17 g Missed               polyethylene glycol (Glycolax, Miralax) packet 17 g (g) Total dose:  17 g* Dosing weight:  95.7   *Administration not included in total     Date/Time Rate/Dose/Volume Action       09/11/24 2048 17 g Given     09/12/24  0913 *17 g Missed               iohexol (OMNIPaque) 350 mg iodine/mL solution 75 mL (mL) Total volume:  80 mL Dosing weight:  97.1      Date/Time Rate/Dose/Volume Action       09/07/24 2029 80 mL Given               enoxaparin (Lovenox) syringe 40 mg (mg) Total dose:  0 mg* Dosing weight:  97.1   *Administration not included in total     Date/Time Rate/Dose/Volume Action       09/08/24  0031 *40 mg Missed               insulin lispro (HumaLOG) injection 0-5 Units (Units) Total dose:  Cannot be calculated* Dosing weight:  97.1   *Administration dose not documented     Date/Time Rate/Dose/Volume Action       09/08/24  0042 *Not included in total Missed      0410 *Not included in total Missed      0814 *Not included in total Missed      1404 *Not included in total Missed      1604 *Not included in total Missed               oxygen (O2) therapy (percent)  Total dose:  365 percent Dosing weight:  97.1      Date/Time Rate/Dose/Volume Action       09/08/24  2126 60 percent Rate Verify Medical Gas     09/09/24  0750 45 percent Start     09/10/24  0700 80 percent Rate Verify Medical Gas      0945 60 percent Rate Verify Medical Gas      1300 60 percent Rate Verify Medical Gas     09/11/24  1943 60 percent Rate Verify Medical Gas               oxygen (O2) therapy (L/min) Total volume:  Not documented* Dosing weight:  97.1   *Total volume has not been documented. View each administration to see the amount administered.     Date/Time Rate/Dose/Volume Action       09/08/24  0019 8 L/min Rate Verify Medical Gas      0800 8 L/min Rate Verify Medical Gas      1005 10 L/min Rate Change Medical Gas               cefTRIAXone (Rocephin) 1 g in dextrose (iso) IV 50 mL (mL/hr) Total dose:  2 g* Dosing weight:  97.1   *From user-documented volume     Date/Time Rate/Dose/Volume Action       09/08/24  0423 1 g - 100 mL/hr (over 30 min) - 50 mL New Bag      0559  (over 30 min) Stopped     09/09/24  0339 1 g - 100 mL/hr (over 30 min) - 50 mL New Bag      0417  (over 30 min) Stopped               cefTRIAXone (Rocephin) 1 g in dextrose (iso) IV 50 mL (mL/hr) Total dose:  2 g* Dosing weight:  96.5   *From user-documented volume     Date/Time Rate/Dose/Volume Action       09/10/24  1950 1 g - 100 mL/hr (over 30 min) - 50 mL New Bag      2020  (over 30 min) Stopped     09/11/24  1620 1 g - 100 mL/hr (over 30 min) New Bag      1634 50 mL       1650  (over 30 min) Stopped               azithromycin 500 mg in dextrose 5% 250 mL IV (mL/hr) Total dose:  1,500 mg* Dosing weight:  97.1   *From user-documented volume     Date/Time Rate/Dose/Volume Action       09/08/24  0423 250 mL       0424 500 mg - 250 mL/hr (over 60 min) New Bag      0559  (over 60 min) Stopped     09/09/24  0419 500 mg - 250 mL/hr (over 60 min) - 250 mL New Bag      0458  (over 60 min) Stopped     09/10/24  0625 500 mg - 250 mL/hr  (over 60 min) - 250 mL New Bag      0801  (over 60 min) Stopped               norepinephrine in dextrose 5 % (Levophed) infusion  - Omnicell Override Pull (mcg/kg/min) Total dose:  Cannot be calculated*   *Total user-documented volume 427.75 mL may contain volume from other administrations     Date/Time Rate/Dose/Volume Action       09/08/24  1300 0.01 mcg/kg/min - 1.82 mL/hr New Bag               norepinephrine (Levophed) 8 mg in dextrose 5% 250 mL (0.032 mg/mL) infusion (premix) (mcg/kg/min) Total dose:  Cannot be calculated* Dosing weight:  97.1   *Total user-documented volume 427.75 mL may contain volume from other administrations     Date/Time Rate/Dose/Volume Action       09/08/24  1300 0.01 mcg/kg/min - 1.82 mL/hr New Bag      1345  Stopped      1727 0.02 mcg/kg/min - 3.64 mL/hr Restarted      1742 0.01 mcg/kg/min - 1.82 mL/hr Rate Change      1800 0.01 mcg/kg/min - 1.82 mL/hr Rate Verify      1900 0.01 mcg/kg/min - 1.82 mL/hr Rate Verify      2300  Stopped     09/09/24  2033 0.04 mcg/kg/min - 7.28 mL/hr Restarted      2209 0.03 mcg/kg/min - 5.46 mL/hr Rate Change     09/10/24  0104 0.02 mcg/kg/min - 3.64 mL/hr Rate Change      0320 0.04 mcg/kg/min - 7.28 mL/hr Rate Change      0600 0.04 mcg/kg/min - 7.28 mL/hr Rate Verify      0800 0.04 mcg/kg/min - 7.28 mL/hr Rate Verify      0900 0.04 mcg/kg/min - 7.28 mL/hr Rate Verify      1000 0.04 mcg/kg/min - 7.28 mL/hr Rate Verify      1001 0.05 mcg/kg/min - 9.1 mL/hr Rate Change      1100 0.05 mcg/kg/min - 9.1 mL/hr Rate Verify      1200 0.05 mcg/kg/min - 9.1 mL/hr Rate Verify      1300 0.05 mcg/kg/min - 9.1 mL/hr Rate Verify      1400 0.05 mcg/kg/min - 9.1 mL/hr Rate Verify      1900 0.05 mcg/kg/min - 9.1 mL/hr Rate Verify     09/11/24  0000 0.05 mcg/kg/min - 9.1 mL/hr Rate Verify      0100 0.04 mcg/kg/min - 7.28 mL/hr Rate Change      0230 0.05 mcg/kg/min - 9.1 mL/hr Rate Change      0300 0.06 mcg/kg/min - 10.92 mL/hr Rate Change      0620 0.12 mcg/kg/min - 21.8  mL/hr Rate Change      0700 0.122 mcg/kg/min - 22.3 mL/hr Rate Verify      0900 0.06 mcg/kg/min - 10.92 mL/hr Rate Change      0945 0.05 mcg/kg/min - 9.1 mL/hr Rate Change      1000 0.04 mcg/kg/min - 7.28 mL/hr Rate Change      1100 0.02 mcg/kg/min - 3.64 mL/hr Rate Change      1110 0.03 mcg/kg/min - 5.46 mL/hr Rate Change      1200 0.03 mcg/kg/min - 5.46 mL/hr Rate Verify      1236 0.022 mcg/kg/min - 4 mL/hr Rate Change      1600 0.022 mcg/kg/min - 4 mL/hr Rate Verify      1900 0.022 mcg/kg/min - 4 mL/hr Rate Verify      2106 0.02 mcg/kg/min - 3.64 mL/hr Rate Change     09/12/24  0140 0.02 mcg/kg/min - 3.64 mL/hr Rate Change      0230 0.016 mcg/kg/min - 3 mL/hr Rate Change      0353  Stopped      0415 0.022 mcg/kg/min - 4 mL/hr Restarted      0600 0.011 mcg/kg/min - 2 mL/hr Rate Change               dexmedeTOMIDine (Precedex) sodium chloride 0.9% infusion  - Omnicell Override Pull (mcg/kg/hr) Total dose:  Cannot be calculated*   *Total user-documented volume 2.43 mL may contain volume from other administrations     Date/Time Rate/Dose/Volume Action       09/08/24  1200 0.2 mcg/kg/hr - 4.86 mL/hr New Bag               dexmedeTOMIDine (Precedex) 400 mcg in 100 mL (4 mcg/mL) sodium chloride 0.9% infusion (mcg/kg/hr) Total dose:  Cannot be calculated* Dosing weight:  97.1   *Total user-documented volume 2.43 mL may contain volume from other administrations     Date/Time Rate/Dose/Volume Action       09/08/24  1200 0.2 mcg/kg/hr - 4.86 mL/hr New Bag      1230  Stopped               etomidate (Amidate) injection  - Omnicell Override Pull (mg) Total dose:  30 mg      Date/Time Rate/Dose/Volume Action       09/08/24  1200 30 mg Given               etomidate (Amidate) injection 50 mg (mg) Total dose:  Cannot be calculated* Dosing weight:  97.1   *Administration dose not documented     Date/Time Rate/Dose/Volume Action       09/08/24  Canceled Entry               etomidate (Amidate) injection 30 mg (mg) Total dose:  30 mg  Dosing weight:  97.1      Date/Time Rate/Dose/Volume Action       09/08/24  1200 30 mg Given               rocuronium (ZeMuron) injection  - Omnicell Override Pull (mg) Total dose:  100 mg      Date/Time Rate/Dose/Volume Action       09/08/24  1205 100 mg Given               rocuronium (ZeMuron) injection 100 mg (mg) Total dose:  100 mg Dosing weight:  97.1      Date/Time Rate/Dose/Volume Action       09/08/24  1205 100 mg Given               lidocaine (Xylocaine) injection  - Omnicell Override Pull Total dose:  Cannot be calculated*   *Administration dose not documented     Date/Time Rate/Dose/Volume Action       09/08/24  Canceled Entry               midazolam (Versed) injection  - Omnicell Override Pull (mg) Total dose:  2 mg      Date/Time Rate/Dose/Volume Action       09/08/24  1210 2 mg Given               midazolam (Versed) injection 2 mg (mg) Total dose:  2 mg Dosing weight:  97.1      Date/Time Rate/Dose/Volume Action       09/08/24  1210 2 mg Given               midazolam (Versed) injection (mg) Total dose:  1 mg      Date/Time Rate/Dose/Volume Action       09/12/24  1100 1 mg Given               phenylephrine HCl in 0.9% NaCl syringe  - Omnicell Override Pull Total dose:  Cannot be calculated*   *Administration dose not documented     Date/Time Rate/Dose/Volume Action       09/08/24  1338 *Not included in total Return to Cabinet               phenylephrine HCl in 0.9% NaCl syringe  - Omnicell Override Pull Total dose:  Cannot be calculated*   *Administration dose not documented     Date/Time Rate/Dose/Volume Action       09/10/24  0103 *Not included in total Missed               fentaNYL PF (Sublimaze) injection  - Omnicell Override Pull (mcg) Total dose:  50 mcg      Date/Time Rate/Dose/Volume Action       09/08/24  1155 50 mcg Given               fentaNYL PF (Sublimaze) injection 50 mcg (mcg) Total dose:  50 mcg Dosing weight:  97.1      Date/Time Rate/Dose/Volume Action       09/08/24  1155 50 mcg  Given               fentaNYL PF (Sublimaze) injection (mcg) Total dose:  25 mcg      Date/Time Rate/Dose/Volume Action       09/12/24  1100 25 mcg Given               propofol (Diprivan) infusion  - Omnicell Override Pull (mcg/kg/min) Total dose:  Cannot be calculated*   *Total user-documented volume 431.76 mL may contain volume from other administrations     Date/Time Rate/Dose/Volume Action       09/08/24  1215 5 mcg/kg/min - 2.91 mL/hr New Bag               propofol (Diprivan) infusion (mcg/kg/min) Total dose:  Cannot be calculated* Dosing weight:  97.1   *Total user-documented volume 431.76 mL may contain volume from other administrations     Date/Time Rate/Dose/Volume Action       09/08/24  1215 5 mcg/kg/min - 2.91 mL/hr New Bag      1220 10 mcg/kg/min - 5.83 mL/hr Rate Change      1245 15 mcg/kg/min - 8.74 mL/hr Rate Change      1300 15 mcg/kg/min - 8.74 mL/hr Rate Verify      1400 15 mcg/kg/min - 8.74 mL/hr Rate Verify      1500 15 mcg/kg/min - 8.74 mL/hr Rate Verify      1600 15 mcg/kg/min - 8.74 mL/hr Rate Verify      1700 15 mcg/kg/min - 8.74 mL/hr Rate Verify      1800 15 mcg/kg/min - 8.74 mL/hr Rate Verify      1810 15 mcg/kg/min - 8.74 mL/hr New Bag      1900 15 mcg/kg/min - 8.74 mL/hr Rate Verify     09/09/24  0200 10 mcg/kg/min - 5.83 mL/hr Rate Change      0700 10 mcg/kg/min - 5.83 mL/hr Rate Verify      0800 10 mcg/kg/min - 5.83 mL/hr Rate Verify      0810 10 mcg/kg/min - 5.83 mL/hr New Bag      0900 10 mcg/kg/min - 5.83 mL/hr Rate Verify      1000 10 mcg/kg/min - 5.83 mL/hr Rate Verify      1100 10 mcg/kg/min - 5.83 mL/hr Rate Verify      1200 10 mcg/kg/min - 5.83 mL/hr Rate Verify      1321 20 mcg/kg/min - 11.65 mL/hr Rate Change      1600 20 mcg/kg/min - 11.65 mL/hr Rate Verify      1700 10 mcg/kg/min - 5.83 mL/hr Rate Change      1800 5 mcg/kg/min - 2.91 mL/hr Rate Change     09/10/24  0000 5 mcg/kg/min - 2.91 mL/hr Rate Verify      0026 5 mcg/kg/min - 2.91 mL/hr New Bag      0320 5 mcg/kg/min -  2.91 mL/hr Rate Verify      0900 5 mcg/kg/min - 2.91 mL/hr Rate Verify      1000 5 mcg/kg/min - 2.91 mL/hr Rate Verify      1100 5 mcg/kg/min - 2.91 mL/hr Rate Verify      1200 5 mcg/kg/min - 2.91 mL/hr Rate Verify      1300 5 mcg/kg/min - 2.91 mL/hr Rate Verify      1400 5 mcg/kg/min - 2.91 mL/hr Rate Verify      1900 15 mcg/kg/min - 8.74 mL/hr Rate Change     09/11/24  0000 15 mcg/kg/min - 8.74 mL/hr Rate Verify      0400 15 mcg/kg/min - 8.74 mL/hr Rate Verify      0600 15 mcg/kg/min - 8.74 mL/hr Rate Verify      0700 15 mcg/kg/min - 8.74 mL/hr Rate Verify      0751 15 mcg/kg/min - 8.74 mL/hr New Bag      0945  Stopped               fentaNYL bolus from bag 50 mcg (mcg) Total dose:  Cannot be calculated* Dosing weight:  97.1   *Total user-documented volume 346.09 mL may contain volume from other administrations     Date/Time Rate/Dose/Volume Action       09/08/24  Canceled Entry               fentanyl (Sublimaze) 1000 mcg in sodium chloride 0.9% 100 mL (10 mcg/mL) infusion (premix) (mcg/hr) Total dose:  Cannot be calculated* Dosing weight:  97.1   *Total user-documented volume 346.09 mL may contain volume from other administrations     Date/Time Rate/Dose/Volume Action       09/08/24  1245 25 mcg/hr - 2.5 mL/hr New Bag      1300 25 mcg/hr - 2.5 mL/hr Rate Verify      1400 25 mcg/hr - 2.5 mL/hr Rate Verify      1500 25 mcg/hr - 2.5 mL/hr Rate Verify      1600 25 mcg/hr - 2.5 mL/hr Rate Verify      1700 25 mcg/hr - 2.5 mL/hr Rate Verify      1800 25 mcg/hr - 2.5 mL/hr Rate Verify      1900 25 mcg/hr - 2.5 mL/hr Rate Verify     09/09/24  0700 25 mcg/hr - 2.5 mL/hr Rate Verify      0800 25 mcg/hr - 2.5 mL/hr Rate Verify      0900 25 mcg/hr - 2.5 mL/hr Rate Verify      1000 25 mcg/hr - 2.5 mL/hr Rate Verify      1100 25 mcg/hr - 2.5 mL/hr Rate Verify      1200 25 mcg/hr - 2.5 mL/hr Rate Verify      1322 50 mcg/hr - 5 mL/hr Rate Change      1600 50 mcg/hr - 5 mL/hr Rate Verify      1728 25 mcg/hr - 2.5 mL/hr New Bag      09/10/24  0000 25 mcg/hr - 2.5 mL/hr Rate Verify      0215 50 mcg/hr - 5 mL/hr Rate Change      0320 50 mcg/hr - 5 mL/hr Rate Verify      0600 50 mcg/hr - 5 mL/hr Rate Verify      0800 50 mcg/hr - 5 mL/hr Rate Verify      0900 50 mcg/hr - 5 mL/hr Rate Verify      1000 50 mcg/hr - 5 mL/hr Rate Verify      1059 50 mcg/hr - 5 mL/hr New Bag      1100 50 mcg/hr - 5 mL/hr Rate Verify      1200 50 mcg/hr - 5 mL/hr Rate Verify      1300 50 mcg/hr - 5 mL/hr Rate Verify      1400 50 mcg/hr - 5 mL/hr Rate Verify      1900 50 mcg/hr - 5 mL/hr Rate Verify     09/11/24  0000 50 mcg/hr - 5 mL/hr Rate Verify      0400 50 mcg/hr - 5 mL/hr Rate Verify      0600 50 mcg/hr - 5 mL/hr Rate Verify      0700 25 mcg/hr - 2.5 mL/hr Rate Verify      1200 25 mcg/hr - 2.5 mL/hr Rate Verify      1340 50 mcg/hr - 5 mL/hr Rate Change      1600 50 mcg/hr - 5 mL/hr Rate Verify      1815 50 mcg/hr - 5 mL/hr New Bag      1900 50 mcg/hr - 5 mL/hr Rate Verify               fentaNYL infusion  - Omnicell Override Pull (mcg/hr) Total dose:  Cannot be calculated*   *Total user-documented volume 346.09 mL may contain volume from other administrations     Date/Time Rate/Dose/Volume Action       09/08/24  1245 25 mcg/hr - 2.5 mL/hr New Bag               lactated Ringer's bolus 500 mL (mL/hr) Total volume:  1,354.17 mL* Dosing weight:  97.1   *From user-documented volume     Date/Time Rate/Dose/Volume Action       09/08/24  1145 500 mL - 250 mL/hr (over 120 min) New Bag      1200 250 mL/hr - 500 mL Rate Verify      1215  (over 120 min) Stopped      1300 0 mL      09/09/24  0214 500 mL - 250 mL/hr (over 120 min) [dose] - 500 mL [vol] New Bag      0339  (over 120 min) Stopped      0700 354.17 mL                lactated Ringer's bolus 500 mL (mL/hr) Total volume:  500 mL* Dosing weight:  99.2   *From user-documented volume     Date/Time Rate/Dose/Volume Action       09/09/24  0900 500 mL - 500 mL/hr (over 60 min) [dose] - 0 mL [vol] New Bag      1000 500 mL/hr -  500 mL Rate Verify      1007  (over 60 min) Stopped               lactated Ringer's bolus 1,000 mL (mL/hr) Total volume:  1,000 mL* Dosing weight:  99.2   *From user-documented volume     Date/Time Rate/Dose/Volume Action       09/09/24  2218 1,000 mL - 999 mL/hr (over 60 min) New Bag     09/10/24  0000 1,000 mL       0027  (over 60 min) Stopped               lactated Ringer's bolus 1,000 mL (mL/hr) Total volume:  1,000 mL* Dosing weight:  95.7   *From user-documented volume     Date/Time Rate/Dose/Volume Action       09/11/24  0735 1,000 mL - 500 mL/hr (over 120 min) [dose] - 1,000 mL [vol] New Bag      0927  (over 120 min) Stopped               lidocaine (Xylocaine) 20 mg/mL (2 %) injection 5 mL (mL) Total dose:  Cannot be calculated* Dosing weight:  97.1   *Administration dose not documented     Date/Time Rate/Dose/Volume Action       09/08/24  Canceled Entry               lidocaine PF (Xylocaine) 40 mg/mL (4 %) injection 40 mg (mg) Total dose:  Cannot be calculated* Dosing weight:  97.1   *Administration dose not documented     Date/Time Rate/Dose/Volume Action       09/08/24  Canceled Entry               magnesium sulfate 2 g in sterile water for injection 50 mL (mL/hr) Total dose:  2 g* Dosing weight:  99.2   *From user-documented volume     Date/Time Rate/Dose/Volume Action       09/09/24  0810 2 g - 25 mL/hr (over 120 min) New Bag      0900 25 mL/hr Rate Verify      1000 25 mL/hr Rate Verify      1010  (over 120 min) Stopped               potassium chloride (Klor-Con) packet 20 mEq (mEq) Total dose:  20 mEq Dosing weight:  99.2      Date/Time Rate/Dose/Volume Action       09/09/24  0810 20 mEq Given               DOPamine (Intropin) 400 mg in dextrose 5% 250 mL (1.6 mg/mL) infusion (premix) (mcg/kg/min) Total dose:  Not documented* Dosing weight:  99.2   *Total volume has not been documented. View each administration to see the amount administered.     Date/Time Rate/Dose/Volume Action       09/09/24  7815   Stopped               amiodarone (Nexterone) infusion  - Omnicell Override Pull (mg/min) Total dose:  Cannot be calculated*   *Total user-documented volume 1172.43 mL may contain volume from other administrations     Date/Time Rate/Dose/Volume Action       09/09/24  2217 1 mg/min - 33.3 mL/hr New Bag               amiodarone (Nexterone) 360 mg in dextrose,iso-osm 200 mL (1.8 mg/mL) infusion (premix) (mg/min) Total dose:  Cannot be calculated* Dosing weight:  99.2   *Total user-documented volume 1172.43 mL may contain volume from other administrations     Date/Time Rate/Dose/Volume Action       09/09/24  2217 1 mg/min - 33.3 mL/hr New Bag     09/10/24  0026 1 mg/min - 33.3 mL/hr New Bag      0320 1 mg/min - 33.3 mL/hr Rate Verify      0600 1 mg/min - 33.3 mL/hr Rate Verify      0625 1 mg/min - 33.3 mL/hr New Bag      0800 1 mg/min - 33.3 mL/hr Rate Verify      0900 1 mg/min - 33.3 mL/hr Rate Verify      1000 1 mg/min - 33.3 mL/hr Rate Verify      1100 1 mg/min - 33.3 mL/hr Rate Verify      1200 1 mg/min - 33.3 mL/hr Rate Verify      1256 0.5 mg/min - 16.67 mL/hr New Bag      1300 0.5 mg/min - 16.67 mL/hr Rate Verify      1400 0.5 mg/min - 16.67 mL/hr Rate Verify      1900 0.5 mg/min - 16.67 mL/hr Rate Verify     09/11/24  0000 0.5 mg/min - 16.67 mL/hr Rate Verify      0200 0.5 mg/min - 16.67 mL/hr New Bag      0400 0.5 mg/min - 16.67 mL/hr Rate Verify      0600 0.5 mg/min - 16.67 mL/hr Rate Verify      0700 0.5 mg/min - 16.67 mL/hr Rate Verify      1157 0.5 mg/min - 16.67 mL/hr New Bag      1600 0.5 mg/min - 16.67 mL/hr Rate Verify      1900 0.5 mg/min - 16.67 mL/hr Rate Verify     09/12/24  0036 0.5 mg/min - 16.67 mL/hr New Bag               amiodarone (Nexterone) 300 mg in dextrose (iso)  mL (mg) Total dose:  Cannot be calculated* Dosing weight:  99.2   *Total user-documented volume 1172.43 mL may contain volume from other administrations     Date/Time Rate/Dose/Volume Action       09/09/24  5196  (over 10  min) Rate Verify      2230 300 mg (over 10 min) New Bag     09/10/24  0103  (over 10 min) Stopped               piperacillin-tazobactam (Zosyn) 4.5 g in dextrose (iso)  mL (g) Total dose:  13.5 g* Dosing weight:  99.2   *From user-documented volume     Date/Time Rate/Dose/Volume Action       09/10/24  0320 4.5 g (over 30 min) - 100 mL New Bag      0417  (over 30 min) Stopped      0903 4.5 g (over 30 min) - 100 mL New Bag      0925  (over 30 min) Stopped      1551 4.5 g (over 30 min) - 100 mL New Bag      1700  (over 30 min) Stopped               pantoprazole (ProtoNix) EC tablet 40 mg (mg) Total dose:  Cannot be calculated* Dosing weight:  99.2   *Administration dose not documented     Date/Time Rate/Dose/Volume Action       09/10/24  0626 *Not included in total See Alternative     09/11/24 0751 *Not included in total See Alternative     09/12/24 0637 *Not included in total See Alternative               esomeprazole (NexIUM) suspension 40 mg (mg) Total dose:  Cannot be calculated* Dosing weight:  99.2   *Administration dose not documented     Date/Time Rate/Dose/Volume Action       09/10/24  0626 *Not included in total See Alternative     09/11/24 0751 *Not included in total See Alternative     09/12/24 0637 *Not included in total See Alternative               pantoprazole (ProtoNix) injection 40 mg (mg) Total dose:  120 mg Dosing weight:  99.2      Date/Time Rate/Dose/Volume Action       09/10/24  0626 40 mg Given     09/11/24 0751 40 mg Given     09/12/24 0637 40 mg Given               potassium chloride 20 mEq in sterile water for injection 100 mL (mL/hr) Total dose:  20 mEq* Dosing weight:  95.7   *From user-documented volume     Date/Time Rate/Dose/Volume Action       09/11/24  0700 20 mEq - 50 mL/hr (over 120 min) - 100 mL New Bag      0849 0 mL Stopped               potassium chloride in sterile water for injection  - Omnicell Override Pull (mL/hr) Total volume:  Not documented*   *Total volume  has not been documented. View each administration to see the amount administered.     Date/Time Rate/Dose/Volume Action       09/11/24  0700 20 mEq - 50 mL/hr (over 120 min) New Bag      0849  (over 120 min) Stopped               digoxin (Lanoxin) injection 500 mcg (mcg) Total dose:  0 mcg* Dosing weight:  77.6   *Administration not included in total     Date/Time Rate/Dose/Volume Action       09/11/24  0739 *500 mcg (over 5 min) Missed               digoxin (Lanoxin) injection 250 mcg (mcg) Total dose:  250 mcg Dosing weight:  77.6      Date/Time Rate/Dose/Volume Action       09/11/24  0755 250 mcg (over 5 min) Given               sennosides-docusate sodium (Babs-Colace) 8.6-50 mg per tablet 2 tablet (tablet) Total dose:  2 tablet* Dosing weight:  95.7   *Administration not included in total     Date/Time Rate/Dose/Volume Action       09/11/24  1345 *2 tablet Missed      2048 2 tablet Given     09/12/24  0914 *2 tablet Missed                     See detailed result report with images in PACS.    The patient tolerated the procedure well without incident or complication and is in stable condition.

## 2024-09-12 NOTE — PROGRESS NOTES
Subjective Data:  Patient seen this AM, remains intubated but awake and alert, able to answer yes/ no questions. Denies CP or SOB.   - around 75ml of fluid drained this am    Overnight Events:    None      Objective Data:  Last Recorded Vitals:  Vitals:    09/12/24 0500 09/12/24 0600 09/12/24 0800 09/12/24 0923   BP:       BP Location:       Patient Position:       Pulse: 72 70  67   Resp: 16 16  20   Temp:   36.2 °C (97.2 °F)    TempSrc:       SpO2: 96% 96%  97%   Weight:       Height:           Last Labs:  CBC - 9/12/2024:  5:24 AM  10.4 9.7 264    27.9      CMP - 9/12/2024:  5:24 AM  8.4 5.3 22 --- 1.9   3.6 2.5 11 68      PTT - 9/9/2024: 10:01 PM  1.3   14.5 27     TROPHS   Date/Time Value Ref Range Status   09/11/2024 04:43 AM 20 0 - 53 ng/L Final   09/07/2024 09:27 AM 10 0 - 20 ng/L Final     BNP   Date/Time Value Ref Range Status   09/07/2024 09:27  0 - 99 pg/mL Final     HGBA1C   Date/Time Value Ref Range Status   09/08/2024 12:47 AM 6.8 see below % Final   03/20/2022 05:39 AM 6.3 % Final     Comment:          Diagnosis of Diabetes-Adults   Non-Diabetic: < or = 5.6%   Increased risk for developing diabetes: 5.7-6.4%   Diagnostic of diabetes: > or = 6.5%  .       Monitoring of Diabetes                Age (y)     Therapeutic Goal (%)   Adults:          >18           <7.0   Pediatrics:    13-18           <7.5                   7-12           <8.0                   0- 6            7.5-8.5   American Diabetes Association. Diabetes Care 33(S1), Jan 2010.        Last I/O:  I/O last 3 completed shifts:  In: 2488.7 (26 mL/kg) [I.V.:1288.7 (13.5 mL/kg); IV Piggyback:1200]  Out: 1175 (12.3 mL/kg) [Urine:1125 (0.3 mL/kg/hr); Drains:50]  Weight: 95.7 kg     Ejection Fractions:  EF   Date/Time Value Ref Range Status   09/11/2024 01:00 PM 42 %    09/09/2024 09:57 AM 33 %      Cath:  Cardiac Catheterization Procedure 09/09/2024    Stress Test:  No results found for this or any previous visit from the past 1095  days.    Cardiac Imaging:  No results found for this or any previous visit from the past 1095 days.      Inpatient Medications:  Scheduled medications   Medication Dose Route Frequency    cefTRIAXone  1 g intravenous q24h    pantoprazole  40 mg oral Daily before breakfast    Or    esomeprazole  40 mg nasoduodenal tube Daily before breakfast    Or    pantoprazole  40 mg intravenous Daily before breakfast    polyethylene glycol  17 g nasogastric tube Daily    sennosides-docusate sodium  2 tablet nasogastric tube BID     PRN medications   Medication    dextrose    dextrose    glucagon    glucagon    oxygen     Continuous Medications   Medication Dose Last Rate    amiodarone  0.5-1 mg/min 0.5 mg/min (09/12/24 0036)    fentaNYL  0-300 mcg/hr 50 mcg/hr (09/11/24 1900)    norepinephrine  0-0.2 mcg/kg/min 0.011 mcg/kg/min (09/12/24 0600)    propofol  5-50 mcg/kg/min Stopped (09/11/24 0945)       Physical Exam:  General: intubated, awake and alert, appears comfortable, lying in bed  Skin: warm and dry   Head/ neck: no JVD seen at 90 degrees  Cardiac: RRR, subcostal pericardial drain in place, insertion site C/D/I; scant serosang drainage in drainage bag   Pulm: intubated  GI: soft, nontender   Extremities: no LE edema   Neuro: no focal neuro deficits   Psych: cooperative      Assessment/Plan     Khoa Mitchell is a 69 y.o. male with PMH of T2DM, CKD2 (baseline Cr 1.2) admitted for concern for SVC syndrome likely 2/2 to new diagnosis of metastatic cancer. Course c/b acute hypoxic resp failure s/p intubation and moderate pericardial effusion with early tamponade physiology s/p pericardiocentesis and post-procedure development of CHB requiring TVP (now discontinued with recovery of conduction).     9/9  Pericardiocentesis- subcostal approach, 400 ml serous drainage. Pericardial drain in place, to gravity. Labs sent. Echocardiogram used during and post procedure, with resolution of tamponade.     Pericardial Effusion  - Initial  TTE on 9/9 showed a moderate pericardial effusion with early findings of tamponade   - s/p successful ultrasound- and fluoroscopy-guided pericardiacentesis of 400 ml serous fluid removal on 9/9  - 9/9 TTE post-pericardiocentesis showed trivial pericardial effusion  - pericardial fluid sent for analysis  - Pericardial drain was sutured in place and attached to gravity  - 75ml drained in last 24 hours; will reassess output tomorrow and determine if drain can be removed vs longterm plan for reaccumulating pericardial effusion      Recommendations:  encourage frequent position changes to facilitate optimal drainage of effusion, if able   will consider drain removal when <50 cc out in 24 hour period and no residual fluid seen on limited echo  Primary team to follow up pericardial fluid analysis  interventional cardiology will continue to follow, will defer further care to primary team       Code Status:  Full Code    I spent 30 minutes in the professional and overall care of this patient.    Interventional Cardiology will follow.     David Matthews DNP, APRN-CNP  Interventional Cardiology     General Cardiology Consult Pager: [phone] (weekday 7AM-6PM and weekend 7AM-2PM)and other: [phone]  EP Consult Pager: [phone] (weekday 7AM-6PM and weekend 7AM-2PM) and other: [phone]  EP Device Nurse Pager: [phone] (weekday 7AM-4PM)  Advanced Heart Failure Consult Pager: [phone] anytime  CICU Fellow Pager: [phone] anytime  Endovascular /Limb Salvage Team Pager: [phone] for day coverage (8am-5pm)  Interventional Cardiology Fellow Pager: [phone] (7AM-5PM) Night coverage: [facility] Cross Cover [id number]  Structural Heart Team Pager: [phone] anytime  [facility] coverage: [facility] [id number]     David Matthews, APRN-CNP, OLINDA

## 2024-09-12 NOTE — SIGNIFICANT EVENT
The patient was weaned off sedation while still remaining intubated. His mental status was assessed and he was fully awake and oriented. He could not verbalize but was able to answer yes/no questions by writing his answer or by head movements.    Members that were present:  -Patient  -His friend Zita  -Dr. Sultana Kolb (resident)  -Dr. Enid Chambers (intern)  -Maite Bauer ()    The patient chose his friend Zita to be his health care proxy regarding medical decisions, including end of life decisions.

## 2024-09-12 NOTE — SIGNIFICANT EVENT
69 y.o. male w/ hx of T2DM, CKD2 (baseline Cr 1.2) admitted for concern for SVC syndrome likely 2/2 to new diagnosis of metastatic cancer. Course c/b acute hypoxic resp failure s/p intubation and moderate pericardial effusion with early tamponade physiology s/p pericardiocentesis and post-procedure development of CHB requiring TVP. EP consulted for further management. Course c/b Afib with RVR (failed electrical cardioversion attempts x 3) now on Amio gtt and reverted to NSR with intermittent Afib on going.     Subjective:  Attempted to see the patient but off the floor for biopsy.     Reviewed the tele and PVC present. No more RVR since yesterday    Impression:  - Moderate pericardial effusion with early tamponade physiology (metastatic given newly diagnosed metastatic disease, pericardial fluid biopsy with +ve malignant cells etc)  - s/p pericardiocentesis 09/09 and post-procedure complication on CHB with hypotension requiring inotropes and TVP  - spontaneous resolution of CHB and patient in NSR with intrinsic rate in 80-90s. TVP removed.   - 09/09 pm: developed wide complex tachycardia (Afib w aberrancy), received 200 J synchronized shock but no significant change in HR. Started on IV amio drip.  - 09/11 am: again developed RVR with hemodynamic instability, received 2 shocks x 200 J, no change in HR, also received dig 250 mcg. Later spontaneously reverted to NSR      Recommendations:  - Continue amio gtt for rhythm control for atrial fibrillation with aberrancy. Can be switched to PO at the discretion of ICU team.   - Ideally should be anticoagulated after the electrical shocks. However, intracranial lesions with propensity of hemorrhagic conversion might pose barrier. Once cleared from NSGY standpoint, consider starting AC.     EP will sign off at this time.    Thank you for involving us in the care of this patient. Above recommendations discussed with Dr. Gardiner. If further questions arise, please page the EP  consult pager at [phone] on weekdays 7AM - 6PM and weekends 7AM - 2PM, or at [phone] at all other times. The EP device nurse can be reached at pager [phone] during regular business hours M-F.Reason for Consult:

## 2024-09-12 NOTE — PRE-PROCEDURE NOTE
Interventional Radiology Preprocedure Note    Indication for procedure: The primary encounter diagnosis was Metastatic malignant neoplasm, unspecified site (Multi). Diagnoses of Shortness of breath, Swelling, Pericardial effusion (HHS-HCC), Acute hypoxic respiratory failure (Multi), Heart block, Cardiac tamponade (HHS-HCC), and Paroxysmal atrial fibrillation (Multi) were also pertinent to this visit.    Metastatic caner of unknown primary. Patietn presents to IR for biopsy.     Relevant review of systems: NA    Relevant Labs:   Lab Results   Component Value Date    CREATININE 0.98 09/12/2024    EGFR 83 09/12/2024    INR 1.3 (H) 09/09/2024    PROTIME 14.5 (H) 09/09/2024       Planned Sedation/Anesthesia: Moderate    Airway assessment: Intuabted    Directed physical examination:  Intubated . Unable to lay flat. No acute distress.     Mallampati: Unable to assess.     ASA Score: ASA 4 - Patient with severe systemic disease that is a constant threat to life    Benefits, risks and alternatives of procedure and planned sedation have been discussed with the patient and/or their representative. All questions answered and they agree to proceed.

## 2024-09-12 NOTE — PROGRESS NOTES
Subjective Data:  Patient intubated, but able to gesture, nod to communicate. Wife at bedside. SR 60's.     Overnight Events:    NA      Objective Data:  Last Recorded Vitals:  Vitals:    24 1120 24 1200 24 1300 24 1515   BP: 132/50      Pulse: 74 72 72    Resp: 20 20 17 20   Temp:  36.2 °C (97.2 °F)     TempSrc:       SpO2: 91% 90% 92% 92%   Weight:       Height:           Last Labs:  CBC - 2024:  5:24 AM  10.4 9.7 264    27.9      CMP - 2024:  5:24 AM  8.4 5.3 22 --- 1.9   3.6 2.5 11 68      PTT - 2024: 10:01 PM  1.3   14.5 27     TROPHS   Date/Time Value Ref Range Status   2024 04:43 AM 20 0 - 53 ng/L Final   2024 09:27 AM 10 0 - 20 ng/L Final     BNP   Date/Time Value Ref Range Status   2024 09:27  0 - 99 pg/mL Final     HGBA1C   Date/Time Value Ref Range Status   2024 12:47 AM 6.8 see below % Final   2022 05:39 AM 6.3 % Final     Comment:          Diagnosis of Diabetes-Adults   Non-Diabetic: < or = 5.6%   Increased risk for developing diabetes: 5.7-6.4%   Diagnostic of diabetes: > or = 6.5%  .       Monitoring of Diabetes                Age (y)     Therapeutic Goal (%)   Adults:          >18           <7.0   Pediatrics:    13-18           <7.5                   7-12           <8.0                   0- 6            7.5-8.5   American Diabetes Association. Diabetes Care 33(S1), 2010.        Last I/O:  I/O last 3 completed shifts:  In: 2488.7 (26 mL/kg) [I.V.:1288.7 (13.5 mL/kg); IV Piggyback:1200]  Out: 1175 (12.3 mL/kg) [Urine:1125 (0.3 mL/kg/hr); Drains:50]  Weight: 95.7 kg     Past Cardiology Tests (Last 3 Years):  EK/9: sinus tachycardia with known LBBB        Echo:  Transthoracic Echo (TTE) Limited 2024  CONCLUSIONS:   1. Poorly visualized anatomical structures due to suboptimal image quality.   2. Endocardial definition and arrhythmia preclude accurate assessment of LVEF.   3. Unable to determine right ventricular  systolic function.   4. There is a trivial pericardial effusion.   5. Compared with study dated 9/9/2024, the previous study was done michaela-pericardiocentesis, effusion remains trivial and both studies are significantly technically limited.     Transthoracic Echo (TTE) Limited 09/09/2024  CONCLUSIONS:   1. The left ventricle was not well visualized. The left ventricular ejection fraction could not be measured.   2. There is normal right ventricular global systolic function.   3. There is a moderate pericardial effusion.   4. There is a suggestion of brief RV diastolic collapse and brief RA collapse on pre-tap images. Tap and post-tap images were obtained in the 4-C view only. Trivial to small effusion on final images.   5. Very limited 2D study only before and during pericardiocentesis.     Transthoracic Echo (TTE) Limited 09/09/2024  CONCLUSIONS:   1. The left ventricular systolic function is moderately decreased, with a visually estimated ejection fraction of 30-35%.   2. There is global hypokinesis of the left ventricle with minor regional variations.   3. Left ventricular diastolic filling was indeterminate.   4. There is normal right ventricular global systolic function.   5. Mild diastolic collapse of the right ventricle and brief right atrium diastolic collapse.   6. There is a moderate pericardial effusion.   7. The pulmonary artery is not well visualized.   8. The inferior vena cava appears moderately dilated.   9. There are findings of early echocardiographic tamponade with buckling of RV free wall during late diastolic and brief end diastole RA collapse. The quality of the transvalvular Doppler signals was not clear enough to measure respirophasic change in ventricular filling.     Ejection Fractions:        EF   Date/Time Value Ref Range Status   09/09/2024 09:57 AM 33 %        Cath:  Cardiac Catheterization Procedure 09/09/2024  Ejection Fractions:  EF   Date/Time Value Ref Range Status   09/11/2024  01:00 PM 42 %    09/09/2024 09:57 AM 33 %      Cath:  Cardiac Catheterization Procedure 09/09/2024    Stress Test:  No results found for this or any previous visit from the past 1095 days.    Cardiac Imaging:  No results found for this or any previous visit from the past 1095 days.      Inpatient Medications:  Scheduled medications   Medication Dose Route Frequency    cefTRIAXone  1 g intravenous q24h    pantoprazole  40 mg oral Daily before breakfast    Or    esomeprazole  40 mg nasoduodenal tube Daily before breakfast    Or    pantoprazole  40 mg intravenous Daily before breakfast    polyethylene glycol  17 g nasogastric tube Daily    sennosides-docusate sodium  2 tablet nasogastric tube BID     PRN medications   Medication    dextrose    dextrose    glucagon    glucagon    oxygen     Continuous Medications   Medication Dose Last Rate    amiodarone  0.5-1 mg/min 0.5 mg/min (09/12/24 1301)    fentaNYL  0-300 mcg/hr 75 mcg/hr (09/12/24 1431)    norepinephrine  0-0.2 mcg/kg/min 0.02 mcg/kg/min (09/12/24 1301)       Physical Exam:  General: intubated but awake and responsive  Skin: warm and dry   Head/ neck: no JVD seen at 90 degrees  Cardiac: RRR, S1, S2   Pulm: CTAB, room air   GI: soft, nontender   Extremities: no LE edema   Neuro: no focal neuro deficits   Psych: appropriate mood and behavior        Assessment/Plan   Khoa Mitchell is a 69 y.o. male with PMH of T2DM, CKD2 (baseline Cr 1.2) admitted for concern for SVC syndrome likely 2/2 to new diagnosis of metastatic cancer. Course c/b acute hypoxic resp failure s/p intubation and moderate pericardial effusion with early tamponade physiology s/p pericardiocentesis and post-procedure development of CHB requiring TVP (now discontinued with recovery of conduction). Cardiology consulted for pericardial effusion and cardiomyopathy.      # Malignant pericardial effusion  # Cardiac tamponade, resolved s/p pericardiocentesis/drain  Cytology is positive for malignant cells,  though team still working up primary cancer. Drain output over the past 24h decreased from >100 to @80cc, 15cc in the past 4 hours.   - leave pericardial drain unclamped for now  - drain mgmt per interventional cardiology note 9/11  - Consult CT surgery regarding pericardial window (if within the patient's goals of care) since patients effusion likely due to malignancy and may reoccur.      # HFrEF  - no prior known hx of CM or echo on EMR  - echo 9/11/24:  Mid and apical inferior septum and apex are abnormal. EF 42%, trivial to small pericardial effusion. There is a large pleural effusion.  Unclear chronicity or etiology at this point. Depending on the patient's prognosis and goals of care/clinical course this admission, will discuss further workup and ischemic evaluation when more stable.  - hold off initiating GDMT in the setting of septic shock  - consider further work up/ischemic eval pending clinical course this admission    CHB requiring TVP-resolved  Afib  - Followed by EP  - on amio gtt       Thank you for involving us in this patient's care. Recommendations discussed with Dr. Regalado.     General Cardiology Consult Pager: [phone] (weekday 7AM-6PM and weekend 7AM-2PM) and other: [phone]  EP Consult Pager: [phone] (weekday 7AM-6PM and weekend 7AM-2PM) and other: [phone]  CICU Fellow Pager: [phone] anytime  EP Device Nurse Pager: [phone] (weekday 7AM-4PM)  Advanced Heart Failure Consult Pager: [phone] anytime     Peripheral IV 09/07/24 20 G Left Antecubital (Active)   Site Assessment Clean;Dry;Intact 09/12/24 1200   Dressing Type Transparent 09/12/24 1200   Line Status Flushed;Infusing 09/12/24 1200   Dressing Status Clean;Dry;Occlusive 09/12/24 1200   Number of days: 5       Peripheral IV 09/07/24 22 G Left Hand (Active)   Site Assessment Clean;Dry;Intact 09/12/24 1200   Dressing Type Transparent 09/12/24 1200   Line Status Flushed 09/12/24 1200   Dressing Status Clean;Dry;Occlusive 09/12/24 1200   Number of days: 5        Peripheral IV 09/08/24 20 G Left;Ventral Forearm (Active)   Site Assessment Clean;Dry;Intact 09/12/24 1200   Dressing Type Transparent 09/12/24 1200   Line Status Flushed;Infusing 09/12/24 1200   Dressing Status Clean;Dry;Occlusive 09/12/24 1200   Number of days: 4       Peripheral IV 09/08/24 20 G Left;Ventral Hand (Active)   Site Assessment Clean;Dry;Intact 09/12/24 1200   Dressing Type Transparent 09/12/24 1200   Line Status Flushed;Infusing 09/12/24 1200   Dressing Status Clean;Dry;Occlusive 09/12/24 1200   Number of days: 4       ETT  8.5 mm (Active)   Secured at (cm) 27 cm 09/12/24 1515   Measured from Lips 09/12/24 1515   Secured Location Center 09/12/24 1515   Secured by Commercial tube brown 09/12/24 1515   Site Condition Dry 09/12/24 1515   Number of days: 4       Arterial Line 09/10/24 Left Radial (Active)   Site Assessment Clean;Dry;Intact 09/12/24 1200   Line Status Pulsatile blood flow 09/12/24 1200   Art Line Waveform Appropriate 09/12/24 1200   Color/Movement/Sensation Capillary refill less than 3 sec;Distal pulses palpable 09/12/24 1200   Dressing Type Antimicrobial patch;Transparent 09/12/24 1200   Dressing Status Clean;Dry;Occlusive 09/12/24 1200   Number of days: 2       NG/OG/Feeding Tube OG - [place] sump 18 Fr Center mouth (Active)   Site Assessment Clean;Dry;Intact 09/12/24 1200   Number of days: 4       Code Status:  Full Code      Eusebio Barrios APRN-CNP

## 2024-09-12 NOTE — CARE PLAN
Problem: Pain - Adult  Goal: Verbalizes/displays adequate comfort level or baseline comfort level  Outcome: Progressing     Problem: Safety - Adult  Goal: Free from fall injury  Outcome: Progressing     Problem: Skin  Goal: Decreased wound size/increased tissue granulation at next dressing change  Outcome: Progressing  Flowsheets (Taken 9/11/2024 9384)  Decreased wound size/increased tissue granulation at next dressing change: Protective dressings over bony prominences  Goal: Participates in plan/prevention/treatment measures  Outcome: Progressing  Goal: Promote/optimize nutrition  Outcome: Progressing  Goal: Promote skin healing  Outcome: Progressing

## 2024-09-12 NOTE — PROGRESS NOTES
SOCIAL WORK NOTE  [facility] Not [facility] to [facility], not additional emergency contacts. Social work to follow.    UPDATE: SW met with patient at bedside along with medical team and nursing to discuss proxy. Sedation was lowered, and medical team felt he had sufficient capacity to verbally designate medical decision maker. SW spoke with patient and explained role of proxy including end of life decisions. Patient nodded and wrote yes confirming friend Zita to be proxy. SW will follow up when more appropriate for HCPOA document.   Maite Paul, DOUG, LISW-S ([id number])

## 2024-09-12 NOTE — PROGRESS NOTES
Medical Intensive Care - Daily Progress Note   Subjective    Khoa Mitchell is a 69 y.o. year old male patient admitted on 2024 with following ICU needs: acute hypoxemic respiratory failure, worsening from severe metastatic burden requiring endotracheal intubation     Interval History:  NAEON    Meds    Scheduled medications  cefTRIAXone, 1 g, intravenous, q24h  pantoprazole, 40 mg, oral, Daily before breakfast   Or  esomeprazole, 40 mg, nasoduodenal tube, Daily before breakfast   Or  pantoprazole, 40 mg, intravenous, Daily before breakfast  polyethylene glycol, 17 g, nasogastric tube, Daily  sennosides-docusate sodium, 2 tablet, nasogastric tube, BID      Continuous medications  amiodarone, 0.5-1 mg/min, Last Rate: 0.5 mg/min (24 1301)  fentaNYL, 0-300 mcg/hr, Last Rate: 75 mcg/hr (24 1431)  norepinephrine, 0-0.2 mcg/kg/min, Last Rate: 0.02 mcg/kg/min (24 1301)  propofol, 0-20 mcg/kg/min      PRN medications  PRN medications: dextrose, dextrose, glucagon, glucagon, oxygen     Objective    Blood pressure 132/50, pulse 72, temperature 36.2 °C (97.2 °F), resp. rate 20, height 1.829 m (6'), weight 95.7 kg (210 lb 15.7 oz), SpO2 92%.     Physical Exam  Constitutional:       General: He is awake.      Appearance: He is obese.      Interventions: Nasal cannula in place.      Comments: Patient intubated. Responsive to commands.   HENT:      Head:      Salivary Glands: Right salivary gland is diffusely enlarged.      Comments: Significant plethora of the head including neck, face, and periorbital edema. Evidence of plum shaped mass on right jaw.   Cardiovascular:      Rate and Rhythm: Normal rate.      Heart sounds: Normal heart sounds, S1 normal and S2 normal.   Comments: Pericardial drain in place.   Musculoskeletal:      Right upper arm: Swelling and edema present.      Left upper arm: Swelling and edema present.      Right lower le+ Pitting Edema present.      Left lower le+ Pitting Edema  present.      Comments: The upper extremities are dematous, hands are warm to touch. There is also some noticeable asymmetry where right upper extremity is larger than left.    Lymphadenopathy:      Cervical:      Right cervical: No superficial cervical adenopathy.     Left cervical: No superficial cervical adenopathy.      Upper Body:      Right upper body: No supraclavicular adenopathy.      Left upper body: No supraclavicular adenopathy.   Neurological:      Mental Status: He is alert.   Psychiatric:         Behavior: Behavior is cooperative.      Intake/Output Summary (Last 24 hours) at 9/12/2024 1725  Last data filed at 9/12/2024 1300  Gross per 24 hour   Intake 501.11 ml   Output 580 ml   Net -78.89 ml     Labs:   Results from last 72 hours   Lab Units 09/12/24  0524 09/11/24  0443 09/10/24  0540   SODIUM mmol/L 137 134* 136   POTASSIUM mmol/L 3.9 3.7 4.2   CHLORIDE mmol/L 96* 93* 95*   CO2 mmol/L 30 28 30   BUN mg/dL 28* 28* 30*   CREATININE mg/dL 0.98 1.14 0.97   GLUCOSE mg/dL 138* 151* 148*   CALCIUM mg/dL 8.4* 8.7 8.9   ANION GAP mmol/L 15 17 15   EGFR mL/min/1.73m*2 83 69 84   PHOSPHORUS mg/dL 3.6 3.5 3.2      Results from last 72 hours   Lab Units 09/12/24  0524 09/11/24  0443 09/10/24  0540 09/09/24  2201   WBC AUTO x10*3/uL 10.4 12.1* 12.0* 12.5*   HEMOGLOBIN g/dL 9.7* 9.5* 9.8* 9.5*   HEMATOCRIT % 27.9* 28.7* 28.3* 29.6*   PLATELETS AUTO x10*3/uL  --  264 271 268   NEUTROS PCT AUTO % 83.7 84.9 85.6 87.1   LYMPHS PCT AUTO % 4.6 4.0 3.1 3.0   MONOS PCT AUTO % 9.1 9.7 10.0 8.8   EOS PCT AUTO % 1.3 0.4 0.3 0.2      Results from last 72 hours   Lab Units 09/10/24  0540 09/09/24  2134   POCT PH, ARTERIAL pH 7.49* 7.48*   POCT PCO2, ARTERIAL mm Hg 39 40   POCT PO2, ARTERIAL mm Hg 77* 93   POCT SO2, ARTERIAL % 96 99            Micro/ID:     Lab Results   Component Value Date    BLOODCULT No growth at 4 days -  FINAL REPORT 09/08/2024    BLOODCULT No growth at 4 days -  FINAL REPORT 09/08/2024       Summary of  key imaging results from the last 24 hours  none    Assessment and Plan     Assessment: Khoa Mitchell is a 69 y.o. year old male patient admitted on 9/7/2024 with PMHx of T2DM, CKD2 (baseline Cr 1.2) originally admitted for acute hypoxic respiratory failure 2/2 to postobstructive pneumonia from SVC from new diagnosis of malignancy. Course complicated by worsening respiratory failure requiring intubation 9/8 with subsequent BAL with suctioning of thick green secretions. 9/9 TTE with moderate pericardial effusion, findings suggestive of early tamponade. Transferred to the CICU, underwent pericardiocentesis subsequently complicated by complete heart block requiring TVP, then with afib RVR which chemically converted with amiodarone. Repeat CXR with complete opacification of the left lung. Treated with ceftriaxone (9/8-9/10), broadened to zosyn (9/10-*) given worsening infiltrates, azithromycin (9/8-9/10). Now currently on ceftriaxone (9/10- *).       Mechanical Ventilation: 4-10 days  Sedation/Analgesia:  Propofol  Restraints: no    Summary for 09/12/24  :  Patient was not able to lay flat for MRI recommended by NSGY. MRI subsequently cancelled until patient is able to lay flat.   Patient went for IR biopsy, obtaining 4 cores from his right cheek soft tissue mass. Pending results.   Pt was weaned off of sedation and expressed wishes for Zita to be his proximal decision maker through writing and gestures as he is still intubated.     Plan:  NEUROLOGIC  #Intubated, sedated  -fentanyl 75  -Daily SATs as oxygen requirements decrease     #Multiple brain lesions  ::CT head performed, notable for multiple hyperdense lesions concerning for hemorrhagic metastases, largest [id number]  ::Repeat CT head without evidence of hemorrhagic transformation  -Holding dvt ppx post-bleed day 2 (9/10 AM)  -Per NSGY, recommend MRI presurgical perfusion and fingerprinting brain, C/T/L w/ w/o contrast when able to lie flat; will page them when  done     CARDIOVASCULAR  #Shock  Likely septic with concern for worsening post-obstructive pneumonia and mucus plugging as well as worsening pleural effusion  -Given worsening effusion and pneumonia, broadened to zosyn (9/10-*)  -Fu Bcx, BAL cx, sputum cx  -Infectious workup as below     #Pericardial effusion  #Complete heart block  Likely malignant given evidence of multiple probably metastatic lesions  ::CT chest with pericardial effusion measuring 15mm in thickness  ::9/9 TTE with moderate pericardial effusion, brief RV and RA diastolic collapse, concerning for early tamponade  ::9/9 pericardiocentesis with 400cc serosanguinous fluid, complicated by CHB requiring TVP  -Monitor pericardial drain output  -Pending pericardial cytology  -TVP x 24 hours, threshold 0.2, output 10 mAmp and VVI rate of 60  -Cardiology, EP following; appreciate recs     #Afib  Likely 2/2 to worsening infection and effusion  ::s/p amio bolus  -Continue amio gtt     PULMONARY  #Acute hypoxic respiratory failure  Originally likely 2/2 to post-obstructive pneumonia, underwent BAL with evidence of multiple secretions. Now complicated by complete opacification of the left lung concerning for worsening mucus plugging vs worsening pleural effusion vs combination.  ::Baseline RA  ::ENT scoped - patent airway with gross aspiration of secretions; hypomobile left vocal cord  ::9/7 BCx 2/4 Strep salivarius, likely contaminant, very uncommon cause of bacteremia  ::9/9 CXR with complete opacification of the left lung and R mediastinal shift  -Fu BAL cx, BCx, sputum cx  -fu Aspergillus galactomannan, PJP PCR, AFB, fungal cx  -s/p ceftriaxone (9/10-**)  -Plan for bronch to evaluate mucus plug then thoracentesis later  -ENT following, appreciate recs    Vent Mode: Volume control/assist control  FiO2 (%):  [60 %] 60 %  S RR:  [20] 20  S VT:  [500 mL] 500 mL  PEEP/CPAP (cm H2O):  [8 cm H20] 8 cm H20  MAP (cm H2O):  [13-14] 14      RENAL/  #Hyponatremia,  resolved   Likely SIADH 2/2 to cancer, which would be consistent with lung SCC vs hypervolemic hyponatremia  ::Admission Na 133  ::Home salt tabs and water restriction, lasix 20mg per outpt nephro  ::Serum osm 294  ::Urine osm 785   ::Urine lytes WNL   -Continue to monitor      #CKD2a  ::Baseline Cr 1.1-1.2  -At baseline, CTM     GASTROINTESTINAL  -No acute issues     ENDOCRINE  #T2DM  ::9/8 A1C 6.8  -SSI q4h while NPO     HEME/ONC  #Metastatic cancer  #SVC syndrome  Unknown primary, potentially SCC lung vs head/neck  ::CT imaging with multiple lesions - multiple brain mets, soft tissue mass in lung  ::CT AP with lesions in liver, spleen, both adrenal glands, pancreas, multiple peritoneal nodules, R gluteal mass, R acetabulum  ::CT head with multiple lesions  :: Pericardial fluid significant for malignant cells; BAL cytology  -IR biopsy pending results   -Follow up with med onc after tissue diagnosis    -Will begin 5 fraction treatment to the mediastinum starting tomorrow      #Normocytic anemia  Likely AOCD 2/2 to malignancy vs SARA given elevated RDW  ::Baseline 11-12, now 8-9   -Iron, TIBC, Tsat, ferritin      INFECTIOUS DISEASE  #Concern for post-obstructive pneumonia  -Fu sputum cx, Bcx as above  -C/w ceftriaxone (9/10-*)     F: none  E: K>4, Phosph>3, Mg>2  N: NPO for thoracentesis  A: PIV  DVT ppx: holding off iso potential hemorrhagic conversion, thoracentesis  GI ppx: esomeprazole     O2: VC RR 20//PEEP 8/FiO2 80%  Gtt: amiodarone, levophed 0.03  Abx: zosyn/azithro    Code Status: Full code  Surrogate Medical Decision-maker: no family able to serve as NOK, but Zita Adrian (friend) [phone] able to assist with interpreting patient's wishes     ICU Check List       ICU Liberation: Intervention:   Assess, Prevent, Manage Pain CPOT -    Both SAT and SBT [] SAT  [] SBT 30-60 min [] Extubate to NC  [] Extubate to NIV  [] Discuss Trach   Choice of analgesia and sedation RASS: -1  Fentanyl Propofol     Delirium: Assess, prevent and manage CAM -    Early Mobility and Exercise  [] PT /OT consult   Family Engagement and Empowerment [x]Family updated [x]SW consult     FEN  Fluids: PRN  Electrolytes: PRN  Nutrition: NPO  Prophylaxis:  DVT ppx: contraindicated   GI ppx: none  Bowel care: none   Access: Peripheral IV (left), endotracheal tube (09/08-  Hardware:         Arterial Line 09/10/24 Left Radial (Active)   Placement Date/Time: 09/10/24 (c) 0105   Size: 20 G  Orientation: Left  Location: Radial  Site Prep: Alcohol  Local Anesthetic: Injectable  Insertion attempts: 1  Securement Method: Transparent dressing  Patient Tolerance: Tolerated well   Number of days: 2       ETT  8.5 mm (Active)   Placement Date/Time: 09/08/24 1208   Hand Hygiene Completed: Yes  Mask Ventilation: Vent by mask + OA or adjuvant +/- NMBA  ETT Type: ETT - single  Single Lumen Tube Size: 8.5 mm  Cuffed: Yes  Laryngoscope: Jose  Blade Size: 4  Location: Oral  ...   Number of days: 4       Open Drain Inferior;Medial;Midline Pericardial (Active)   Placement Date/Time: 09/09/24 1341   Hand Hygiene Completed: Yes  Orientation: Inferior;Medial;Midline  Location: Pericardial  Size (Fr.): 8 Fr.   Number of days: 3       NG/OG/Feeding Tube OG - Cayey sump 18 Fr Center mouth (Active)   Placement Date/Time: 09/08/24 1230   Hand Hygiene Completed: Yes  Type of Tube: NG/OG Tube  Tube Length: 65 cm  Tube Type: OG - Cayey sump  NG/OG Tube Size: 18 Fr  Tube Location: Center mouth   Number of days: 4       External Urinary Catheter Male (Active)   Placement Date/Time: 09/11/24 1353   Hand Hygiene Completed: Yes  External Catheter Type: Male   Number of days: 1       Social:  Code: Full Code    HPOA: there is no HCPOA, only contact is girlfriend Zita Campbell [phone] who is proximal decision maker   Disposition: Medicine ICU    Enid Chambers MD   09/12/24 at 5:25 PM     Disclaimer: Documentation completed with the information available at  the time of input. The times in the chart may not be reflective of actual patient care times, interventions, or procedures. Documentation occurs after the physical care of the patient.

## 2024-09-12 NOTE — CONSULTS
Radiation Oncology Inpatient Consult    Patient Name:  Khoa Mitchell  MRN:  [id number]  :  1955    Referring Provider: Dr. Tacos MD  Primary Care Provider: CHICO Dobbs DNP  Care Team: Patient Care Team:  CHICO Dobbs DNP as PCP - General  CHICO Dobbs DNP as PCP - [employer] PCP    Date of Service: 2024     SUBJECTIVE  History of Present Illness:  This is a 69-year-old male with a history of tobacco abuse that presents to [facility] emergency department on  complaining of a multiple month history of progressive facial swelling, upper extremity swelling, shortness of breath, anorexia with 30 pound weight loss.  CT imaging showed extensive metastatic changes to the liver, spleen, adrenal glands, pancreas, with peritoneal and soft tissue lesions, osseous lesions.  Chest imaging identified a large soft tissue neoplasm throughout the central left upper chest with SVC obstruction, and encasement of the trachea and jose antonio, with multifocal metastatic lymphadenopathy.  Noncontrast CT imaging of the head showed multiple round hyperdense lesions throughout bilateral cerebral hemisphere, with the largest lesion seen in the left frontal lobe measuring 2.7 x 2.0 x 2.0 cm, with slight mass effect to the left frontal horn.      Radiation oncology has been asked to follow along for potential care coordination.    The patient was evaluated on the afternoon of .  He remains intubated, but has been weaned off sedation, and is awake.  He engages and responds to questioning with nonverbal cues.   I was able to make contact with his significant other, Zita.  She states the patient had been complaining of R shoulder pain for multiple weeks, and that he rarely sees medical providers.  He is reported to be a long time smoker.  He has children, but is not in close contact.      Prior Radiotherapy:  no    Current Systemic Treatment:       Presence of Pacemaker or ICD:  no    Past  Medical History:    Past Medical History:   Diagnosis Date    Other specified health status     No pertinent past medical history      Tobacco abuse, obesity      Past Surgical History:    Past Surgical History:   Procedure Laterality Date    OTHER SURGICAL HISTORY  05/16/2022    No history of surgery      None reported per girlfriendZita      Family History:  Cancer-related family history is not on file.    Social History:    Social History     Tobacco Use    Smoking status: Every Day     Types: Cigarettes    Smokeless tobacco: Never   Substance Use Topics    Alcohol use: Never    Drug use: Never     The patient has a significant other, Zita.     He has two children, both estranged.   His son serves in the , and is deployed over [place].  His daughter is in the miliary and reported to be living in [place].   Both are considered unreachable at this time.     Allergies:  No Known Allergies     Medications:    Current Facility-Administered Medications:     amiodarone (Nexterone) 360 mg in dextrose,iso-osm 200 mL (1.8 mg/mL) infusion (premix), 0.5-1 mg/min, intravenous, Continuous, Lianne Hanks MD, Last Rate: 16.67 mL/hr at 09/12/24 0036, 0.5 mg/min at 09/12/24 0036    cefTRIAXone (Rocephin) 1 g in dextrose (iso) IV 50 mL, 1 g, intravenous, q24h, Meet Thibodeaux MD, Stopped at 09/11/24 1650    dextrose 50 % injection 12.5 g, 12.5 g, intravenous, q15 min PRN, Lianne Hanks MD    dextrose 50 % injection 25 g, 25 g, intravenous, q15 min PRN, Lianne Hanks MD    pantoprazole (ProtoNix) EC tablet 40 mg, 40 mg, oral, Daily before breakfast **OR** esomeprazole (NexIUM) suspension 40 mg, 40 mg, nasoduodenal tube, Daily before breakfast **OR** pantoprazole (ProtoNix) injection 40 mg, 40 mg, intravenous, Daily before breakfast, Lianne Hanks MD, 40 mg at 09/12/24 0637    fentanyl (Sublimaze) 1000 mcg in sodium chloride 0.9% 100 mL (10 mcg/mL) infusion (premix), 0-300 mcg/hr, intravenous, Continuous, Lianne Hanks MD, Last Rate: 5  mL/hr at 09/11/24 1900, 50 mcg/hr at 09/11/24 1900    glucagon (Glucagen) injection 1 mg, 1 mg, intramuscular, q15 min PRN, Lianne Hanks MD    glucagon (Glucagen) injection 1 mg, 1 mg, intramuscular, q15 min PRN, Lianne Hanks MD    norepinephrine (Levophed) 8 mg in dextrose 5% 250 mL (0.032 mg/mL) infusion (premix), 0-0.2 mcg/kg/min, intravenous, Continuous, Lianne Hanks MD, Last Rate: 2 mL/hr at 09/12/24 0600, 0.011 mcg/kg/min at 09/12/24 0600    oxygen (O2) therapy, , inhalation, Continuous PRN - O2/gases, Lianne Hanks MD, 60 percent at 09/11/24 1943    polyethylene glycol (Glycolax, Miralax) packet 17 g, 17 g, nasogastric tube, Daily, Rae Darden MD, 17 g at 09/11/24 2048    propofol (Diprivan) infusion, 5-50 mcg/kg/min, intravenous, Continuous, Lianne Hanks MD, Stopped at 09/11/24 0945    sennosides-docusate sodium (Babs-Colace) 8.6-50 mg per tablet 2 tablet, 2 tablet, nasogastric tube, BID, Rae Darden MD, 2 tablet at 09/11/24 2048      Review of Systems:    Intubated    Performance Status:  The Karnofsky performance scale today is 30, Severely disabled; hospital admission is indicated although death not imminent (ECOG equivalent 3).        OBJECTIVE  Physical Exam:  BP (!) 92/42   Pulse 70   Temp 36.8 °C (98.2 °F) (Temporal)   Resp 16   Ht 1.829 m (6')   Wt 95.7 kg (210 lb 15.7 oz)   SpO2 96%   BMI 28.61 kg/m²    General: awake, alert, in no distress, intubated  HEENT: pupils equal and round, no scleral icterus, facial swelling, short thick neck,   Pulmonary: Intubated  Cardiac: regular rate  Abdomen: soft, nondistended, non tender to palpation   EXT: upper extremity edema  Neuro: A&O x 1, cranial nerves II through XII grossly intact, moves extremities      Laboratory Review:    09/11/24 1024  Hepatic function panel  Collected: 09/10/24 0540  Final result  Specimen: Blood, Venous    Albumin 2.7 Low  g/dL ALT 11 U/L    Bilirubin, Total 1.9 High  mg/dL AST 22 U/L   Bilirubin, Direct 1.0 High   mg/dL Total Protein 5.3 Low  g/dL   Alkaline Phosphatase 68 U/L          9/11/24 0620  CBC and Auto Differential  Collected: 09/11/24 0443  Final result  Specimen: Blood, Venous    WBC 12.1 High  x10*3/uL Immature Granulocytes %, Automated 0.8 %    nRBC 0.0 /100 WBCs Lymphocytes % 4.0 %   RBC 3.32 Low  x10*6/uL Monocytes % 9.7 %   Hemoglobin 9.5 Low  g/dL Eosinophils % 0.4 %   Hematocrit 28.7 Low  % Basophils % 0.2 %   MCV 86 fL Neutrophils Absolute 10.26 High  x10*3/uL    MCH 28.6 pg Immature Granulocytes Absolute, Automated 0.10 x10*3/uL   MCHC 33.1 g/dL Lymphocytes Absolute 0.49 Low  x10*3/uL   RDW 16.5 High  % Monocytes Absolute 1.18 High  x10*3/uL   Platelets 264 x10*3/uL Eosinophils Absolute 0.05 x10*3/uL   Neutrophils % 84.9 % Basophils Absolute 0.03 x10*3/uL        9/11/24 0631  Renal function panel  Collected: 09/11/24 0443  Final result  Specimen: Blood, Venous    Glucose 151 High  mg/dL Urea Nitrogen 28 High  mg/dL   Sodium 134 Low  mmol/L Creatinine 1.14 mg/dL   Potassium 3.7 mmol/L eGFR 69 mL/min/1.73m*2    Chloride 93 Low  mmol/L Calcium 8.7 mg/dL   Bicarbonate 28 mmol/L Phosphorus 3.5 mg/dL    Anion Gap 17 mmol/L Albumin 2.8 Low  g/dL          Pathology Review:    Pathologist Review-Cell Count,Fluid  Order: [id number] - Reflex for Order [id number]   Status: Final result       Visible to patient: No (inaccessible in [facility])    0 Result Notes      Component 3 d ago   Pathologist Review-Cell Count, Fluid Malignant tumor cells present. Correlate with separate cytology report.          Imaging:  CT 9/7  IMPRESSION:  CHEST:      Heterogenous soft tissue consistent with malignancy/neoplasm  throughout the central/left upper chest with probable SVC  obstruction, encasement and narrowing of the trachea/jose antonio,  left  chest wall invasion. Multifocal likely metastatic lymphadenopathy and  permeative lytic lesion in the right scapula with pathologic  fracture. Further evaluation recommended.      No gross  central PE identified. Limited assessment for  peripheral/basilar PE. Bibasilar infiltrates or atelectasis with  pleural effusions, left-greater-than-right.      Pericardial effusion.      ABDOMEN AND PELVIS:      Extensive malignancy/metastatic disease including presumed metastatic  lesions in the liver, spleen, both adrenal glands, and the pancreas,  multiple peritoneal nodules, soft tissue mass in the right gluteal  region and lytic probable metastatic lesion in the right acetabulum.    CT Head 9/7  IMPRESSION:  Multiple rounded hyperdense lesions throughout bilateral cerebral  hemispheres as described above concerning for hyperdense versus  hemorrhagic metastasis given patient's clinical history and the  diffuse nature of the lesions. Intraparenchymal hemorrhage is also a  consideration. The largest hyperdense lesion is within the left  frontal lobe and measures 2.7 x 2.0 x 2.0 cm with slight adjacent  mass effect on the frontal horn of the left lateral ventricle and  mild effacement of the adjacent sulci. No midline shift. Further  evaluation with dedicated contrast-enhanced MRI is recommended.      ASSESSMENT:  This is a 69-year-old male with a history of tobacco abuse that presents to [facility] emergency department on 9/7 complaining of a multiple month history of progressive facial swelling, upper extremity swelling, shortness of breath, anorexia with 30 pound weight loss.  CT imaging showed extensive metastatic changes to the liver, spleen, adrenal glands, pancreas, with peritoneal and soft tissue lesions, osseous lesions.  Chest imaging identified a large soft tissue neoplasm throughout the central left upper chest with SVC obstruction, and encasement of the trachea and jose antonio, with multifocal metastatic lymphadenopathy.  Noncontrast CT imaging of the head showed multiple round hyperdense lesions throughout bilateral cerebral hemisphere, with the largest lesion seen in the left frontal lobe measuring 2.7 x 2.0 x  2.0 cm, with slight mass effect to the left frontal horn.      -Neurosurgical services evaluated the patient, and there are no acute surgical interventions planned at this time.   -Patient was evaluated by thoracic surgery and recommended for IR biopsy.    -The patient was intubated on 9/8.  Bronchoscopy was performed and noted findings of a left distal mainstem endobronchial lesion resulting in obstruction of over 50% of the left upper lobe.    -On 9/9 pericardiocentesis was performed for probable malignant effusion resulting in tamponade.    -Cytology reports finds of malignant cells.    Radiation oncology has been asked to follow along for potential care coordination.    PLAN:    The patient was discussed with my attending physician, Dr. Snider.     Imaging is consistent with a widely metastatic bronchogenic malignancy, favoring SCLC.  Pathologic diagnosis remains pending, however, malignant cells were identified within pericardial effusion.     Medical oncology has been consulted for induction chemotherapy, pending pathology.    The patient remains in critical condition, and is being consider for urgent palliative RT to mediastinal / left lung mass.  Additional treatments may included palliative RT to the brain, pending further work up with MRI.    The patient is currently unable to provided consent.  He has a significant other, Zita, who was in the process of establishing POA, however, this remains pending.  He has two children, both estranged.   Both are considered to be unreachable at this time.     -Tentative plans for urgent RT to mediastinum, possible 8Gy in 1 fraction vs. 20Gy in 5 fractions  -Await ethics review for consent  -MRI brain, possible interval RT for metastases     I spent 80 minutes in the professional and overall care of this patient.

## 2024-09-13 ENCOUNTER — HOSPITAL ENCOUNTER (OUTPATIENT)
Dept: RADIOLOGY | Facility: EXTERNAL LOCATION | Age: 69
Discharge: HOME | End: 2024-09-13

## 2024-09-13 ENCOUNTER — HOSPITAL ENCOUNTER (OUTPATIENT)
Dept: RADIATION ONCOLOGY | Facility: HOSPITAL | Age: 69
Setting detail: RADIATION/ONCOLOGY SERIES
Discharge: HOME | End: 2024-09-13
Payer: MEDICARE

## 2024-09-13 ENCOUNTER — DOCUMENTATION (OUTPATIENT)
Dept: RADIATION ONCOLOGY | Facility: HOSPITAL | Age: 69
End: 2024-09-13

## 2024-09-13 ENCOUNTER — DOCUMENTATION (OUTPATIENT)
Dept: RADIATION ONCOLOGY | Facility: HOSPITAL | Age: 69
End: 2024-09-13
Payer: MEDICARE

## 2024-09-13 ENCOUNTER — RADIATION ONCOLOGY OTV (OUTPATIENT)
Dept: RADIATION ONCOLOGY | Facility: HOSPITAL | Age: 69
End: 2024-09-13
Payer: MEDICARE

## 2024-09-13 DIAGNOSIS — C38.3: Primary | ICD-10-CM

## 2024-09-13 DIAGNOSIS — C38.3 MALIGNANT NEOPLASM OF MEDIASTINUM, PART UNSPECIFIED (MULTI): ICD-10-CM

## 2024-09-13 DIAGNOSIS — C38.3: ICD-10-CM

## 2024-09-13 PROBLEM — J90 PLEURAL EFFUSION: Status: ACTIVE | Noted: 2024-09-13

## 2024-09-13 PROBLEM — C34.90 SMALL CELL LUNG CANCER: Status: ACTIVE | Noted: 2024-01-01

## 2024-09-13 LAB
ALBUMIN SERPL BCP-MCNC: 2.6 G/DL (ref 3.4–5)
ALBUMIN SERPL BCP-MCNC: 2.6 G/DL (ref 3.4–5)
ALP SERPL-CCNC: 79 U/L (ref 33–136)
ALT SERPL W P-5'-P-CCNC: 25 U/L (ref 10–52)
AMORPH CRY #/AREA UR COMP ASSIST: ABNORMAL /HPF
ANION GAP SERPL CALC-SCNC: 16 MMOL/L (ref 10–20)
ANION GAP SERPL CALC-SCNC: 18 MMOL/L (ref 10–20)
APPEARANCE UR: ABNORMAL
AST SERPL W P-5'-P-CCNC: 37 U/L (ref 9–39)
ATRIAL RATE: 105 BPM
ATRIAL RATE: 88 BPM
BACTERIA #/AREA URNS AUTO: ABNORMAL /HPF
BASOPHILS # BLD AUTO: 0.02 X10*3/UL (ref 0–0.1)
BASOPHILS NFR BLD AUTO: 0.2 %
BASOPHILS NFR FLD MANUAL: 0 %
BILIRUB DIRECT SERPL-MCNC: 2.5 MG/DL (ref 0–0.3)
BILIRUB SERPL-MCNC: 4 MG/DL (ref 0–1.2)
BILIRUB UR STRIP.AUTO-MCNC: ABNORMAL MG/DL
BLASTS NFR FLD MANUAL: 0 %
BUN SERPL-MCNC: 27 MG/DL (ref 6–23)
BUN SERPL-MCNC: 27 MG/DL (ref 6–23)
CALCIUM SERPL-MCNC: 8.5 MG/DL (ref 8.6–10.6)
CALCIUM SERPL-MCNC: 8.6 MG/DL (ref 8.6–10.6)
CHLORIDE SERPL-SCNC: 96 MMOL/L (ref 98–107)
CHLORIDE SERPL-SCNC: 96 MMOL/L (ref 98–107)
CLARITY FLD: ABNORMAL
CO2 SERPL-SCNC: 27 MMOL/L (ref 21–32)
CO2 SERPL-SCNC: 28 MMOL/L (ref 21–32)
COLOR FLD: YELLOW
COLOR UR: ABNORMAL
CREAT SERPL-MCNC: 0.89 MG/DL (ref 0.5–1.3)
CREAT SERPL-MCNC: 0.92 MG/DL (ref 0.5–1.3)
EGFRCR SERPLBLD CKD-EPI 2021: 89 ML/MIN/1.73M*2
EGFRCR SERPLBLD CKD-EPI 2021: >90 ML/MIN/1.73M*2
EOSINOPHIL # BLD AUTO: 0.28 X10*3/UL (ref 0–0.7)
EOSINOPHIL NFR BLD AUTO: 2.5 %
EOSINOPHIL NFR FLD MANUAL: 0 %
ERYTHROCYTE [DISTWIDTH] IN BLOOD BY AUTOMATED COUNT: 15.9 % (ref 11.5–14.5)
GLUCOSE BLD MANUAL STRIP-MCNC: 110 MG/DL (ref 74–99)
GLUCOSE BLD MANUAL STRIP-MCNC: 131 MG/DL (ref 74–99)
GLUCOSE BLD MANUAL STRIP-MCNC: 132 MG/DL (ref 74–99)
GLUCOSE BLD MANUAL STRIP-MCNC: 135 MG/DL (ref 74–99)
GLUCOSE BLD MANUAL STRIP-MCNC: 147 MG/DL (ref 74–99)
GLUCOSE BLD MANUAL STRIP-MCNC: 150 MG/DL (ref 74–99)
GLUCOSE FLD-MCNC: 134 MG/DL
GLUCOSE SERPL-MCNC: 134 MG/DL (ref 74–99)
GLUCOSE SERPL-MCNC: 142 MG/DL (ref 74–99)
GLUCOSE UR STRIP.AUTO-MCNC: NORMAL MG/DL
HCT VFR BLD AUTO: 28.4 % (ref 41–52)
HGB BLD-MCNC: 9.9 G/DL (ref 13.5–17.5)
HOLD SPECIMEN: NORMAL
IMM GRANULOCYTES # BLD AUTO: 0.11 X10*3/UL (ref 0–0.7)
IMM GRANULOCYTES NFR BLD AUTO: 1 % (ref 0–0.9)
IMMATURE GRANULOCYTES IN FLUID: 0 %
KETONES UR STRIP.AUTO-MCNC: ABNORMAL MG/DL
LDH FLD L TO P-CCNC: 152 U/L
LDH SERPL L TO P-CCNC: 253 U/L (ref 84–246)
LEUKOCYTE ESTERASE UR QL STRIP.AUTO: NEGATIVE
LYMPHOCYTES # BLD AUTO: 0.58 X10*3/UL (ref 1.2–4.8)
LYMPHOCYTES NFR BLD AUTO: 5.2 %
LYMPHOCYTES NFR FLD MANUAL: 49 %
MAGNESIUM SERPL-MCNC: 2.06 MG/DL (ref 1.6–2.4)
MAGNESIUM SERPL-MCNC: 2.14 MG/DL (ref 1.6–2.4)
MCH RBC QN AUTO: 29.1 PG (ref 26–34)
MCHC RBC AUTO-ENTMCNC: 34.9 G/DL (ref 32–36)
MCV RBC AUTO: 84 FL (ref 80–100)
MONOCYTES # BLD AUTO: 0.94 X10*3/UL (ref 0.1–1)
MONOCYTES NFR BLD AUTO: 8.4 %
MONOS+MACROS NFR FLD MANUAL: 0 %
MUCOUS THREADS #/AREA URNS AUTO: ABNORMAL /LPF
NEUTROPHILS # BLD AUTO: 9.24 X10*3/UL (ref 1.2–7.7)
NEUTROPHILS NFR BLD AUTO: 82.7 %
NEUTROPHILS NFR FLD MANUAL: 50 %
NITRITE UR QL STRIP.AUTO: NEGATIVE
NRBC BLD-RTO: 0 /100 WBCS (ref 0–0)
OTHER CELLS NFR FLD MANUAL: 0 %
P AXIS: 33 DEGREES
P AXIS: 48 DEGREES
P OFFSET: 192 MS
P OFFSET: 203 MS
P ONSET: 141 MS
P ONSET: 141 MS
PH UR STRIP.AUTO: 6 [PH]
PHOSPHATE SERPL-MCNC: 2.8 MG/DL (ref 2.5–4.9)
PHOSPHATE SERPL-MCNC: 2.9 MG/DL (ref 2.5–4.9)
PLASMA CELLS NFR FLD MANUAL: 1 %
PLATELET # BLD AUTO: 294 X10*3/UL (ref 150–450)
POTASSIUM SERPL-SCNC: 3.7 MMOL/L (ref 3.5–5.3)
POTASSIUM SERPL-SCNC: 3.8 MMOL/L (ref 3.5–5.3)
PR INTERVAL: 136 MS
PR INTERVAL: 140 MS
PROT FLD-MCNC: 1.8 G/DL
PROT SERPL-MCNC: 5.4 G/DL (ref 6.4–8.2)
PROT SERPL-MCNC: 5.4 G/DL (ref 6.4–8.2)
PROT UR STRIP.AUTO-MCNC: ABNORMAL MG/DL
Q ONSET: 209 MS
Q ONSET: 211 MS
QRS COUNT: 14 BEATS
QRS COUNT: 17 BEATS
QRS DURATION: 142 MS
QRS DURATION: 152 MS
QT INTERVAL: 394 MS
QT INTERVAL: 396 MS
QTC CALCULATION(BAZETT): 476 MS
QTC CALCULATION(BAZETT): 523 MS
QTC FREDERICIA: 447 MS
QTC FREDERICIA: 477 MS
R AXIS: -11 DEGREES
R AXIS: 5 DEGREES
RAD ONC MSQ ACTUAL FRACTIONS DELIVERED: 1
RAD ONC MSQ ACTUAL SESSION DELIVERED DOSE: 800 CGRAY
RAD ONC MSQ ACTUAL TOTAL DOSE: 800 CGRAY
RAD ONC MSQ ELAPSED DAYS: 0
RAD ONC MSQ LAST DATE: NORMAL
RAD ONC MSQ PRESCRIBED FRACTIONAL DOSE: 800 CGRAY
RAD ONC MSQ PRESCRIBED NUMBER OF FRACTIONS: 1
RAD ONC MSQ PRESCRIBED TECHNIQUE: NORMAL
RAD ONC MSQ PRESCRIBED TOTAL DOSE: 800 CGRAY
RAD ONC MSQ PRESCRIPTION PATTERN COMMENT: NORMAL
RAD ONC MSQ START DATE: NORMAL
RAD ONC MSQ TREATMENT COURSE NUMBER: 1
RAD ONC MSQ TREATMENT SITE: NORMAL
RBC # BLD AUTO: 3.4 X10*6/UL (ref 4.5–5.9)
RBC # FLD AUTO: 326 /UL
RBC # UR STRIP.AUTO: NEGATIVE /UL
RBC #/AREA URNS AUTO: ABNORMAL /HPF
SODIUM SERPL-SCNC: 136 MMOL/L (ref 136–145)
SODIUM SERPL-SCNC: 137 MMOL/L (ref 136–145)
SP GR UR STRIP.AUTO: 1.03
T AXIS: 138 DEGREES
T AXIS: 18 DEGREES
T OFFSET: 406 MS
T OFFSET: 409 MS
TOTAL CELLS COUNTED FLD: 100
UROBILINOGEN UR STRIP.AUTO-MCNC: ABNORMAL MG/DL
VENTRICULAR RATE: 105 BPM
VENTRICULAR RATE: 88 BPM
WBC # BLD AUTO: 11.2 X10*3/UL (ref 4.4–11.3)
WBC # FLD AUTO: 30 /UL
WBC #/AREA URNS AUTO: ABNORMAL /HPF

## 2024-09-13 PROCEDURE — 87070 CULTURE OTHR SPECIMN AEROBIC: CPT

## 2024-09-13 PROCEDURE — 2500000001 HC RX 250 WO HCPCS SELF ADMINISTERED DRUGS (ALT 637 FOR MEDICARE OP): Performed by: STUDENT IN AN ORGANIZED HEALTH CARE EDUCATION/TRAINING PROGRAM

## 2024-09-13 PROCEDURE — 2500000001 HC RX 250 WO HCPCS SELF ADMINISTERED DRUGS (ALT 637 FOR MEDICARE OP)

## 2024-09-13 PROCEDURE — 82945 GLUCOSE OTHER FLUID: CPT

## 2024-09-13 PROCEDURE — 87116 MYCOBACTERIA CULTURE: CPT

## 2024-09-13 PROCEDURE — 84157 ASSAY OF PROTEIN OTHER: CPT

## 2024-09-13 PROCEDURE — 83615 LACTATE (LD) (LDH) ENZYME: CPT

## 2024-09-13 PROCEDURE — 99291 CRITICAL CARE FIRST HOUR: CPT | Performed by: NURSE PRACTITIONER

## 2024-09-13 PROCEDURE — 80076 HEPATIC FUNCTION PANEL: CPT

## 2024-09-13 PROCEDURE — 77295 3-D RADIOTHERAPY PLAN: CPT | Performed by: INTERNAL MEDICINE

## 2024-09-13 PROCEDURE — 80053 COMPREHEN METABOLIC PANEL: CPT

## 2024-09-13 PROCEDURE — 87206 SMEAR FLUORESCENT/ACID STAI: CPT

## 2024-09-13 PROCEDURE — 88305 TISSUE EXAM BY PATHOLOGIST: CPT | Performed by: STUDENT IN AN ORGANIZED HEALTH CARE EDUCATION/TRAINING PROGRAM

## 2024-09-13 PROCEDURE — 81001 URINALYSIS AUTO W/SCOPE: CPT

## 2024-09-13 PROCEDURE — 94003 VENT MGMT INPAT SUBQ DAY: CPT

## 2024-09-13 PROCEDURE — 99223 1ST HOSP IP/OBS HIGH 75: CPT | Performed by: STUDENT IN AN ORGANIZED HEALTH CARE EDUCATION/TRAINING PROGRAM

## 2024-09-13 PROCEDURE — 77334 RADIATION TREATMENT AID(S): CPT | Performed by: INTERNAL MEDICINE

## 2024-09-13 PROCEDURE — 88189 FLOWCYTOMETRY/READ 16 & >: CPT

## 2024-09-13 PROCEDURE — 82248 BILIRUBIN DIRECT: CPT

## 2024-09-13 PROCEDURE — 88104 CYTOPATH FL NONGYN SMEARS: CPT

## 2024-09-13 PROCEDURE — 32555 ASPIRATE PLEURA W/ IMAGING: CPT | Mod: GC | Performed by: STUDENT IN AN ORGANIZED HEALTH CARE EDUCATION/TRAINING PROGRAM

## 2024-09-13 PROCEDURE — 88112 CYTOPATH CELL ENHANCE TECH: CPT | Mod: TC,MCY

## 2024-09-13 PROCEDURE — 77300 RADIATION THERAPY DOSE PLAN: CPT | Performed by: INTERNAL MEDICINE

## 2024-09-13 PROCEDURE — 88184 FLOWCYTOMETRY/ TC 1 MARKER: CPT | Mod: TC

## 2024-09-13 PROCEDURE — 77290 THER RAD SIMULAJ FIELD CPLX: CPT | Performed by: RADIOLOGY

## 2024-09-13 PROCEDURE — 87205 SMEAR GRAM STAIN: CPT

## 2024-09-13 PROCEDURE — 88185 FLOWCYTOMETRY/TC ADD-ON: CPT | Mod: TC

## 2024-09-13 PROCEDURE — 2020000001 HC ICU ROOM DAILY

## 2024-09-13 PROCEDURE — 2500000004 HC RX 250 GENERAL PHARMACY W/ HCPCS (ALT 636 FOR OP/ED)

## 2024-09-13 PROCEDURE — 2500000005 HC RX 250 GENERAL PHARMACY W/O HCPCS

## 2024-09-13 PROCEDURE — 85025 COMPLETE CBC W/AUTO DIFF WBC: CPT

## 2024-09-13 PROCEDURE — 89051 BODY FLUID CELL COUNT: CPT

## 2024-09-13 PROCEDURE — DB021ZZ BEAM RADIATION OF LUNG USING PHOTONS 1 - 10 MEV: ICD-10-PCS

## 2024-09-13 PROCEDURE — 2500000004 HC RX 250 GENERAL PHARMACY W/ HCPCS (ALT 636 FOR OP/ED): Performed by: STUDENT IN AN ORGANIZED HEALTH CARE EDUCATION/TRAINING PROGRAM

## 2024-09-13 PROCEDURE — 88341 IMHCHEM/IMCYTCHM EA ADD ANTB: CPT | Performed by: STUDENT IN AN ORGANIZED HEALTH CARE EDUCATION/TRAINING PROGRAM

## 2024-09-13 PROCEDURE — 84155 ASSAY OF PROTEIN SERUM: CPT

## 2024-09-13 PROCEDURE — 88112 CYTOPATH CELL ENHANCE TECH: CPT | Performed by: STUDENT IN AN ORGANIZED HEALTH CARE EDUCATION/TRAINING PROGRAM

## 2024-09-13 PROCEDURE — 82947 ASSAY GLUCOSE BLOOD QUANT: CPT

## 2024-09-13 PROCEDURE — 71045 X-RAY EXAM CHEST 1 VIEW: CPT | Performed by: STUDENT IN AN ORGANIZED HEALTH CARE EDUCATION/TRAINING PROGRAM

## 2024-09-13 PROCEDURE — 82374 ASSAY BLOOD CARBON DIOXIDE: CPT

## 2024-09-13 PROCEDURE — 94799 UNLISTED PULMONARY SVC/PX: CPT

## 2024-09-13 PROCEDURE — 37799 UNLISTED PX VASCULAR SURGERY: CPT

## 2024-09-13 PROCEDURE — 32555 ASPIRATE PLEURA W/ IMAGING: CPT | Performed by: STUDENT IN AN ORGANIZED HEALTH CARE EDUCATION/TRAINING PROGRAM

## 2024-09-13 PROCEDURE — 87015 SPECIMEN INFECT AGNT CONCNTJ: CPT

## 2024-09-13 PROCEDURE — 88341 IMHCHEM/IMCYTCHM EA ADD ANTB: CPT | Mod: TC,MCY

## 2024-09-13 PROCEDURE — 87102 FUNGUS ISOLATION CULTURE: CPT

## 2024-09-13 PROCEDURE — 88342 IMHCHEM/IMCYTCHM 1ST ANTB: CPT | Performed by: STUDENT IN AN ORGANIZED HEALTH CARE EDUCATION/TRAINING PROGRAM

## 2024-09-13 PROCEDURE — 77280 THER RAD SIMULAJ FIELD SMPL: CPT | Performed by: RADIOLOGY

## 2024-09-13 PROCEDURE — 99232 SBSQ HOSP IP/OBS MODERATE 35: CPT | Performed by: NURSE PRACTITIONER

## 2024-09-13 PROCEDURE — 83735 ASSAY OF MAGNESIUM: CPT

## 2024-09-13 PROCEDURE — 0W9B3ZZ DRAINAGE OF LEFT PLEURAL CAVITY, PERCUTANEOUS APPROACH: ICD-10-PCS | Performed by: STUDENT IN AN ORGANIZED HEALTH CARE EDUCATION/TRAINING PROGRAM

## 2024-09-13 PROCEDURE — 83986 ASSAY PH BODY FLUID NOS: CPT

## 2024-09-13 PROCEDURE — 84100 ASSAY OF PHOSPHORUS: CPT

## 2024-09-13 RX ORDER — PROPOFOL 10 MG/ML
5-50 INJECTION, EMULSION INTRAVENOUS CONTINUOUS
Status: DISCONTINUED | OUTPATIENT
Start: 2024-09-13 | End: 2024-09-19

## 2024-09-13 RX ORDER — POTASSIUM CHLORIDE 1.5 G/1.58G
20 POWDER, FOR SOLUTION ORAL ONCE
Status: COMPLETED | OUTPATIENT
Start: 2024-09-13 | End: 2024-09-13

## 2024-09-13 RX ADMIN — SENNOSIDES AND DOCUSATE SODIUM 2 TABLET: 50; 8.6 TABLET ORAL at 20:45

## 2024-09-13 RX ADMIN — POLYETHYLENE GLYCOL 3350 17 G: 17 POWDER, FOR SOLUTION ORAL at 08:20

## 2024-09-13 RX ADMIN — SODIUM CHLORIDE, POTASSIUM CHLORIDE, SODIUM LACTATE AND CALCIUM CHLORIDE 500 ML: 600; 310; 30; 20 INJECTION, SOLUTION INTRAVENOUS at 02:05

## 2024-09-13 RX ADMIN — Medication 125 MCG/HR: at 16:44

## 2024-09-13 RX ADMIN — POTASSIUM CHLORIDE 20 MEQ: 1.5 POWDER, FOR SOLUTION ORAL at 02:05

## 2024-09-13 RX ADMIN — AMIODARONE HYDROCHLORIDE 150 MG: 1.5 INJECTION, SOLUTION INTRAVENOUS at 00:45

## 2024-09-13 RX ADMIN — AMIODARONE HYDROCHLORIDE 0.5 MG/MIN: 1.8 INJECTION, SOLUTION INTRAVENOUS at 06:34

## 2024-09-13 RX ADMIN — AMIODARONE HYDROCHLORIDE 0.5 MG/MIN: 1.8 INJECTION, SOLUTION INTRAVENOUS at 00:17

## 2024-09-13 RX ADMIN — Medication 175 MCG/HR: at 09:00

## 2024-09-13 RX ADMIN — PROPOFOL 40 MCG/KG/MIN: 10 INJECTION, EMULSION INTRAVENOUS at 06:34

## 2024-09-13 RX ADMIN — Medication 100 MCG/HR: at 01:20

## 2024-09-13 RX ADMIN — CEFTRIAXONE SODIUM 1 G: 1 INJECTION, SOLUTION INTRAVENOUS at 16:50

## 2024-09-13 RX ADMIN — PANTOPRAZOLE SODIUM 40 MG: 40 INJECTION, POWDER, FOR SOLUTION INTRAVENOUS at 06:34

## 2024-09-13 RX ADMIN — SENNOSIDES AND DOCUSATE SODIUM 2 TABLET: 50; 8.6 TABLET ORAL at 08:20

## 2024-09-13 RX ADMIN — AMIODARONE HYDROCHLORIDE 0.5 MG/MIN: 1.8 INJECTION, SOLUTION INTRAVENOUS at 20:45

## 2024-09-13 RX ADMIN — PROPOFOL 25 MCG/KG/MIN: 10 INJECTION, EMULSION INTRAVENOUS at 20:45

## 2024-09-13 RX ADMIN — PROPOFOL 40 MCG/KG/MIN: 10 INJECTION, EMULSION INTRAVENOUS at 09:40

## 2024-09-13 RX ADMIN — PROPOFOL 30 MCG/KG/MIN: 10 INJECTION, EMULSION INTRAVENOUS at 15:42

## 2024-09-13 RX ADMIN — Medication 0.06 MCG/KG/MIN: at 18:08

## 2024-09-13 ASSESSMENT — PAIN - FUNCTIONAL ASSESSMENT
PAIN_FUNCTIONAL_ASSESSMENT: CPOT (CRITICAL CARE PAIN OBSERVATION TOOL)

## 2024-09-13 ASSESSMENT — PAIN SCALES - GENERAL
PAINLEVEL_OUTOF10: 0 - NO PAIN

## 2024-09-13 NOTE — PROGRESS NOTES
Subjective Data:  Patient intubated, SR 60's.   Pericardial drain 100 ml last 24 hours     Overnight Events:    NA      Objective Data:  Last Recorded Vitals:  Vitals:    09/13/24 1300 09/13/24 1400 09/13/24 1406 09/13/24 1500   BP:       Pulse: 73 70 72 74   Resp: 16 16 16 16   Temp:       TempSrc:       SpO2: 96% 95% 98% 95%   Weight:       Height:           Last Labs:  CBC - 9/13/2024:  4:49 AM  11.2 9.9 294    28.4      CMP - 9/13/2024:  4:49 AM  8.5 5.4 37 --- 4.0   2.8 2.6 25 79      PTT - 9/9/2024: 10:01 PM  1.3        Last I/O:  I/O last 3 completed shifts:  In: 1428.8 (14.9 mL/kg) [I.V.:1378.8 (14.4 mL/kg); IV Piggyback:50]  Out: 1105 (11.5 mL/kg) [Urine:1000 (0.3 mL/kg/hr); Drains:105]  Weight: 96 kg      Echo:  Transthoracic Echo (TTE) Limited 09/09/2024  CONCLUSIONS:   1. Poorly visualized anatomical structures due to suboptimal image quality.   2. Endocardial definition and arrhythmia preclude accurate assessment of LVEF.   3. Unable to determine right ventricular systolic function.   4. There is a trivial pericardial effusion.   5. Compared with study dated 9/9/2024, the previous study was done michaela-pericardiocentesis, effusion remains trivial and both studies are significantly technically limited.     Transthoracic Echo (TTE) Limited 09/09/2024  CONCLUSIONS:   1. The left ventricle was not well visualized. The left ventricular ejection fraction could not be measured.   2. There is normal right ventricular global systolic function.   3. There is a moderate pericardial effusion.   4. There is a suggestion of brief RV diastolic collapse and brief RA collapse on pre-tap images. Tap and post-tap images were obtained in the 4-C view only. Trivial to small effusion on final images.   5. Very limited 2D study only before and during pericardiocentesis.     Transthoracic Echo (TTE) Limited 09/09/2024  CONCLUSIONS:   1. The left ventricular systolic function is moderately decreased, with a visually estimated  ejection fraction of 30-35%.   2. There is global hypokinesis of the left ventricle with minor regional variations.   3. Left ventricular diastolic filling was indeterminate.   4. There is normal right ventricular global systolic function.   5. Mild diastolic collapse of the right ventricle and brief right atrium diastolic collapse.   6. There is a moderate pericardial effusion.   7. The pulmonary artery is not well visualized.   8. The inferior vena cava appears moderately dilated.   9. There are findings of early echocardiographic tamponade with buckling of RV free wall during late diastolic and brief end diastole RA collapse. The quality of the transvalvular Doppler signals was not clear enough to measure respirophasic change in ventricular filling.     Ejection Fractions:        EF   Date/Time Value Ref Range Status   09/09/2024 09:57 AM 33 %        Cath:  Cardiac Catheterization Procedure 09/09/2024  Ejection Fractions:  EF   Date/Time Value Ref Range Status   09/11/2024 01:00 PM 42 %    09/09/2024 09:57 AM 33 %        Inpatient Medications:  Scheduled medications   Medication Dose Route Frequency    cefTRIAXone  1 g intravenous q24h    pantoprazole  40 mg oral Daily before breakfast    Or    esomeprazole  40 mg nasoduodenal tube Daily before breakfast    Or    pantoprazole  40 mg intravenous Daily before breakfast    polyethylene glycol  17 g nasogastric tube Daily    sennosides-docusate sodium  2 tablet nasogastric tube BID     PRN medications   Medication    dextrose    dextrose    glucagon    glucagon    oxygen     Continuous Medications   Medication Dose Last Rate    amiodarone  0.5-1 mg/min 0.5 mg/min (09/13/24 0634)    fentaNYL  0-300 mcg/hr 125 mcg/hr (09/13/24 1020)    norepinephrine  0-0.2 mcg/kg/min 0.05 mcg/kg/min (09/13/24 1500)    propofol  5-50 mcg/kg/min 30 mcg/kg/min (09/13/24 1020)       Physical Exam:  General: intubated sedated   Skin: warm and dry   Head/ neck: no JVD seen at 90  degrees  Cardiac: RRR, S1, S2   Pulm: CTAB, room air   GI: soft, nontender   Extremities: no LE edema , upper extremities + 3 swelling/weeping   Neuro: no focal neuro deficits   Psych: appropriate mood and behavior        Assessment/Plan   Khoa Mitchell is a 69 y.o. male with PMH of T2DM, CKD2 (baseline Cr 1.2) admitted for concern for SVC syndrome likely 2/2 to new diagnosis of metastatic cancer. Course c/b acute hypoxic resp failure s/p intubation and moderate pericardial effusion with early tamponade physiology s/p pericardiocentesis and post-procedure development of CHB requiring TVP (now discontinued with recovery of conduction). Cardiology consulted for pericardial effusion and cardiomyopathy.      # Malignant pericardial effusion  # Cardiac tamponade, resolved s/p pericardiocentesis/drain  Cytology is positive for malignant cells, though team still working up primary cancer. Drain output over the past 24h decreased from >100 to @80cc, 15cc in the past 4 hours.   - leave pericardial drain unclamped for now > 100cc last 24 hours   - drain mgmt per interventional cardiology note 9/11  - Consult CT surgery regarding pericardial window (if within the patient's goals of care) since patients effusion likely due to malignancy and may reoccur.      # HFrEF  - no prior known hx of CM or echo on EMR  - echo 9/11/24:  Mid and apical inferior septum and apex are abnormal. EF 42%, trivial to small pericardial effusion. There is a large pleural effusion.  Unclear chronicity or etiology at this point. Depending on the patient's prognosis and goals of care/clinical course this admission, will discuss further workup and ischemic evaluation when more stable.  - hold off initiating GDMT in the setting of septic shock  - consider further work up/ischemic eval pending clinical course this admission    CHB requiring TVP-resolved  Afib  - Followed by EP  - on amio gtt       Thank you for involving us in this patient's care.  Recommendations discussed with Dr. Regalado.     General Cardiology Consult Pager: [phone] (weekday 7AM-6PM and weekend 7AM-2PM) and other: [phone]  EP Consult Pager: [phone] (weekday 7AM-6PM and weekend 7AM-2PM) and other: [phone]  CICU Fellow Pager: [phone] anytime  EP Device Nurse Pager: [phone] (weekday 7AM-4PM)  Advanced Heart Failure Consult Pager: [phone] anytime Code Status:  Full Code      Jeniffer Parra, APRN-CNP

## 2024-09-13 NOTE — CARE PLAN
Problem: Skin  Goal: Decreased wound size/increased tissue granulation at next dressing change  Outcome: Progressing  Flowsheets (Taken 9/13/2024 1957)  Decreased wound size/increased tissue granulation at next dressing change: Protective dressings over bony prominences     Problem: Skin  Goal: Participates in plan/prevention/treatment measures  Outcome: Progressing  Flowsheets (Taken 9/13/2024 1957)  Participates in plan/prevention/treatment measures: Elevate heels     Problem: Skin  Goal: Promote/optimize nutrition  Outcome: Progressing  Flowsheets (Taken 9/13/2024 1957)  Promote/optimize nutrition:   Discuss with provider if NPO > 2 days   Monitor/record intake including meals     Problem: Skin  Goal: Promote skin healing  Outcome: Progressing  Flowsheets (Taken 9/13/2024 1957)  Promote skin healing:   Assess skin/pad under line(s)/device(s)   Protective dressings over bony prominences   Turn/reposition every 2 hours/use positioning/transfer devices   Ensure correct size (line/device) and apply per  instructions   Rotate device position/do not position patient on device

## 2024-09-13 NOTE — PROCEDURES
Thoracentesis Procedure Note    Pre-operative Diagnosis: pleural effusion, left    Post-operative Diagnosis: same    Indications: Pleural sampling      Procedure Details   Consent: Informed consent was obtained. Risks of the procedure were discussed including: infection, bleeding, pain, pneumothorax.    Under sterile conditions the patient was positioned. Betadine solution and sterile drapes were utilized.  1% lidocaine was used for anesthesia. Fluid was obtained without any difficulties and minimal blood loss.  A dressing was applied to the wound and wound care instructions were provided.     Findings  1100 ml of  straw-colored  pleural fluid was obtained. A sample was sent to Pathology for cytogenetics, flow, and cell counts, as well as for infection analysis.    Complications:  Patient tolerated procedure well. Awaiting CXR.           Condition: stable    Plan  A follow up chest x-ray was ordered.  Intubated w/analgosedation     Attending Attestation:

## 2024-09-13 NOTE — PROGRESS NOTES
Medical Intensive Care - Daily Progress Note   Subjective    Khoa Mitchell is a 69 y.o. year old male patient admitted on 9/7/2024 with following ICU needs: acute hypoxemic respiratory failure, worsening from severe metastatic burden requiring endotracheal intubation.     Interval History:  Patient reentered afib RVR returned to [facility] with amio bolus   First fraction of radiation therapy at 1:00 PM     Meds    Scheduled medications  cefTRIAXone, 1 g, intravenous, q24h  pantoprazole, 40 mg, oral, Daily before breakfast   Or  esomeprazole, 40 mg, nasoduodenal tube, Daily before breakfast   Or  pantoprazole, 40 mg, intravenous, Daily before breakfast  polyethylene glycol, 17 g, nasogastric tube, Daily  sennosides-docusate sodium, 2 tablet, nasogastric tube, BID      Continuous medications  amiodarone, 0.5-1 mg/min, Last Rate: 0.5 mg/min (09/13/24 0634)  fentaNYL, 0-300 mcg/hr, Last Rate: 125 mcg/hr (09/13/24 1020)  norepinephrine, 0-0.2 mcg/kg/min, Last Rate: 0.05 mcg/kg/min (09/13/24 1040)  propofol, 5-50 mcg/kg/min, Last Rate: 30 mcg/kg/min (09/13/24 1020)      PRN medications  PRN medications: dextrose, dextrose, glucagon, glucagon, oxygen     Objective    Blood pressure 132/50, pulse 81, temperature 36.7 °C (98.1 °F), resp. rate 16, height 1.829 m (6'), weight 96 kg (211 lb 10.3 oz), SpO2 94%.     Physical Exam  Constitutional:       General: He is awake.      Appearance: He is obese.      Interventions: Nasal cannula in place.      Comments: Patient intubated. Responsive to commands.   HENT:      Head:      Salivary Glands: Right salivary gland is diffusely enlarged.      Comments: Significant plethora of the head including neck, face, and periorbital edema. Evidence of plum shaped mass on right jaw.   Cardiovascular:      Rate and Rhythm: Normal rate.      Heart sounds: Normal heart sounds, S1 normal and S2 normal.   Comments: Pericardial drain in place.   Musculoskeletal:      Right upper arm: Swelling and edema  present.      Left upper arm: Swelling and edema present.      Right lower le+ Pitting Edema present.      Left lower le+ Pitting Edema present.      Comments: The upper extremities are dematous, hands are warm to touch. There is also some noticeable asymmetry where right upper extremity is larger than left.    Lymphadenopathy:      Cervical:      Right cervical: No superficial cervical adenopathy.     Left cervical: No superficial cervical adenopathy.      Upper Body:      Right upper body: No supraclavicular adenopathy.      Left upper body: No supraclavicular adenopathy.   Neurological:      Mental Status: He is alert.   Psychiatric:         Behavior: Behavior is cooperative.      Intake/Output Summary (Last 24 hours) at 2024 1135  Last data filed at 2024 1100  Gross per 24 hour   Intake 1400.65 ml   Output 750 ml   Net 650.65 ml     Labs:   Results from last 72 hours   Lab Units 24  04424  2344 24  0524   SODIUM mmol/L 136 137 137   POTASSIUM mmol/L 3.8 3.7 3.9   CHLORIDE mmol/L 96* 96* 96*   CO2 mmol/L 28 27 30   BUN mg/dL 27* 27* 28*   CREATININE mg/dL 0.89 0.92 0.98   GLUCOSE mg/dL 142* 134* 138*   CALCIUM mg/dL 8.5* 8.6 8.4*   ANION GAP mmol/L 16 18 15   EGFR mL/min/1.73m*2 >90 89 83   PHOSPHORUS mg/dL 2.8 2.9 3.6      Results from last 72 hours   Lab Units 24  0524 24  0443   WBC AUTO x10*3/uL 11.2 10.4 12.1*   HEMOGLOBIN g/dL 9.9* 9.7* 9.5*   HEMATOCRIT % 28.4* 27.9* 28.7*   PLATELETS AUTO x10*3/uL 294  --  264   NEUTROS PCT AUTO % 82.7 83.7 84.9   LYMPHS PCT AUTO % 5.2 4.6 4.0   MONOS PCT AUTO % 8.4 9.1 9.7   EOS PCT AUTO % 2.5 1.3 0.4                 Micro/ID:     Lab Results   Component Value Date    BLOODCULT No growth at 4 days -  FINAL REPORT 2024    BLOODCULT No growth at 4 days -  FINAL REPORT 2024       Summary of key imaging results from the last 24 hours  None     Assessment and Plan     Assessment: Khoa Mitchell is a 69  y.o. year old male patient admitted on 9/7/2024 with PMHx of T2DM, CKD2 (baseline Cr 1.2) originally admitted for acute hypoxic respiratory failure 2/2 to postobstructive pneumonia from SVC from new diagnosis of malignancy. Course complicated by worsening respiratory failure requiring intubation 9/8 with subsequent BAL with suctioning of thick green secretions. 9/9 TTE with moderate pericardial effusion, findings suggestive of early tamponade. Transferred to the CICU, underwent pericardiocentesis subsequently complicated by complete heart block requiring TVP, then with afib RVR which chemically converted with amiodarone. Repeat CXR with complete opacification of the left lung. Treated with ceftriaxone (9/8-9/10), broadened to zosyn (9/10-*) given worsening infiltrates, azithromycin (9/8-9/10). Now currently on ceftriaxone (9/10- *).     Mechanical Ventilation: 4-10 days  Sedation/Analgesia:  Propofol, Fentanyl   Restraints: Restraints indicated as alternative therapies have been attempted and have been ineffective.  Restrain with soft wrist restraints and side rails up x4 until medical devices discontinued and/or patient able to participate with plan of care.     Summary for 09/13/24  :  Patient went into afib RVR and shortly returned to NSR with bolus of amio   Small cell lung carcinoma confirmed on biopsy   Patient completed first fraction of radiation therapy   Pt underwent thoracentesis and drained 1.1 L  Weaned off of sedation, plan to extubate tomorrow         Plan:  NEUROLOGY/PSYCH:  NEUROLOGIC  #Intubated, sedated  -fentanyl 75  -Daily SATs as oxygen requirements decrease     #Multiple brain lesions  ::CT head performed, notable for multiple hyperdense lesions concerning for hemorrhagic metastases, largest [id number]  ::Repeat CT head without evidence of hemorrhagic transformation  -Holding dvt ppx post-bleed day 2 (9/10 AM)  -Per NSGY, recommend MRI presurgical perfusion and fingerprinting brain, C/T/L w/  w/o contrast when able to lie flat; will page them when done     CARDIOVASCULAR  #Shock  Likely septic with concern for worsening post-obstructive pneumonia and mucus plugging as well as worsening pleural effusion  -Given worsening effusion and pneumonia, broadened to zosyn (9/10-*)  -Fu Bcx, BAL cx, sputum cx  -Infectious workup as below     #Pericardial effusion  #Complete heart block  Likely malignant given evidence of multiple probably metastatic lesions  ::CT chest with pericardial effusion measuring 15mm in thickness  ::9/9 TTE with moderate pericardial effusion, brief RV and RA diastolic collapse, concerning for early tamponade  ::9/9 pericardiocentesis with 400cc serosanguinous fluid, complicated by CHB requiring TVP  -Monitor pericardial drain output  -Pending pericardial cytology  -TVP x 24 hours, threshold 0.2, output 10 mAmp and VVI rate of 60  -Cardiology, EP following; appreciate recs     #Afib  Likely 2/2 to worsening infection and effusion  ::s/p amio bolus  -Continue amio gtt     PULMONARY  #Acute hypoxic respiratory failure  Originally likely 2/2 to post-obstructive pneumonia, underwent BAL with evidence of multiple secretions. Now complicated by complete opacification of the left lung concerning for worsening mucus plugging vs worsening pleural effusion vs combination.  ::Baseline RA  ::ENT scoped - patent airway with gross aspiration of secretions; hypomobile left vocal cord  ::9/7 BCx 2/4 Strep salivarius, likely contaminant, very uncommon cause of bacteremia  ::9/9 CXR with complete opacification of the left lung and R mediastinal shift  -Fu BAL cx, BCx, sputum cx  -fu Aspergillus galactomannan, PJP PCR, AFB, fungal cx  -s/p ceftriaxone (9/10-**)  -Plan for bronch to evaluate mucus plug then thoracentesis later  -ENT following, appreciate recs     Vent Mode: Volume control/assist control  FiO2 (%):  [60 %] 60 %  S RR:  [20] 20  S VT:  [500 mL] 500 mL  PEEP/CPAP (cm H2O):  [8 cm H20] 8 cm  H20  MAP (cm H2O):  [13-14] 14      RENAL/  #Hyponatremia, resolved   Likely SIADH 2/2 to cancer, which would be consistent with lung SCC vs hypervolemic hyponatremia  ::Admission Na 133  ::Home salt tabs and water restriction, lasix 20mg per outpt nephro  ::Serum osm 294  ::Urine osm 785   ::Urine lytes WNL   -Continue to monitor      #CKD2a  ::Baseline Cr 1.1-1.2  -At baseline, CTM     GASTROINTESTINAL  -No acute issues     ENDOCRINE  #T2DM  ::9/8 A1C 6.8  -SSI q4h while NPO     HEME/ONC  #Metastatic cancer  #SVC syndrome  Unknown primary, potentially SCC lung vs head/neck  ::CT imaging with multiple lesions - multiple brain mets, soft tissue mass in lung  ::CT AP with lesions in liver, spleen, both adrenal glands, pancreas, multiple peritoneal nodules, R gluteal mass, R acetabulum  ::CT head with multiple lesions  :: Pericardial fluid significant for malignant cells; BAL cytology  -IR biopsy pending results   -Follow up with med onc after tissue diagnosis    -Will begin 5 fraction treatment to the mediastinum starting 9/13      #Normocytic anemia  Likely AOCD 2/2 to malignancy vs SARA given elevated RDW  ::Baseline 11-12, now 8-9   -Iron, TIBC, Tsat, ferritin      INFECTIOUS DISEASE  #Concern for post-obstructive pneumonia  -Fu sputum cx, Bcx as above  -C/w ceftriaxone (9/10-*)      ICU Check List            ICU Liberation: Intervention:   Assess, Prevent, Manage Pain CPOT -     Both SAT and SBT [] SAT  [] SBT 30-60 min [] Extubate to NC  [] Extubate to NIV  [] Discuss Trach   Choice of analgesia and sedation RASS: -1  Fentanyl Propofol    Delirium: Assess, prevent and manage CAM -     Early Mobility and Exercise   [] PT /OT consult   Family Engagement and Empowerment [x]Family updated [x]SW consult      F: none  E: K>4, Phosph>3, Mg>2  N: NPO for thoracentesis  A: PIV  DVT ppx: holding off iso potential hemorrhagic conversion, thoracentesis  GI ppx: esomeprazole     O2: VC RR 20//PEEP 8/FiO2 80%  Gtt:  amiodarone, levophed 0.03  Abx: zosyn/azithro    Code Status: Full code  Surrogate Medical Decision-maker: no family able to serve as NOK, but Zita Campbell (friend) [phone] to be proxy decision maker     Hardware:         Arterial Line 09/10/24 Left Radial (Active)   Placement Date/Time: 09/10/24 (c) 0105   Size: 20 G  Orientation: Left  Location: Radial  Site Prep: Alcohol  Local Anesthetic: Injectable  Insertion attempts: 1  Securement Method: Transparent dressing  Patient Tolerance: Tolerated well   Number of days: 3       ETT  8.5 mm (Active)   Placement Date/Time: 09/08/24 1208   Hand Hygiene Completed: Yes  Mask Ventilation: Vent by mask + OA or adjuvant +/- NMBA  ETT Type: ETT - single  Single Lumen Tube Size: 8.5 mm  Cuffed: Yes  Laryngoscope: Jose  Blade Size: 4  Location: Oral  ...   Number of days: 4       Urethral Catheter (Active)   Placement Date/Time: 09/13/24 0500   Hand Hygiene Completed: Yes  Urine Returned: Yes   Number of days: 0       Open Drain Inferior;Medial;Midline Pericardial (Active)   Placement Date/Time: 09/09/24 1341   Hand Hygiene Completed: Yes  Orientation: Inferior;Medial;Midline  Location: Pericardial  Size (Fr.): 8 Fr.   Number of days: 3       NG/OG/Feeding Tube OG - [place] sump 18 Fr Center mouth (Active)   Placement Date/Time: 09/08/24 1230   Hand Hygiene Completed: Yes  Type of Tube: NG/OG Tube  Tube Length: 65 cm  Tube Type: OG - [place] sump  NG/OG Tube Size: 18 Fr  Tube Location: Center mouth   Number of days: 4     Enid Chambers MD   09/13/24 at 11:35 AM     Disclaimer: Documentation completed with the information available at the time of input. The times in the chart may not be reflective of actual patient care times, interventions, or procedures. Documentation occurs after the physical care of the patient.

## 2024-09-13 NOTE — PROGRESS NOTES
RADIATION COMPLETION OF THERAPY NOTE    Patient Name:  Khoa Mitchell  MRN:  [id number]  :  1955    Radiation Oncologist: No care team member to display   Referring Provider: No ref. provider found  Primary Care Provider: Savita Cerda, NICOLE-CNP, DNP    Brief History: Khoa Mitchell is a 69 y.o. male with No matching staging information was found for the patient..  The patient completed radiotherapy as outlined below.    Radiation Treatment Summary:    Radiation Oncology   Radiation Treatments       Active   No active radiation treatments to show.     Completed   Lung (Started on 2024)   Most recent fraction: 800 cGy given on 2024   Total given: 800 cGy / 800 cGy  (1 of 1 fractions)   Elapsed Days: 0   Technique: 3D                       Concurrent Chemotherapy:  (INPT) CARBOplatin / Etoposide, 21 Day Cycles   Treatment goal Palliative   Treatment line First Line   Status Active   Start Date 2024 (Planned)   End Date 2024 (Planned)   Treatment Medications CARBOplatin (Paraplatin) 590 mg in sodium chloride 0.9% 159 mL IV, 590 mg, intravenous, Once, 0 of 1 cycle    etoposide (Toposar) 110 mg in sodium chloride 0.9% 505.5 mL IV, 50 mg/m2 = 110 mg (50 % of original dose 100 mg/m2), intravenous, Once, 0 of 1 cycle  Dose modification: 50 mg/m2 (original dose 100 mg/m2, Cycle 1)    methylPREDNISolone sod succinate (SOLU-Medrol) 40 mg/mL injection 40 mg, 40 mg, intravenous, As needed, 0 of 1 cycle         CTCAE Toxicity Overview:   Toxicity Assessment          2024    17:28   Toxicity Assessment   Adverse Events Reviewed (WDL) Yes (Within Defined Limits)   Treatment Site Bone   Anorexia Grade 0   Anxiety Grade 0   Dehydration Grade 0   Depression Grade 0   Dermatitis Radiation Grade 0   Diarrhea Grade 0   Fatigue Grade 0       pt intubated   Nausea Grade 0   Pain Grade 0   Vomiting Grade 0   Joint Range of Motion Decreased Grade 0   Bone Pain Grade 0   Edema Limbs Grade 0   Alopecia Grade 0    Erythroderma Grade 0   Pain of Skin Grade 0   Pruritus Grade 0   Rash Acneiform Grade 0   Skin Hyperpigmentation Grade 0   Skin Hypopigmentation Grade 0   Skin Induration Grade 0   Skin Ulceration Grade 0   Telangiectasia Grade 0     Patient Disposition:    Future Appointments       Date / Time Provider Department Dept Phone    1/13/2025 11:00 AM NICOLE Dobbs-CNP, DNP [facility] [phone]

## 2024-09-13 NOTE — CONSULTS
HEMATOLOGY AND MEDICAL ONCOLOGY  -----------------------------------------------------------------------------------  THORACIC ONCOLOGY      Patient ID:   Khoa Mitchell  69 y.o.  [id number]      DIAGNOSIS  1. Metastatic malignant neoplasm, unspecified site (Multi)  Transthoracic Echo (TTE) Limited    Transthoracic Echo (TTE) Limited    Transthoracic Echo (TTE) Limited    Transthoracic Echo (TTE) Limited    Case Request Cath Lab: Pericardiocentesis    Case Request Cath Lab: Pericardiocentesis    Cardiac Catheterization Procedure    Cardiac Catheterization Procedure    Transthoracic Echo (TTE) Limited    Transthoracic Echo (TTE) Limited    CANCELED: Case Request Cath Lab: Temporary Pacemaker Insertion    CANCELED: Case Request Cath Lab: Temporary Pacemaker Insertion    CANCELED: Cardiac Catheterization Procedure    CANCELED: Cardiac Catheterization Procedure      2. Shortness of breath        3. Swelling        4. Pericardial effusion (HHS-HCC)  Transthoracic Echo (TTE) Limited    Transthoracic Echo (TTE) Limited    Transthoracic Echo (TTE) Limited    Transthoracic Echo (TTE) Limited    Case Request Cath Lab: Pericardiocentesis    Case Request Cath Lab: Pericardiocentesis    Cardiac Catheterization Procedure    Cardiac Catheterization Procedure    Transthoracic Echo (TTE) Limited    Transthoracic Echo (TTE) Limited      5. Acute hypoxic respiratory failure (Multi)  Intubation    Intubation      6. Heart block  CANCELED: Case Request Cath Lab: Temporary Pacemaker Insertion    CANCELED: Case Request Cath Lab: Temporary Pacemaker Insertion    CANCELED: Cardiac Catheterization Procedure    CANCELED: Cardiac Catheterization Procedure      7. Cardiac tamponade (HHS-HCC)  Cardiac Catheterization Procedure    Transthoracic Echo (TTE) Limited    Transthoracic Echo (TTE) Limited      8. Paroxysmal atrial fibrillation (Multi)  Insert arterial line    Arterial Line Insertion           Medical Problems       Problem List       *  (Principal) Metastatic malignant neoplasm, unspecified site (Multi)    Hyponatremia    Chronic kidney disease, stage 2 (mild)    Pericardial effusion (HHS-HCC)    Heart block            STAGING  Cancer Staging   No matching staging information was found for the patient.    Pending finalization of the surgical pathology reports. Preliminary, the pathology team has reported SMALL CELL CARCINOMA.  In the patient's case, he has a huge lung mass with severe metastatic disease to the brain, liver, adrenal glands, bones, consistent with an EXTENSIVE-STAGE SMALL CELL LUNG CANCER        CURRENT SITES OF DISEASE      Multi metastatic disease. Large mass of the central/left upper chest with affecting the SVC, producing obstruction; encasement and narrowing of the trachea/jose antonio, left chest wall invasion, as well as metastatic disease to the brain, liver, adrenal glands, bones, pericardial effusion, and pleural effusion.       MOLECULAR GENOMICS     None     SERUM TUMOR MARKERS         PRIOR THERAPY    None       CURRENT THERAPY     To be started        CURRENT ONCOLOGICAL PROBLEMS    Respiratory failure  SVC syndrome  Distributive shock  Massive pericardial effusion drained  Massive pleural effusion to be drained and rule out infection     HISTORY OF PRESENT ILLNESS    69-year-old gentleman, with a history of heavy smoking (1 pack a day since childhood), CKD stage 2, peripheral vascular disease, type 2 diabetes mellitus, chronic mild hyponatremia (associated with his CKD per Nephrology notes). The patient presented to [facility] on 09/07/2024 due to progressive anasarca, primarily involving the face, arms, and upper thorax. CT scans showed a huge mass in the central/left upper chest with probable SVC obstruction, encasement and narrowing of the trachea/jose antonio, left chest wall invasion, as well as extensive metastasis to the adrenal glands, liver, bones, and brain.    The patient developed respiratory failure and was  intubated in the MICU. The cardiology team drained A massive pericardial effusion with pericardiocentesis, and malignant cells were identified within the effusion (preliminary report by the pathology team corresponds to small cell carcinoma). The patient has also been receiving antibiotic treatment for a suspected respiratory infection associated with gram-positive bacteremia. After the pericardial drainage, the patient became hypotensive, developed arrhythmia, and required pressors and amiodarone infusion.    Currently, the MICU team will perform thoracentesis to rule out empyema, and the radiation oncology team will start emergent radiation to the chest and mediastinum.  The thoracic oncology team has been called to plan for acute systemic treatment due to likely SCLC.        PAST MEDICAL HISTORY  Past Medical History:   Diagnosis Date    Other specified health status     No pertinent past medical history        PAST SURGICAL HISTORY  Past Surgical History:   Procedure Laterality Date    CARDIAC CATHETERIZATION N/A 9/9/2024    Procedure: Pericardiocentesis;  Surgeon: Bryan Graham MD;  Location: [place] [address] Cardiac Cath Lab;  Service: Cardiovascular;  Laterality: N/A;    CARDIAC ELECTROPHYSIOLOGY PROCEDURE N/A 9/9/2024    Procedure: Temporary Pacemaker Insertion;  Surgeon: Bryan Graham MD;  Location: [place] [address] Cardiac Cath Lab;  Service: Cardiovascular;  Laterality: N/A;    OTHER SURGICAL HISTORY  05/16/2022    No history of surgery        FAMILY HISTORY   No family history on file.   Cancer-related family history is not on file.    SOCIAL HISTORY   reports that he has been smoking cigarettes. He has never used smokeless tobacco. He reports that he does not drink alcohol and does not use drugs.     CURRENT MEDS    Current Facility-Administered Medications:     amiodarone (Nexterone) 360 mg in dextrose,iso-osm 200 mL (1.8 mg/mL) infusion (premix), 0.5-1 mg/min, intravenous, Continuous, Lianne Hanks MD,  Last Rate: 16.67 mL/hr at 09/13/24 0634, 0.5 mg/min at 09/13/24 0634    cefTRIAXone (Rocephin) 1 g in dextrose (iso) IV 50 mL, 1 g, intravenous, q24h, Meet Thibodeaux MD, Stopped at 09/12/24 1922    dextrose 50 % injection 12.5 g, 12.5 g, intravenous, q15 min PRN, Lianne Hanks MD    dextrose 50 % injection 25 g, 25 g, intravenous, q15 min PRN, Lianne Hanks MD    pantoprazole (ProtoNix) EC tablet 40 mg, 40 mg, oral, Daily before breakfast **OR** esomeprazole (NexIUM) suspension 40 mg, 40 mg, nasoduodenal tube, Daily before breakfast **OR** pantoprazole (ProtoNix) injection 40 mg, 40 mg, intravenous, Daily before breakfast, Lianne Hanks MD, 40 mg at 09/13/24 0634    fentanyl (Sublimaze) 1000 mcg in sodium chloride 0.9% 100 mL (10 mcg/mL) infusion (premix), 0-300 mcg/hr, intravenous, Continuous, Lianne Hanks MD, Last Rate: 12.5 mL/hr at 09/13/24 1020, 125 mcg/hr at 09/13/24 1020    glucagon (Glucagen) injection 1 mg, 1 mg, intramuscular, q15 min PRN, Lianne Hanks MD    glucagon (Glucagen) injection 1 mg, 1 mg, intramuscular, q15 min PRN, Lianne Hanks MD    norepinephrine (Levophed) 8 mg in dextrose 5% 250 mL (0.032 mg/mL) infusion (premix), 0-0.2 mcg/kg/min, intravenous, Continuous, Lianne Hanks MD, Last Rate: 10.92 mL/hr at 09/13/24 1338, 0.06 mcg/kg/min at 09/13/24 1338    oxygen (O2) therapy, , inhalation, Continuous PRN - O2/gases, Lianne Hanks MD, 60 percent at 09/12/24 1947    polyethylene glycol (Glycolax, Miralax) packet 17 g, 17 g, nasogastric tube, Daily, Rae Darden MD, 17 g at 09/13/24 0820    propofol (Diprivan) infusion, 5-50 mcg/kg/min, intravenous, Continuous, David Melchor MD, Last Rate: 17.23 mL/hr at 09/13/24 1020, 30 mcg/kg/min at 09/13/24 1020    sennosides-docusate sodium (Babs-Colace) 8.6-50 mg per tablet 2 tablet, 2 tablet, nasogastric tube, BID, Rae Darden MD, 2 tablet at 09/13/24 0820        ALLERGIES  No Known Allergies      Subjective    Cancer symptoms  Weight lost 30 Lbs over the last  three weeks      Objective      Vitals:    09/13/24 1200   BP:    Pulse: 75   Resp: 16   Temp:    SpO2: 96%        Vital Signs:  height is 1.829 m (6') and weight is 96 kg (211 lb 10.3 oz). His temperature is 36.1 °C (97 °F). His blood pressure is 132/50 and his pulse is 75. His respiration is 16 and oxygen saturation is 96%.    General: Follows simple commands.    Orientation: Sedated and intubated   Mood: Under effects of sedative with propofol and fentanyl infusions      Body surface area is 2.21 meters squared.    I/O last 3 completed shifts:  In: 1428.8 (14.9 mL/kg) [I.V.:1378.8 (14.4 mL/kg); IV Piggyback:50]  Out: 1105 (11.5 mL/kg) [Urine:1000 (0.3 mL/kg/hr); Drains:105]  Weight: 96 kg   I/O this shift:  In: 499.7 [I.V.:399.7; NG/GT:100]  Out: 375 [Urine:375]        Physical Exam    Physical Exam  Constitutional:       General: He is in acute distress.      Appearance: He is ill-appearing and toxic-appearing.      Comments: Apparent acutely ill in a chronic disease background   HENT:      Head: Normocephalic.      Nose: No rhinorrhea.      Mouth/Throat:      Mouth: Mucous membranes are moist.      Pharynx: No oropharyngeal exudate or posterior oropharyngeal erythema.   Eyes:      General: Scleral icterus present.      Pupils: Pupils are equal, round, and reactive to light.   Neck:      Comments: Neck distended  Cardiovascular:      Rate and Rhythm: Normal rate and regular rhythm.      Heart sounds: No murmur heard.     Friction rub present. No gallop.      Comments: Amiodarone infussion  Pulmonary:      Effort: No respiratory distress.      Breath sounds: Wheezing, rhonchi and rales present.   Abdominal:      General: Bowel sounds are normal. There is no distension.      Palpations: Abdomen is soft.      Tenderness: There is no abdominal tenderness. There is no rebound.   Musculoskeletal:         General: Swelling present.      Cervical back: Neck supple.      Right lower leg: Edema present.      Left lower  leg: Edema present.      Comments: Mild lower extremities edema. Notorious bilateral upper extremities edema   Skin:     Capillary Refill: Capillary refill takes 2 to 3 seconds.      Coloration: Skin is jaundiced.      Findings: No bruising, erythema or rash.   Neurological:      Comments: Under sedation with propofol and analgesia with fentanyl infusion. Was able to wiggle the toes and barely made thumbs up in both hands.         Performance Status:    ECOG Per nephrology notes, in July 2024 his performance status was 0-1    [id number]       Relevant Laboratories    Results from last 7 days   Lab Units 09/13/24 0449 09/12/24 0524 09/11/24 0443 09/10/24  0540   WBC AUTO x10*3/uL 11.2 10.4 12.1* 12.0*   HEMOGLOBIN g/dL 9.9* 9.7* 9.5* 9.8*   HEMATOCRIT % 28.4* 27.9* 28.7* 28.3*   PLATELETS AUTO x10*3/uL 294  --  264 271   NEUTROS PCT AUTO % 82.7 83.7 84.9 85.6   LYMPHS PCT AUTO % 5.2 4.6 4.0 3.1   MONOS PCT AUTO % 8.4 9.1 9.7 10.0   EOS PCT AUTO % 2.5 1.3 0.4 0.3       Results from last 7 days   Lab Units 09/13/24 0449 09/12/24  2344 09/12/24 0524 09/11/24  0443 09/10/24  0540 09/08/24  0047 09/07/24  0927   SODIUM mmol/L 136 137 137   < > 136   < > 133*   POTASSIUM mmol/L 3.8 3.7 3.9   < > 4.2   < > 4.2   CHLORIDE mmol/L 96* 96* 96*   < > 95*   < > 92*   CO2 mmol/L 28 27 30   < > 30   < > 33*   BUN mg/dL 27* 27* 28*   < > 30*   < > 28*   CREATININE mg/dL 0.89 0.92 0.98   < > 0.97   < > 1.08   CALCIUM mg/dL 8.5* 8.6 8.4*   < > 8.9   < > 9.6   PROTEIN TOTAL g/dL 5.4*  --   --   --  5.3*  --  7.1   BILIRUBIN TOTAL mg/dL 4.0*  --   --   --  1.9*  --  0.8   ALK PHOS U/L 79  --   --   --  68  --  75   ALT U/L 25  --   --   --  11  --  17   AST U/L 37  --   --   --  22  --  21   GLUCOSE mg/dL 142* 134* 138*   < > 148*   < > 152*    < > = values in this interval not displayed.       Results from last 7 days   Lab Units 09/13/24  0449 09/12/24  2344 09/12/24  0524   MAGNESIUM mg/dL 2.06 2.14 2.18       Surgical  pathology:  SOFT TISSUE MASS BIOPSY neck and right soft tissue mass anterior to TMJ performed by IR  Pending final official report    Non-gynecologic cytology: [id number]  Order: [id number]   Collected 9/9/2024 13:30       Status: Final result       Visible to patient: No (inaccessible [facility])    0 Result Notes      Component    Final Cytological Interpretation   A. Pericardial fluid , cytology and cell block:   -- Rare atypical cells present in a background of some acute and chronic inflammatory and mesothelial cells, see note.     Note: Insufficient quantity of atypical cells present for further evaluation.                    Assessment/Plan   This is a 69 y.o. patient with past medical history of heavy smoking (1 pack a day since childhood), CKD stage 2, peripheral vascular disease, type 2 diabetes mellitus, chronic mild hyponatremia (associated with his CKD per Nephrology notes) who was transferred to the MICU due to acute respiratry failure in the context of a recently documented huge mass in the central/left upper chest with probable SVC obstruction, encasement and narrowing of the trachea/jose antonio, left chest wall invasion, as well as extensive metastasis to the adrenal glands, liver, bones, and brain. The patient presented to [facility] initially on 09/07/2024 due to progressive anasarca, primarily involving the face, arms, and upper thorax. A massive pericardial effusion was drained with pericardiocentesis, and rare atypical cells present in a background of some acute and chronic inflammatory and mesothelial cells. On the other hand, from the surgical pathology taken by IR,  the pathology team has preliminary reported the biopsy suggests small cell carcinoma.  Of note, the patient has been receiving antibiotic treatment for a suspected respiratory infection associated with gram-positive bacteremia.  Currently, the MICU team will perform thoracentesis to rule out empyema, and the radiation oncology team  will start emergent radiation to the chest and mediastinum due to SVC syndrome.  The thoracic oncology team has been called to plan for acute systemic treatment due to the likely ES-SCLC.      With all these elements, we can conclude that probably the patient has an Extensive-Stage Small Cell Lung Cancer (SCLC). He is currently experiencing respiratory failure due to superior vena cava (SVC) syndrome and the severe airway narrowing caused by the large lung mass. Additionally, the patient is receiving antibiotic treatment for bacteremia. A thoracentesis will be performed to rule out empyema.    From an oncology perspective, initiating urgent chemotherapy for SCLC with Carboplatin (AUC 5) and Etoposide (at a 50% reduced dose due to hyperbilirubinemia) is essential. We will do maitenance immunotherapy after wards. We have discussed the diagnosis, prognosis, and risks in detail with the patient's surrogate decision-maker, who has consented to the initiation of chemotherapy. However, we will wait for the results of the thoracentesis and ensure empyema is ruled out before starting chemotherapy. We have also coordinated with the Medical Intensive Care Unit (MICU) team, the MICU attending physician, and the pharmacists team to start chemotherapy after completing diagnostic procedures.    Today, the patient will begin radiation therapy to alleviate SVC symptoms promptly.    Important to note:  Prognosis:  According to the National [employer] [place] (NCI), the average survival rate for extensive small cell lung cancer (SCLC) without treatment is 2-4 months after diagnosis and 7-11 months with treatment.    In the treatment of extensive-stage small cell lung cancer (ES-SCLC), platinum-based chemotherapy has been the standard for very long time. Although patients initially respond well to this treatment, the responses are not long-lasting, and they frequently have recurrent/persistent disease. As a result, the median overall  survival is less than 14 months, and the two-year survival rate is no more than 7%. (Kim AF, Karl PA, Olamide SPANND: Small-cell lung cancer: What we know, what we need to know and the path forward. Magda Rev Cancer 17:725-737, 2017)      Patient seen and assessed with Dr. Devang Mcwilliams MD. The questions, doubts and concerns were addressed and resolved apropriately.   A thorough and detailed explanation of the patient's case was provided to the family member. They demonstrated an understanding of the information and agreed with the plan of treatment.          -----------------------------------------------------------------------------------  Charlie Forman MD  Hematology and Medical Oncology Fellow  Epic chat, [phone], [phone], [phone]

## 2024-09-13 NOTE — CARE PLAN
Problem: Pain - Adult  Goal: Verbalizes/displays adequate comfort level or baseline comfort level  Outcome: Progressing     Problem: Safety - Adult  Goal: Free from fall injury  Outcome: Progressing     Problem: Discharge Planning  Goal: Discharge to home or other facility with appropriate resources  Outcome: Progressing     Problem: Chronic Conditions and Co-morbidities  Goal: Patient's chronic conditions and co-morbidity symptoms are monitored and maintained or improved  Outcome: Progressing     Problem: Skin  Goal: Decreased wound size/increased tissue granulation at next dressing change  Outcome: Progressing  Goal: Participates in plan/prevention/treatment measures  Outcome: Progressing  Goal: Promote/optimize nutrition  Outcome: Progressing  Goal: Promote skin healing  Outcome: Progressing  Flowsheets (Taken 9/13/2024 1438)  Promote skin healing:   Assess skin/pad under line(s)/device(s)   Protective dressings over bony prominences   Turn/reposition every 2 hours/use positioning/transfer devices   Ensure correct size (line/device) and apply per  instructions     Problem: Safety - Medical Restraint  Goal: Remains free of injury from restraints (Restraint for Interference with Medical Device)  Outcome: Progressing  Goal: Free from restraint(s) (Restraint for Interference with Medical Device)  Outcome: Progressing     Problem: Knowledge Deficit  Goal: Patient/family/caregiver demonstrates understanding of disease process, treatment plan, medications, and discharge instructions  Outcome: Progressing     Problem: Fall/Injury  Goal: Not fall by end of shift  Outcome: Progressing  Goal: Be free from injury by end of the shift  Outcome: Progressing  Goal: Verbalize understanding of personal risk factors for fall in the hospital  Outcome: Progressing  Goal: Verbalize understanding of risk factor reduction measures to prevent injury from fall in the home  Outcome: Progressing  Goal: Use assistive devices by  end of the shift  Outcome: Progressing  Goal: Pace activities to prevent fatigue by end of the shift  Outcome: Progressing

## 2024-09-13 NOTE — PROGRESS NOTES
Subjective Data:  - intubated, sedated  -25ml of fluid drained at 1800 yesterday evening  - minimal out put in bag this AM, around 5-10ml  - Will repeat limited TTE to assess pericardial effusion for resolution      Overnight Events:    None      Objective Data:  Last Recorded Vitals:  Vitals:    09/13/24 0624 09/13/24 0736 09/13/24 0811 09/13/24 1008   BP:       Pulse:  88  78   Resp:  16  16   Temp:   36.7 °C (98.1 °F)    TempSrc:       SpO2:  95%  95%   Weight: 96 kg (211 lb 10.3 oz)      Height:           Last Labs:  CBC - 9/13/2024:  4:49 AM  11.2 9.9 294    28.4      CMP - 9/13/2024:  4:49 AM  8.5 5.4 37 --- 4.0   2.8 2.6 25 79      PTT - 9/9/2024: 10:01 PM  1.3   14.5 27     TROPHS   Date/Time Value Ref Range Status   09/11/2024 04:43 AM 20 0 - 53 ng/L Final   09/07/2024 09:27 AM 10 0 - 20 ng/L Final     BNP   Date/Time Value Ref Range Status   09/07/2024 09:27  0 - 99 pg/mL Final     HGBA1C   Date/Time Value Ref Range Status   09/08/2024 12:47 AM 6.8 see below % Final   03/20/2022 05:39 AM 6.3 % Final     Comment:          Diagnosis of Diabetes-Adults   Non-Diabetic: < or = 5.6%   Increased risk for developing diabetes: 5.7-6.4%   Diagnostic of diabetes: > or = 6.5%  .       Monitoring of Diabetes                Age (y)     Therapeutic Goal (%)   Adults:          >18           <7.0   Pediatrics:    13-18           <7.5                   7-12           <8.0                   0- 6            7.5-8.5   American Diabetes Association. Diabetes Care 33(S1), Jan 2010.        Last I/O:  I/O last 3 completed shifts:  In: 1428.8 (14.9 mL/kg) [I.V.:1378.8 (14.4 mL/kg); IV Piggyback:50]  Out: 1105 (11.5 mL/kg) [Urine:1000 (0.3 mL/kg/hr); Drains:105]  Weight: 96 kg     Ejection Fractions:  EF   Date/Time Value Ref Range Status   09/11/2024 01:00 PM 42 %    09/09/2024 09:57 AM 33 %      Cath:  Cardiac Catheterization Procedure 09/09/2024    Stress Test:  No results found for this or any previous visit from the past  1095 days.    Cardiac Imaging:  No results found for this or any previous visit from the past 1095 days.      Inpatient Medications:  Scheduled medications   Medication Dose Route Frequency    cefTRIAXone  1 g intravenous q24h    pantoprazole  40 mg oral Daily before breakfast    Or    esomeprazole  40 mg nasoduodenal tube Daily before breakfast    Or    pantoprazole  40 mg intravenous Daily before breakfast    polyethylene glycol  17 g nasogastric tube Daily    sennosides-docusate sodium  2 tablet nasogastric tube BID     PRN medications   Medication    dextrose    dextrose    glucagon    glucagon    oxygen     Continuous Medications   Medication Dose Last Rate    amiodarone  0.5-1 mg/min 0.5 mg/min (09/13/24 0634)    fentaNYL  0-300 mcg/hr 125 mcg/hr (09/13/24 1020)    norepinephrine  0-0.2 mcg/kg/min 0.08 mcg/kg/min (09/13/24 0811)    propofol  5-50 mcg/kg/min 30 mcg/kg/min (09/13/24 1020)       Physical Exam:  General: intubated, sedated  Skin: warm and dry   Head/ neck: no JVD seen at 90 degrees  Cardiac: RRR, subcostal pericardial drain in place, insertion site C/D/I; scant serosang drainage in drainage bag   Pulm: intubated  GI: soft, nontender   Extremities: no LE edema   Neuro: RICHARD intubated, sedated  Psych: intubated, sedated      Assessment/Plan     Khoa Mitchell is a 69 y.o. male with PMH of T2DM, CKD2 (baseline Cr 1.2) admitted for concern for SVC syndrome likely 2/2 to new diagnosis of metastatic cancer. Course c/b acute hypoxic resp failure s/p intubation and moderate pericardial effusion with early tamponade physiology s/p pericardiocentesis and post-procedure development of CHB requiring TVP (now discontinued with recovery of conduction).     9/9  Pericardiocentesis- subcostal approach, 400 ml serous drainage. Pericardial drain in place, to gravity. Labs sent. Echocardiogram used during and post procedure, with resolution of tamponade.     Pericardial Effusion  - Initial TTE on 9/9 showed a moderate  pericardial effusion with early findings of tamponade   - s/p successful ultrasound- and fluoroscopy-guided pericardiacentesis of 400 ml serous fluid removal on 9/9  - 9/9 TTE post-pericardiocentesis showed trivial pericardial effusion  - pericardial fluid sent for analysis  - Pericardial drain was sutured in place and attached to gravity  - 9/12 75ml drained in last 24 hours; will reassess output tomorrow and determine if drain can be removed vs longterm plan for reaccumulating pericardial effusion   - 25ml of fluid drained at 1800 yesterday evening  - minimal out put in bag this AM, around 5-10ml  - Please repeat limited TTE to assess pericardial effusion for resolution   - will consider removal after reviewing images, will discuss with cardiology prior to removal  - cardiology consults recommended Consult CT surgery regarding pericardial window (if within the patient's goals of care) since patients effusion likely due to malignancy and may reoccur.       Recommendations:  encourage frequent position changes to facilitate optimal drainage of effusion, if able   will consider drain removal when <50 cc out in 24 hour period and no residual fluid seen on limited echo  Primary team to follow up pericardial fluid analysis  interventional cardiology will continue to follow, will defer further care to primary team       Code Status:  Full Code    I spent 30 minutes in the professional and overall care of this patient.    Interventional Cardiology will follow.     David Matthews DNP, APRN-CNP  Interventional Cardiology     General Cardiology Consult Pager: [phone] (weekday 7AM-6PM and weekend 7AM-2PM)and other: [phone]  EP Consult Pager: [phone] (weekday 7AM-6PM and weekend 7AM-2PM) and other: [phone]  EP Device Nurse Pager: [phone] (weekday 7AM-4PM)  Advanced Heart Failure Consult Pager: [phone] anytime  CICU Fellow Pager: [phone] anytime  Endovascular /Limb Salvage Team Pager: [phone] for day coverage (8am-5pm)  Interventional  Cardiology Fellow Pager: [phone] (7AM-5PM) [phone]  Structural Heart Team Pager: [phone] anytime  Night coverage: [facility] [phone]     David Matthews APRN-CNP, DNP

## 2024-09-13 NOTE — PROGRESS NOTES
SOCIAL WORK NOTE   Update provided to Travon Boyle ([phone]). Social work to follow.  DOUG Childress, LISW-S ([id number])

## 2024-09-13 NOTE — PROGRESS NOTES
Radiation Oncology On Treatment Visit    Patient Name:  Khoa Mitchell  MRN:  [id number]  :  1955    Referring Provider: No ref. provider found  Primary Care Provider: CHICO Dobbs DNP  Care Team: Patient Care Team:  CHICO Dobbs DNP as PCP - General  CHICO Dobbs DNP as PCP - [employer] PCP  Devang Mcwilliams MD as Consulting Physician (Hematology and Oncology)    Date of Service: 2024     Diagnosis:   Specialty Problems          Radiation Oncology Problems    Metastatic malignant neoplasm, unspecified site (Multi)        Small cell lung cancer (Multi)         Treatment Summary:  3D CRT: Not Applicable Mediastinum    Treatment Period Technique Fraction Dose Fractions Total Dose   Course 1 2024-2024  (days elapsed: 0)         Lung 2024-2024 3D 800 / 800 cGy  800 / 800 cGy     SUBJECTIVE: Pt is intubated laying supine in bed.  In to see pt.      OBJECTIVE:   Vital Signs:  There were no vitals taken for this visit.    Other Pertinent Findings:     Toxicity Assessment          2024    17:28   Toxicity Assessment   Adverse Events Reviewed (WDL) Yes (Within Defined Limits)   Treatment Site Bone   Anorexia Grade 0   Anxiety Grade 0   Dehydration Grade 0   Depression Grade 0   Dermatitis Radiation Grade 0   Diarrhea Grade 0   Fatigue Grade 0       pt intubated   Nausea Grade 0   Pain Grade 0   Vomiting Grade 0   Joint Range of Motion Decreased Grade 0   Bone Pain Grade 0   Edema Limbs Grade 0   Alopecia Grade 0   Erythroderma Grade 0   Pain of Skin Grade 0   Pruritus Grade 0   Rash Acneiform Grade 0   Skin Hyperpigmentation Grade 0   Skin Hypopigmentation Grade 0   Skin Induration Grade 0   Skin Ulceration Grade 0   Telangiectasia Grade 0        Assessment / Plan:    The preliminary pathology came back suggestive of SCLC.  Patient was given 8 Gy in 1 fraction and was transported back to the MICU.  Rest of care per MICU Team and  medical oncology.

## 2024-09-13 NOTE — PROGRESS NOTES
Occupational Therapy                 Therapy Communication Note    Patient Name: Khoa Mitchell  MRN: [id number]  Department: [facility] RAD EXTERNAL FILM  Room: 18/18-A  Today's Date: 9/13/2024     Discipline: Occupational Therapy    Missed Visit Reason: Missed Visit Reason: Patient placed on medical hold (Continues to be a medical hold, RASS -3 and cardiac issues overnight. Will reattempt when medically appropriate.)    Missed Time: Attempt    Rae Bob OT  9/13/2024

## 2024-09-13 NOTE — CARE PLAN
Problem: Safety - Medical Restraint  Goal: Remains free of injury from restraints (Restraint for Interference with Medical Device)  Outcome: Progressing  Flowsheets (Taken 9/10/2024 0555)  Remains free of injury from restraints (restraint for interference with medical device):   Determine that other, less restrictive measures have been tried or would not be effective before applying the restraint   Evaluate the patient's condition at the time of restraint application   Inform patient/family regarding the reason for restraint   Every 2 hours: Monitor safety, psychosocial status, comfort, nutrition and hydration     Problem: Safety - Medical Restraint  Goal: Free from restraint(s) (Restraint for Interference with Medical Device)  Flowsheets (Taken 9/10/2024 0555)  Free from restraint(s) (restraint for interference with medical device):   ONCE/SHIFT or MINIMUM Every 12 hours: Assess and document the continuing need for restraints   Every 24 hours: Continued use of restraint requires Licensed Independent Practitioner to perform face to face examination and written order   Identify and implement measures to help patient regain control

## 2024-09-13 NOTE — PROGRESS NOTES
ETHICS PROGRESS NOTE    Ethics was consulted to assist with decision-making on behalf of Mr. Mitchell who, at the time of consult, was being considered a patient without proxy. Per chart review, Mr. Mitchell was able to regain sufficient capacity to designate a surrogate decision-maker - his friend, Zita. At this time, the ethical issues appear to be resolved, and Ethics will sign off.    Please re-consult if further needs arise.     Naveed Acosta, PhD

## 2024-09-14 ENCOUNTER — APPOINTMENT (OUTPATIENT)
Dept: CARDIOLOGY | Facility: HOSPITAL | Age: 69
End: 2024-09-14
Payer: MEDICARE

## 2024-09-14 LAB
ALBUMIN SERPL BCP-MCNC: 2.4 G/DL (ref 3.4–5)
ALBUMIN SERPL BCP-MCNC: 2.5 G/DL (ref 3.4–5)
ALBUMIN SERPL BCP-MCNC: 2.6 G/DL (ref 3.4–5)
ANION GAP SERPL CALC-SCNC: 13 MMOL/L (ref 10–20)
ANION GAP SERPL CALC-SCNC: 13 MMOL/L (ref 10–20)
ANION GAP SERPL CALC-SCNC: 14 MMOL/L (ref 10–20)
BASOPHILS # BLD AUTO: 0.03 X10*3/UL (ref 0–0.1)
BASOPHILS NFR BLD AUTO: 0.3 %
BILIRUB DIRECT SERPL-MCNC: 2.4 MG/DL (ref 0–0.3)
BILIRUB SERPL-MCNC: 3.8 MG/DL (ref 0–1.2)
BUN SERPL-MCNC: 23 MG/DL (ref 6–23)
BUN SERPL-MCNC: 23 MG/DL (ref 6–23)
BUN SERPL-MCNC: 24 MG/DL (ref 6–23)
CALCIUM SERPL-MCNC: 8.2 MG/DL (ref 8.6–10.6)
CALCIUM SERPL-MCNC: 8.3 MG/DL (ref 8.6–10.6)
CALCIUM SERPL-MCNC: 8.5 MG/DL (ref 8.6–10.6)
CHLORIDE SERPL-SCNC: 94 MMOL/L (ref 98–107)
CHLORIDE SERPL-SCNC: 95 MMOL/L (ref 98–107)
CHLORIDE SERPL-SCNC: 95 MMOL/L (ref 98–107)
CO2 SERPL-SCNC: 28 MMOL/L (ref 21–32)
CO2 SERPL-SCNC: 29 MMOL/L (ref 21–32)
CO2 SERPL-SCNC: 29 MMOL/L (ref 21–32)
CREAT SERPL-MCNC: 0.78 MG/DL (ref 0.5–1.3)
CREAT SERPL-MCNC: 0.86 MG/DL (ref 0.5–1.3)
CREAT SERPL-MCNC: 0.87 MG/DL (ref 0.5–1.3)
DIGOXIN SERPL-MCNC: 0.51 NG/ML (ref 0.8–?)
EGFRCR SERPLBLD CKD-EPI 2021: >90 ML/MIN/1.73M*2
EJECTION FRACTION APICAL 4 CHAMBER: 33.6
EJECTION FRACTION: 39 %
EOSINOPHIL # BLD AUTO: 0.27 X10*3/UL (ref 0–0.7)
EOSINOPHIL NFR BLD AUTO: 2.9 %
ERYTHROCYTE [DISTWIDTH] IN BLOOD BY AUTOMATED COUNT: 16 % (ref 11.5–14.5)
GLUCOSE BLD MANUAL STRIP-MCNC: 131 MG/DL (ref 74–99)
GLUCOSE BLD MANUAL STRIP-MCNC: 139 MG/DL (ref 74–99)
GLUCOSE BLD MANUAL STRIP-MCNC: 141 MG/DL (ref 74–99)
GLUCOSE BLD MANUAL STRIP-MCNC: 144 MG/DL (ref 74–99)
GLUCOSE BLD MANUAL STRIP-MCNC: 152 MG/DL (ref 74–99)
GLUCOSE BLD MANUAL STRIP-MCNC: 183 MG/DL (ref 74–99)
GLUCOSE SERPL-MCNC: 126 MG/DL (ref 74–99)
GLUCOSE SERPL-MCNC: 133 MG/DL (ref 74–99)
GLUCOSE SERPL-MCNC: 150 MG/DL (ref 74–99)
HCT VFR BLD AUTO: 28 % (ref 41–52)
HGB BLD-MCNC: 9.6 G/DL (ref 13.5–17.5)
HOLD SPECIMEN: NORMAL
IMM GRANULOCYTES # BLD AUTO: 0.07 X10*3/UL (ref 0–0.7)
IMM GRANULOCYTES NFR BLD AUTO: 0.7 % (ref 0–0.9)
LABORATORY COMMENT REPORT: NORMAL
LABORATORY COMMENT REPORT: NORMAL
LDH SERPL L TO P-CCNC: 296 U/L (ref 84–246)
LEFT VENTRICLE INTERNAL DIMENSION DIASTOLE: 4.4 CM (ref 3.5–6)
LYMPHOCYTES # BLD AUTO: 0.26 X10*3/UL (ref 1.2–4.8)
LYMPHOCYTES NFR BLD AUTO: 2.8 %
MAGNESIUM SERPL-MCNC: 1.93 MG/DL (ref 1.6–2.4)
MAGNESIUM SERPL-MCNC: 1.95 MG/DL (ref 1.6–2.4)
MCH RBC QN AUTO: 29.4 PG (ref 26–34)
MCHC RBC AUTO-ENTMCNC: 34.3 G/DL (ref 32–36)
MCV RBC AUTO: 86 FL (ref 80–100)
MONOCYTES # BLD AUTO: 0.6 X10*3/UL (ref 0.1–1)
MONOCYTES NFR BLD AUTO: 6.4 %
NEUTROPHILS # BLD AUTO: 8.14 X10*3/UL (ref 1.2–7.7)
NEUTROPHILS NFR BLD AUTO: 86.9 %
NRBC BLD-RTO: 0 /100 WBCS (ref 0–0)
PATH REPORT.FINAL DX SPEC: NORMAL
PATH REPORT.GROSS SPEC: NORMAL
PATH REPORT.RELEVANT HX SPEC: NORMAL
PATH REPORT.TOTAL CANCER: NORMAL
PH FLD: 7.29 [PH]
PHOSPHATE SERPL-MCNC: 3.4 MG/DL (ref 2.5–4.9)
PHOSPHATE SERPL-MCNC: 3.4 MG/DL (ref 2.5–4.9)
PHOSPHATE SERPL-MCNC: 3.7 MG/DL (ref 2.5–4.9)
PLATELET # BLD AUTO: 254 X10*3/UL (ref 150–450)
POTASSIUM SERPL-SCNC: 3.7 MMOL/L (ref 3.5–5.3)
POTASSIUM SERPL-SCNC: 3.8 MMOL/L (ref 3.5–5.3)
POTASSIUM SERPL-SCNC: 4.1 MMOL/L (ref 3.5–5.3)
RBC # BLD AUTO: 3.27 X10*6/UL (ref 4.5–5.9)
RESIDENT REVIEW: NORMAL
RIGHT VENTRICLE FREE WALL PEAK S': 6.74 CM/S
RIGHT VENTRICLE PEAK SYSTOLIC PRESSURE: 41.9 MMHG
SODIUM SERPL-SCNC: 132 MMOL/L (ref 136–145)
SODIUM SERPL-SCNC: 133 MMOL/L (ref 136–145)
SODIUM SERPL-SCNC: 133 MMOL/L (ref 136–145)
TRICUSPID ANNULAR PLANE SYSTOLIC EXCURSION: 1.2 CM
URATE SERPL-MCNC: 2.7 MG/DL (ref 4–7.5)
WBC # BLD AUTO: 9.4 X10*3/UL (ref 4.4–11.3)

## 2024-09-14 PROCEDURE — 2500000001 HC RX 250 WO HCPCS SELF ADMINISTERED DRUGS (ALT 637 FOR MEDICARE OP)

## 2024-09-14 PROCEDURE — 82248 BILIRUBIN DIRECT: CPT

## 2024-09-14 PROCEDURE — 93308 TTE F-UP OR LMTD: CPT | Performed by: INTERNAL MEDICINE

## 2024-09-14 PROCEDURE — 37799 UNLISTED PX VASCULAR SURGERY: CPT

## 2024-09-14 PROCEDURE — 2500000004 HC RX 250 GENERAL PHARMACY W/ HCPCS (ALT 636 FOR OP/ED)

## 2024-09-14 PROCEDURE — 93321 DOPPLER ECHO F-UP/LMTD STD: CPT | Performed by: INTERNAL MEDICINE

## 2024-09-14 PROCEDURE — 80069 RENAL FUNCTION PANEL: CPT

## 2024-09-14 PROCEDURE — 71045 X-RAY EXAM CHEST 1 VIEW: CPT | Performed by: STUDENT IN AN ORGANIZED HEALTH CARE EDUCATION/TRAINING PROGRAM

## 2024-09-14 PROCEDURE — 2500000005 HC RX 250 GENERAL PHARMACY W/O HCPCS

## 2024-09-14 PROCEDURE — 83735 ASSAY OF MAGNESIUM: CPT

## 2024-09-14 PROCEDURE — 82247 BILIRUBIN TOTAL: CPT

## 2024-09-14 PROCEDURE — 94003 VENT MGMT INPAT SUBQ DAY: CPT

## 2024-09-14 PROCEDURE — 93325 DOPPLER ECHO COLOR FLOW MAPG: CPT | Performed by: INTERNAL MEDICINE

## 2024-09-14 PROCEDURE — 99233 SBSQ HOSP IP/OBS HIGH 50: CPT | Performed by: INTERNAL MEDICINE

## 2024-09-14 PROCEDURE — 94669 MECHANICAL CHEST WALL OSCILL: CPT

## 2024-09-14 PROCEDURE — 93325 DOPPLER ECHO COLOR FLOW MAPG: CPT

## 2024-09-14 PROCEDURE — 3E03305 INTRODUCTION OF OTHER ANTINEOPLASTIC INTO PERIPHERAL VEIN, PERCUTANEOUS APPROACH: ICD-10-PCS

## 2024-09-14 PROCEDURE — 83615 LACTATE (LD) (LDH) ENZYME: CPT

## 2024-09-14 PROCEDURE — 2020000001 HC ICU ROOM DAILY

## 2024-09-14 PROCEDURE — 82947 ASSAY GLUCOSE BLOOD QUANT: CPT

## 2024-09-14 PROCEDURE — 51702 INSERT TEMP BLADDER CATH: CPT

## 2024-09-14 PROCEDURE — 80162 ASSAY OF DIGOXIN TOTAL: CPT

## 2024-09-14 PROCEDURE — 84550 ASSAY OF BLOOD/URIC ACID: CPT

## 2024-09-14 PROCEDURE — 2500000002 HC RX 250 W HCPCS SELF ADMINISTERED DRUGS (ALT 637 FOR MEDICARE OP, ALT 636 FOR OP/ED): Performed by: STUDENT IN AN ORGANIZED HEALTH CARE EDUCATION/TRAINING PROGRAM

## 2024-09-14 PROCEDURE — 2500000004 HC RX 250 GENERAL PHARMACY W/ HCPCS (ALT 636 FOR OP/ED): Performed by: STUDENT IN AN ORGANIZED HEALTH CARE EDUCATION/TRAINING PROGRAM

## 2024-09-14 PROCEDURE — 99223 1ST HOSP IP/OBS HIGH 75: CPT | Performed by: THORACIC SURGERY (CARDIOTHORACIC VASCULAR SURGERY)

## 2024-09-14 PROCEDURE — 85025 COMPLETE CBC W/AUTO DIFF WBC: CPT

## 2024-09-14 PROCEDURE — 82960 TEST FOR G6PD ENZYME: CPT

## 2024-09-14 PROCEDURE — 93010 ELECTROCARDIOGRAM REPORT: CPT | Performed by: INTERNAL MEDICINE

## 2024-09-14 PROCEDURE — 99291 CRITICAL CARE FIRST HOUR: CPT

## 2024-09-14 RX ORDER — PROCHLORPERAZINE MALEATE 10 MG
10 TABLET ORAL EVERY 6 HOURS PRN
Status: DISCONTINUED | OUTPATIENT
Start: 2024-09-14 | End: 2024-09-24 | Stop reason: HOSPADM

## 2024-09-14 RX ORDER — FAMOTIDINE 10 MG/ML
20 INJECTION INTRAVENOUS AS NEEDED
Status: DISCONTINUED | OUTPATIENT
Start: 2024-09-14 | End: 2024-09-24

## 2024-09-14 RX ORDER — DIPHENHYDRAMINE HYDROCHLORIDE 50 MG/ML
50 INJECTION INTRAMUSCULAR; INTRAVENOUS AS NEEDED
Status: DISCONTINUED | OUTPATIENT
Start: 2024-09-14 | End: 2024-09-24

## 2024-09-14 RX ORDER — OLANZAPINE 5 MG/1
5 TABLET ORAL NIGHTLY
Status: COMPLETED | OUTPATIENT
Start: 2024-09-14 | End: 2024-09-17

## 2024-09-14 RX ORDER — PROCHLORPERAZINE EDISYLATE 5 MG/ML
10 INJECTION INTRAMUSCULAR; INTRAVENOUS EVERY 6 HOURS PRN
Status: DISCONTINUED | OUTPATIENT
Start: 2024-09-14 | End: 2024-09-24 | Stop reason: HOSPADM

## 2024-09-14 RX ORDER — DIGOXIN 0.25 MG/ML
250 INJECTION INTRAMUSCULAR; INTRAVENOUS ONCE
Status: COMPLETED | OUTPATIENT
Start: 2024-09-14 | End: 2024-09-14

## 2024-09-14 RX ORDER — EPINEPHRINE 1 MG/ML
0.3 INJECTION, SOLUTION, CONCENTRATE INTRAVENOUS EVERY 5 MIN PRN
Status: DISCONTINUED | OUTPATIENT
Start: 2024-09-14 | End: 2024-09-24

## 2024-09-14 RX ORDER — ALBUTEROL SULFATE 0.83 MG/ML
3 SOLUTION RESPIRATORY (INHALATION) AS NEEDED
Status: DISCONTINUED | OUTPATIENT
Start: 2024-09-14 | End: 2024-09-24

## 2024-09-14 RX ORDER — ALLOPURINOL 300 MG/1
300 TABLET ORAL DAILY
Status: DISCONTINUED | OUTPATIENT
Start: 2024-09-14 | End: 2024-09-24

## 2024-09-14 RX ADMIN — ONDANSETRON 16 MG: 2 INJECTION INTRAMUSCULAR; INTRAVENOUS at 17:27

## 2024-09-14 RX ADMIN — Medication 125 MCG/HR: at 01:05

## 2024-09-14 RX ADMIN — PROPOFOL 25 MCG/KG/MIN: 10 INJECTION, EMULSION INTRAVENOUS at 07:26

## 2024-09-14 RX ADMIN — PROPOFOL 25 MCG/KG/MIN: 10 INJECTION, EMULSION INTRAVENOUS at 20:00

## 2024-09-14 RX ADMIN — OLANZAPINE 5 MG: 5 TABLET, FILM COATED ORAL at 22:52

## 2024-09-14 RX ADMIN — Medication 100 MCG/HR: at 15:57

## 2024-09-14 RX ADMIN — Medication 0.05 MCG/KG/MIN: at 17:07

## 2024-09-14 RX ADMIN — SENNOSIDES AND DOCUSATE SODIUM 2 TABLET: 50; 8.6 TABLET ORAL at 22:52

## 2024-09-14 RX ADMIN — AMIODARONE HYDROCHLORIDE 0.5 MG/MIN: 1.8 INJECTION, SOLUTION INTRAVENOUS at 18:59

## 2024-09-14 RX ADMIN — PROPOFOL 25 MCG/KG/MIN: 10 INJECTION, EMULSION INTRAVENOUS at 01:05

## 2024-09-14 RX ADMIN — Medication 125 MCG/HR: at 06:15

## 2024-09-14 RX ADMIN — AMIODARONE HYDROCHLORIDE 0.5 MG/MIN: 1.8 INJECTION, SOLUTION INTRAVENOUS at 08:09

## 2024-09-14 RX ADMIN — POLYETHYLENE GLYCOL 3350 17 G: 17 POWDER, FOR SOLUTION ORAL at 09:05

## 2024-09-14 RX ADMIN — PROPOFOL 25 MCG/KG/MIN: 10 INJECTION, EMULSION INTRAVENOUS at 14:22

## 2024-09-14 RX ADMIN — DEXAMETHASONE SODIUM PHOSPHATE 12 MG: 10 INJECTION, SOLUTION INTRAMUSCULAR; INTRAVENOUS at 15:41

## 2024-09-14 RX ADMIN — FOSAPREPITANT 150 MG: 150 INJECTION, POWDER, LYOPHILIZED, FOR SOLUTION INTRAVENOUS at 16:26

## 2024-09-14 RX ADMIN — SENNOSIDES AND DOCUSATE SODIUM 2 TABLET: 50; 8.6 TABLET ORAL at 09:05

## 2024-09-14 RX ADMIN — PANTOPRAZOLE SODIUM 40 MG: 40 INJECTION, POWDER, FOR SOLUTION INTRAVENOUS at 06:14

## 2024-09-14 RX ADMIN — CEFTRIAXONE SODIUM 1 G: 1 INJECTION, SOLUTION INTRAVENOUS at 17:06

## 2024-09-14 RX ADMIN — DIGOXIN 250 MCG: 0.25 INJECTION INTRAMUSCULAR; INTRAVENOUS at 13:13

## 2024-09-14 RX ADMIN — ALLOPURINOL 300 MG: 300 TABLET ORAL at 09:05

## 2024-09-14 RX ADMIN — CARBOPLATIN 600 MG: 10 INJECTION, SOLUTION INTRAVENOUS at 17:32

## 2024-09-14 RX ADMIN — SODIUM CHLORIDE 110 MG: 0.9 INJECTION, SOLUTION INTRAVENOUS at 18:34

## 2024-09-14 ASSESSMENT — PAIN SCALES - GENERAL
PAINLEVEL_OUTOF10: 0 - NO PAIN
PAINLEVEL_OUTOF10: 0 - NO PAIN

## 2024-09-14 ASSESSMENT — PAIN - FUNCTIONAL ASSESSMENT
PAIN_FUNCTIONAL_ASSESSMENT: CPOT (CRITICAL CARE PAIN OBSERVATION TOOL)

## 2024-09-14 NOTE — PROGRESS NOTES
Subjective Data:  Minimal pericardial drain output over last 24 hours. Still on pressors. Receiving radiation for SCLC.     Objective Data:  Last Recorded Vitals:  Vitals:    09/14/24 1300 09/14/24 1400 09/14/24 1405 09/14/24 1423   BP:       Pulse: 95 90     Resp: 16 16  17   Temp:       TempSrc:       SpO2: 93% 94%     Weight:   (S) 98.2 kg (216 lb 7.9 oz)    Height:           Last Labs:  CBC - 9/14/2024:  5:34 AM  9.4 9.6 254    28.0      CMP - 9/14/2024:  5:34 AM  8.2 5.4 37 --- 3.8   3.7 2.4 25 79      PTT - 9/9/2024: 10:01 PM  1.3        Last I/O:  I/O last 3 completed shifts:  In: 2433.2 (24.8 mL/kg) [I.V.:2223.2 (22.7 mL/kg); NG/GT:160; IV Piggyback:50]  Out: 1468 (15 mL/kg) [Urine:1460 (0.4 mL/kg/hr); Drains:8]  Weight: 98 kg      Echo:  TTE 9/14  CONCLUSIONS:   1. [place] and mid and apical inferior septum are abnormal.   2. Left ventricular ejection fraction is moderately decreased, calculated by Pike's biplane at 39%.   3. There is global hypokinesis of the left ventricle with minor regional variations.   4. There is normal right ventricular global systolic function.   5. There is a large pleural effusion.   6. Mildly elevated right ventricular systolic pressure.   7. The inferior vena cava appears severely dilated, IVC inspiratory collapse is absent.    Transthoracic Echo (TTE) Limited 09/09/2024  CONCLUSIONS:   1. Poorly visualized anatomical structures due to suboptimal image quality.   2. Endocardial definition and arrhythmia preclude accurate assessment of LVEF.   3. Unable to determine right ventricular systolic function.   4. There is a trivial pericardial effusion.   5. Compared with study dated 9/9/2024, the previous study was done michaela-pericardiocentesis, effusion remains trivial and both studies are significantly technically limited.     Transthoracic Echo (TTE) Limited 09/09/2024  CONCLUSIONS:   1. The left ventricle was not well visualized. The left ventricular ejection fraction could not be  measured.   2. There is normal right ventricular global systolic function.   3. There is a moderate pericardial effusion.   4. There is a suggestion of brief RV diastolic collapse and brief RA collapse on pre-tap images. Tap and post-tap images were obtained in the 4-C view only. Trivial to small effusion on final images.   5. Very limited 2D study only before and during pericardiocentesis.     Transthoracic Echo (TTE) Limited 09/09/2024  CONCLUSIONS:   1. The left ventricular systolic function is moderately decreased, with a visually estimated ejection fraction of 30-35%.   2. There is global hypokinesis of the left ventricle with minor regional variations.   3. Left ventricular diastolic filling was indeterminate.   4. There is normal right ventricular global systolic function.   5. Mild diastolic collapse of the right ventricle and brief right atrium diastolic collapse.   6. There is a moderate pericardial effusion.   7. The pulmonary artery is not well visualized.   8. The inferior vena cava appears moderately dilated.   9. There are findings of early echocardiographic tamponade with buckling of RV free wall during late diastolic and brief end diastole RA collapse. The quality of the transvalvular Doppler signals was not clear enough to measure respirophasic change in ventricular filling.     Ejection Fractions:        EF   Date/Time Value Ref Range Status   09/09/2024 09:57 AM 33 %        Cath:  Cardiac Catheterization Procedure 09/09/2024  Ejection Fractions:  EF   Date/Time Value Ref Range Status   09/14/2024 11:09 AM 39 %    09/11/2024 01:00 PM 42 %    09/09/2024 09:57 AM 33 %        Inpatient Medications:  Scheduled medications   Medication Dose Route Frequency    allopurinol  300 mg oral Daily    CARBOplatin  600 mg intravenous Once    cefTRIAXone  1 g intravenous q24h    dexAMETHasone  12 mg intravenous Once    pantoprazole  40 mg oral Daily before breakfast    Or    esomeprazole  40 mg nasoduodenal tube  Daily before breakfast    Or    pantoprazole  40 mg intravenous Daily before breakfast    etoposide  50 mg/m2 (Treatment Plan Recorded) intravenous Once    fosaprepitant  150 mg intravenous Once    OLANZapine  5 mg oral Nightly    ondansetron  16 mg intravenous Once    perflutren lipid microspheres  0.5-10 mL of dilution intravenous Once in imaging    polyethylene glycol  17 g nasogastric tube Daily    sennosides-docusate sodium  2 tablet nasogastric tube BID     PRN medications   Medication    albuterol    dextrose    dextrose    dextrose    diphenhydrAMINE    EPINEPHrine HCl    famotidine    glucagon    glucagon    methylPREDNISolone sodium succinate (PF)    oxygen    prochlorperazine    prochlorperazine    sodium chloride     Continuous Medications   Medication Dose Last Rate    amiodarone  0.5-1 mg/min 0.5 mg/min (09/14/24 1200)    fentaNYL  0-300 mcg/hr 100 mcg/hr (09/14/24 1100)    norepinephrine  0-0.2 mcg/kg/min 0.07 mcg/kg/min (09/14/24 1200)    propofol  5-50 mcg/kg/min 25 mcg/kg/min (09/14/24 1422)       Physical Exam:  General: intubated sedated   Skin: warm and dry   Cardiac: RRR, S1, S2   Pulm: CTAB, room air   GI: soft, nontender   Extremities: no LE edema  Neuro: no focal neuro deficits   Psych: appropriate mood and behavior        Assessment/Plan   Khoa Mitchell is a 69 y.o. male with PMH of T2DM, CKD2 (baseline Cr 1.2) admitted for concern for SVC syndrome likely 2/2 to new diagnosis of metastatic cancer. Course c/b acute hypoxic resp failure s/p intubation and moderate pericardial effusion with early tamponade physiology s/p pericardiocentesis and post-procedure development of CHB requiring TVP (now discontinued with recovery of conduction). Cardiology consulted for pericardial effusion and cardiomyopathy.      # Malignant pericardial effusion  # Cardiac tamponade, resolved s/p pericardiocentesis/drain  Cytology is positive for malignant cells, though team still working up primary cancer. Drain  output over the past 24h is minimal, effusion still trivial-small.   - drain mgmt per interventional cardiology note 9/11  - Consult CT surgery regarding pericardial window (if within the patient's goals of care) since patients effusion likely due to malignancy and may reoccur.   - pending CT surgery evaluation and continued minimal output, consider pulling the drain tomorrow     # HFrEF  - no prior known hx of CM or echo on EMR  - echo 9/11/24:  Mid and apical inferior septum and apex are abnormal. EF 42%, trivial to small pericardial effusion. There is a large pleural effusion.  Unclear chronicity or etiology at this point. Depending on the patient's prognosis and goals of care/clinical course this admission, will discuss further workup and ischemic evaluation when more stable.  - hold off initiating GDMT in the setting of septic shock  - consider further work up/ischemic eval pending clinical course this admission    CHB requiring TVP-resolved  Afib  - Followed by EP  - on amio gtt       Thank you for involving us in this patient's care. Recommendations discussed with Dr. Blue     General Cardiology Consult Pager: [phone] (weekday 7AM-6PM and weekend 7AM-2PM) and other: [phone]  EP Consult Pager: [phone] (weekday 7AM-6PM and weekend 7AM-2PM) and other: [phone]  CICU Fellow Pager: [phone] anytime  EP Device Nurse Pager: [phone] (weekday 7AM-4PM)  Advanced Heart Failure Consult Pager: [phone] anytime Code Status:  Full Code      Devang Fraire MD  Cardiology Fellow

## 2024-09-14 NOTE — CARE PLAN
Problem: Skin  Goal: Decreased wound size/increased tissue granulation at next dressing change  Outcome: Progressing  Flowsheets (Taken 9/14/2024 1118)  Decreased wound size/increased tissue granulation at next dressing change: Protective dressings over bony prominences  Goal: Participates in plan/prevention/treatment measures  Outcome: Progressing  Flowsheets (Taken 9/14/2024 1118)  Participates in plan/prevention/treatment measures: Elevate heels  Goal: Promote/optimize nutrition  Outcome: Progressing  Flowsheets (Taken 9/14/2024 1118)  Promote/optimize nutrition: Discuss with provider if NPO > 2 days  Goal: Promote skin healing  Outcome: Progressing  Flowsheets (Taken 9/14/2024 1118)  Promote skin healing: Turn/reposition every 2 hours/use positioning/transfer devices     Problem: Safety - Medical Restraint  Goal: Remains free of injury from restraints (Restraint for Interference with Medical Device)  Outcome: Progressing  Flowsheets (Taken 9/14/2024 1118)  Remains free of injury from restraints (restraint for interference with medical device): Every 2 hours: Monitor safety, psychosocial status, comfort, nutrition and hydration  Goal: Free from restraint(s) (Restraint for Interference with Medical Device)  Outcome: Progressing  Flowsheets (Taken 9/14/2024 1118)  Free from restraint(s) (restraint for interference with medical device): ONCE/SHIFT or MINIMUM Every 12 hours: Assess and document the continuing need for restraints

## 2024-09-14 NOTE — PROGRESS NOTES
Khoa Mitchell is a 69 y.o. male on day 7 of admission presenting with Metastatic malignant neoplasm, unspecified site (Multi). S/p cardiac tamponade and pericardiocentesis w/ pericardial drain left in place on 9/9.    Subjective   Patient is intubated , minimal pericardial drainage 10cc over last 24hr. Patient on pressor support.       Objective     General: intubated, sedated  Skin: warm and dry   Head/ neck: no JVD seen at 90 degrees  Cardiac: RRR, subcostal pericardial drain in place, insertion site C/D/I; scant serosang drainage in drainage bag   Pulm: intubated  GI: soft, nontender   Extremities: no LE edema   Neuro: RICHARD intubated, sedated  Psych: intubated, sedated        Last Recorded Vitals  Blood pressure 132/50, pulse 82, temperature 35.8 °C (96.4 °F), resp. rate 16, height 1.829 m (6'), weight (S) 98.2 kg (216 lb 7.9 oz), SpO2 94%.  Intake/Output last 3 Shifts:  I/O last 3 completed shifts:  In: 2433.2 (24.8 mL/kg) [I.V.:2223.2 (22.7 mL/kg); NG/GT:160; IV Piggyback:50]  Out: 1468 (15 mL/kg) [Urine:1460 (0.4 mL/kg/hr); Drains:8]  Weight: 98 kg     Relevant Results          XR chest 1 view    Result Date: 9/14/2024  Interpreted By:  Edvin Ocampo, STUDY: XR CHEST 1 VIEW;  9/14/2024 2:22 pm   INDICATION: Signs/Symptoms:L effusion.   COMPARISON: Chest radiograph 09/13/2024; abdominopelvic CT scan 09/07/2024   ACCESSION NUMBER(S): [id number]   ORDERING CLINICIAN: JOSE SMITH   FINDINGS: AP radiograph of the chest. Endotracheal tube tip now terminates approximately 5.5 cm superior to jose antonio. Enteric tube is again seen coursing below diaphragm and tip not included in field of view. A pericardial drain again seen in place.   CARDIOMEDIASTINAL SILHOUETTE: Cardiomediastinal silhouette is again completely obscured on the left.   LUNGS: Interval worsening of left upper lung aeration with now complete opacification of left hemithorax visualized. Slight worsening of hazy and patchy right lung airspace opacities, which  may represent increasing edema/atelectasis/consolidation. Similar small right pleural effusion. There is no pneumothorax.   ABDOMEN: No remarkable upper abdominal findings.   BONES: No acute osseous abnormality.       1. Complete opacification of left hemithorax, correlating with combination of left lung mass and pleural effusion. Left lung aeration is worsened as compared to prior radiograph from yesterday. 2. Slight worsening of right lung patchy airspace opacities with similar small right pleural effusion. 3. Medical devices as above.   Signed by: Edvin Ocampo 9/14/2024 2:34 PM Dictation workstation:   [id number]    Transthoracic Echo (TTE) Limited    Result Date: 9/14/2024   [facility], [address] [place] [address]                Tel [phone] and Fax [phone] TRANSTHORACIC ECHOCARDIOGRAM REPORT  Patient Name:      APPLE CHARLES         Rubin Physician:    [id number] Burton Johnson MD Study Date:        9/14/2024            Ordering Provider:    [id number] FELICE SHER MRN/PID:           [id number]             Fellow: Accession#:        [id number]         Nurse: Date of Birth/Age: 1955 / 69 years Sonographer:          James Rudolph RDCS Gender:            M                    Additional Staff: Height:            182.88 cm            Admit Date: Weight:            97.98 kg             Admission Status:     Inpatient - STAT BSA / BMI:         2.20 m2 / 29.30      Encounter#:           [id number]                    kg/m2 Blood Pressure:    128/59 mmHg          Department Location:  [address] Study Type:    TRANSTHORACIC ECHO (TTE) LIMITED Diagnosis/ICD: Cardiac tamponade-I31.4 Indication:    assess pericardial effusion size s/p tap CPT Code:      Echo Limited-46167; Doppler Limited-78156;  Color Doppler-73268 Patient History: Pertinent History: Cardiac tamponade s/p pericardiocentesis 9/9/24, heart block,                    small cell lung cancer, CKD. Study Detail: The following Echo studies were performed: 2D, M-Mode, Doppler and               color flow. Technically challenging study due to body habitus,               patient lying in supine position, poor acoustic windows, prominent               lung artifact and postoperative dressings. The patient is               intubated.  PHYSICIAN INTERPRETATION: Left Ventricle: Left ventricular ejection fraction is moderately decreased, calculated by Pike's biplane at 39%. There is global hypokinesis of the left ventricle with minor regional variations. The left ventricular cavity size is normal. Abnormal (paradoxical) septal motion, consistent with an intraventricular conduction delay. Left ventricular diastolic filling was not assessed. LV Wall Scoring: The apex is akinetic. The mid and apical inferior septum is hypokinetic. Left Atrium: The left atrium is normal in size. Right Ventricle: The right ventricle is normal in size. There is normal right ventricular global systolic function. Right Atrium: The right atrium was not well visualized. Aortic Valve: The aortic valve was not well visualized. There is mild aortic valve cusp calcification. There is no evidence of aortic valve regurgitation. Mitral Valve: The mitral valve was not well visualized. There is no evidence of mitral valve regurgitation. Tricuspid Valve: The tricuspid valve was not well visualized. There is trace tricuspid regurgitation. The Doppler estimated RVSP is mildly elevated at 41.9 mmHg. Pulmonic Valve: The pulmonic valve is not well visualized. The pulmonic valve regurgitation was not well visualized. Pericardium: There is a trivial to small pericardial effusion. Pleural: There is a large left pleural effusion. Aorta: The aortic root is normal. Systemic Veins: The inferior vena  cava appears severely dilated, absent inspiratory collapse of the IVC. In comparison to the previous echocardiogram(s): Compared with study dated 9/11/2024, no significant change.  CONCLUSIONS:  1. [place] and mid and apical inferior septum are abnormal.  2. Left ventricular ejection fraction is moderately decreased, calculated by Pike's biplane at 39%.  3. There is global hypokinesis of the left ventricle with minor regional variations.  4. There is normal right ventricular global systolic function.  5. There is a large pleural effusion.  6. Mildly elevated right ventricular systolic pressure.  7. The inferior vena cava appears severely dilated, IVC inspiratory collapse is absent. QUANTITATIVE DATA SUMMARY:  2D MEASUREMENTS:          Normal Ranges: IVSd:            0.90 cm  (0.6-1.1cm) LVPWd:           0.90 cm  (0.6-1.1cm) LVIDd:           4.40 cm  (3.9-5.9cm) LVIDs:           3.50 cm LV Mass Index:   58 g/m2 LVEDV Index:     53 ml/m2 LV % FS          20.5 %  LV SYSTOLIC FUNCTION BY 2D PLANIMETRY (MOD):                      Normal Ranges: EF-A4C View:    34 % (>=55%) EF-A2C View:    48 % EF-Biplane:     39 % LV EF Reported: 39 %  RIGHT VENTRICLE: TAPSE: 12.4 mm RV s'  0.07 m/s  TRICUSPID VALVE/RVSP:          Normal Ranges: Peak TR Velocity:     3.12 m/s RV Syst Pressure:     42 mmHg  (< 30mmHg) IVC Diam:             3.10 cm  [id number] Burton Johnson MD Electronically signed on 9/14/2024 at 11:53:07 AM  Wall Scoring  ** Final **     XR chest 1 view    Result Date: 9/14/2024  Interpreted By:  Edvin Ocampo, STUDY: XR CHEST 1 VIEW;  9/13/2024 7:12 pm   INDICATION: Signs/Symptoms:Thoracentesis.   COMPARISON: Chest radiograph 09/10/2024 and abdominopelvic CT scan 09/07/2024   ACCESSION NUMBER(S): [id number]   ORDERING CLINICIAN: JOSE SMITH   FINDINGS: AP radiograph of the chest. Patient is now slightly rotated towards left. Endotracheal tube tip now terminates 8 cm superior to jose antonio and consider slight advancement. Enteric  tube is again seen coursing below diaphragm and tip not included in field of view. Right IJ approach temporary transvenous pacer has been removed. A drainage tube again overlies left lower aspect of cardiomediastinal silhouette, likely correlating with the pericardial drain.   CARDIOMEDIASTINAL SILHOUETTE: There is again complete obscuration of left heart border.   LUNGS: There is similar near complete opacification of left hemithorax, correlating with combination of left lung mass and pleural effusion on CT scan 09/07/2024. There is similar right basilar atelectasis/consolidation with suggestion of small right pleural effusion. There is no pneumothorax.   ABDOMEN: No remarkable upper abdominal findings.   BONES: No acute osseous abnormality.       1. No significant change in near complete opacification of left hemithorax, correlating with combination of left lung mass and pleural effusion on CT scan 09/07/2024. 2. Similar right basilar atelectasis/consolidation with suggestion of small right pleural effusion. 3. Medical devices as above. Consider slight advancement of endotracheal tube.   Signed by: Edvin Ocampo 9/14/2024 7:10 AM Dictation workstation:   [id number]    ECG 12 lead    Result Date: 9/13/2024  Sinus tachycardia with occasional Premature ventricular complexes Left bundle branch block Abnormal ECG No previous ECGs available See ED provider note for full interpretation and clinical correlation Confirmed by Monse Segura ([id number]) on 9/13/2024 9:06:57 PM    Electrocardiogram, 12-lead PRN ACS symptoms    Result Date: 9/13/2024  Normal sinus rhythm Left bundle branch block Abnormal ECG Confirmed by Landon Pillai ([id number]) on 9/13/2024 12:21:20 PM    Rad Onc CT Sim Images    Result Date: 9/13/2024  These images are not reportable by radiology and will not be interpreted by  Radiologists.    US guided soft tissue biopsy    Result Date: 9/12/2024  Interpreted By:  Lucio New,  and Pranay Burk  STUDY: US GUIDED SOFT TISSUE BIOPSY;  9/12/2024 11:37 am   INDICATION: Signs/Symptoms:tissue biopsy for malignancy diagnosis.   COMPARISON: CT soft tissue neck: 09/07/2024. CT angio chest 09/07/2024.   ACCESSION NUMBER(S): [id number]   ORDERING CLINICIAN: JOSE SMITH   TECHNIQUE: INTERVENTIONALIST(S): Resident: Wm Pires MD Attending: Lucio New MD   CONSENT: The patient/patient's POA/next of kin was informed of the nature of the proposed procedure. The purposes, alternatives, risks, and benefits were explained and discussed. All questions were answered and consent was obtained.   SEDATION: Moderate conscious IV sedation services (supervision of administration, induction, and maintenance) were provided by the physician performing the procedure with intravenous fentanyl 50mcg and versed 1mg for 30 minutes. The physician was assisted by an independent trained observer, an interventional radiology nurse, in the continuous monitoring of patient level of consciousness and physiologic status.   MEDICATION/CONTRAST: No additional.   TIME OUT: A time out was performed immediately prior to procedure start with the interventional team, correctly identifying the patient name, date of birth, MRN, procedure, anatomy (including marking of site and side), patient position, procedure consent form, relevant laboratory and imaging test results, antibiotic administration, safety precautions, and procedure-specific equipment needs.   COMPLICATIONS: No immediate adverse events identified.   FINDINGS: The patient remains in the semi supine position on the hospital bed intubated and alert and responding appropriately to questions. Per preprocedure discussion with the patient he stated was unable to tolerate complete supine positioning or movement from hospital bed. Limited sonographic images of the left upper chest were initially obtained for purposes of needle guidance, which demonstrated marked vascularity of the left  upper mediastinal mass. Given high-risk of bleeding and inability to move patient on to the procedure table the decision was then made to evaluate the right facial soft tissue mass anterior to the right parotid gland due to same malignant/metastatic process and much lower risk of complications. Ultrasound showed a heterogeneous predominantly hypoechoic mass that was large enough for core needle biopsy. The area of concern was prepped and draped under sterile technique.   1% lidocaine was injected subcutaneously and then into the deeper tissues surrounding the targeted area for biopsy. An 18 gauge core biopsy needle was passed into the soft tissue mass under ultrasound guidance and a total of 4 core samples were obtained. The samples were placed in formalin and RPMI.   Postprocedure images demonstrate no evidence for hemorrhage. The patient tolerated the procedure well and there were no immediate complications. Specimens were sent to pathology.       Status post successful ultrasound guided core needle biopsy of right facial/anterior parotid gland soft tissue mass as detailed above.   I was present for and/or performed the critical portions of the procedure and immediately available throughout the entire procedure.   I personally reviewed the image(s) / study and interpretation. I agree with the findings as stated.   Performed and dictated at [facility].   Signed by: Lucio New 9/12/2024 4:05 PM Dictation workstation:   [id number]    Transthoracic Echo (TTE) Limited    Result Date: 9/11/2024   [facility], [address] [place] [address]                Tel [phone] and Fax [phone] TRANSTHORACIC ECHOCARDIOGRAM REPORT  Patient Name:      APPLE Conklin Physician:    [id number] Landon Pillai MD Study Date:        9/11/2024            Ordering Provider:    [id number] JOSE HORNER  SALOMEMARIJA MRN/PID:           [id number]             Fellow: Accession#:        [id number]         Nurse: Date of Birth/Age: 1955 / 69 years Sonographer:          Asif Schwartz                                                               RDCS, RVT Gender:            M                    Additional Staff: Height:            182.88 cm            Admit Date:           9/7/2024 Weight:            95.26 kg             Admission Status:     Inpatient -                                                               Routine BSA / BMI:         2.18 m2 / 28.48      Encounter#:           [id number]                    kg/m2 Blood Pressure:    92/42 mmHg           Department Location:  [address] Study Type:    TRANSTHORACIC ECHO (TTE) LIMITED Diagnosis/ICD: Cardiac tamponade-I31.4 Indication:    s/p pericardiocentesis CPT Code:      Echo Limited-76979; Color Doppler-97929; Doppler Limited-27700 Patient History: Pertinent History: S/p pericardiocentesis. Study Detail: The following Echo studies were performed: 2D, Doppler and color               flow.  PHYSICIAN INTERPRETATION: Left Ventricle: Left ventricular ejection fraction is mildly decreased, calculated by Pike's biplane at 42%. The left ventricular cavity size is normal. Abnormal (paradoxical) septal motion, consistent with an intraventricular conduction delay. Left ventricular diastolic filling was not assessed. LV Wall Scoring: The mid and apical inferior septum and apex are hypokinetic. Left Atrium: The left atrium is upper limits of normal in size. Right Ventricle: The right ventricle is normal in size. There is low normal right ventricular systolic function. Right Atrium: The right atrium is upper limits of normal in size. Aortic Valve: The aortic valve was not well visualized. There is mild aortic valve cusp calcification. There is trace aortic valve regurgitation. Mitral Valve: The mitral valve is normal in structure. There is trace mitral valve  regurgitation. Tricuspid Valve: The tricuspid valve is structurally normal. There is trace to mild tricuspid regurgitation. Pulmonic Valve: The pulmonic valve was not assessed. Pulmonic valve regurgitation was not assessed. Pericardium: There is a trivial to small pericardial effusion. Pleural: There is a large left pleural effusion. Aorta: The aortic root was not well visualized. There is no dilatation of the aortic root. Pulmonary Artery: The tricuspid regurgitant velocity is 2.58 m/s, and with an estimated right atrial pressure of 10 mmHg, the estimated pulmonary artery pressure is mildly elevated with the RVSP at 36.6 mmHg. Systemic Veins: The inferior vena cava was not well visualized; possibly mildly dilated, on vent.  CONCLUSIONS:  1. Mid and apical inferior septum and apex are abnormal.  2. Left ventricular ejection fraction is mildly decreased, calculated by Pike's biplane at 42%.  3. There is low normal right ventricular systolic function.  4. There is a trivial to small pericardial effusion.  5. There is a large pleural effusion. QUANTITATIVE DATA SUMMARY:  2D MEASUREMENTS:          Normal Ranges: LVEDV Index:     61 ml/m2  AORTA MEASUREMENTS:         Normal Ranges: Ao Sinus, d:        3.60 cm (2.1-3.5cm)  LV SYSTOLIC FUNCTION BY 2D PLANIMETRY (MOD):                      Normal Ranges: EF-A4C View:    40 % (>=55%) EF-A2C View:    44 % EF-Biplane:     42 % LV EF Reported: 42 %  TRICUSPID VALVE/RVSP:          Normal Ranges: Peak TR Velocity:     2.58 m/s RV Syst Pressure:     37 mmHg  (< 30mmHg) IVC Diam:             2.40 cm  [id number] Landon Pillai MD Electronically signed on 9/11/2024 at 2:12:55 PM  Wall Scoring  ** Final **     Transthoracic Echo (TTE) Limited    Result Date: 9/10/2024   [facility], [address] [place] [address]                Tel [phone] and Fax [phone] TRANSTHORACIC ECHOCARDIOGRAM REPORT  Patient Name:      APPLE Conklin           [id number]  Sarath Abel                                        Physician:        MD Study Date:        9/9/2024            Ordering          [id number] JOSE SMITH                                        Provider: MRN/PID:           [id number]            Fellow: Accession#:        [id number]        Nurse: Date of Birth/Age: 1955 / 69      Sonographer:      APRYL GARCIA Gender:            M                   Additional Staff: Height:                                Admit Date:       9/7/2024 Weight:                                Admission Status: Inpatient - Routine BSA / BMI:         m2 / kg/m2          Encounter#:       [id number] Study Type:    TRANSTHORACIC ECHO (TTE) LIMITED Diagnosis/ICD: Other pericardial effusion (noninflammatory)-I31.39 CPT Code:      Echo Limited-94130; Doppler Limited-86426  Study Detail: The following Echo studies were performed: 2D and Doppler.  PHYSICIAN INTERPRETATION: Left Ventricle: The left ventricle was not well visualized. The left ventricular ejection fraction could not be measured. The left ventricular cavity size is normal. The septal wall motion is consistent with LV-RV interaction. Left ventricular diastolic filling was not assessed. Endocardial definition and arrhythmia preclude accurate assessment of LVEF. Left Atrium: The left atrium was not well visualized. Right Ventricle: The right ventricle is normal in size. Unable to determine right ventricular systolic function. Right Atrium: The right atrium was not well visualized. Aortic Valve: The aortic valve was not well visualized. Aortic valve regurgitation was not assessed. Mitral Valve: The mitral valve was not well visualized. Mitral valve regurgitation was not assessed. Tricuspid Valve: The tricuspid valve was not well visualized. Tricuspid regurgitation was not assessed. The right ventricular systolic pressure is unable to be estimated. Pulmonic Valve: The pulmonic valve was not assessed. Pulmonic valve  regurgitation was not assessed. Pericardium: There is a trivial pericardial effusion. Aorta: The aortic root was not well visualized. The ascending aorta was not well visualized. Systemic Veins: The inferior vena cava was not well visualized. In comparison to the previous echocardiogram(s): Compared with study dated 9/9/2024, the previous study was done michaela-pericardiocentesis, effusion remains trivial and both studies are significantly technically limited.  CONCLUSIONS:  1. Poorly visualized anatomical structures due to suboptimal image quality.  2. Endocardial definition and arrhythmia preclude accurate assessment of LVEF.  3. Unable to determine right ventricular systolic function.  4. There is a trivial pericardial effusion.  5. Compared with study dated 9/9/2024, the previous study was done michaela-pericardiocentesis, effusion remains trivial and both studies are significantly technically limited. QUANTITATIVE DATA SUMMARY:  [id number] Sarath Abel MD Electronically signed on 9/10/2024 at 5:18:23 PM  ** Final **     Electrocardiogram, 12-lead PRN ACS symptoms    Result Date: 9/10/2024  Marked sinus bradycardia with AV dissociation and Idioventricular rhythm Nonspecific intraventricular block Abnormal ECG When compared with ECG of 08-SEP-2024 16:30, Idioventricular rhythm has replaced Sinus rhythm Vent. rate has decreased BY  52 BPM Confirmed by True Espinosa ([id number]) on 9/10/2024 4:03:14 PM    XR chest 1 view    Result Date: 9/10/2024  Interpreted By:  Iam León, STUDY: XR CHEST 1 VIEW;  9/10/2024 7:51 am   INDICATION: Signs/Symptoms:post bronch.     COMPARISON: Exam dated 09/09/2024   ACCESSION NUMBER(S): [id number]   ORDERING CLINICIAN: NARCISA NEAL   FINDINGS: AP radiograph of the chest was provided.   Endotracheal tube tip is 6.9 cm above the jose antonio. Right IJ transvenous pacing lead is again seen with the tip projecting over the right ventricle. Stable positioning of pigtail drain overlying the cardiac  silhouette. Enteric tube projects over the esophagus, tip not visualized.   CARDIOMEDIASTINAL SILHOUETTE: Continued obscuration of the left cardiomediastinal contours.   LUNGS: Slight interval improvement in aeration within the left upper lung, though there is persistent opacification over much of the left hemithorax. Stable small right pleural effusion with right basilar opacity. No pneumothorax.   ABDOMEN: No remarkable upper abdominal findings.   BONES: No acute osseous changes.       1.  Slight interval improvement in left upper lung aeration with otherwise persistent near-complete opacification of the left hemithorax likely representing a combination of residual atelectasis/consolidation and layering effusion. 2. Stable small right pleural effusion. 3. Medical devices as above.       MACRO: None   Signed by: Iam León 9/10/2024 9:29 AM Dictation workstation:   [id number]    XR chest 1 view    Result Date: 9/10/2024  Interpreted By:  Endy Burgess and Bartolomei Aguilar Christopher STUDY: XR CHEST 1 VIEW;  9/9/2024 10:53 pm   INDICATION: Signs/Symptoms:SOB.   COMPARISON: Chest radiograph 09/08/2024   ACCESSION NUMBER(S): [id number]   ORDERING CLINICIAN: JOSE SMITH   FINDINGS: Supine AP radiographs of the chest were provided.   Endotracheal tube positioned 6.8 cm above the jose antonio. Right internal jugular central venous catheter with distal tip projecting over the superior cavoatrial junction. Enteric tube coursing below the level of the diaphragm, with distal tip projecting over the left hemiabdomen in the expected location of the gastric body.   Surgical drain/chest tube overlies the superior cardiomediastinal silhouette.   CARDIOMEDIASTINAL SILHOUETTE: Complete obscuration of the left heart border.   LUNGS: Complete opacification of the left lung may be secondary to collapse versus effusion. Trachea is at the midline. Linear right basilar airspace opacities with blunting of the right costophrenic angle  suggesting effusion with associated atelectasis. There is interstitial edema within the right lung. No detectable pneumothorax.   ABDOMEN: No remarkable upper abdominal findings.   BONES: No acute osseous changes.       1. Complete opacification of the left lung may be secondary to collapse versus large pleural effusion. 2. Placement of pericardial drain 3. Moderate right-sided pleural effusion with right basilar atelectasis and interstitial edema. 4. Medical lines/devices as above.   I personally reviewed the images/study and I agree with the findings as stated by Isidoro Mack MD (Radiology Resident). This study was interpreted at [place], [place].   MACRO: None   Signed by: Endy Burgess 9/10/2024 9:08 AM Dictation workstation:   [id number]    Electrocardiogram, 12-lead PRN ACS symptoms    Result Date: 9/10/2024  Sinus tachycardia with occasional Premature ventricular complexes Left bundle branch block Abnormal ECG When compared with ECG of 09-SEP-2024 22:13, Sinus rhythm has replaced Atrial fibrillation    Electrocardiogram, 12-lead PRN ACS symptoms    Result Date: 9/10/2024  Atrial fibrillation with rapid ventricular response Rightward axis Nonspecific intraventricular block Abnormal ECG When compared with ECG of 09-SEP-2024 20:59, No significant change was found    Electrocardiogram, 12-lead PRN ACS symptoms    Result Date: 9/10/2024  Atrial fibrillation with rapid ventricular response Rightward axis Nonspecific intraventricular block Cannot rule out Anterior infarct , age undetermined T wave abnormality, consider inferolateral ischemia Abnormal ECG When compared with ECG of 09-SEP-2024 15:08, Atrial fibrillation has replaced Sinus rhythm Vent. rate has increased BY  65 BPM    Cardiac Catheterization Procedure    Result Date: 9/9/2024   [facility], Cath Lab, [address] [place] [address] Cardiovascular Catheterization  Report Patient Name:      APPLE CHARLES         Performing Physician:  [id number] Bryan Graham MD Study Date:        9/9/2024             Verifying Physician:   [id number] Bryan Graham MD MRN/PID:           [id number]             Cardiologist/Co-Scrub: Accession#:        [id number]         Ordering Provider:     [id number] JOSE SMITH Date of Birth/Age: 1955 / 69 years Cardiologist: Gender:            M                    Fellow:                González Winslow Admit Date:                             Fellow:                Tray Faulkner Encounter#:        [id number]           Surgeon:  Study: Pericardiocentesis  Indications: APPLE CHARLES is a 69 year old male who presents with Cardiac Tamponade.  Procedure Description Comments: Lidocaine was administered subxiphoid. A micropuncture needle was advanced using the subxiphoid approach until pericardial fluid was aspirated. Microwire was placed over the needle which was subsequently exchanged for a J wire. After dilation of the track, a pigtail catheter was exchanged over the J wire. 400 cc of serous fluid was removed and pericardial drain was placed. Echo was performed during the entirety of the case which showed resolution of tamponade. Shortly following the conclusion of the case, patient rhythm was complete heart block. Patient became hypotensive and patient transiently required dopamine, levophed, and epinpehrine. A right IJ transvenous pacemaker was placed with appropriate capture.  Hemo Personnel: +--------------------+---------+ Name                Duty      +--------------------+---------+ Bryan Graham MD 1 +--------------------+---------+  Complications: Heart block/Conduction abnormality.  Cardiac Cath Post Procedure Notes: Post Procedure Diagnosis: Cardiac  tamponade. Blood Loss:               Estimated blood loss during the procedure was 5 cc                           mls. Specimens Removed:        Number of specimen(s) removed: none. ____________________________________________________________________________________ CONCLUSIONS:  1. Resoluton of cardiac tamponade with 400 cc of hematic fluid removed and pericardial drain in place.  2. Positioning of temporary PM via the right Jugular vein for complete heart blockage with HR 30 ppm and deteriorating BP.  3. Heart block/Conduction abnormality. ICD 10 Codes: Cardiac tamponade-I31.4  CPT Codes: Pericardial drainage w/insertion indwelling catheter, w/wo fluoro, w/wo US guidance, >6 yrs, w/o congenital anomaly-42806; Pericardiocentesis, including image guidance when performed-92716; Insertion of temporary single chamber cardiac electrode or pacemaker catheter (separate procedure)-22246  [id number] Bryan Graham MD Performing Physician Electronically signed by [id number] Bryan Graham MD on 9/9/2024 at 5:40:35 PM  ** Final **     Electrocardiogram, 12-lead PRN ACS symptoms    Result Date: 9/9/2024  Normal sinus rhythm Left bundle branch block Abnormal ECG When compared with ECG of 09-SEP-2024 13:59, Sinus rhythm has replaced Idioventricular rhythm Vent. rate has increased BY  60 BPM    Transthoracic Echo (TTE) Limited    Result Date: 9/9/2024   [facility], [address] [place] [address]                Tel [phone] and Fax [phone] TRANSTHORACIC ECHOCARDIOGRAM REPORT  Patient Name:      APPLE Conklin Physician:    [id number] Varinder Wilson MD Study Date:        9/9/2024             Ordering Provider:    [id number] LANDON MALAVE MRN/PID:           [id number]             Fellow: Accession#:        [id number]         Nurse: Date of Birth/Age: 1955 / 69 years  Sonographer:          Bong Chacon RDCS, RICH Gender:            M                    Additional Staff: Weight:                                 Admission Status:     Inpatient - STAT BSA / BMI:         m2 / kg/m2           Encounter#:           [id number] Blood Pressure:    106/59 mmHg          Department Location:  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 Study Detail: The following Echo studies were performed: 2D. Technically               challenging study due to body habitus and patient lying in supine               position.  PHYSICIAN INTERPRETATION: Left Ventricle: The left ventricle was not well visualized. The left ventricular ejection fraction could not be measured. The left ventricular cavity size was not assessed. Left ventricular diastolic filling was not assessed. Left Atrium: The left atrium was not assessed. Right Ventricle: The right ventricle is small in size. There is normal right ventricular global systolic function. Right Atrium: The right atrium was not well visualized. Aortic Valve: The aortic valve was not well visualized. Aortic valve regurgitation was not assessed. Mitral Valve: The mitral valve is normal in structure. Mitral valve regurgitation was not assessed. Tricuspid Valve: The tricuspid valve is structurally normal. Tricuspid regurgitation was not assessed. The right ventricular systolic pressure is unable to be estimated. Pulmonic Valve: The pulmonic valve was not assessed. Pulmonic valve regurgitation was not assessed. Pericardium: There is a moderately sized pericardial  effusion. There is a suggestion of brief RV diastolic collapse and brief RA collapse on pre-tap images. Tap and post-tap images were obtained in the 4-C view only. Trivial to small effusion on final images. Aorta: The aortic root was not well visualized. In comparison to the previous echocardiogram(s): Although previous studies are available for review, a comparison with the current echocardiogram is not feasible due to its limited scope or image quality.  CONCLUSIONS:  1. The left ventricle was not well visualized. The left ventricular ejection fraction could not be measured.  2. There is normal right ventricular global systolic function.  3. There is a moderate pericardial effusion.  4. There is a suggestion of brief RV diastolic collapse and brief RA collapse on pre-tap images. Tap and post-tap images were obtained in the 4-C view only. Trivial to small effusion on final images.  5. Very limited 2D study only before and during pericardiocentesis. QUANTITATIVE DATA SUMMARY:  [id number] Varinder Wilson MD Electronically signed on 9/9/2024 at 2:41:56 PM  ** Final **     Transthoracic Echo (TTE) Limited    Result Date: 9/9/2024   [facility] [address] [place] [address]                Tel [phone] and Fax [phone] TRANSTHORACIC ECHOCARDIOGRAM REPORT  Patient Name:      APPLE Conklin Physician:    [id number] Rocky Emery MD Study Date:        9/9/2024             Ordering Provider:    [id number] DEANNE JOHNSON MRN/PID:           [id number]             Fellow:               Carine Sauceda Accession#:        [id number]         Nurse: Date of Birth/Age: 1955 / 69 years Sonographer:          VERNON Graves RDCS Gender:            M                    Additional Staff: Height:            182.88 cm             Admit Date: Weight:            98.88 kg             Admission Status:     Inpatient -                                                               Routine BSA / BMI:         2.21 m2 / 29.57      Encounter#:           [id number]                    kg/m2 Blood Pressure:    85/45 mmHg           Department Location:  [address] Study Type:    TRANSTHORACIC ECHO (TTE) LIMITED Diagnosis/ICD: Other pericardial effusion (noninflammatory)-I31.39 Indication:    Pericardial effusion CPT Code:      Echo Limited-94318; Doppler Limited-13336 Patient History: Diabetes:          Yes Pertinent History: CKD, Acute respiratory failure from severe metastatic burden. Study Detail: The following Echo studies were performed: 2D and Doppler.               Technically challenging study due to poor acoustic windows. The               patient is intubated.  Critical Event Critical Event: Test was completed as per department protocol. Critical Finding: Pericardial effusion with tamponade physiology. Time Test was Completed: 10:04:43 AM Notified: Rupert. Attending notification time: 10:05:01 AM  PHYSICIAN INTERPRETATION: Left Ventricle: The left ventricular systolic function is moderately decreased, with a visually estimated ejection fraction of 30-35%. There is global hypokinesis of the left ventricle with minor regional variations. The left ventricular cavity size is normal. Left ventricular diastolic filling was indeterminate. Left Atrium: The left atrium is normal in size. Right Ventricle: The right ventricle is normal in size. There is normal right ventricular global systolic function. Right Atrium: The right atrium is normal in size. Aortic Valve: The aortic valve was not assessed. There is trace to mild aortic valve regurgitation. Mitral Valve: The mitral valve is mildly thickened. Mitral valve regurgitation was not assessed. Tricuspid Valve: The tricuspid valve is structurally normal. Tricuspid regurgitation was not  assessed. Pulmonic Valve: The pulmonic valve was not assessed. Pulmonic valve regurgitation was not assessed. Pericardium: There is a moderately sized pericardial effusion. The effusion is circumferential. There is mild right ventricular diastolic collapse and is brief right atrial diastolic collapse. Aorta: The aortic root was not assessed. Pulmonary Artery: The pulmonary artery is not well visualized. Systemic Veins: The inferior vena cava appears moderately dilated. There is absent inspiratory collapse of the IVC. In comparison to the previous echocardiogram(s): There are no prior studies on this patient for comparison purposes.  CONCLUSIONS:  1. The left ventricular systolic function is moderately decreased, with a visually estimated ejection fraction of 30-35%.  2. There is global hypokinesis of the left ventricle with minor regional variations.  3. Left ventricular diastolic filling was indeterminate.  4. There is normal right ventricular global systolic function.  5. Mild diastolic collapse of the right ventricle and brief right atrium diastolic collapse.  6. There is a moderate pericardial effusion.  7. The pulmonary artery is not well visualized.  8. The inferior vena cava appears moderately dilated.  9. There are findings of early echocardiographic tamponade with buckling of RV free wall during late diastolic and brief end diastole RA collapse. The quality of the transvalvular Doppler signals was not clear enough to measure respirophasic change in ventricular filling. QUANTITATIVE DATA SUMMARY: 2D MEASUREMENTS:                       Normal Ranges: LVEDV Index: 64 ml/m2 LV SYSTOLIC FUNCTION BY 2D PLANIMETRY (MOD):                      Normal Ranges: EF-A4C View:    19 % (>=55%) EF-A2C View:    41 % EF-Biplane:     29 % EF-Visual:      33 % LV EF Reported: 33 % AORTIC VALVE:                        Normal Ranges: LVOT Diameter: 2.10 cm (1.8-2.4cm) TRICUSPID VALVE/RVSP:                   Normal Ranges: IVC  Diam: 3.00 cm  [id number] Rocky Emery MD Electronically signed on 9/9/2024 at 11:36:11 AM  ** Final **     XR abdomen 1 view    Result Date: 9/8/2024  Interpreted By:  Bassem Nuñez, STUDY: XR ABDOMEN 1 VIEW; 9/8/2024 12:30 pm   INDICATION: Signs/Symptoms:confirm OG.   COMPARISON: None   ACCESSION NUMBER(S): [id number]   ORDERING CLINICIAN: DEANNE JOHNSON   FINDINGS: NG tube  overlies the body of stomach. Moderate amount of stool in nondistended loops of colon. Limited evaluation of pneumoperitoneum on supine imaging, however no gross evidence of free air is noted.   Bilateral pleural effusions perihilar edema.   Osseous structures demonstrate no acute bony changes.       1.  NG tube overlies the body of stomach.   Signed by: Bassem Nuñez 9/8/2024 1:41 PM Dictation workstation:   [id number]    XR chest 1 view    Result Date: 9/8/2024  Interpreted By:  Bassem Nuñez, STUDY: XR CHEST 1 VIEW; 9/8/2024 12:29 pm   INDICATION: Signs/Symptoms:Intubated s/p bronch.   COMPARISON: 09/07/2024.   ACCESSION NUMBER(S): [id number]   ORDERING CLINICIAN: DEANNE JOHNSON   FINDINGS: Endotracheal tube in satisfactory position.   CARDIOMEDIASTINAL SILHOUETTE: Cardiomegaly versus pericardial effusion. Left sided mediastinal soft tissue prominence.   LUNGS: Moderate-sized left-sided pleural effusion with basilar consolidation/atelectasis. Right basilar atelectasis.   ABDOMEN: NG tube overlies the body of stomach.   BONES: No acute osseous changes.       1.  Life-support systems in satisfactory position. Moderate-sized left-sided pleural effusion with perihilar airspace disease/atelectasis. 2. Medial soft tissue prominence consistent with infiltrative adenopathy. 3. Right basilar consolidation/atelectasis.     Signed by: Bassem Nuñez 9/8/2024 1:38 PM Dictation workstation:   [id number]    CT head wo IV contrast    Result Date: 9/8/2024  Interpreted By:  Jovanni Daniel and Hofer Lindsay STUDY: CT HEAD WO IV CONTRAST;   9/8/2024 5:39 am   INDICATION: Signs/Symptoms:Follow-up concern for intraparenchymal hemorrhage, hx multiple brain mets.   COMPARISON: CT head 09/07/2024 at 8:20 p.m.   ACCESSION NUMBER(S): [id number]   ORDERING CLINICIAN: ELIS ENGLAND   TECHNIQUE: Noncontrast axial CT images of head were obtained with coronal and sagittal reconstructed images.   FINDINGS: Similar size and appearance of multiple ovoid hyperdensities throughout the brain parenchyma. For example hyperdensity within the left frontal lobe measuring 2.1 x 2.5 x 1.8 cm (series 204, image 34; series 205, image 51), previously measuring 2.0 x 2.7 x 2.0 cm with similar mild effacement of the adjacent sulci and effacement of the frontal horn of the left lateral ventricle. Additional examples include, 1.1 x 1.0 x 0.8 cm ovoid hyperdensity within the left parietal lobe (series 201, image 16; series 205, image 85), previously measuring 1.1 x 1.0 x 1.0 cm. Similar size and appearance of a 1.4 x 1.0 x 1.3 cm ovoid hyperdensity within the right inferior frontal lobe (series 204, image 55; series 205, image 52), previously measuring 1.5 x 1.4 x 1.2 cm.   Stable mild hypoattenuation within the brain parenchyma surrounding these hyperattenuating lesions is most consistent with edema.   No midline shift. No areas of new intraparenchymal hemorrhage.   No abnormal extra-axial fluid collection. The gray-white differentiation is otherwise maintained.   Stable small mucosal retention cysts located within the floor of the right maxillary sinus. Otherwise, the visualized paranasal sinuses and mastoid air cells are essentially clear   Partially visualized mass located within the superficial lobe of the right parotid gland.       Stable intracranial hyperattenuating masses, highly suspicious for metastases with stable surrounding mild parenchymal edema and mass effect. No midline shift.   I personally reviewed the images/study and resident's interpretation and I agree with  the findings as stated by Neida Mcmullen MD (resident radiologist). This study was analyzed and interpreted at [facility], [place], [place].   MACRO: None.   Signed by: Jovanni Daniel 9/8/2024 7:33 AM Dictation workstation:   [id number]    CT head wo IV contrast    Addendum Date: 9/7/2024    Interpreted By:  Christina Reardon, ADDENDUM: The above findings were relayed via epic chat to NP Cahtleen Jaquez by Dr. Neida Doshi. Christina Reardon discussed the significance and urgency of this critical finding by telephone with  Dr. Amadou Alvarado on 9/7/2024 at 9:50 pm.  (**-RCF-**) Findings:  See findings.     Signed by: Christina Reardon 9/7/2024 10:01 PM   -------- ORIGINAL REPORT -------- Dictation workstation:   [id number]    Result Date: 9/7/2024  Interpreted By:  Christina Reardon and Hofer Lindsay STUDY: CT HEAD WO IV CONTRAST;  9/7/2024 8:28 pm   INDICATION: Signs/Symptoms:concern for metastatic disease.   COMPARISON: None.   ACCESSION NUMBER(S): [id number]   ORDERING CLINICIAN: PIA GONZALES   TECHNIQUE: Noncontrast axial CT images of head were obtained with coronal and sagittal reconstructed images.   FINDINGS: BRAIN PARENCHYMA: There are multiple rounded hyperdense foci involving involving bilateral cerebral hemispheres concerning for hyperdense versus hemorrhagic metastasis . For example hyperdensity within the left frontal lobe measuring 2.0 x 2.0 x 2.7 cm (series 206, image 49; series 201, image 25) with extension to the corpus callosum and slight adjacent mass effect on the frontal horn of the left lateral ventricle and mild effacement of the adjacent sulci. No midline shift. There are additional rounded hyperdensities within the left parieto-occipital lobe measuring 2.0 x 1.2 x 1.2 cm and 1.1 x 1.0 x 1.1 cm. There is an additional hyperdensity within the right temporal lobe measuring 1.5 x 1.4 x 1.2 cm (series 204, image 40; series 206, image 42). Additional smaller  hyperdense foci suspected. Gray-white matter distinction is otherwise preserved. Mild deep and periventricular white matter hypodensities are nonspecific, but favored to represent chronic small vessel ischemic changes.   VENTRICLES and EXTRA-AXIAL SPACES: No acute extra-axial or intraventricular hemorrhage. Slight effacement of the frontal horn of the left lateral ventricle as described above.. The ventricles and sulci are age-concordant.   PARANASAL SINUSES/MASTOIDS: Mucous retention cysts within the right maxillary sinus. The paranasal sinuses otherwise clear. The mastoids are well aerated.   CALVARIUM/ORBITS: No skull fracture.   EXTRACRANIAL SOFT TISSUES: Please refer to same-day CT neck for full dictation of the neck soft tissues. There is no abnormality within the soft tissues overlying the calvarium.       Multiple rounded hyperdense lesions throughout bilateral cerebral hemispheres as described above concerning for hyperdense versus hemorrhagic metastasis given patient's clinical history and the diffuse nature of the lesions. Intraparenchymal hemorrhage is also a consideration. The largest hyperdense lesion is within the left frontal lobe and measures 2.7 x 2.0 x 2.0 cm with slight adjacent mass effect on the frontal horn of the left lateral ventricle and mild effacement of the adjacent sulci. No midline shift. Further evaluation with dedicated contrast-enhanced MRI is recommended.   I personally reviewed the images/study and resident's interpretation and I agree with the findings as stated by Neida Mcmullen MD (resident radiologist). This study was analyzed and interpreted at [facility], [place], [place].   MACRO: Neida Mcmullen discussed the significance and urgency of this critical finding by epic chat with  Angi Jaquez NP on 9/7/2024 at 8:52 pm. (**-RCF-**) Findings:  See findings.   Signed by: Christina Reardon 9/7/2024 9:12 PM Dictation workstation:   [id number]    CT  soft tissue neck w IV contrast    Result Date: 9/7/2024  Interpreted By:  Christina Reardon and Hofer Lindsay STUDY: CT SOFT TISSUE NECK W IV CONTRAST;  9/7/2024 8:28 pm   INDICATION: Signs/Symptoms:concern for neoplasm.     COMPARISON: None.   ACCESSION NUMBER(S): [id number]   ORDERING CLINICIAN: PIA GONZALES   TECHNIQUE: Axial CT images of the neck were obtained.  The patient received 80 ML of Omnipaque 350 intravenous contrast agent. The images were reformatted in angled axial, coronal and sagittal planes.   FINDINGS: Oral Cavity, Pharynx and Larynx:  Evaluation oral cavity is limited by streak artifact from dental hardware.  The nasopharyngeal and oropharyngeal structures are unremarkable. The hypopharyngeal and laryngeal structures are unremarkable.   Retropharyngeal and Prevertebral Soft Tissues: Unremarkable.   Lymph nodes: Multiple prominent right level 2A and B lymph nodes measuring up to 1.4 cm in short axis. There are enlarged bilateral supraclavicular and infraclavicular lymph nodes measuring up to 2 cm.   Neck vessels: Bilateral neck vessels are normal in course and caliber and appear patent. Calcific atherosclerosis of the carotid bifurcations.   Thyroid gland: Heterogeneous soft tissue mass in the anterior mediastinal extends to the inferior margins of the thyroid gland with loss of the fat plane. Heterogeneous enhancement of the thyroid gland.   Parotid and submandibular glands: There are multiple soft tissue masses within the right parotid gland. For example 3.6 x 3.2 cm soft tissue mass (series 301, image 35), 2.4 x 1.7 cm intraparotid soft tissue mass (series 301, image 53), and 1.7 x 1.4 cm soft tissue mass (series 301, image 59). The left parotid and bilateral submandibular glands are within normal limits.   Paranasal Sinuses and Mastoids: Mucous retention cyst versus polyp in the right maxillary sinus remainder of the visualized paranasal sinuses and mastoid air cells are well aerated.    Visualized orbital structures are unremarkable. Mild right periorbital/infraorbital soft tissue swelling.   There is large heterogeneous soft tissue mass in the left lung apex extending to the anterior mediastinum. Left pleural effusion noted. Please refer to same-day CTA chest for detailed report of the lungs.   Multilevel degenerative changes of the cervical spine.       1. Multiple soft tissue masses within the right parotid gland as described above. Multiple enlarged cervical lymph nodes. Findings highly concerning for malignancy either primary versus metastatic. 2. Large soft tissue mass in the left lung apex is partially imaged extending to the anterior mediastinum with loss of fat plane with the inferior margin of the thyroid gland. Please refer to same-day CTA chest for detailed report of the lungs. 3. Additional findings as described above.     I personally reviewed the images/study and resident's interpretation and I agree with the findings as stated by Neida Mcmullen MD (resident radiologist). This study was analyzed and interpreted at [place], [place].   MACRO: None   Signed by: Christina Reardon 9/7/2024 9:24 PM Dictation workstation:   [id number]    CT angio chest for pulmonary embolism    Result Date: 9/7/2024  Interpreted By:  Natali Daigle, STUDY: CT ABDOMEN PELVIS W IV CONTRAST; CT ANGIO CHEST FOR PULMONARY EMBOLISM;  9/7/2024 11:09 am   INDICATION: Signs/Symptoms:weight loss; Signs/Symptoms:dyspnea.     COMPARISON: None.   ACCESSION NUMBER(S): [id number]; [id number]   ORDERING CLINICIAN: CARLOS PARR   TECHNIQUE: CT of the chest, abdomen, and pelvis was performed. Sagittal and coronal reconstructions were generated. CT chest performed with pulmonary embolism protocol. Multiplanar reconstructions of the pulmonary vessels were created on an independent workstation and provided for review. 70 cc Omnipaque 350 intravenous contrast given for the  examination.   FINDINGS: CHEST:       CHEST WALL AND LOWER NECK: Superficial chest wall excluded from imaging field. Several left axillary nodes measuring up to 1.8 cm. Probable soft tissue edema. Multiple small opacified superficial collateral vessels throughout the left chest.   MEDIASTINUM AND ANANT:  Extensive abnormal slightly heterogenous soft tissue throughout the anterosuperior mediastinum, upper left chest and left hilum encasing the aortic arch and great vessels as well as the left hilar vessels. There is also encasement and probable narrowing of the distal trachea and jose antonio. The brachiocephalic vein and superior SVC are not separately definable. 2.5 cm subcarinal node. 2.1 cm right hilar node. 1.4 cm right retrocrural node.   HEART AND VESSELS:  No central pulmonary artery filling defect/PE however limited assessment for basilar/peripheral PE due to motion artifact and timing of IV contrast bolus. There is abrupt cutoff of the left brachiocephalic vein with no discernible superior SVC and dense opacification of numerous small collateral vessels throughout the left chest wall and along the diaphragm. The heart is normal in size. Pericardial effusion measuring 15 mm in thickness anteriorly. Multifocal atherosclerotic calcifications including the coronary arteries.   LUNGS, PLEURA, LARGE AIRWAYS:  Again large central upper chest mass involving the left hilum with opacification of the anterior/superior left chest and probable nodular component extending into the lateral left mid chest and through the lateral 4/5 rib interspace into the anterior chest wall (image 68/158). Moderate left pleural effusion. Patchy infiltrate or atelectasis in small remaining aerated segment of the left lung base. Coarse infiltrates scattered in the right lung base with air bronchograms. 12 mm right upper lobe nodule image 64/158. Small right pleural effusion. Again upper chest mass encases and likely narrows the distal  trachea/jose antonio and main bronchi.   BONES: Probable lytic permeative lesion of the right scapula with comminuted fracture and adjacent callus.     ABDOMEN/PELVIS:       ABDOMINAL ORGANS:   LIVER: Heterogenous 4.4 x 3.6 cm mass in the anterior/superior right lobe image 28/167.   GALL BLADDER AND BILIARY TREE: No calcified gallstone. No significant biliary dilatation.   SPLEEN: 4.8 cm ill-defined hypodense lesion in the inferior pole. 3.4 cm heterogenous lesion contiguous with or exophytic from the inferior pole   PANCREAS: Slightly heterogenous masslike fullness of the inferior head/uncinate process measuring approximately 3.7 cm image 58/167. 1.8 cm hypodense/cystic lesion in the anterior neck image 53/167. No significant ductal dilatation.   ADRENALS: 3.9 x 2.7 cm left adrenal mass. 3.3 x 2.9 cm right adrenal mass.   KIDNEYS AND URETERS: No renal mass or hydronephrosis within limits of nephrogram phase images.   BOWEL: No abnormally dilated small bowel loops. Fecal residue in the right colon. Rectum is distended with gas and fecal debris.   PERITONEUM, RETROPERITONEUM, NODES: Several peritoneal nodules for example measuring 1.7 cm right mid abdomen image 82/167 and 2.1 cm left mid abdomen image 72/167. 1.8 cm peripancreatic node image 59/167. 2.6 x 3.2 cm aortocaval node image 65/167 likely slightly indenting/displacing the cava. No significant free fluid. No free air.   VESSELS:  Multifocal atherosclerotic calcifications. No abdominal aortic aneurysm.   PELVIS: Urinary bladder is moderately distended without focal wall thickening. No gross prostate enlargement. Partially imaged rounded possible high-riding testis in the right scrotum on the most caudal image.   ABDOMINAL WALL: 4.7 x 2.4 cm heterogenous subcutaneous nodule in the upper right gluteal region image 97/167. Mild soft tissue edema. No sizable abdominal wall hernia.   BONES: Approximately 4.7 cm lytic lesion in the inferior right acetabulum eroding the  overlying acetabular cortex. Small associated soft tissue component.       CHEST:   Heterogenous soft tissue consistent with malignancy/neoplasm throughout the central/left upper chest with probable SVC obstruction, encasement and narrowing of the trachea/jose antonio,  left chest wall invasion. Multifocal likely metastatic lymphadenopathy and permeative lytic lesion in the right scapula with pathologic fracture. Further evaluation recommended.   No gross central PE identified. Limited assessment for peripheral/basilar PE. Bibasilar infiltrates or atelectasis with pleural effusions, left-greater-than-right.   Pericardial effusion.   ABDOMEN AND PELVIS:   Extensive malignancy/metastatic disease including presumed metastatic lesions in the liver, spleen, both adrenal glands, and the pancreas, multiple peritoneal nodules, soft tissue mass in the right gluteal region and lytic probable metastatic lesion in the right acetabulum.   MACRO: None.   Signed by: Natali Daigle 9/7/2024 11:56 AM Dictation workstation:   [id number]    CT abdomen pelvis w IV contrast    Result Date: 9/7/2024  Interpreted By:  Natali Daigle, STUDY: CT ABDOMEN PELVIS W IV CONTRAST; CT ANGIO CHEST FOR PULMONARY EMBOLISM;  9/7/2024 11:09 am   INDICATION: Signs/Symptoms:weight loss; Signs/Symptoms:dyspnea.     COMPARISON: None.   ACCESSION NUMBER(S): [id number]; [id number]   ORDERING CLINICIAN: CARLOS PARR   TECHNIQUE: CT of the chest, abdomen, and pelvis was performed. Sagittal and coronal reconstructions were generated. CT chest performed with pulmonary embolism protocol. Multiplanar reconstructions of the pulmonary vessels were created on an independent workstation and provided for review. 70 cc Omnipaque 350 intravenous contrast given for the examination.   FINDINGS: CHEST:       CHEST WALL AND LOWER NECK: Superficial chest wall excluded from imaging field. Several left axillary nodes measuring up to 1.8 cm. Probable soft tissue edema.  Multiple small opacified superficial collateral vessels throughout the left chest.   MEDIASTINUM AND ANANT:  Extensive abnormal slightly heterogenous soft tissue throughout the anterosuperior mediastinum, upper left chest and left hilum encasing the aortic arch and great vessels as well as the left hilar vessels. There is also encasement and probable narrowing of the distal trachea and jose antonio. The brachiocephalic vein and superior SVC are not separately definable. 2.5 cm subcarinal node. 2.1 cm right hilar node. 1.4 cm right retrocrural node.   HEART AND VESSELS:  No central pulmonary artery filling defect/PE however limited assessment for basilar/peripheral PE due to motion artifact and timing of IV contrast bolus. There is abrupt cutoff of the left brachiocephalic vein with no discernible superior SVC and dense opacification of numerous small collateral vessels throughout the left chest wall and along the diaphragm. The heart is normal in size. Pericardial effusion measuring 15 mm in thickness anteriorly. Multifocal atherosclerotic calcifications including the coronary arteries.   LUNGS, PLEURA, LARGE AIRWAYS:  Again large central upper chest mass involving the left hilum with opacification of the anterior/superior left chest and probable nodular component extending into the lateral left mid chest and through the lateral 4/5 rib interspace into the anterior chest wall (image 68/158). Moderate left pleural effusion. Patchy infiltrate or atelectasis in small remaining aerated segment of the left lung base. Coarse infiltrates scattered in the right lung base with air bronchograms. 12 mm right upper lobe nodule image 64/158. Small right pleural effusion. Again upper chest mass encases and likely narrows the distal trachea/jose antonio and main bronchi.   BONES: Probable lytic permeative lesion of the right scapula with comminuted fracture and adjacent callus.     ABDOMEN/PELVIS:       ABDOMINAL ORGANS:   LIVER: Heterogenous  4.4 x 3.6 cm mass in the anterior/superior right lobe image 28/167.   GALL BLADDER AND BILIARY TREE: No calcified gallstone. No significant biliary dilatation.   SPLEEN: 4.8 cm ill-defined hypodense lesion in the inferior pole. 3.4 cm heterogenous lesion contiguous with or exophytic from the inferior pole   PANCREAS: Slightly heterogenous masslike fullness of the inferior head/uncinate process measuring approximately 3.7 cm image 58/167. 1.8 cm hypodense/cystic lesion in the anterior neck image 53/167. No significant ductal dilatation.   ADRENALS: 3.9 x 2.7 cm left adrenal mass. 3.3 x 2.9 cm right adrenal mass.   KIDNEYS AND URETERS: No renal mass or hydronephrosis within limits of nephrogram phase images.   BOWEL: No abnormally dilated small bowel loops. Fecal residue in the right colon. Rectum is distended with gas and fecal debris.   PERITONEUM, RETROPERITONEUM, NODES: Several peritoneal nodules for example measuring 1.7 cm right mid abdomen image 82/167 and 2.1 cm left mid abdomen image 72/167. 1.8 cm peripancreatic node image 59/167. 2.6 x 3.2 cm aortocaval node image 65/167 likely slightly indenting/displacing the cava. No significant free fluid. No free air.   VESSELS:  Multifocal atherosclerotic calcifications. No abdominal aortic aneurysm.   PELVIS: Urinary bladder is moderately distended without focal wall thickening. No gross prostate enlargement. Partially imaged rounded possible high-riding testis in the right scrotum on the most caudal image.   ABDOMINAL WALL: 4.7 x 2.4 cm heterogenous subcutaneous nodule in the upper right gluteal region image 97/167. Mild soft tissue edema. No sizable abdominal wall hernia.   BONES: Approximately 4.7 cm lytic lesion in the inferior right acetabulum eroding the overlying acetabular cortex. Small associated soft tissue component.       CHEST:   Heterogenous soft tissue consistent with malignancy/neoplasm throughout the central/left upper chest with probable SVC  obstruction, encasement and narrowing of the trachea/jose antonio,  left chest wall invasion. Multifocal likely metastatic lymphadenopathy and permeative lytic lesion in the right scapula with pathologic fracture. Further evaluation recommended.   No gross central PE identified. Limited assessment for peripheral/basilar PE. Bibasilar infiltrates or atelectasis with pleural effusions, left-greater-than-right.   Pericardial effusion.   ABDOMEN AND PELVIS:   Extensive malignancy/metastatic disease including presumed metastatic lesions in the liver, spleen, both adrenal glands, and the pancreas, multiple peritoneal nodules, soft tissue mass in the right gluteal region and lytic probable metastatic lesion in the right acetabulum.   MACRO: None.   Signed by: Natali Daigle 9/7/2024 11:56 AM Dictation workstation:   [id number]        Last TTE from today with minimal pericardial effusion.    Assessment/Plan   Assessment & Plan  Metastatic malignant neoplasm, unspecified site (Multi)    Small cell lung cancer (Multi)    Pericardial effusion ([facility])    #Cardiac tamponade s/p pericardiocentesis and pericardial drain placement on 9/9  - 10cc drain over 24h  - TTE with minimal pericardial effusion  - will discuss with cardiology team and will likely pull drain tomorrow AM.  - cardiology consults recommended Consult CT surgery regarding pericardial window (if within the patient's goals of care) since patients effusion likely due to malignancy and may reoccur.        I spent 30 minutes in the professional and overall care of this patient.      Audie Coe MD       Adequate: hears normal conversation without difficulty

## 2024-09-14 NOTE — PROGRESS NOTES
Medical Intensive Care - Daily Progress Note   Subjective    Khoa Mitchell is a 69 y.o. year old male patient admitted on 9/7/2024 with following ICU needs: acute hypoxemic respiratory failure, worsening from severe metastatic burden requiring endotracheal intubation.     Interval History:  NAEON  Thoracentesis 9/13/24 w/ 1100 ml fluid removed, +exudative   Remains on 0.06 levo, 25 prop, 125 fentanyl, amio  Radiation 9/13 8 Gy in 1 fraction     Meds    Scheduled medications  cefTRIAXone, 1 g, intravenous, q24h  pantoprazole, 40 mg, oral, Daily before breakfast   Or  esomeprazole, 40 mg, nasoduodenal tube, Daily before breakfast   Or  pantoprazole, 40 mg, intravenous, Daily before breakfast  perflutren lipid microspheres, 0.5-10 mL of dilution, intravenous, Once in imaging  polyethylene glycol, 17 g, nasogastric tube, Daily  sennosides-docusate sodium, 2 tablet, nasogastric tube, BID      Continuous medications  amiodarone, 0.5-1 mg/min, Last Rate: 0.5 mg/min (09/14/24 0600)  fentaNYL, 0-300 mcg/hr, Last Rate: 125 mcg/hr (09/14/24 0615)  norepinephrine, 0-0.2 mcg/kg/min, Last Rate: 0.06 mcg/kg/min (09/14/24 0600)  propofol, 5-50 mcg/kg/min, Last Rate: 25 mcg/kg/min (09/14/24 0726)      PRN medications  PRN medications: dextrose, dextrose, glucagon, glucagon, oxygen     Objective    Blood pressure 132/50, pulse 83, temperature 36.8 °C (98.2 °F), temperature source Temporal, resp. rate 16, height 1.829 m (6'), weight 98 kg (216 lb 0.8 oz), SpO2 94%.     Physical Exam  Constitutional:       General: He is awake.      Appearance: He is obese.      Interventions: Nasal cannula in place.      Comments: Patient intubated. Stirring but not opening eyes to voice  HENT:      Head:      Salivary Glands: Right salivary gland is diffusely enlarged.      Comments: Significant plethora of the head including neck, face, and periorbital edema. Evidence of plum shaped mass on right jaw.   Cardiovascular:      Rate and Rhythm: Normal  rate.      Heart sounds: Normal heart sounds, S1 normal and S2 normal.   Comments: Pericardial drain in place.   Musculoskeletal:      Right upper arm: Swelling and edema present.      Left upper arm: Swelling and edema present.      Right lower le+ Pitting Edema present.      Left lower le+ Pitting Edema present.      Comments: The upper extremities are dematous, hands are warm to touch. There is also some noticeable asymmetry where right upper extremity is larger than left.    Lymphadenopathy:      Cervical:      Right cervical: No superficial cervical adenopathy.     Left cervical: No superficial cervical adenopathy.      Upper Body:      Right upper body: No supraclavicular adenopathy.      Left upper body: No supraclavicular adenopathy.   Neurological:      Mental Status: He is alert.   Psychiatric:         Behavior: Behavior is cooperative.      Intake/Output Summary (Last 24 hours) at 2024 0800  Last data filed at 2024 0600  Gross per 24 hour   Intake 1503.23 ml   Output 968 ml   Net 535.23 ml     Labs:   Results from last 72 hours   Lab Units 24  0534 24  0449 24  2344   SODIUM mmol/L 133* 136 137   POTASSIUM mmol/L 3.8 3.8 3.7   CHLORIDE mmol/L 95* 96* 96*   CO2 mmol/L 29 28 27   BUN mg/dL 24* 27* 27*   CREATININE mg/dL 0.87 0.89 0.92   GLUCOSE mg/dL 126* 142* 134*   CALCIUM mg/dL 8.2* 8.5* 8.6   ANION GAP mmol/L 13 16 18   EGFR mL/min/1.73m*2 >90 >90 89   PHOSPHORUS mg/dL 3.7 2.8 2.9      Results from last 72 hours   Lab Units 24  0534 24  0449 24  0524   WBC AUTO x10*3/uL 9.4 11.2 10.4   HEMOGLOBIN g/dL 9.6* 9.9* 9.7*   HEMATOCRIT % 28.0* 28.4* 27.9*   PLATELETS AUTO x10*3/uL 254 294  --    NEUTROS PCT AUTO % 86.9 82.7 83.7   LYMPHS PCT AUTO % 2.8 5.2 4.6   MONOS PCT AUTO % 6.4 8.4 9.1   EOS PCT AUTO % 2.9 2.5 1.3                 Micro/ID:     Lab Results   Component Value Date    BLOODCULT No growth at 4 days -  FINAL REPORT 2024    BLOODCULT No  growth at 4 days -  FINAL REPORT 09/08/2024       Summary of key imaging results from the last 24 hours  CXR after thoracentesis 9/13  IMPRESSION:  1. No significant change in near complete opacification of left  hemithorax, correlating with combination of left lung mass and  pleural effusion on CT scan 09/07/2024.  2. Similar right basilar atelectasis/consolidation with suggestion of  small right pleural effusion.  3. Medical devices as above. Consider slight advancement of  endotracheal tube.    Assessment and Plan     Assessment: Khoa Mitchell is a 69 y.o. year old male patient admitted on 9/7/2024 with PMHx of T2DM, CKD2 (baseline Cr 1.2) originally admitted for acute hypoxic respiratory failure 2/2 to postobstructive pneumonia from SVC from new diagnosis of malignancy. Course complicated by worsening respiratory failure requiring intubation 9/8 with subsequent BAL with suctioning of thick green secretions. 9/9 TTE with moderate pericardial effusion, findings suggestive of early tamponade. Transferred to the CICU, underwent pericardiocentesis subsequently complicated by complete heart block requiring TVP, then with afib RVR which chemically converted with amiodarone. Repeat CXR with complete opacification of the left lung. Treated with ceftriaxone (9/8-9/10), broadened to zosyn (9/10-*) given worsening infiltrates, azithromycin (9/8-9/10). Now currently on ceftriaxone (9/10- *).     Mechanical Ventilation: 4-10 days  Sedation/Analgesia:  Propofol, Fentanyl   Restraints: Restraints indicated as alternative therapies have been attempted and have been ineffective.  Restrain with soft wrist restraints and side rails up x4 until medical devices discontinued and/or patient able to participate with plan of care.     Summary for 09/14/24  :  Starting chemo, TLS labs ordered  Consulting thoracic surg for evaluation of pericardial window  Intermittent Afib RVR with MAP 40-50, gave dig 250 mcg  IV      Plan:  NEUROLOGY/PSYCH:  NEUROLOGIC  #Intubated, sedated  -fentanyl 75  -Daily SATs as oxygen requirements decrease     #Multiple brain lesions  ::CT head performed, notable for multiple hyperdense lesions concerning for hemorrhagic metastases, largest [id number]  ::Repeat CT head without evidence of hemorrhagic transformation  -Holding dvt ppx post-bleed day 2 (9/10 AM)  -Per NSGY, recommend MRI presurgical perfusion and fingerprinting brain, C/T/L w/ w/o contrast when able to lie flat; will page them when done     CARDIOVASCULAR  #Shock  Likely septic with concern for worsening post-obstructive pneumonia and mucus plugging as well as worsening pleural effusion  -Given worsening effusion and pneumonia, ceftriaxone (9/10-*)  -Fu Bcx, BAL cx, sputum cx  -Infectious workup as below     #Pericardial effusion  #Complete heart block  Likely malignant given evidence of multiple probably metastatic lesions  ::CT chest with pericardial effusion measuring 15mm in thickness  ::9/9 TTE with moderate pericardial effusion, brief RV and RA diastolic collapse, concerning for early tamponade  ::9/9 pericardiocentesis with 400cc serosanguinous fluid, complicated by CHB requiring TVP  :: cytology showing malignant pericardial effusion likely SCLC  -Monitor pericardial drain output, minimal  -thoracic surgery consult for pericardial window  -Cardiology, EP following; appreciate recs     #Afib  Likely 2/2 to worsening infection and effusion  ::s/p amio bolus  ::Intermittent Afib RVR w/ MAP 40-50, on amio gtt  - s/p dig 250 IV  -Continue amio gtt     PULMONARY  #Acute hypoxic respiratory failure  Originally likely 2/2 to post-obstructive pneumonia, underwent BAL with evidence of multiple secretions. Now complicated by complete opacification of the left lung concerning for worsening mucus plugging vs worsening pleural effusion vs combination.  ::Baseline RA  ::ENT scoped - patent airway with gross aspiration of secretions;  hypomobile left vocal cord  ::9/7 BCx 2/4 Strep salivarius, likely contaminant, very uncommon cause of bacteremia  ::9/9 CXR with complete opacification of the left lung and R mediastinal shift  -Fu BAL cx, BCx, sputum cx  -fu Aspergillus galactomannan, PJP PCR, AFB, fungal cx  -s/p ceftriaxone (9/10-**)  -Plan for bronch to evaluate mucus plug then thoracentesis later  -ENT following, appreciate recs     RENAL/  #Hyponatremia, resolved   Likely SIADH 2/2 to cancer, which would be consistent with lung SCC vs hypervolemic hyponatremia  ::Admission Na 133  ::Home salt tabs and water restriction, lasix 20mg per outpt nephro  ::Serum osm 294  ::Urine osm 785   ::Urine lytes WNL   -Continue to monitor      #CKD2a  ::Baseline Cr 1.1-1.2  -At baseline, CTM     GASTROINTESTINAL  -No acute issues     ENDOCRINE  #T2DM  ::9/8 A1C 6.8  -SSI q4h while NPO     HEME/ONC  #Metastatic cancer, SCLC  #SVC syndrome  ::CT imaging with multiple lesions - multiple brain mets, soft tissue mass in lung  ::CT AP with lesions in liver, spleen, both adrenal glands, pancreas, multiple peritoneal nodules, R gluteal mass, R acetabulum  ::CT head with multiple lesions  :: Pericardial fluid significant for malignant cells; BAL cytology  -Follow up with med onc- plan for chemo initiation 9/14  -Will begin 5 fraction treatment to the mediastinum starting 9/13   - rad onc following     #Normocytic anemia  Likely AOCD 2/2 to malignancy vs SARA given elevated RDW  ::Baseline 11-12, now 8-9   -Iron, TIBC, Tsat, ferritin      INFECTIOUS DISEASE  #Concern for post-obstructive pneumonia  -Fu sputum cx, Bcx as above  -C/w ceftriaxone (9/10-*)      ICU Check List       ICU Liberation: Intervention:   Assess, Prevent, Manage Pain CPOT -    Both SAT and SBT [] SAT  [] SBT 30-60 min [] Extubate to NC  [] Extubate to NIV  [] Discuss Trach   Choice of analgesia and sedation RASS: -2  Fentanyl and Propofol    Delirium: Assess, prevent and manage CAM -    Early  Mobility and Exercise  [] PT /OT consult   Family Engagement and Empowerment [x]Family updated [x]SW consult     FEN  FEN  Fluids: PRN  Electrolytes: PRN  Nutrition: NPO  Prophylaxis:  DVT ppx: contraindicated   GI ppx: none  Bowel care: none   Access: Peripheral IV (left), endotracheal tube (09/08-), A line  Hardware:         Arterial Line 09/10/24 Left Radial (Active)   Placement Date/Time: 09/10/24 (c) 0105   Size: 20 G  Orientation: Left  Location: Radial  Site Prep: Alcohol  Local Anesthetic: Injectable  Insertion attempts: 1  Securement Method: Transparent dressing  Patient Tolerance: Tolerated well   Number of days: 3       ETT  8.5 mm (Active)   Placement Date/Time: 09/08/24 1208   Hand Hygiene Completed: Yes  Mask Ventilation: Vent by mask + OA or adjuvant +/- NMBA  ETT Type: ETT - single  Single Lumen Tube Size: 8.5 mm  Cuffed: Yes  Laryngoscope: Jose  Blade Size: 4  Location: Oral  ...   Number of days: 4       Urethral Catheter (Active)   Placement Date/Time: 09/13/24 0500   Hand Hygiene Completed: Yes  Urine Returned: Yes   Number of days: 0       Open Drain Inferior;Medial;Midline Pericardial (Active)   Placement Date/Time: 09/09/24 1341   Hand Hygiene Completed: Yes  Orientation: Inferior;Medial;Midline  Location: Pericardial  Size (Fr.): 8 Fr.   Number of days: 3       NG/OG/Feeding Tube OG - Crowley sump 18 Fr Center mouth (Active)   Placement Date/Time: 09/08/24 1230   Hand Hygiene Completed: Yes  Type of Tube: NG/OG Tube  Tube Length: 65 cm  Tube Type: OG - Crowley sump  NG/OG Tube Size: 18 Fr  Tube Location: Center mouth   Number of days: 4       Social:  Code: Full Code    HPOA: there is no HCPOA, only contact is girlfriend Zita Campbell [phone] who is proximal decision maker   Disposition: Medicine ICU    Clair Ardon MD   09/14/24 at 8:00 AM     Disclaimer: Documentation completed with the information available at the time of input. The times in the chart may not be reflective of actual  patient care times, interventions, or procedures. Documentation occurs after the physical care of the patient.

## 2024-09-14 NOTE — PROGRESS NOTES
Name: Khoa Mitchell  MRN: [id number]  Encounter Date: 2024  PCP: Savita Cerda, APRN-CNP, DNP  Heme-Onc: None    Reason for consult: SCLC  Attending Provider Dr. James    Hematology/ Oncology Consult Daily Progress Note    Overnight Events   NAEO. S/p thoracentesis with 1100cc of exudative fluid but pH/glucose not suggestive of empyema.     Patient remains intubated, sedated, and unresponsive. He is on 0.08 of norepi along with fentanyl, propofol, and amiodarone. Vent at 50%/8.     Review of Systems   12 Point ROS negative except HPI     Allergies   No Known Allergies    Medications     allopurinol, 300 mg, Daily  cefTRIAXone, 1 g, q24h  pantoprazole, 40 mg, Daily before breakfast   Or  esomeprazole, 40 mg, Daily before breakfast   Or  pantoprazole, 40 mg, Daily before breakfast  perflutren lipid microspheres, 0.5-10 mL of dilution, Once in imaging  polyethylene glycol, 17 g, Daily  sennosides-docusate sodium, 2 tablet, BID      amiodarone, Last Rate: 0.5 mg/min (24 1200)  fentaNYL, Last Rate: 100 mcg/hr (24 1100)  norepinephrine, Last Rate: 0.07 mcg/kg/min (24 1200)  propofol, Last Rate: 25 mcg/kg/min (24 1200)      dextrose, 12.5 g, q15 min PRN  dextrose, 25 g, q15 min PRN  glucagon, 1 mg, q15 min PRN  glucagon, 1 mg, q15 min PRN  oxygen, , Continuous PRN - O2/gases        Physical Exam   Blood pressure 132/50, pulse 89, temperature 36.2 °C (97.2 °F), temperature source Temporal, resp. rate 16, height 1.829 m (6'), weight 98 kg (216 lb 0.8 oz), SpO2 90%.    ECO    Gen: intubated, sedated   HEENT: facial plethora and erythema, large right face mass   CV: RRR, levo at 0.08  Pulm: ventilated breath sounds   Abd: soft, NT/ND   Ext: upper extremity edema   : hurtado with pink tinged urine    Labs     Lab Results   Component Value Date    GLUCOSE 126 (H) 2024    CALCIUM 8.2 (L) 2024     (L) 2024    K 3.8 2024    CO2 29 2024    CL 95 (L) 2024    BUN  24 (H) 09/14/2024    CREATININE 0.87 09/14/2024       Lab Results   Component Value Date    WBC 9.4 09/14/2024    HGB 9.6 (L) 09/14/2024    HCT 28.0 (L) 09/14/2024    MCV 86 09/14/2024     09/14/2024       Lab Results   Component Value Date    ALT 25 09/13/2024    AST 37 09/13/2024    ALKPHOS 79 09/13/2024    BILITOT 3.8 (H) 09/14/2024     G6PD pending    Imaging   Reviewed.     Assessment/Plan     Khoa Mitchell is a 69 y.o. 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     Patient underwent urgent RT 8 Gy x 1 9/13.   S/p thoracentesis 9/13 showing exudative fluid without overt evidence of empyema. MICU team agreeable to proceed with systemic chemotherapy.     Recommendations:  - start urgent palliative chemotherapy 9/14 with carboplatin and etoposide as follows:   - carboplatin AUC 5 day 1   - etoposide 50mg/m2 (50% dose reduced for hyperbilirubinemia) daily days 1-3  - please start allopurinol 300mg daily for TLS prophlyaxis  - please obtain daily CBC, CMP  - agree with more frequent monitoring for TLS with RFP, LDH, uric acid  - f/u final pathology, G6PD  - rest of care per excellent primary team     Given critical illness and SVC syndrome, okay to proceed with chemotherapy despite bilirubin above typical parameters. Etoposide dose reduced, as above.     Patient discussed with attending physician, Dr. Mcwilliams, who agrees with the above.     Javier Tiwari MD  Hematology-Oncology Fellow, PGY5  Hematology Consult Pager: [phone]  Oncology Consult Pager: [phone]

## 2024-09-14 NOTE — NURSING NOTE
CHEMO NOTE    Pt received day 1/1 of 600mg carboplatin in 170mL and 110mg etoposide in 552.5mL through #[id number] +BBR. Pt intubated and sedated, vitals remained stable throughout infusions. Pt premedicated per MD's orders. All chemo verified at bedside with 2nd RN. DAVID Rossi

## 2024-09-14 NOTE — SIGNIFICANT EVENT
09/14/24 1420   Prechemo Checklist   Has the patient been in the hospital, ED, or urgent care since last date of service N/A   Chemo/Immuno Consent Completed and Signed Yes   Protocol/Indications Verified Yes   Confirmed to previous date/time of medication N/A   Compared to previous dose N/A   All medications are dated accurately Yes   Pregnancy Test Negative Not applicable   Parameters Met Yes   Provider Name Natali Quintanilla RN   BSA/Weight-Height Verified Yes   Dose Calculations Verified (current, total, cumulative) Yes

## 2024-09-14 NOTE — PROGRESS NOTES
Khoa Mitchell is a 69 y.o. male on day 7 of admission presenting with Metastatic malignant neoplasm, unspecified site (Multi).    Subjective   Thoracic Surgery reengaged for possible pericardial window for drainage of pericardial effusion in setting of lung primary malignancy.     Since patient was last seen by Thoracic Surgery team on admission, he has been intubated and is on pressors (levo 0.07) due to presumed septic shock of unknown origin. Prior to this, on 9/9, he underwent a pericardiocentesis, which drained 400 ml serous fluid. Post-procedure echo demonstrated resolution of tamponade. A pericardial drain was left in place, draining ~100 ml fluid daily initially but has since dropped in output. He also underwent a thoracentesis yesterday, during with 1.1L straw-colored fluid was drained from his L chest. Fluid was sent to cytology. Per chart review, he is set to start systemic chemotherapy today for further management of malignancy. His partner is his legal POA.    History obtained by chart review primarily given that patient is intubated.     Objective     General: critically ill in ICU bed  HEENT: ETT in place  Resp: intubated, mechanically ventilated on FiO2 50% PEEP 8  RR 16  CV: on 0.07 levo, amiodarone gtt  Abd: soft, NTND  : hurtado draining thick, blood-tinged urine  Ext: bilateral LE edema  Psych: sedated, unable to assess    Last Recorded Vitals  Blood pressure 132/50, pulse 95, temperature 36.2 °C (97.2 °F), temperature source Temporal, resp. rate 16, height 1.829 m (6'), weight (S) 98.2 kg (216 lb 7.9 oz), SpO2 93%.  Intake/Output last 3 Shifts:  I/O last 3 completed shifts:  In: 2433.2 (24.8 mL/kg) [I.V.:2223.2 (22.7 mL/kg); NG/GT:160; IV Piggyback:50]  Out: 1468 (15 mL/kg) [Urine:1460 (0.4 mL/kg/hr); Drains:8]  Weight: 98 kg     Relevant Results            9.6     9.4>-----<254              28.0   133  95  24                  ----------------<126     3.8  29  0.87          Ca 8.2 Phos  3.7 Mg 1.95       ALT 25 AST 37 AlkPhos 79 tBili 3.8         CXR 9/14:  No significant change in near complete opacification of left   hemithorax, correlating with combination of left lung mass and   pleural effusion on CT scan 09/07/2024.   Similar right basilar atelectasis/consolidation with suggestion of   small right pleural effusion.     Assessment/Plan   Assessment & Plan  Metastatic malignant neoplasm, unspecified site (Multi)    Small cell lung cancer (Multi)    Pleural effusion    Pericardial effusion (HHS-HCC)    Heart block    This is a 70 y/o M with newly diagnosed small cell lung cancer that is metastatic to the brain, adrenal glands, liver, and bone with associated malignant pleural and pericardial effusions. Thoracic Surgery reengaged for possible pericardial window for drainage of pericardial effusion in setting of lung primary malignancy.     No interventions recommended - patient is unstable with diffusely metastatic disease. Performing a pericardial window is medically futile.   Please maintain pericardial drain for management of pericardial effusion. Recommend stripping drain of clots frequently to ensure patency.   Recommend continued goals of care conversations with family.     Discussed with attending, Dr. Murcia.    Lily Bush MD  Thoracic Surgery [id number]

## 2024-09-15 VITALS
HEIGHT: 72 IN | RESPIRATION RATE: 16 BRPM | SYSTOLIC BLOOD PRESSURE: 132 MMHG | TEMPERATURE: 98.1 F | DIASTOLIC BLOOD PRESSURE: 50 MMHG | WEIGHT: 218.92 LBS | BODY MASS INDEX: 29.65 KG/M2 | OXYGEN SATURATION: 91 % | HEART RATE: 77 BPM

## 2024-09-15 LAB
ALBUMIN SERPL BCP-MCNC: 2.4 G/DL (ref 3.4–5)
ALBUMIN SERPL BCP-MCNC: 2.5 G/DL (ref 3.4–5)
ALBUMIN SERPL BCP-MCNC: 2.6 G/DL (ref 3.4–5)
ALBUMIN SERPL BCP-MCNC: 2.6 G/DL (ref 3.4–5)
ALP SERPL-CCNC: 100 U/L (ref 33–136)
ALT SERPL W P-5'-P-CCNC: 44 U/L (ref 10–52)
ANION GAP SERPL CALC-SCNC: 14 MMOL/L (ref 10–20)
ANION GAP SERPL CALC-SCNC: 15 MMOL/L (ref 10–20)
ANION GAP SERPL CALC-SCNC: 18 MMOL/L (ref 10–20)
ANION GAP SERPL CALC-SCNC: 18 MMOL/L (ref 10–20)
AST SERPL W P-5'-P-CCNC: 47 U/L (ref 9–39)
BACTERIA FLD CULT: NORMAL
BACTERIA FLD CULT: NORMAL
BASOPHILS # BLD AUTO: 0.01 X10*3/UL (ref 0–0.1)
BASOPHILS NFR BLD AUTO: 0.1 %
BILIRUB SERPL-MCNC: 4.2 MG/DL (ref 0–1.2)
BUN SERPL-MCNC: 26 MG/DL (ref 6–23)
BUN SERPL-MCNC: 28 MG/DL (ref 6–23)
BUN SERPL-MCNC: 30 MG/DL (ref 6–23)
BUN SERPL-MCNC: 34 MG/DL (ref 6–23)
CALCIUM SERPL-MCNC: 8.3 MG/DL (ref 8.6–10.6)
CALCIUM SERPL-MCNC: 8.4 MG/DL (ref 8.6–10.6)
CHLORIDE SERPL-SCNC: 94 MMOL/L (ref 98–107)
CHLORIDE SERPL-SCNC: 95 MMOL/L (ref 98–107)
CO2 SERPL-SCNC: 26 MMOL/L (ref 21–32)
CO2 SERPL-SCNC: 26 MMOL/L (ref 21–32)
CO2 SERPL-SCNC: 28 MMOL/L (ref 21–32)
CO2 SERPL-SCNC: 28 MMOL/L (ref 21–32)
CREAT SERPL-MCNC: 0.89 MG/DL (ref 0.5–1.3)
CREAT SERPL-MCNC: 0.92 MG/DL (ref 0.5–1.3)
CREAT SERPL-MCNC: 0.95 MG/DL (ref 0.5–1.3)
CREAT SERPL-MCNC: 0.95 MG/DL (ref 0.5–1.3)
EGFRCR SERPLBLD CKD-EPI 2021: 86 ML/MIN/1.73M*2
EGFRCR SERPLBLD CKD-EPI 2021: 86 ML/MIN/1.73M*2
EGFRCR SERPLBLD CKD-EPI 2021: 89 ML/MIN/1.73M*2
EGFRCR SERPLBLD CKD-EPI 2021: >90 ML/MIN/1.73M*2
EOSINOPHIL # BLD AUTO: 0.01 X10*3/UL (ref 0–0.7)
EOSINOPHIL NFR BLD AUTO: 0.1 %
ERYTHROCYTE [DISTWIDTH] IN BLOOD BY AUTOMATED COUNT: 16.4 % (ref 11.5–14.5)
G6PD RBC QL: NORMAL
GLUCOSE BLD MANUAL STRIP-MCNC: 192 MG/DL (ref 74–99)
GLUCOSE BLD MANUAL STRIP-MCNC: 197 MG/DL (ref 74–99)
GLUCOSE BLD MANUAL STRIP-MCNC: 198 MG/DL (ref 74–99)
GLUCOSE BLD MANUAL STRIP-MCNC: 216 MG/DL (ref 74–99)
GLUCOSE SERPL-MCNC: 176 MG/DL (ref 74–99)
GLUCOSE SERPL-MCNC: 186 MG/DL (ref 74–99)
GLUCOSE SERPL-MCNC: 191 MG/DL (ref 74–99)
GLUCOSE SERPL-MCNC: 198 MG/DL (ref 74–99)
GRAM STN SPEC: NORMAL
HCT VFR BLD AUTO: 31.2 % (ref 41–52)
HGB BLD-MCNC: 10.5 G/DL (ref 13.5–17.5)
IMM GRANULOCYTES # BLD AUTO: 0.12 X10*3/UL (ref 0–0.7)
IMM GRANULOCYTES NFR BLD AUTO: 1.1 % (ref 0–0.9)
LDH SERPL L TO P-CCNC: 223 U/L (ref 84–246)
LDH SERPL L TO P-CCNC: 224 U/L (ref 84–246)
LDH SERPL L TO P-CCNC: 229 U/L (ref 84–246)
LYMPHOCYTES # BLD AUTO: 0.19 X10*3/UL (ref 1.2–4.8)
LYMPHOCYTES NFR BLD AUTO: 1.7 %
MAGNESIUM SERPL-MCNC: 2.05 MG/DL (ref 1.6–2.4)
MCH RBC QN AUTO: 28.9 PG (ref 26–34)
MCHC RBC AUTO-ENTMCNC: 33.7 G/DL (ref 32–36)
MCV RBC AUTO: 86 FL (ref 80–100)
MONOCYTES # BLD AUTO: 0.41 X10*3/UL (ref 0.1–1)
MONOCYTES NFR BLD AUTO: 3.7 %
NEUTROPHILS # BLD AUTO: 10.39 X10*3/UL (ref 1.2–7.7)
NEUTROPHILS NFR BLD AUTO: 93.3 %
NRBC BLD-RTO: 0 /100 WBCS (ref 0–0)
PHOSPHATE SERPL-MCNC: 4.1 MG/DL (ref 2.5–4.9)
PHOSPHATE SERPL-MCNC: 4.3 MG/DL (ref 2.5–4.9)
PHOSPHATE SERPL-MCNC: 4.7 MG/DL (ref 2.5–4.9)
PLATELET # BLD AUTO: 335 X10*3/UL (ref 150–450)
POTASSIUM SERPL-SCNC: 4.5 MMOL/L (ref 3.5–5.3)
POTASSIUM SERPL-SCNC: 4.6 MMOL/L (ref 3.5–5.3)
POTASSIUM SERPL-SCNC: 4.6 MMOL/L (ref 3.5–5.3)
POTASSIUM SERPL-SCNC: 4.7 MMOL/L (ref 3.5–5.3)
PROT SERPL-MCNC: 5.7 G/DL (ref 6.4–8.2)
RBC # BLD AUTO: 3.63 X10*6/UL (ref 4.5–5.9)
SODIUM SERPL-SCNC: 131 MMOL/L (ref 136–145)
SODIUM SERPL-SCNC: 132 MMOL/L (ref 136–145)
SODIUM SERPL-SCNC: 133 MMOL/L (ref 136–145)
SODIUM SERPL-SCNC: 134 MMOL/L (ref 136–145)
URATE SERPL-MCNC: 2.8 MG/DL (ref 4–7.5)
URATE SERPL-MCNC: 3 MG/DL (ref 4–7.5)
URATE SERPL-MCNC: 3.2 MG/DL (ref 4–7.5)
WBC # BLD AUTO: 11.1 X10*3/UL (ref 4.4–11.3)

## 2024-09-15 PROCEDURE — 83615 LACTATE (LD) (LDH) ENZYME: CPT

## 2024-09-15 PROCEDURE — 2500000004 HC RX 250 GENERAL PHARMACY W/ HCPCS (ALT 636 FOR OP/ED): Performed by: STUDENT IN AN ORGANIZED HEALTH CARE EDUCATION/TRAINING PROGRAM

## 2024-09-15 PROCEDURE — 2500000002 HC RX 250 W HCPCS SELF ADMINISTERED DRUGS (ALT 637 FOR MEDICARE OP, ALT 636 FOR OP/ED): Performed by: STUDENT IN AN ORGANIZED HEALTH CARE EDUCATION/TRAINING PROGRAM

## 2024-09-15 PROCEDURE — 2020000001 HC ICU ROOM DAILY

## 2024-09-15 PROCEDURE — 83735 ASSAY OF MAGNESIUM: CPT

## 2024-09-15 PROCEDURE — 2500000004 HC RX 250 GENERAL PHARMACY W/ HCPCS (ALT 636 FOR OP/ED)

## 2024-09-15 PROCEDURE — 80069 RENAL FUNCTION PANEL: CPT | Mod: CCI

## 2024-09-15 PROCEDURE — 37799 UNLISTED PX VASCULAR SURGERY: CPT

## 2024-09-15 PROCEDURE — 82947 ASSAY GLUCOSE BLOOD QUANT: CPT

## 2024-09-15 PROCEDURE — 80069 RENAL FUNCTION PANEL: CPT

## 2024-09-15 PROCEDURE — 2500000001 HC RX 250 WO HCPCS SELF ADMINISTERED DRUGS (ALT 637 FOR MEDICARE OP)

## 2024-09-15 PROCEDURE — 2500000005 HC RX 250 GENERAL PHARMACY W/O HCPCS

## 2024-09-15 PROCEDURE — 84550 ASSAY OF BLOOD/URIC ACID: CPT

## 2024-09-15 PROCEDURE — 94669 MECHANICAL CHEST WALL OSCILL: CPT

## 2024-09-15 PROCEDURE — 84295 ASSAY OF SERUM SODIUM: CPT

## 2024-09-15 PROCEDURE — 85025 COMPLETE CBC W/AUTO DIFF WBC: CPT

## 2024-09-15 PROCEDURE — 99233 SBSQ HOSP IP/OBS HIGH 50: CPT | Performed by: THORACIC SURGERY (CARDIOTHORACIC VASCULAR SURGERY)

## 2024-09-15 PROCEDURE — 99291 CRITICAL CARE FIRST HOUR: CPT

## 2024-09-15 RX ORDER — DIGOXIN 0.25 MG/ML
INJECTION INTRAMUSCULAR; INTRAVENOUS
Status: COMPLETED
Start: 2024-09-15 | End: 2024-09-15

## 2024-09-15 RX ORDER — DIGOXIN 0.25 MG/ML
250 INJECTION INTRAMUSCULAR; INTRAVENOUS ONCE
Status: COMPLETED | OUTPATIENT
Start: 2024-09-15 | End: 2024-09-15

## 2024-09-15 RX ORDER — ONDANSETRON HYDROCHLORIDE 2 MG/ML
8 INJECTION, SOLUTION INTRAVENOUS ONCE
Status: COMPLETED | OUTPATIENT
Start: 2024-09-15 | End: 2024-09-15

## 2024-09-15 RX ORDER — ONDANSETRON HYDROCHLORIDE 2 MG/ML
INJECTION, SOLUTION INTRAVENOUS
Status: DISPENSED
Start: 2024-09-15 | End: 2024-09-16

## 2024-09-15 RX ORDER — CEFTRIAXONE 1 G/50ML
1 INJECTION, SOLUTION INTRAVENOUS EVERY 24 HOURS
Status: COMPLETED | OUTPATIENT
Start: 2024-09-15 | End: 2024-09-16

## 2024-09-15 RX ADMIN — POLYETHYLENE GLYCOL 3350 17 G: 17 POWDER, FOR SOLUTION ORAL at 08:59

## 2024-09-15 RX ADMIN — DEXAMETHASONE SODIUM PHOSPHATE 8 MG: 4 INJECTION, SOLUTION INTRA-ARTICULAR; INTRALESIONAL; INTRAMUSCULAR; INTRAVENOUS; SOFT TISSUE at 17:15

## 2024-09-15 RX ADMIN — ALLOPURINOL 300 MG: 300 TABLET ORAL at 08:59

## 2024-09-15 RX ADMIN — ONDANSETRON 8 MG: 2 INJECTION INTRAMUSCULAR; INTRAVENOUS at 17:15

## 2024-09-15 RX ADMIN — DIGOXIN 250 MCG: 0.25 INJECTION INTRAMUSCULAR; INTRAVENOUS at 01:29

## 2024-09-15 RX ADMIN — AMIODARONE HYDROCHLORIDE 0.5 MG/MIN: 1.8 INJECTION, SOLUTION INTRAVENOUS at 18:58

## 2024-09-15 RX ADMIN — SODIUM CHLORIDE, POTASSIUM CHLORIDE, SODIUM LACTATE AND CALCIUM CHLORIDE 500 ML: 600; 310; 30; 20 INJECTION, SOLUTION INTRAVENOUS at 02:38

## 2024-09-15 RX ADMIN — SODIUM CHLORIDE 110 MG: 0.9 INJECTION, SOLUTION INTRAVENOUS at 17:56

## 2024-09-15 RX ADMIN — Medication 100 MCG/HR: at 01:43

## 2024-09-15 RX ADMIN — PROPOFOL 20 MCG/KG/MIN: 10 INJECTION, EMULSION INTRAVENOUS at 15:28

## 2024-09-15 RX ADMIN — AMIODARONE HYDROCHLORIDE 0.5 MG/MIN: 1.8 INJECTION, SOLUTION INTRAVENOUS at 07:37

## 2024-09-15 RX ADMIN — SENNOSIDES AND DOCUSATE SODIUM 2 TABLET: 50; 8.6 TABLET ORAL at 08:59

## 2024-09-15 RX ADMIN — CEFTRIAXONE SODIUM 1 G: 1 INJECTION, SOLUTION INTRAVENOUS at 17:15

## 2024-09-15 RX ADMIN — SENNOSIDES AND DOCUSATE SODIUM 2 TABLET: 50; 8.6 TABLET ORAL at 20:04

## 2024-09-15 RX ADMIN — OLANZAPINE 5 MG: 5 TABLET, FILM COATED ORAL at 20:04

## 2024-09-15 RX ADMIN — Medication 75 MCG/HR: at 12:49

## 2024-09-15 RX ADMIN — PROPOFOL 20 MCG/KG/MIN: 10 INJECTION, EMULSION INTRAVENOUS at 22:19

## 2024-09-15 RX ADMIN — PANTOPRAZOLE SODIUM 40 MG: 40 INJECTION, POWDER, FOR SOLUTION INTRAVENOUS at 07:42

## 2024-09-15 RX ADMIN — PROPOFOL 20 MCG/KG/MIN: 10 INJECTION, EMULSION INTRAVENOUS at 07:37

## 2024-09-15 RX ADMIN — Medication 0.07 MCG/KG/MIN: at 10:27

## 2024-09-15 RX ADMIN — PROPOFOL 25 MCG/KG/MIN: 10 INJECTION, EMULSION INTRAVENOUS at 01:42

## 2024-09-15 ASSESSMENT — PAIN SCALES - GENERAL
PAINLEVEL_OUTOF10: 0 - NO PAIN
PAINLEVEL_OUTOF10: 0 - NO PAIN

## 2024-09-15 ASSESSMENT — PAIN - FUNCTIONAL ASSESSMENT
PAIN_FUNCTIONAL_ASSESSMENT: CPOT (CRITICAL CARE PAIN OBSERVATION TOOL)
PAIN_FUNCTIONAL_ASSESSMENT: 0-10

## 2024-09-15 NOTE — PROGRESS NOTES
Khoa Mitchell is a 69 y.o. male on day 8 of admission presenting with Metastatic malignant neoplasm, unspecified site (Multi).    Subjective   Patient is intubated, arousable to vocal stimuli. minimal pericardial drainage Minimal drainage since yesterday (1-2 ccs) Patient on pressor support.          Objective     Physical Exam  General: intubated but arousable to vocal stimuli  Skin: warm and dry   Head/ neck: no JVD seen at 90 degrees  Cardiac: RRR, subcostal pericardial drain in place, insertion site C/D/I; scant serosang drainage in drainage bag   Pulm: intubated  GI: soft, nontender   Extremities: edematous in 4 limbs  Neuro: RICHARD intubated, sedated  Psych: intubated, sedated     Last Recorded Vitals  Blood pressure 132/50, pulse 85, temperature 36.5 °C (97.7 °F), resp. rate 16, height 1.829 m (6'), weight 99.3 kg (218 lb 14.7 oz), SpO2 93%.  Intake/Output last 3 Shifts:  I/O last 3 completed shifts:  In: 3121.6 (31.4 mL/kg) [I.V.:2011.6 (20.3 mL/kg); NG/GT:210; IV Piggyback:900]  Out: 1393 (14 mL/kg) [Urine:1370 (0.4 mL/kg/hr); Drains:23]  Weight: 99.3 kg     Relevant Results             Scheduled medications  allopurinol, 300 mg, oral, Daily  cefTRIAXone, 1 g, intravenous, q24h  dexAMETHasone, 8 mg, intravenous, Once  pantoprazole, 40 mg, oral, Daily before breakfast   Or  esomeprazole, 40 mg, nasoduodenal tube, Daily before breakfast   Or  pantoprazole, 40 mg, intravenous, Daily before breakfast  etoposide, 50 mg/m2 (Treatment Plan Recorded), intravenous, Once  OLANZapine, 5 mg, oral, Nightly  ondansetron, 8 mg, intravenous, Once  perflutren lipid microspheres, 0.5-10 mL of dilution, intravenous, Once in imaging  polyethylene glycol, 17 g, nasogastric tube, Daily  sennosides-docusate sodium, 2 tablet, nasogastric tube, BID      Continuous medications  amiodarone, 0.5-1 mg/min, Last Rate: 0.5 mg/min (09/15/24 0900)  fentaNYL, 0-300 mcg/hr, Last Rate: 75 mcg/hr (09/15/24 1249)  norepinephrine, 0-0.2 mcg/kg/min,  Last Rate: 0.06 mcg/kg/min (09/15/24 1100)  propofol, 5-50 mcg/kg/min, Last Rate: 20 mcg/kg/min (09/15/24 0900)      PRN medications  PRN medications: albuterol, dextrose, dextrose, dextrose, diphenhydrAMINE, EPINEPHrine HCl, famotidine, glucagon, glucagon, methylPREDNISolone sodium succinate (PF), oxygen, prochlorperazine, prochlorperazine, sodium chloride      Results for orders placed or performed during the hospital encounter of 09/07/24 (from the past 24 hour(s))   POCT GLUCOSE   Result Value Ref Range    POCT Glucose 139 (H) 74 - 99 mg/dL   Uric Acid   Result Value Ref Range    Uric Acid 2.7 (L) 4.0 - 7.5 mg/dL   Lactate Dehydrogenase   Result Value Ref Range     (H) 84 - 246 U/L   Renal Function Panel   Result Value Ref Range    Glucose 133 (H) 74 - 99 mg/dL    Sodium 133 (L) 136 - 145 mmol/L    Potassium 3.7 3.5 - 5.3 mmol/L    Chloride 94 (L) 98 - 107 mmol/L    Bicarbonate 29 21 - 32 mmol/L    Anion Gap 14 10 - 20 mmol/L    Urea Nitrogen 23 6 - 23 mg/dL    Creatinine 0.86 0.50 - 1.30 mg/dL    eGFR >90 >60 mL/min/1.73m*2    Calcium 8.5 (L) 8.6 - 10.6 mg/dL    Phosphorus 3.4 2.5 - 4.9 mg/dL    Albumin 2.6 (L) 3.4 - 5.0 g/dL   POCT GLUCOSE   Result Value Ref Range    POCT Glucose 152 (H) 74 - 99 mg/dL   Digoxin   Result Value Ref Range    Digoxin  0.51 (L) 0.80 - <2.00 ng/mL   Renal function panel   Result Value Ref Range    Glucose 150 (H) 74 - 99 mg/dL    Sodium 132 (L) 136 - 145 mmol/L    Potassium 4.1 3.5 - 5.3 mmol/L    Chloride 95 (L) 98 - 107 mmol/L    Bicarbonate 28 21 - 32 mmol/L    Anion Gap 13 10 - 20 mmol/L    Urea Nitrogen 23 6 - 23 mg/dL    Creatinine 0.78 0.50 - 1.30 mg/dL    eGFR >90 >60 mL/min/1.73m*2    Calcium 8.3 (L) 8.6 - 10.6 mg/dL    Phosphorus 3.4 2.5 - 4.9 mg/dL    Albumin 2.5 (L) 3.4 - 5.0 g/dL   Magnesium   Result Value Ref Range    Magnesium 1.93 1.60 - 2.40 mg/dL   POCT GLUCOSE   Result Value Ref Range    POCT Glucose 183 (H) 74 - 99 mg/dL   Uric Acid   Result Value Ref  Range    Uric Acid 2.8 (L) 4.0 - 7.5 mg/dL   Lactate Dehydrogenase   Result Value Ref Range     84 - 246 U/L   Renal Function Panel   Result Value Ref Range    Glucose 176 (H) 74 - 99 mg/dL    Sodium 134 (L) 136 - 145 mmol/L    Potassium 4.6 3.5 - 5.3 mmol/L    Chloride 95 (L) 98 - 107 mmol/L    Bicarbonate 26 21 - 32 mmol/L    Anion Gap 18 10 - 20 mmol/L    Urea Nitrogen 26 (H) 6 - 23 mg/dL    Creatinine 0.89 0.50 - 1.30 mg/dL    eGFR >90 >60 mL/min/1.73m*2    Calcium 8.4 (L) 8.6 - 10.6 mg/dL    Phosphorus 4.7 2.5 - 4.9 mg/dL    Albumin 2.5 (L) 3.4 - 5.0 g/dL   CBC and Auto Differential   Result Value Ref Range    WBC 11.1 4.4 - 11.3 x10*3/uL    nRBC 0.0 0.0 - 0.0 /100 WBCs    RBC 3.63 (L) 4.50 - 5.90 x10*6/uL    Hemoglobin 10.5 (L) 13.5 - 17.5 g/dL    Hematocrit 31.2 (L) 41.0 - 52.0 %    MCV 86 80 - 100 fL    MCH 28.9 26.0 - 34.0 pg    MCHC 33.7 32.0 - 36.0 g/dL    RDW 16.4 (H) 11.5 - 14.5 %    Platelets 335 150 - 450 x10*3/uL    Neutrophils % 93.3 40.0 - 80.0 %    Immature Granulocytes %, Automated 1.1 (H) 0.0 - 0.9 %    Lymphocytes % 1.7 13.0 - 44.0 %    Monocytes % 3.7 2.0 - 10.0 %    Eosinophils % 0.1 0.0 - 6.0 %    Basophils % 0.1 0.0 - 2.0 %    Neutrophils Absolute 10.39 (H) 1.20 - 7.70 x10*3/uL    Immature Granulocytes Absolute, Automated 0.12 0.00 - 0.70 x10*3/uL    Lymphocytes Absolute 0.19 (L) 1.20 - 4.80 x10*3/uL    Monocytes Absolute 0.41 0.10 - 1.00 x10*3/uL    Eosinophils Absolute 0.01 0.00 - 0.70 x10*3/uL    Basophils Absolute 0.01 0.00 - 0.10 x10*3/uL   Magnesium   Result Value Ref Range    Magnesium 2.05 1.60 - 2.40 mg/dL   Comprehensive metabolic panel   Result Value Ref Range    Glucose 186 (H) 74 - 99 mg/dL    Sodium 133 (L) 136 - 145 mmol/L    Potassium 4.7 3.5 - 5.3 mmol/L    Chloride 94 (L) 98 - 107 mmol/L    Bicarbonate 26 21 - 32 mmol/L    Anion Gap 18 10 - 20 mmol/L    Urea Nitrogen 28 (H) 6 - 23 mg/dL    Creatinine 0.92 0.50 - 1.30 mg/dL    eGFR 89 >60 mL/min/1.73m*2    Calcium  8.4 (L) 8.6 - 10.6 mg/dL    Albumin 2.6 (L) 3.4 - 5.0 g/dL    Alkaline Phosphatase 100 33 - 136 U/L    Total Protein 5.7 (L) 6.4 - 8.2 g/dL    AST 47 (H) 9 - 39 U/L    Bilirubin, Total 4.2 (H) 0.0 - 1.2 mg/dL    ALT 44 10 - 52 U/L   POCT GLUCOSE   Result Value Ref Range    POCT Glucose 197 (H) 74 - 99 mg/dL   Uric Acid   Result Value Ref Range    Uric Acid 3.2 (L) 4.0 - 7.5 mg/dL   Lactate Dehydrogenase   Result Value Ref Range     84 - 246 U/L   Renal Function Panel   Result Value Ref Range    Glucose 191 (H) 74 - 99 mg/dL    Sodium 132 (L) 136 - 145 mmol/L    Potassium 4.5 3.5 - 5.3 mmol/L    Chloride 94 (L) 98 - 107 mmol/L    Bicarbonate 28 21 - 32 mmol/L    Anion Gap 15 10 - 20 mmol/L    Urea Nitrogen 30 (H) 6 - 23 mg/dL    Creatinine 0.95 0.50 - 1.30 mg/dL    eGFR 86 >60 mL/min/1.73m*2    Calcium 8.3 (L) 8.6 - 10.6 mg/dL    Phosphorus 4.3 2.5 - 4.9 mg/dL    Albumin 2.4 (L) 3.4 - 5.0 g/dL   POCT GLUCOSE   Result Value Ref Range    POCT Glucose 198 (H) 74 - 99 mg/dL            Assessment/Plan   Assessment & Plan  Metastatic malignant neoplasm, unspecified site (Multi)    Small cell lung cancer (Multi)    Pericardial effusion (HHS-HCC)    Heart block    Pleural effusion    #Cardiac tamponade s/p pericardiocentesis and pericardial drain placement on 9/9  - minimal drainage over few days  - TTE with minimal pericardial effusion  - CTS does not recommend pericardial window due to futility.  - After discussion with primary team and assessment of risk for infection due to prolonged drain presence versus recurrence of pericardial effusion, pericardial drain was removed.          I spent 30 minutes in the professional and overall care of this patient.      Audie Coe MD

## 2024-09-15 NOTE — PROGRESS NOTES
Khoa Mitchell is a 69 y.o. male on day 8 of admission presenting with Metastatic malignant neoplasm, unspecified site (Multi).    Subjective   Thoracic Surgery reengaged for possible pericardial window for drainage of pericardial effusion in setting of lung primary malignancy.     Patient continues to be hemodynamically unstable, requiring levo 0.06-0.07 due to presumed septic shock of unknown origin.   History obtained by chart review primarily given that patient is intubated.     Objective     General: critically ill in ICU bed  HEENT: ETT in place  Resp: intubated, mechanically ventilated on FiO2 50% PEEP 8  RR 16  CV: on 0.07 levo; pericardial drain to gravity bag with SS/dark red output  Abd: soft, NTND  : hurtado draining thick, blood-tinged urine  Ext: bilateral LE edema  Psych: sedated, unable to assess    Last Recorded Vitals  Blood pressure 132/50, pulse 84, temperature 36.4 °C (97.5 °F), resp. rate 16, height 1.829 m (6'), weight 99.3 kg (218 lb 14.7 oz), SpO2 94%.  Intake/Output last 3 Shifts:  I/O last 3 completed shifts:  In: 3121.6 (31.4 mL/kg) [I.V.:2011.6 (20.3 mL/kg); NG/GT:210; IV Piggyback:900]  Out: 1393 (14 mL/kg) [Urine:1370 (0.4 mL/kg/hr); Drains:23]  Weight: 99.3 kg     Relevant Results            10.5     11.1>-----<335              31.2   132  94  30                  ----------------<191     4.5  28  0.95          Ca 8.3 Phos 4.3 Mg 2.05       ALT 44 AST 47 AlkPhos 100 tBili 4.2         CXR 9/14:  No significant change in near complete opacification of left   hemithorax, correlating with combination of left lung mass and   pleural effusion on CT scan 09/07/2024.   Similar right basilar atelectasis/consolidation with suggestion of   small right pleural effusion.     Assessment/Plan   Assessment & Plan  Metastatic malignant neoplasm, unspecified site (Multi)    Small cell lung cancer (Multi)    Pleural effusion    Pericardial effusion (HHS-HCC)    Heart block    This is a 70 y/o M  with newly diagnosed small cell lung cancer that is metastatic to the brain, adrenal glands, liver, and bone with associated malignant pleural and pericardial effusions. S/p pericardial drain on 9/9.   Thoracic Surgery reengaged for possible pericardial window for drainage of pericardial effusion in setting of lung primary malignancy.     9/9: he underwent a pericardiocentesis, which drained 400 ml serous fluid.  9/13: thoracentesis with 1.1L straw-colored fluid was drained from his L chest. Fluid was sent to cytology.     Per cardiology, pending possible removal of drain 9/15 AM given decreasing output.     Recommendations:   - No intervention recommended - patient is unstable with diffusely metastatic disease. Performing a pericardial window is medically futile.   - Please maintain pericardial drain for management of pericardial effusion. Recommend stripping drain of clots frequently to ensure patency.   - Recommend continued goals of care conversations with family.     Discussed with attending, Dr. Murcia.    Shena Madsen MD  Thoracic Surgery [phone]

## 2024-09-15 NOTE — SIGNIFICANT EVENT
09/15/24 1010   Prechemo Checklist   Has the patient been in the hospital, ED, or urgent care since last date of service Yes   Chemo/Immuno Consent Completed and Signed Yes   Protocol/Indications Verified Yes   Confirmed to previous date/time of medication Yes   Compared to previous dose Yes   All medications are dated accurately Yes   Pregnancy Test Negative Not applicable   Parameters Met (!) No  (Devang Mcwilliams MD notified of T bili 4.2. MD states dose reduced by half and okay to treat. Order placed stating okay to treat)   Provider Notified Yes   Provider Name Devang Mcwilliams MD   Is Patient Proceeding With Treatment? Yes   BSA/Weight-Height Verified Yes   Dose Calculations Verified (current, total, cumulative) Yes     Kim Mobley RN

## 2024-09-15 NOTE — PROGRESS NOTES
Medical Intensive Care - Daily Progress Note   Subjective    Khoa Mitchell is a 69 y.o. year old male patient admitted on 9/7/2024 with following ICU needs: acute hypoxemic respiratory failure, worsening from severe metastatic burden requiring endotracheal intubation.     Interval History:     Patient went into Afib RVR with HR between 100-130   Remains on 0.06 levo, 25 prop, 125 fentanyl, amio gtt   Radiation will resume on Monday     Meds    Scheduled medications  allopurinol, 300 mg, oral, Daily  cefTRIAXone, 1 g, intravenous, q24h  dexAMETHasone, 8 mg, intravenous, Once  pantoprazole, 40 mg, oral, Daily before breakfast   Or  esomeprazole, 40 mg, nasoduodenal tube, Daily before breakfast   Or  pantoprazole, 40 mg, intravenous, Daily before breakfast  etoposide, 50 mg/m2 (Treatment Plan Recorded), intravenous, Once  OLANZapine, 5 mg, oral, Nightly  ondansetron, 8 mg, intravenous, Once  perflutren lipid microspheres, 0.5-10 mL of dilution, intravenous, Once in imaging  polyethylene glycol, 17 g, nasogastric tube, Daily  sennosides-docusate sodium, 2 tablet, nasogastric tube, BID      Continuous medications  amiodarone, 0.5-1 mg/min, Last Rate: 0.5 mg/min (09/15/24 0900)  fentaNYL, 0-300 mcg/hr, Last Rate: 75 mcg/hr (09/15/24 1249)  norepinephrine, 0-0.2 mcg/kg/min, Last Rate: 0.06 mcg/kg/min (09/15/24 1100)  propofol, 5-50 mcg/kg/min, Last Rate: 20 mcg/kg/min (09/15/24 0900)      PRN medications  PRN medications: albuterol, dextrose, dextrose, dextrose, diphenhydrAMINE, EPINEPHrine HCl, famotidine, glucagon, glucagon, methylPREDNISolone sodium succinate (PF), oxygen, prochlorperazine, prochlorperazine, sodium chloride     Objective    Blood pressure 132/50, pulse 85, temperature 36.5 °C (97.7 °F), resp. rate 16, height 1.829 m (6'), weight 99.3 kg (218 lb 14.7 oz), SpO2 93%.     Physical Exam  Constitutional:       General: He is awake.      Appearance: He is obese.      Interventions: Nasal cannula in place.       Comments: Patient intubated. Stirring but not opening eyes to voice  HENT:      Head:      Salivary Glands: Right salivary gland is diffusely enlarged.      Comments: Significant plethora of the head including neck, face, and periorbital edema. Evidence of plum shaped mass on right jaw.   Cardiovascular:      Rate and Rhythm: Normal rate.      Heart sounds: Normal heart sounds, S1 normal and S2 normal.   Musculoskeletal:      Right upper arm: Swelling and edema present.      Left upper arm: Swelling and edema present.      Right lower le+ Pitting Edema present.      Left lower le+ Pitting Edema present.      Comments: The upper extremities are symmetrically edematous, hands are warm to touch.    Lymphadenopathy:      Cervical:      Right cervical: No superficial cervical adenopathy.     Left cervical: No superficial cervical adenopathy.      Upper Body:      Right upper body: No supraclavicular adenopathy.      Left upper body: No supraclavicular adenopathy.   Neurological:      Mental Status: He is alert.   Psychiatric:         Behavior: Behavior is cooperative.      Intake/Output Summary (Last 24 hours) at 9/15/2024 1322  Last data filed at 9/15/2024 1249  Gross per 24 hour   Intake 2061.18 ml   Output 865 ml   Net 1196.18 ml     Labs:   Results from last 72 hours   Lab Units 09/15/24  0859 09/15/24  0342 09/15/24  0019 24  1949   SODIUM mmol/L 132* 133* 134* 132*   POTASSIUM mmol/L 4.5 4.7 4.6 4.1   CHLORIDE mmol/L 94* 94* 95* 95*   CO2 mmol/L 28 26 26 28   BUN mg/dL 30* 28* 26* 23   CREATININE mg/dL 0.95 0.92 0.89 0.78   GLUCOSE mg/dL 191* 186* 176* 150*   CALCIUM mg/dL 8.3* 8.4* 8.4* 8.3*   ANION GAP mmol/L 15 18 18 13   EGFR mL/min/1.73m*2 86 89 >90 >90   PHOSPHORUS mg/dL 4.3  --  4.7 3.4      Results from last 72 hours   Lab Units 09/15/24  0342 24  0534 24  0449   WBC AUTO x10*3/uL 11.1 9.4 11.2   HEMOGLOBIN g/dL 10.5* 9.6* 9.9*   HEMATOCRIT % 31.2* 28.0* 28.4*   PLATELETS AUTO  x10*3/uL 335 254 294   NEUTROS PCT AUTO % 93.3 86.9 82.7   LYMPHS PCT AUTO % 1.7 2.8 5.2   MONOS PCT AUTO % 3.7 6.4 8.4   EOS PCT AUTO % 0.1 2.9 2.5                 Micro/ID:     Lab Results   Component Value Date    BLOODCULT No growth at 4 days -  FINAL REPORT 09/08/2024    BLOODCULT No growth at 4 days -  FINAL REPORT 09/08/2024       Summary of key imaging results from the last 24 hours  CXR: Complete opacification of left hemithorax, correlating with combination of left lung mass and pleural effusion.    Assessment and Plan     Assessment: Khoa Mitchell is a 69 y.o. year old male patient admitted on 9/7/2024 with PMHx of T2DM, CKD2 (baseline Cr 1.2) originally admitted for acute hypoxic respiratory failure 2/2 to postobstructive pneumonia from SVC from new diagnosis of malignancy. Course complicated by worsening respiratory failure requiring intubation 9/8 with subsequent BAL with suctioning of thick green secretions. 9/9 TTE with moderate pericardial effusion, findings suggestive of early tamponade. Transferred to the CICU, underwent pericardiocentesis subsequently complicated by complete heart block requiring TVP, then with afib RVR which chemically converted with amiodarone. Repeat CXR with complete opacification of the left lung. Treated with ceftriaxone (9/8-9/10), broadened to zosyn (9/10-*) given worsening infiltrates, azithromycin (9/8-9/10). Now currently on ceftriaxone (9/10- *).     Mechanical Ventilation: 4-10 days  Sedation/Analgesia:  Propofol, Fentanyl   Restraints: Restraints indicated as alternative therapies have been attempted and have been ineffective.  Restrain with soft wrist restraints and side rails up x4 until medical devices discontinued and/or patient able to participate with plan of care.     Summary for 09/15/24:  Thoracic surgery does not recommend pericardial window due to futility   Interventional cardiology pulled pericardial drain due to minimal draining and risk of infection    Intermittent Afib RVR with MAP 40-50, gave dig 250 mcg IV    Plan:  NEUROLOGY/PSYCH:  NEUROLOGIC  #Intubated, sedated  -fentanyl 75  -Daily SATs as oxygen requirements decrease     #Multiple brain lesions  ::CT head performed, notable for multiple hyperdense lesions concerning for hemorrhagic metastases, largest [id number]  ::Repeat CT head without evidence of hemorrhagic transformation  -Holding dvt ppx post-bleed day 2 (9/10 AM)  -Per NSGY, recommend MRI presurgical perfusion and fingerprinting brain, C/T/L w/ w/o contrast when able to lie flat; will page them when done     CARDIOVASCULAR  #Shock  Likely septic with concern for worsening post-obstructive pneumonia and mucus plugging as well as worsening pleural effusion  -Given worsening effusion and pneumonia, ceftriaxone (9/10-*)  -Fu Bcx, BAL cx, sputum cx  -Infectious workup as below     #Pericardial effusion  #Complete heart block  Likely malignant given evidence of multiple probably metastatic lesions  ::CT chest with pericardial effusion measuring 15mm in thickness  ::9/9 TTE with moderate pericardial effusion, brief RV and RA diastolic collapse, concerning for early tamponade  ::9/9 pericardiocentesis with 400cc serosanguinous fluid, complicated by CHB requiring TVP  :: cytology showing malignant pericardial effusion likely SCLC  -Drain pulled  -thoracic surgery recommends no pericardial window due to futility   -Cardiology, EP following; appreciate recs     #Afib  Likely 2/2 to worsening infection and effusion  ::s/p amio bolus  ::Intermittent Afib RVR w/ MAP 40-50, on amio gtt  - s/p dig 250 IV  -Continue amio gtt     PULMONARY  #Acute hypoxic respiratory failure  Originally likely 2/2 to post-obstructive pneumonia, underwent BAL with evidence of multiple secretions. Now complicated by complete opacification of the left lung concerning for worsening mucus plugging vs worsening pleural effusion vs combination.  ::Baseline RA  ::ENT scoped - patent airway  with gross aspiration of secretions; hypomobile left vocal cord  ::9/7 BCx 2/4 Strep salivarius, likely contaminant, very uncommon cause of bacteremia  ::9/9 CXR with complete opacification of the left lung and R mediastinal shift  -Fu BAL cx, BCx, sputum cx  -fu Aspergillus galactomannan, PJP PCR, AFB, fungal cx  -s/p ceftriaxone (9/10-**)  -Plan for bronch to evaluate mucus plug then thoracentesis later  -ENT following, appreciate recs     RENAL/  #Hyponatremia, resolved   Likely SIADH 2/2 to cancer, which would be consistent with lung SCC vs hypervolemic hyponatremia  ::Admission Na 133  ::Home salt tabs and water restriction, lasix 20mg per outpt nephro  ::Serum osm 294  ::Urine osm 785   ::Urine lytes WNL   -Continue to monitor      #CKD2a  ::Baseline Cr 1.1-1.2  -At baseline, CTM     GASTROINTESTINAL  -No acute issues     ENDOCRINE  #T2DM  ::9/8 A1C 6.8  -SSI q4h while NPO     HEME/ONC  #Metastatic cancer, SCLC  #SVC syndrome  ::CT imaging with multiple lesions - multiple brain mets, soft tissue mass in lung  ::CT AP with lesions in liver, spleen, both adrenal glands, pancreas, multiple peritoneal nodules, R gluteal mass, R acetabulum  ::CT head with multiple lesions  :: Pericardial fluid significant for malignant cells; BAL cytology  -Follow up with med onc- plan for chemo initiation 9/14  -Will begin 5 fraction treatment to the mediastinum starting 9/13   - rad onc following     #Normocytic anemia  Likely AOCD 2/2 to malignancy vs SARA given elevated RDW  ::Baseline 11-12, now 8-9   -Iron, TIBC, Tsat, ferritin      INFECTIOUS DISEASE  #Concern for post-obstructive pneumonia  -Fu sputum cx, Bcx as above  -C/w ceftriaxone (9/10-*)    ICU Check List            ICU Liberation: Intervention:   Assess, Prevent, Manage Pain CPOT -     Both SAT and SBT [] SAT  [] SBT 30-60 min [] Extubate to NC  [] Extubate to NIV  [] Discuss Trach   Choice of analgesia and sedation RASS: -2  Fentanyl and Propofol     Delirium:  Assess, prevent and manage CAM -     Early Mobility and Exercise   [] PT /OT consult   Family Engagement and Empowerment [x]Family updated [x]SW consult     FEN  Fluids: PRN  Electrolytes: PRN  Nutrition: NPO  Prophylaxis:  DVT ppx: contraindicated   GI ppx: none  Bowel care: none   Access: Peripheral IV (left), endotracheal tube (09/08-), A line  Hardware:         Arterial Line 09/10/24 Left Radial (Active)   Placement Date/Time: 09/10/24 (c) 0105   Size: 20 G  Orientation: Left  Location: Radial  Site Prep: Alcohol  Local Anesthetic: Injectable  Insertion attempts: 1  Securement Method: Transparent dressing  Patient Tolerance: Tolerated well   Number of days: 5       ETT  8.5 mm (Active)   Placement Date/Time: 09/08/24 1208   Hand Hygiene Completed: Yes  Mask Ventilation: Vent by mask + OA or adjuvant +/- NMBA  ETT Type: ETT - single  Single Lumen Tube Size: 8.5 mm  Cuffed: Yes  Laryngoscope: Jose  Blade Size: 4  Location: Oral  ...   Number of days: 7       Urethral Catheter (Active)   Placement Date/Time: 09/13/24 0500   Hand Hygiene Completed: Yes  Urine Returned: Yes   Number of days: 2       Open Drain Inferior;Medial;Midline Pericardial (Active)   Placement Date/Time: 09/09/24 1341   Hand Hygiene Completed: Yes  Orientation: Inferior;Medial;Midline  Location: Pericardial  Size (Fr.): 8 Fr.   Number of days: 5       NG/OG/Feeding Tube OG - [place] sump 18 Fr Center mouth (Active)   Placement Date/Time: 09/08/24 1230   Hand Hygiene Completed: Yes  Type of Tube: NG/OG Tube  Tube Length: 65 cm  Tube Type: OG - [place] sump  NG/OG Tube Size: 18 Fr  Tube Location: Center mouth   Number of days: 7       Social:  Code: Full Code    HPOA: there is no HCPOA, only contact is girlfriend Zita Campbell [phone] who is proximal decision maker   Disposition: Medicine ICU    Enid Chambers MD   09/15/24 at 8:00 AM     Disclaimer: Documentation completed with the information available at the time of input. The  times in the chart may not be reflective of actual patient care times, interventions, or procedures. Documentation occurs after the physical care of the patient.

## 2024-09-15 NOTE — PROGRESS NOTES
Name: Khoa Mitchell  MRN: [id number]  Encounter Date: 9/15/2024  PCP: Savita Cerda, APRN-CNP, DNP  Heme-Onc: None    Reason for consult: SCLC  Attending Provider Dr. James    Hematology/ Oncology Consult Daily Progress Note    Overnight Events   Patient went into afib with RVR for around 7 hours overnight. Otherwise no acute events.     Patient remains intubated, sedated, on norepinephrine.     Review of Systems   12 Point ROS negative except HPI     Allergies   No Known Allergies    Medications     allopurinol, 300 mg, Daily  cefTRIAXone, 1 g, q24h  dexAMETHasone, 8 mg, Once  pantoprazole, 40 mg, Daily before breakfast   Or  esomeprazole, 40 mg, Daily before breakfast   Or  pantoprazole, 40 mg, Daily before breakfast  etoposide, 50 mg/m2 (Treatment Plan Recorded), Once  OLANZapine, 5 mg, Nightly  ondansetron, 8 mg, Once  perflutren lipid microspheres, 0.5-10 mL of dilution, Once in imaging  polyethylene glycol, 17 g, Daily  sennosides-docusate sodium, 2 tablet, BID      amiodarone, Last Rate: 0.5 mg/min (09/15/24 0900)  fentaNYL, Last Rate: 75 mcg/hr (09/15/24 1249)  norepinephrine, Last Rate: 0.06 mcg/kg/min (09/15/24 1100)  propofol, Last Rate: 20 mcg/kg/min (09/15/24 1528)      albuterol, 3 mL, PRN  dextrose, 500 mL, PRN  dextrose, 12.5 g, q15 min PRN  dextrose, 25 g, q15 min PRN  diphenhydrAMINE, 50 mg, PRN  EPINEPHrine HCl, 0.3 mg, q5 min PRN  famotidine, 20 mg, PRN  glucagon, 1 mg, q15 min PRN  glucagon, 1 mg, q15 min PRN  methylPREDNISolone sodium succinate (PF), 40 mg, PRN  oxygen, , Continuous PRN - O2/gases  prochlorperazine, 10 mg, q6h PRN  prochlorperazine, 10 mg, q6h PRN  sodium chloride, 500 mL, PRN        Physical Exam   Blood pressure 132/50, pulse 86, temperature 36.5 °C (97.7 °F), resp. rate 16, height 1.829 m (6'), weight 99.3 kg (218 lb 14.7 oz), SpO2 92%.    ECO    Gen: intubated, sedated   HEENT: facial plethora and erythema, large right face mass   CV: RRR, levo at 0.07  Pulm: ventilated  breath sounds   Abd: soft, NT/ND   Ext: upper extremity edema   : hurtado with pink tinged urine    Labs     Lab Results   Component Value Date    GLUCOSE 191 (H) 09/15/2024    CALCIUM 8.3 (L) 09/15/2024     (L) 09/15/2024    K 4.5 09/15/2024    CO2 28 09/15/2024    CL 94 (L) 09/15/2024    BUN 30 (H) 09/15/2024    CREATININE 0.95 09/15/2024       Lab Results   Component Value Date    WBC 11.1 09/15/2024    HGB 10.5 (L) 09/15/2024    HCT 31.2 (L) 09/15/2024    MCV 86 09/15/2024     09/15/2024       Lab Results   Component Value Date    ALT 44 09/15/2024    AST 47 (H) 09/15/2024    ALKPHOS 100 09/15/2024    BILITOT 4.2 (H) 09/15/2024         Imaging   Reviewed    Assessment/Plan   Khoa Mitchell is a 69 y.o. 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      Patient underwent urgent RT 8 Gy x 1 9/13.   S/p thoracentesis 9/13 showing exudative fluid without overt evidence of empyema. MICU team agreeable to proceed with systemic chemotherapy.   G6PD normal.      Recommendations:  - continue urgent palliative chemotherapy 9/14- with carboplatin and etoposide as follows:              - carboplatin AUC 5 day 1              - etoposide 50mg/m2 (50% dose reduced for hyperbilirubinemia) daily days 1-3  - continue allopurinol 300mg daily for TLS prophlyaxis  - please obtain daily CBC, CMP  - agree with more frequent monitoring for TLS with RFP, LDH, uric acid  - f/u final pathology  - rest of care per excellent primary team      Patient discussed with attending physician, Dr. Mcwilliams, who agrees with the above.      Javier Tiwari MD  Hematology-Oncology Fellow, PGY5  Hematology Consult Pager: [phone]  Oncology Consult Pager: [phone]

## 2024-09-16 ENCOUNTER — APPOINTMENT (OUTPATIENT)
Dept: RADIOLOGY | Facility: HOSPITAL | Age: 69
DRG: 870 | End: 2024-09-16
Payer: MEDICARE

## 2024-09-16 LAB
ALBUMIN SERPL BCP-MCNC: 2.4 G/DL (ref 3.4–5)
ALBUMIN SERPL BCP-MCNC: 2.5 G/DL (ref 3.4–5)
ALP SERPL-CCNC: 110 U/L (ref 33–136)
ALT SERPL W P-5'-P-CCNC: 55 U/L (ref 10–52)
ANION GAP BLDA CALCULATED.4IONS-SCNC: 10 MMO/L (ref 10–25)
ANION GAP BLDA CALCULATED.4IONS-SCNC: 8 MMO/L (ref 10–25)
ANION GAP BLDA CALCULATED.4IONS-SCNC: 8 MMO/L (ref 10–25)
ANION GAP SERPL CALC-SCNC: 14 MMOL/L (ref 10–20)
ANION GAP SERPL CALC-SCNC: 14 MMOL/L (ref 10–20)
ANION GAP SERPL CALC-SCNC: 15 MMOL/L (ref 10–20)
ANION GAP SERPL CALC-SCNC: 16 MMOL/L (ref 10–20)
APPEARANCE UR: ABNORMAL
AST SERPL W P-5'-P-CCNC: 50 U/L (ref 9–39)
BACTERIA FLD CULT: NORMAL
BASE EXCESS BLDA CALC-SCNC: 2 MMOL/L (ref -2–3)
BASE EXCESS BLDA CALC-SCNC: 2.2 MMOL/L (ref -2–3)
BASE EXCESS BLDA CALC-SCNC: 2.6 MMOL/L (ref -2–3)
BASOPHILS # BLD AUTO: 0.01 X10*3/UL (ref 0–0.1)
BASOPHILS NFR BLD AUTO: 0.1 %
BILIRUB DIRECT SERPL-MCNC: 1.8 MG/DL (ref 0–0.3)
BILIRUB SERPL-MCNC: 3 MG/DL (ref 0–1.2)
BILIRUB UR STRIP.AUTO-MCNC: NEGATIVE MG/DL
BODY TEMPERATURE: 37 DEGREES CELSIUS
BUN SERPL-MCNC: 38 MG/DL (ref 6–23)
BUN SERPL-MCNC: 39 MG/DL (ref 6–23)
BUN SERPL-MCNC: 42 MG/DL (ref 6–23)
BUN SERPL-MCNC: 43 MG/DL (ref 6–23)
CA-I BLDA-SCNC: 1.16 MMOL/L (ref 1.1–1.33)
CA-I BLDA-SCNC: 1.17 MMOL/L (ref 1.1–1.33)
CA-I BLDA-SCNC: 1.19 MMOL/L (ref 1.1–1.33)
CALCIUM SERPL-MCNC: 8.2 MG/DL (ref 8.6–10.6)
CALCIUM SERPL-MCNC: 8.2 MG/DL (ref 8.6–10.6)
CALCIUM SERPL-MCNC: 8.3 MG/DL (ref 8.6–10.6)
CALCIUM SERPL-MCNC: 8.4 MG/DL (ref 8.6–10.6)
CHLORIDE BLDA-SCNC: 95 MMOL/L (ref 98–107)
CHLORIDE BLDA-SCNC: 96 MMOL/L (ref 98–107)
CHLORIDE BLDA-SCNC: 98 MMOL/L (ref 98–107)
CHLORIDE SERPL-SCNC: 94 MMOL/L (ref 98–107)
CHLORIDE SERPL-SCNC: 95 MMOL/L (ref 98–107)
CHLORIDE SERPL-SCNC: 95 MMOL/L (ref 98–107)
CHLORIDE SERPL-SCNC: 96 MMOL/L (ref 98–107)
CO2 SERPL-SCNC: 26 MMOL/L (ref 21–32)
CO2 SERPL-SCNC: 27 MMOL/L (ref 21–32)
COLOR UR: ABNORMAL
CREAT SERPL-MCNC: 0.98 MG/DL (ref 0.5–1.3)
CREAT SERPL-MCNC: 1.01 MG/DL (ref 0.5–1.3)
CREAT SERPL-MCNC: 1.07 MG/DL (ref 0.5–1.3)
CREAT SERPL-MCNC: 1.07 MG/DL (ref 0.5–1.3)
DIGOXIN SERPL-MCNC: 0.67 NG/ML (ref 0.8–?)
EGFRCR SERPLBLD CKD-EPI 2021: 75 ML/MIN/1.73M*2
EGFRCR SERPLBLD CKD-EPI 2021: 75 ML/MIN/1.73M*2
EGFRCR SERPLBLD CKD-EPI 2021: 80 ML/MIN/1.73M*2
EGFRCR SERPLBLD CKD-EPI 2021: 83 ML/MIN/1.73M*2
EJECTION FRACTION: 35 %
EOSINOPHIL # BLD AUTO: 0 X10*3/UL (ref 0–0.7)
EOSINOPHIL NFR BLD AUTO: 0 %
ERYTHROCYTE [DISTWIDTH] IN BLOOD BY AUTOMATED COUNT: 15.8 % (ref 11.5–14.5)
GLUCOSE BLD MANUAL STRIP-MCNC: 194 MG/DL (ref 74–99)
GLUCOSE BLD MANUAL STRIP-MCNC: 198 MG/DL (ref 74–99)
GLUCOSE BLD MANUAL STRIP-MCNC: 211 MG/DL (ref 74–99)
GLUCOSE BLD MANUAL STRIP-MCNC: 235 MG/DL (ref 74–99)
GLUCOSE BLD MANUAL STRIP-MCNC: 237 MG/DL (ref 74–99)
GLUCOSE BLD MANUAL STRIP-MCNC: 240 MG/DL (ref 74–99)
GLUCOSE BLDA-MCNC: 188 MG/DL (ref 74–99)
GLUCOSE BLDA-MCNC: 215 MG/DL (ref 74–99)
GLUCOSE BLDA-MCNC: 229 MG/DL (ref 74–99)
GLUCOSE SERPL-MCNC: 186 MG/DL (ref 74–99)
GLUCOSE SERPL-MCNC: 212 MG/DL (ref 74–99)
GLUCOSE SERPL-MCNC: 232 MG/DL (ref 74–99)
GLUCOSE SERPL-MCNC: 233 MG/DL (ref 74–99)
GLUCOSE UR STRIP.AUTO-MCNC: NORMAL MG/DL
GRAM STN SPEC: NORMAL
GRAM STN SPEC: NORMAL
HCO3 BLDA-SCNC: 26.5 MMOL/L (ref 22–26)
HCO3 BLDA-SCNC: 26.5 MMOL/L (ref 22–26)
HCO3 BLDA-SCNC: 27.2 MMOL/L (ref 22–26)
HCT VFR BLD AUTO: 29.4 % (ref 41–52)
HCT VFR BLD EST: 31 % (ref 41–52)
HCT VFR BLD EST: 31 % (ref 41–52)
HCT VFR BLD EST: 41 % (ref 41–52)
HGB BLD-MCNC: 10.1 G/DL (ref 13.5–17.5)
HGB BLDA-MCNC: 10.4 G/DL (ref 13.5–17.5)
HGB BLDA-MCNC: 10.4 G/DL (ref 13.5–17.5)
HGB BLDA-MCNC: 13.8 G/DL (ref 13.5–17.5)
HOLD SPECIMEN: NORMAL
IMM GRANULOCYTES # BLD AUTO: 0.09 X10*3/UL (ref 0–0.7)
IMM GRANULOCYTES NFR BLD AUTO: 0.8 % (ref 0–0.9)
INHALED O2 CONCENTRATION: 45 %
INHALED O2 CONCENTRATION: 50 %
INHALED O2 CONCENTRATION: 50 %
KETONES UR STRIP.AUTO-MCNC: NEGATIVE MG/DL
LAB AP ASR DISCLAIMER: NORMAL
LABORATORY COMMENT REPORT: NORMAL
LACTATE BLDA-SCNC: 1.8 MMOL/L (ref 0.4–2)
LACTATE BLDA-SCNC: 1.8 MMOL/L (ref 0.4–2)
LACTATE BLDA-SCNC: 1.9 MMOL/L (ref 0.4–2)
LDH SERPL L TO P-CCNC: 208 U/L (ref 84–246)
LDH SERPL L TO P-CCNC: 214 U/L (ref 84–246)
LDH SERPL L TO P-CCNC: 219 U/L (ref 84–246)
LEFT ATRIUM VOLUME AREA LENGTH INDEX BSA: 29.2 ML/M2
LEFT VENTRICLE INTERNAL DIMENSION DIASTOLE: 4.2 CM (ref 3.5–6)
LEFT VENTRICULAR OUTFLOW TRACT DIAMETER: 2.3 CM
LEUKOCYTE ESTERASE UR QL STRIP.AUTO: NEGATIVE
LYMPHOCYTES # BLD AUTO: 0.17 X10*3/UL (ref 1.2–4.8)
LYMPHOCYTES NFR BLD AUTO: 1.5 %
MAGNESIUM SERPL-MCNC: 2.24 MG/DL (ref 1.6–2.4)
MCH RBC QN AUTO: 28.9 PG (ref 26–34)
MCHC RBC AUTO-ENTMCNC: 34.4 G/DL (ref 32–36)
MCV RBC AUTO: 84 FL (ref 80–100)
MONOCYTES # BLD AUTO: 0.38 X10*3/UL (ref 0.1–1)
MONOCYTES NFR BLD AUTO: 3.3 %
MUCOUS THREADS #/AREA URNS AUTO: ABNORMAL /LPF
NEUTROPHILS # BLD AUTO: 10.9 X10*3/UL (ref 1.2–7.7)
NEUTROPHILS NFR BLD AUTO: 94.3 %
NITRITE UR QL STRIP.AUTO: NEGATIVE
NRBC BLD-RTO: 0 /100 WBCS (ref 0–0)
OXYHGB MFR BLDA: 93.8 % (ref 94–98)
OXYHGB MFR BLDA: 94.4 % (ref 94–98)
OXYHGB MFR BLDA: 95.1 % (ref 94–98)
PATH REPORT.COMMENTS IMP SPEC: NORMAL
PATH REPORT.FINAL DX SPEC: NORMAL
PATH REPORT.GROSS SPEC: NORMAL
PATH REPORT.RELEVANT HX SPEC: NORMAL
PATH REPORT.TOTAL CANCER: NORMAL
PATH REVIEW-CELL CT,FLUID: NORMAL
PCO2 BLDA: 39 MM HG (ref 38–42)
PCO2 BLDA: 40 MM HG (ref 38–42)
PCO2 BLDA: 41 MM HG (ref 38–42)
PH BLDA: 7.43 PH (ref 7.38–7.42)
PH BLDA: 7.43 PH (ref 7.38–7.42)
PH BLDA: 7.44 PH (ref 7.38–7.42)
PH UR STRIP.AUTO: 5.5 [PH]
PHOSPHATE SERPL-MCNC: 3.2 MG/DL (ref 2.5–4.9)
PHOSPHATE SERPL-MCNC: 3.7 MG/DL (ref 2.5–4.9)
PLATELET # BLD AUTO: 363 X10*3/UL (ref 150–450)
PO2 BLDA: 79 MM HG (ref 85–95)
PO2 BLDA: 80 MM HG (ref 85–95)
PO2 BLDA: 88 MM HG (ref 85–95)
POTASSIUM BLDA-SCNC: 4.7 MMOL/L (ref 3.5–5.3)
POTASSIUM BLDA-SCNC: 4.8 MMOL/L (ref 3.5–5.3)
POTASSIUM BLDA-SCNC: 4.8 MMOL/L (ref 3.5–5.3)
POTASSIUM SERPL-SCNC: 4.7 MMOL/L (ref 3.5–5.3)
POTASSIUM SERPL-SCNC: 4.9 MMOL/L (ref 3.5–5.3)
POTASSIUM SERPL-SCNC: 4.9 MMOL/L (ref 3.5–5.3)
POTASSIUM SERPL-SCNC: 5 MMOL/L (ref 3.5–5.3)
PROT SERPL-MCNC: 5.6 G/DL (ref 6.4–8.2)
PROT UR STRIP.AUTO-MCNC: ABNORMAL MG/DL
RBC # BLD AUTO: 3.49 X10*6/UL (ref 4.5–5.9)
RBC # UR STRIP.AUTO: NEGATIVE /UL
RBC #/AREA URNS AUTO: ABNORMAL /HPF
SAO2 % BLDA: 97 % (ref 94–100)
SAO2 % BLDA: 97 % (ref 94–100)
SAO2 % BLDA: 98 % (ref 94–100)
SODIUM BLDA-SCNC: 125 MMOL/L (ref 136–145)
SODIUM BLDA-SCNC: 128 MMOL/L (ref 136–145)
SODIUM BLDA-SCNC: 128 MMOL/L (ref 136–145)
SODIUM SERPL-SCNC: 131 MMOL/L (ref 136–145)
SODIUM SERPL-SCNC: 131 MMOL/L (ref 136–145)
SODIUM SERPL-SCNC: 132 MMOL/L (ref 136–145)
SODIUM SERPL-SCNC: 132 MMOL/L (ref 136–145)
SP GR UR STRIP.AUTO: 1.03
TRICUSPID ANNULAR PLANE SYSTOLIC EXCURSION: 2.1 CM
URATE SERPL-MCNC: 2.9 MG/DL (ref 4–7.5)
URATE SERPL-MCNC: 3 MG/DL (ref 4–7.5)
URATE SERPL-MCNC: 3.2 MG/DL (ref 4–7.5)
URATE SERPL-MCNC: 3.4 MG/DL (ref 4–7.5)
UROBILINOGEN UR STRIP.AUTO-MCNC: NORMAL MG/DL
WBC # BLD AUTO: 11.6 X10*3/UL (ref 4.4–11.3)
WBC #/AREA URNS AUTO: ABNORMAL /HPF

## 2024-09-16 PROCEDURE — 2500000004 HC RX 250 GENERAL PHARMACY W/ HCPCS (ALT 636 FOR OP/ED)

## 2024-09-16 PROCEDURE — 83615 LACTATE (LD) (LDH) ENZYME: CPT

## 2024-09-16 PROCEDURE — 84100 ASSAY OF PHOSPHORUS: CPT

## 2024-09-16 PROCEDURE — 2500000002 HC RX 250 W HCPCS SELF ADMINISTERED DRUGS (ALT 637 FOR MEDICARE OP, ALT 636 FOR OP/ED): Performed by: STUDENT IN AN ORGANIZED HEALTH CARE EDUCATION/TRAINING PROGRAM

## 2024-09-16 PROCEDURE — 93010 ELECTROCARDIOGRAM REPORT: CPT | Performed by: INTERNAL MEDICINE

## 2024-09-16 PROCEDURE — 84132 ASSAY OF SERUM POTASSIUM: CPT

## 2024-09-16 PROCEDURE — 84550 ASSAY OF BLOOD/URIC ACID: CPT

## 2024-09-16 PROCEDURE — 82248 BILIRUBIN DIRECT: CPT

## 2024-09-16 PROCEDURE — 2500000005 HC RX 250 GENERAL PHARMACY W/O HCPCS: Performed by: STUDENT IN AN ORGANIZED HEALTH CARE EDUCATION/TRAINING PROGRAM

## 2024-09-16 PROCEDURE — 80069 RENAL FUNCTION PANEL: CPT | Mod: CCI

## 2024-09-16 PROCEDURE — 84075 ASSAY ALKALINE PHOSPHATASE: CPT

## 2024-09-16 PROCEDURE — 2500000005 HC RX 250 GENERAL PHARMACY W/O HCPCS

## 2024-09-16 PROCEDURE — 82947 ASSAY GLUCOSE BLOOD QUANT: CPT

## 2024-09-16 PROCEDURE — 2500000002 HC RX 250 W HCPCS SELF ADMINISTERED DRUGS (ALT 637 FOR MEDICARE OP, ALT 636 FOR OP/ED)

## 2024-09-16 PROCEDURE — 2500000004 HC RX 250 GENERAL PHARMACY W/ HCPCS (ALT 636 FOR OP/ED): Performed by: STUDENT IN AN ORGANIZED HEALTH CARE EDUCATION/TRAINING PROGRAM

## 2024-09-16 PROCEDURE — 87086 URINE CULTURE/COLONY COUNT: CPT

## 2024-09-16 PROCEDURE — 93010 ELECTROCARDIOGRAM REPORT: CPT | Performed by: STUDENT IN AN ORGANIZED HEALTH CARE EDUCATION/TRAINING PROGRAM

## 2024-09-16 PROCEDURE — 2020000001 HC ICU ROOM DAILY

## 2024-09-16 PROCEDURE — 99291 CRITICAL CARE FIRST HOUR: CPT

## 2024-09-16 PROCEDURE — 80162 ASSAY OF DIGOXIN TOTAL: CPT

## 2024-09-16 PROCEDURE — 0W9B3ZZ DRAINAGE OF LEFT PLEURAL CAVITY, PERCUTANEOUS APPROACH: ICD-10-PCS | Performed by: NURSE PRACTITIONER

## 2024-09-16 PROCEDURE — 83735 ASSAY OF MAGNESIUM: CPT

## 2024-09-16 PROCEDURE — 85025 COMPLETE CBC W/AUTO DIFF WBC: CPT

## 2024-09-16 PROCEDURE — 71045 X-RAY EXAM CHEST 1 VIEW: CPT

## 2024-09-16 PROCEDURE — 37799 UNLISTED PX VASCULAR SURGERY: CPT

## 2024-09-16 PROCEDURE — 99291 CRITICAL CARE FIRST HOUR: CPT | Performed by: NURSE PRACTITIONER

## 2024-09-16 PROCEDURE — 94003 VENT MGMT INPAT SUBQ DAY: CPT

## 2024-09-16 PROCEDURE — 80069 RENAL FUNCTION PANEL: CPT

## 2024-09-16 PROCEDURE — 2500000001 HC RX 250 WO HCPCS SELF ADMINISTERED DRUGS (ALT 637 FOR MEDICARE OP)

## 2024-09-16 PROCEDURE — 32555 ASPIRATE PLEURA W/ IMAGING: CPT | Performed by: NURSE PRACTITIONER

## 2024-09-16 PROCEDURE — 81001 URINALYSIS AUTO W/SCOPE: CPT

## 2024-09-16 PROCEDURE — 71045 X-RAY EXAM CHEST 1 VIEW: CPT | Performed by: RADIOLOGY

## 2024-09-16 RX ORDER — ONDANSETRON HYDROCHLORIDE 2 MG/ML
8 INJECTION, SOLUTION INTRAVENOUS ONCE
Status: COMPLETED | OUTPATIENT
Start: 2024-09-16 | End: 2024-09-16

## 2024-09-16 RX ORDER — INSULIN LISPRO 100 [IU]/ML
0-10 INJECTION, SOLUTION INTRAVENOUS; SUBCUTANEOUS EVERY 4 HOURS
Status: DISCONTINUED | OUTPATIENT
Start: 2024-09-16 | End: 2024-09-24

## 2024-09-16 RX ORDER — DEXTROSE 50 % IN WATER (D50W) INTRAVENOUS SYRINGE
25
Status: DISCONTINUED | OUTPATIENT
Start: 2024-09-16 | End: 2024-09-24

## 2024-09-16 RX ORDER — INSULIN LISPRO 100 [IU]/ML
0-5 INJECTION, SOLUTION INTRAVENOUS; SUBCUTANEOUS EVERY 4 HOURS
Status: DISCONTINUED | OUTPATIENT
Start: 2024-09-16 | End: 2024-09-16

## 2024-09-16 RX ORDER — DEXTROSE 50 % IN WATER (D50W) INTRAVENOUS SYRINGE
12.5
Status: DISCONTINUED | OUTPATIENT
Start: 2024-09-16 | End: 2024-09-24

## 2024-09-16 ASSESSMENT — PAIN - FUNCTIONAL ASSESSMENT
PAIN_FUNCTIONAL_ASSESSMENT: CPOT (CRITICAL CARE PAIN OBSERVATION TOOL)

## 2024-09-16 ASSESSMENT — PAIN SCALES - GENERAL
PAINLEVEL_OUTOF10: 0 - NO PAIN
PAINLEVEL_OUTOF10: 0 - NO PAIN

## 2024-09-16 NOTE — CARE PLAN
Problem: Pain - Adult  Goal: Verbalizes/displays adequate comfort level or baseline comfort level  Outcome: Progressing     Problem: Safety - Adult  Goal: Free from fall injury  Outcome: Progressing     Problem: Discharge Planning  Goal: Discharge to home or other facility with appropriate resources  Outcome: Progressing     Problem: Chronic Conditions and Co-morbidities  Goal: Patient's chronic conditions and co-morbidity symptoms are monitored and maintained or improved  Outcome: Progressing     Problem: Skin  Goal: Decreased wound size/increased tissue granulation at next dressing change  Outcome: Progressing  Flowsheets (Taken 9/14/2024 1118 by Rae Danielson RN)  Decreased wound size/increased tissue granulation at next dressing change: Protective dressings over bony prominences  Goal: Participates in plan/prevention/treatment measures  Outcome: Progressing  Flowsheets (Taken 9/14/2024 1118 by Rae Danielson RN)  Participates in plan/prevention/treatment measures: Elevate heels  Goal: Promote/optimize nutrition  Outcome: Progressing  Flowsheets (Taken 9/14/2024 1118 by Rae Danielson, DAVID)  Promote/optimize nutrition: Discuss with provider if NPO > 2 days  Goal: Promote skin healing  Outcome: Progressing  Flowsheets (Taken 9/14/2024 1118 by Rae Danielson, DAVID)  Promote skin healing: Turn/reposition every 2 hours/use positioning/transfer devices     Problem: Safety - Medical Restraint  Goal: Remains free of injury from restraints (Restraint for Interference with Medical Device)  Outcome: Progressing  Goal: Free from restraint(s) (Restraint for Interference with Medical Device)  Outcome: Progressing     Problem: Knowledge Deficit  Goal: Patient/family/caregiver demonstrates understanding of disease process, treatment plan, medications, and discharge instructions  Outcome: Progressing     Problem: Mechanical Ventilation  Goal: Patient Will Maintain Patent Airway  Outcome: Progressing  Goal: Oral health is  maintained or improved  Outcome: Progressing  Goal: Tracheostomy will be managed safely  Outcome: Progressing  Goal: ET tube will be managed safely  Outcome: Progressing  Goal: Ability to express needs and understand communication  Outcome: Progressing  Goal: Mobility/activity is maintained at optimum level for patient  Outcome: Progressing     Problem: Fall/Injury  Goal: Not fall by end of shift  Outcome: Progressing  Goal: Be free from injury by end of the shift  Outcome: Progressing  Goal: Verbalize understanding of personal risk factors for fall in the hospital  Outcome: Progressing  Goal: Verbalize understanding of risk factor reduction measures to prevent injury from fall in the home  Outcome: Progressing  Goal: Use assistive devices by end of the shift  Outcome: Progressing  Goal: Pace activities to prevent fatigue by end of the shift  Outcome: Progressing

## 2024-09-16 NOTE — NURSING NOTE
Talked to pt. Residents and fellow about placing a central line for pt. Due to pt. Having norepinephrine running through a peripheral IV for over 72 hours. Pt. Has been on norepinephrine running through PIV since 9/8/24. According to  guidelines, Norepinephrine is allowed to run through PIV for a maximum of 72 hours. Fellow stated that risks for central line placements are high, and continuing running norepinephrine through PIV is a less risk than place central line through the pt. Femoral vein, fellow then stated he would take a look at pt. Veins after he is done with procedure. No central line placement at this time.

## 2024-09-16 NOTE — PROGRESS NOTES
Communication Note    Patient Name: Khoa Mitchell  MRN: [id number]  Today's Date: 9/16/2024   Room: 18/18-A    Discipline: Physical Therapy      Missed Visit: Yes  Missed Visit Reason:  (PT evaluation reviewed, patient remains medically unstable for intervention.  Will continue to monitor and follow up as appropriate.)      09/16/24 at 9:03 AM   Emir Duffy, PT   Rehab Office: [phone]

## 2024-09-16 NOTE — PROGRESS NOTES
Subjective Data:  - now s/p pericardial drain removal  - afib RVR on amio drip, remains in RVR   - remains on levo, intubated, sedated    Objective Data:  Last Recorded Vitals:  Vitals:    09/16/24 0845 09/16/24 0900 09/16/24 1000 09/16/24 1047   BP:       Pulse: (!) 121 (!) 137 (!) 136 (!) 130   Resp: 16 16 16 16   Temp:       TempSrc:       SpO2: 96% 95% 91% 93%   Weight:       Height:           Last Labs:  CBC - 9/16/2024:  4:13 AM  11.6 10.1 363    29.4      CMP - 9/16/2024:  7:37 AM  8.2 5.6 50 --- 3.0   3.7 2.5 55 110      PTT - 9/9/2024: 10:01 PM  1.3        Last I/O:  I/O last 3 completed shifts:  In: 3723.2 (37.6 mL/kg) [I.V.:1780.4 (18 mL/kg); NG/GT:285; IV Piggyback:1657.8]  Out: 1250 (12.6 mL/kg) [Urine:1245 (0.3 mL/kg/hr); Drains:5]  Weight: 99 kg      Echo:  TTE 9/14  CONCLUSIONS:   1. [place] and mid and apical inferior septum are abnormal.   2. Left ventricular ejection fraction is moderately decreased, calculated by Pike's biplane at 39%.   3. There is global hypokinesis of the left ventricle with minor regional variations.   4. There is normal right ventricular global systolic function.   5. There is a large pleural effusion.   6. Mildly elevated right ventricular systolic pressure.   7. The inferior vena cava appears severely dilated, IVC inspiratory collapse is absent.    Transthoracic Echo (TTE) Limited 09/09/2024  CONCLUSIONS:   1. Poorly visualized anatomical structures due to suboptimal image quality.   2. Endocardial definition and arrhythmia preclude accurate assessment of LVEF.   3. Unable to determine right ventricular systolic function.   4. There is a trivial pericardial effusion.   5. Compared with study dated 9/9/2024, the previous study was done michaela-pericardiocentesis, effusion remains trivial and both studies are significantly technically limited.     Transthoracic Echo (TTE) Limited 09/09/2024  CONCLUSIONS:   1. The left ventricle was not well visualized. The left ventricular  ejection fraction could not be measured.   2. There is normal right ventricular global systolic function.   3. There is a moderate pericardial effusion.   4. There is a suggestion of brief RV diastolic collapse and brief RA collapse on pre-tap images. Tap and post-tap images were obtained in the 4-C view only. Trivial to small effusion on final images.   5. Very limited 2D study only before and during pericardiocentesis.     Transthoracic Echo (TTE) Limited 09/09/2024  CONCLUSIONS:   1. The left ventricular systolic function is moderately decreased, with a visually estimated ejection fraction of 30-35%.   2. There is global hypokinesis of the left ventricle with minor regional variations.   3. Left ventricular diastolic filling was indeterminate.   4. There is normal right ventricular global systolic function.   5. Mild diastolic collapse of the right ventricle and brief right atrium diastolic collapse.   6. There is a moderate pericardial effusion.   7. The pulmonary artery is not well visualized.   8. The inferior vena cava appears moderately dilated.   9. There are findings of early echocardiographic tamponade with buckling of RV free wall during late diastolic and brief end diastole RA collapse. The quality of the transvalvular Doppler signals was not clear enough to measure respirophasic change in ventricular filling.     Ejection Fractions:        EF   Date/Time Value Ref Range Status   09/09/2024 09:57 AM 33 %        Cath:  Cardiac Catheterization Procedure 09/09/2024  Ejection Fractions:  EF   Date/Time Value Ref Range Status   09/14/2024 11:09 AM 39 %    09/11/2024 01:00 PM 42 %    09/09/2024 09:57 AM 33 %        Inpatient Medications:  Scheduled medications   Medication Dose Route Frequency    allopurinol  300 mg oral Daily    cefTRIAXone  1 g intravenous q24h    dexAMETHasone  8 mg intravenous Once    esomeprazole  40 mg nasoduodenal tube Daily before breakfast    Or    pantoprazole  40 mg intravenous  Daily before breakfast    etoposide  50 mg/m2 (Treatment Plan Recorded) intravenous Once    insulin lispro  0-5 Units subcutaneous q4h    OLANZapine  5 mg oral Nightly    ondansetron  8 mg intravenous Once    polyethylene glycol  17 g nasogastric tube Daily    sennosides-docusate sodium  2 tablet nasogastric tube BID     PRN medications   Medication    albuterol    dextrose    dextrose    dextrose    diphenhydrAMINE    EPINEPHrine HCl    famotidine    glucagon    glucagon    methylPREDNISolone sodium succinate (PF)    oxygen    prochlorperazine    prochlorperazine    sodium chloride     Continuous Medications   Medication Dose Last Rate    amiodarone  0.5-1 mg/min 0.5 mg/min (09/16/24 0743)    fentaNYL  0-300 mcg/hr 75 mcg/hr (09/16/24 0231)    norepinephrine  0-0.2 mcg/kg/min 0.1 mcg/kg/min (09/16/24 1212)    propofol  5-50 mcg/kg/min 15 mcg/kg/min (09/16/24 0652)       Physical Exam:  General: intubated sedated   Skin: warm and dry   Cardiac: tachycardic IRR, S1, S2   Pulm: intubated, mechanical breath sounds  GI: soft, nontender   Extremities: edema of B/L UE +2, no LE edema, warm   Neuro: no focal neuro deficits   Psych: appropriate mood and behavior        Assessment/Plan   Khoa Mitchell is a 69 y.o. male with PMH of T2DM, CKD2 (baseline Cr 1.2) admitted for concern for SVC syndrome likely 2/2 to new diagnosis of metastatic cancer. Course c/b acute hypoxic resp failure s/p intubation and moderate pericardial effusion with early tamponade physiology s/p pericardiocentesis and post-procedure development of CHB requiring TVP (now discontinued with recovery of conduction). Cardiology consulted for pericardial effusion and cardiomyopathy.      Malignant pericardial effusion  Cardiac tamponade, resolved s/p pericardiocentesis/drain  Cytology is positive for malignant cells, though team still working up primary cancer. Drain output over the past 24h is minimal, effusion still trivial-small. - drain removed 9/15  -  Thoracic surgery evaluation; No intervention recommended - patient is unstable with diffusely metastatic disease. Performing a pericardial window is medically futile.   - Please maintain pericardial drain for management of pericardial effusion. Recommend stripping drain of clots frequently to ensure patency.   - Recommend continued goals of care conversations with family.   - repeat limited TTE today to eval for re accumulation s/p removal   - further imaging in 4-6 weeks to reassess    HFrEF  - no prior known hx of CM or echo on EMR  - echo 9/11/24:  Mid and apical inferior septum and apex are abnormal. EF 42%, trivial to small pericardial effusion. There is a large pleural effusion.  Unclear chronicity or etiology at this point. Depending on the patient's prognosis and goals of care/clinical course this admission, will discuss further workup and ischemic evaluation when more stable.  - hold off initiating GDMT in the setting of septic shock  - consider further work up/ischemic eval pending clinical course this admission    CHB requiring TVP-resolved  Afib  - Followed by EP  - afib RVR on amio drip, remains in RVR      Thank you for involving us in this patient's care. We will sign off at this time, please call with any questions.     Recommendations discussed with Dr. Charles Matthews DNP, APRN-CNP  Cardiology Consults      General Cardiology Consult Pager: [phone] (weekday 7AM-6PM and weekend 7AM-2PM) and other: [phone]  EP Consult Pager: [phone] (weekday 7AM-6PM and weekend 7AM-2PM) and other: [phone]  CICU Fellow Pager: [phone] anytime  EP Device Nurse Pager: [phone] (weekday 7AM-4PM)  Advanced Heart Failure Consult Pager: [phone] anytime Code Status:  Full Code      I spent 30 minutes in the care of this patient

## 2024-09-16 NOTE — NURSING NOTE
Chemotherapy Administration Note:  Etoposide 110mg bag 3/3 administered. Infusing over 1 hour at a rate of 553cc/hr. Infusing through a PIV that is (+) for BBR. Patient received IVP zofran and dexamethasone prior to chemo administration. Chemotherapy verified with second RN Kim Mobley. KAEL Quintanlila RN

## 2024-09-16 NOTE — SIGNIFICANT EVENT
09/16/24 1229   Prechemo Checklist   Has the patient been in the hospital, ED, or urgent care since last date of service Yes   Chemo/Immuno Consent Completed and Signed Yes   Protocol/Indications Verified Yes   Confirmed to previous date/time of medication Yes   Compared to previous dose Yes   All medications are dated accurately Yes   Pregnancy Test Negative Not applicable   Parameters Met Yes   Provider Notified Yes   Is Patient Proceeding With Treatment? Yes   BSA/Weight-Height Verified Yes   Dose Calculations Verified (current, total, cumulative) Yes     KAEL Quintanilla RN

## 2024-09-16 NOTE — PROGRESS NOTES
Medical Intensive Care - Daily Progress Note   Subjective    Khoa Mitchell is a 69 y.o. year old male patient admitted on 9/7/2024 with following ICU needs:  acute hypoxemic respiratory failure, worsening from severe metastatic burden requiring endotracheal intubation.     Interval History:  Pt HR dropped to 45 for 30 sec at 0500 before entering afib RVR at HR in the 140s. Increased levo to 0.1 from 0.08 and gave 500 ml LR.     Meds    Scheduled medications  allopurinol, 300 mg, oral, Daily  cefTRIAXone, 1 g, intravenous, q24h  pantoprazole, 40 mg, oral, Daily before breakfast   Or  esomeprazole, 40 mg, nasoduodenal tube, Daily before breakfast   Or  pantoprazole, 40 mg, intravenous, Daily before breakfast  insulin lispro, 0-5 Units, subcutaneous, q4h  OLANZapine, 5 mg, oral, Nightly  perflutren lipid microspheres, 0.5-10 mL of dilution, intravenous, Once in imaging  polyethylene glycol, 17 g, nasogastric tube, Daily  sennosides-docusate sodium, 2 tablet, nasogastric tube, BID      Continuous medications  amiodarone, 0.5-1 mg/min, Last Rate: 0.5 mg/min (09/16/24 0000)  fentaNYL, 0-300 mcg/hr, Last Rate: 75 mcg/hr (09/16/24 0231)  norepinephrine, 0-0.2 mcg/kg/min, Last Rate: 0.09 mcg/kg/min (09/16/24 0627)  propofol, 5-50 mcg/kg/min, Last Rate: 20 mcg/kg/min (09/16/24 0549)      PRN medications  PRN medications: albuterol, dextrose, dextrose, dextrose, diphenhydrAMINE, EPINEPHrine HCl, famotidine, glucagon, glucagon, methylPREDNISolone sodium succinate (PF), oxygen, prochlorperazine, prochlorperazine, sodium chloride     Objective    Blood pressure 132/50, pulse (!) 133, temperature 36.7 °C (98.1 °F), resp. rate 16, height 1.829 m (6'), weight 99 kg (218 lb 4.1 oz), SpO2 92%.     Physical Exam  Constitutional:       General: He is awake.      Appearance: He is obese.      Interventions: Nasal cannula in place.      Comments: Patient intubated. Stirring but not opening eyes to voice  HENT:      Head:      Salivary  Glands: Right salivary gland is diffusely enlarged.      Comments: Significant plethora of the head including neck, face, and periorbital edema. Evidence of plum shaped mass on right jaw.   Cardiovascular:      Rate and Rhythm: Normal rate.      Heart sounds: Normal heart sounds, S1 normal and S2 normal.    Musculoskeletal:      Right upper arm: Swelling and edema present.      Left upper arm: Swelling and edema present.      Right lower le+ Pitting Edema present.      Left lower le+ Pitting Edema present.      Comments: The upper extremities are symmetrically edematous, hands are warm to touch.    Lymphadenopathy:      Cervical:      Right cervical: No superficial cervical adenopathy.     Left cervical: No superficial cervical adenopathy.      Upper Body:      Right upper body: No supraclavicular adenopathy.      Left upper body: No supraclavicular adenopathy.   Neurological:      Mental Status: He is alert.   Psychiatric:         Behavior: Behavior is cooperative.      Intake/Output Summary (Last 24 hours) at 2024 0651  Last data filed at 2024 0600  Gross per 24 hour   Intake 2555.77 ml   Output 850 ml   Net 1705.77 ml     Labs:   Results from last 72 hours   Lab Units 24  0413 24  0055 09/15/24  1530   SODIUM mmol/L 132* 132* 131*   POTASSIUM mmol/L 5.0 4.9 4.6   CHLORIDE mmol/L 94* 95* 94*   CO2 mmol/L 27 27 28   BUN mg/dL 39* 38* 34*   CREATININE mg/dL 1.01 0.98 0.95   GLUCOSE mg/dL 232* 233* 198*   CALCIUM mg/dL 8.3* 8.4* 8.4*   ANION GAP mmol/L 16 15 14   EGFR mL/min/1.73m*2 80 83 86   PHOSPHORUS mg/dL 3.7 3.7 4.1      Results from last 72 hours   Lab Units 24  0413 09/15/24  0342 24  0534   WBC AUTO x10*3/uL 11.6* 11.1 9.4   HEMOGLOBIN g/dL 10.1* 10.5* 9.6*   HEMATOCRIT % 29.4* 31.2* 28.0*   PLATELETS AUTO x10*3/uL 363 335 254   NEUTROS PCT AUTO % 94.3 93.3 86.9   LYMPHS PCT AUTO % 1.5 1.7 2.8   MONOS PCT AUTO % 3.3 3.7 6.4   EOS PCT AUTO % 0.0 0.1 2.9      Results  from last 72 hours   Lab Units 09/16/24  0626   POCT PH, ARTERIAL pH 7.44*   POCT PCO2, ARTERIAL mm Hg 39   POCT PO2, ARTERIAL mm Hg 80*   POCT SO2, ARTERIAL % 97            Micro/ID:     Lab Results   Component Value Date    BLOODCULT No growth at 4 days -  FINAL REPORT 09/08/2024    BLOODCULT No growth at 4 days -  FINAL REPORT 09/08/2024       Summary of key imaging results from the last 24 hours  None     Assessment and Plan     Assessment: Khoa Mitchell is a 69 y.o. year old male patient admitted on 9/7/2024 with PMHx of T2DM, CKD2 (baseline Cr 1.2) originally admitted for acute hypoxic respiratory failure 2/2 to postobstructive pneumonia from SVC from new diagnosis of malignancy. Course complicated by worsening respiratory failure requiring intubation 9/8 with subsequent BAL with suctioning of thick green secretions. 9/9 TTE with moderate pericardial effusion, findings suggestive of early tamponade. Transferred to the CICU, underwent pericardiocentesis subsequently complicated by complete heart block requiring TVP, then with afib RVR which chemically converted with amiodarone. Repeat CXR with complete opacification of the left lung. Treated with ceftriaxone (9/8-9/10), broadened to zosyn (9/10-*) given worsening infiltrates, azithromycin (9/8-9/10). Now currently on ceftriaxone (9/10- *). Began radiation therapy on 9/13 and chemotherapy 9/14.     Mechanical Ventilation: > 10 days  Sedation/Analgesia:  Propofol, fentanyl   Restraints: Restraints indicated as alternative therapies have been attempted and have been ineffective.  Restrain with soft wrist restraints and side rails up x4 until medical devices discontinued and/or patient able to participate with plan of care.     Summary for 09/16/24:  Patient was bradycardic down to 45 for 30 sec. Shortly after entered afib RVR in rates of 140s. Levo increased from 0.08 to 0.1. He was given 500 ml of LR.   Therapeutic thoracentesis, 1 L removed.   Completed  chemotherapy       Plan:  NEUROLOGY/PSYCH:  NEUROLOGIC  #Intubated, sedated  -fentanyl 75  -Daily SATs as oxygen requirements decrease     #Multiple brain lesions  ::CT head performed, notable for multiple hyperdense lesions concerning for hemorrhagic metastases, largest [id number]  ::Repeat CT head without evidence of hemorrhagic transformation  -Holding dvt ppx post-bleed day 2 (9/10 AM)  -Per NSGY, recommend MRI presurgical perfusion and fingerprinting brain, C/T/L w/ w/o contrast when able to lie flat; will page them when done     CARDIOVASCULAR  #Shock  Likely septic with concern for worsening post-obstructive pneumonia and mucus plugging as well as worsening pleural effusion  -Given worsening effusion and pneumonia, ceftriaxone (9/10-*)  -Fu Bcx, BAL cx, sputum cx  -Infectious workup as below     #Pericardial effusion  #Complete heart block  Likely malignant given evidence of multiple probably metastatic lesions  ::CT chest with pericardial effusion measuring 15mm in thickness  ::9/9 TTE with moderate pericardial effusion, brief RV and RA diastolic collapse, concerning for early tamponade  ::9/9 pericardiocentesis with 400cc serosanguinous fluid, complicated by CHB requiring TVP  :: cytology showing malignant pericardial effusion likely SCLC  -Monitor pericardial drain output, minimal  -thoracic surgery consult for pericardial window  -Cardiology, EP following; appreciate recs     #Afib  Likely 2/2 to worsening infection and effusion  ::s/p amio bolus  ::Intermittent Afib RVR w/ MAP 40-50, on amio gtt  - s/p dig 250 IV  -Continue amio gtt     PULMONARY  #Acute hypoxic respiratory failure  Originally likely 2/2 to post-obstructive pneumonia, underwent BAL with evidence of multiple secretions. Now complicated by complete opacification of the left lung concerning for worsening mucus plugging vs worsening pleural effusion vs combination.  ::Baseline RA  ::ENT scoped - patent airway with gross aspiration of  secretions; hypomobile left vocal cord  ::9/7 BCx 2/4 Strep salivarius, likely contaminant, very uncommon cause of bacteremia  ::9/9 CXR with complete opacification of the left lung and R mediastinal shift  -Fu BAL cx, BCx, sputum cx  -fu Aspergillus galactomannan, PJP PCR, AFB, fungal cx  -s/p ceftriaxone (9/10-**)  -Plan for bronch to evaluate mucus plug then thoracentesis later  -ENT following, appreciate recs    FiO2 (%):  [45 %] 45 %  S RR:  [16] 16  S VT:  [500 mL] 500 mL  PEEP/CPAP (cm H2O):  [8 cm H20] 8 cm H20  MAP (cm H2O):  [12-13] 12      RENAL/  #Hyponatremia, resolved   Likely SIADH 2/2 to cancer, which would be consistent with lung SCC vs hypervolemic hyponatremia  ::Admission Na 133  ::Home salt tabs and water restriction, lasix 20mg per outpt nephro  ::Serum osm 294  ::Urine osm 785   ::Urine lytes WNL   -Continue to monitor      #CKD2a  ::Baseline Cr 1.1-1.2  -At baseline, CTM     GASTROINTESTINAL  -No acute issues     ENDOCRINE  #T2DM  ::9/8 A1C 6.8  -SSI q4h while NPO     HEME/ONC  #Metastatic cancer, SCLC  #SVC syndrome  ::CT imaging with multiple lesions - multiple brain mets, soft tissue mass in lung  ::CT AP with lesions in liver, spleen, both adrenal glands, pancreas, multiple peritoneal nodules, R gluteal mass, R acetabulum  ::CT head with multiple lesions  :: Pericardial fluid significant for malignant cells; BAL cytology  -Follow up with med onc- plan for chemo initiation 9/14  -Will begin radiation therapy to the mediastinum starting 9/13   - rad onc following     #Normocytic anemia  Likely AOCD 2/2 to malignancy vs SARA given elevated RDW  ::Baseline 11-12, now 8-9   -Iron, TIBC, Tsat, ferritin      INFECTIOUS DISEASE  #Concern for post-obstructive pneumonia  -Fu sputum cx, Bcx as above  -C/w ceftriaxone (9/10-*)    ICU Check List               ICU Liberation: Intervention:   Assess, Prevent, Manage Pain CPOT -     Both SAT and SBT [] SAT  [] SBT 30-60 min [] Extubate to NC  []  Extubate to NIV  [] Discuss Trach   Choice of analgesia and sedation RASS: -2  Fentanyl and Propofol     Delirium: Assess, prevent and manage CAM -     Early Mobility and Exercise   [] PT /OT consult   Family Engagement and Empowerment [x]Family updated [x]SW consult     FEN  Fluids: PRN  Electrolytes: PRN  Nutrition: NPO  Prophylaxis:  DVT ppx: contraindicated   GI ppx: none  Bowel care: none   Access: Peripheral IV (left), endotracheal tube (09/08-), A line  Hardware:         Arterial Line 09/10/24 Left Radial (Active)   Placement Date/Time: 09/10/24 (c) 0105   Size: 20 G  Orientation: Left  Location: Radial  Site Prep: Alcohol  Local Anesthetic: Injectable  Insertion attempts: 1  Securement Method: Transparent dressing  Patient Tolerance: Tolerated well   Number of days: 6       ETT  8.5 mm (Active)   Placement Date/Time: 09/08/24 1208   Hand Hygiene Completed: Yes  Mask Ventilation: Vent by mask + OA or adjuvant +/- NMBA  ETT Type: ETT - single  Single Lumen Tube Size: 8.5 mm  Cuffed: Yes  Laryngoscope: Jose  Blade Size: 4  Location: Oral  ...   Number of days: 7       Urethral Catheter Straight-tip 16 Fr. (Active)   Placement Date/Time: 09/16/24 0631   Placed by: mainor gaminoion  Catheter Type: Straight-tip  Tube Size (Fr.): 16 Fr.  Catheter Balloon Size: 10 mL  Urine Returned: Yes   Number of days: 0       NG/OG/Feeding Tube OG - [place] sump 18 Fr Center mouth (Active)   Placement Date/Time: 09/08/24 1230   Hand Hygiene Completed: Yes  Type of Tube: NG/OG Tube  Tube Length: 65 cm  Tube Type: OG - [place] sump  NG/OG Tube Size: 18 Fr  Tube Location: Center mouth   Number of days: 7       Social:  Code: Full Code    HPOA: there is no HCPOA, only contact is girlfriend Zita Campbell [phone] who is proximal decision maker   Disposition: Medicine ICU    Enid Chambers MD   09/16/24 at 6:51 AM     Disclaimer: Documentation completed with the information available at the time of input. The times in  the chart may not be reflective of actual patient care times, interventions, or procedures. Documentation occurs after the physical care of the patient.

## 2024-09-16 NOTE — POST-PROCEDURE NOTE
INTERVENTIONAL RADIOLOGY ADVANCED PRACTICE PROCEDURE  [facility]    A time out was performed and Left Hemithorax was examined with US and appropriate entry point was confirmed and marked.   The patient was prepped and draped in a sterile manner, 1% lidocaine was used to anesthesize the skin and subcutaneous tissue.   A 5F Centesis needle was then introduced through the skin into the pleural space, the centesis catheter was then threaded without difficulty.   1000 ml of yellow fluid was removed without difficulty. The catheter was then removed.   No immediate complications were noted during and immediately following the procedure.

## 2024-09-17 LAB
ALBUMIN SERPL BCP-MCNC: 2.2 G/DL (ref 3.4–5)
ALBUMIN SERPL BCP-MCNC: 2.3 G/DL (ref 3.4–5)
ALBUMIN SERPL BCP-MCNC: 2.5 G/DL (ref 3.4–5)
ALBUMIN SERPL BCP-MCNC: 2.5 G/DL (ref 3.4–5)
ALBUMIN SERPL BCP-MCNC: 2.6 G/DL (ref 3.4–5)
ALP SERPL-CCNC: 158 U/L (ref 33–136)
ALT SERPL W P-5'-P-CCNC: 80 U/L (ref 10–52)
ANION GAP SERPL CALC-SCNC: 11 MMOL/L (ref 10–20)
ANION GAP SERPL CALC-SCNC: 12 MMOL/L (ref 10–20)
ANION GAP SERPL CALC-SCNC: 13 MMOL/L (ref 10–20)
ANION GAP SERPL CALC-SCNC: 13 MMOL/L (ref 10–20)
ANION GAP SERPL CALC-SCNC: 16 MMOL/L (ref 10–20)
AST SERPL W P-5'-P-CCNC: 72 U/L (ref 9–39)
ATRIAL RATE: 131 BPM
ATRIAL RATE: 147 BPM
ATRIAL RATE: 72 BPM
ATRIAL RATE: 82 BPM
BASE EXCESS BLDA CALC-SCNC: 0.8 MMOL/L (ref -2–3)
BASOPHILS # BLD AUTO: 0 X10*3/UL (ref 0–0.1)
BASOPHILS # BLD AUTO: 0.01 X10*3/UL (ref 0–0.1)
BASOPHILS NFR BLD AUTO: 0 %
BASOPHILS NFR BLD AUTO: 0.1 %
BILIRUB SERPL-MCNC: 2.8 MG/DL (ref 0–1.2)
BODY TEMPERATURE: 37 DEGREES CELSIUS
BUN SERPL-MCNC: 44 MG/DL (ref 6–23)
BUN SERPL-MCNC: 45 MG/DL (ref 6–23)
BUN SERPL-MCNC: 45 MG/DL (ref 6–23)
BUN SERPL-MCNC: 46 MG/DL (ref 6–23)
BUN SERPL-MCNC: 46 MG/DL (ref 6–23)
CALCIUM SERPL-MCNC: 7.8 MG/DL (ref 8.6–10.6)
CALCIUM SERPL-MCNC: 7.8 MG/DL (ref 8.6–10.6)
CALCIUM SERPL-MCNC: 8.1 MG/DL (ref 8.6–10.6)
CELL COUNT (BLOOD): 0.08 X10*3/UL
CELL POPULATIONS: NORMAL
CHLORIDE SERPL-SCNC: 95 MMOL/L (ref 98–107)
CHLORIDE SERPL-SCNC: 96 MMOL/L (ref 98–107)
CHLORIDE SERPL-SCNC: 96 MMOL/L (ref 98–107)
CHLORIDE SERPL-SCNC: 97 MMOL/L (ref 98–107)
CHLORIDE SERPL-SCNC: 97 MMOL/L (ref 98–107)
CO2 SERPL-SCNC: 25 MMOL/L (ref 21–32)
CO2 SERPL-SCNC: 27 MMOL/L (ref 21–32)
CO2 SERPL-SCNC: 28 MMOL/L (ref 21–32)
CREAT SERPL-MCNC: 0.91 MG/DL (ref 0.5–1.3)
CREAT SERPL-MCNC: 0.98 MG/DL (ref 0.5–1.3)
CREAT SERPL-MCNC: 0.98 MG/DL (ref 0.5–1.3)
CREAT SERPL-MCNC: 1 MG/DL (ref 0.5–1.3)
CREAT SERPL-MCNC: 1.03 MG/DL (ref 0.5–1.3)
DIAGNOSIS: NORMAL
EGFRCR SERPLBLD CKD-EPI 2021: 78 ML/MIN/1.73M*2
EGFRCR SERPLBLD CKD-EPI 2021: 81 ML/MIN/1.73M*2
EGFRCR SERPLBLD CKD-EPI 2021: 83 ML/MIN/1.73M*2
EGFRCR SERPLBLD CKD-EPI 2021: 83 ML/MIN/1.73M*2
EGFRCR SERPLBLD CKD-EPI 2021: >90 ML/MIN/1.73M*2
EOSINOPHIL # BLD AUTO: 0.01 X10*3/UL (ref 0–0.7)
EOSINOPHIL # BLD AUTO: 0.01 X10*3/UL (ref 0–0.7)
EOSINOPHIL NFR BLD AUTO: 0.1 %
EOSINOPHIL NFR BLD AUTO: 0.1 %
ERYTHROCYTE [DISTWIDTH] IN BLOOD BY AUTOMATED COUNT: 15.9 % (ref 11.5–14.5)
ERYTHROCYTE [DISTWIDTH] IN BLOOD BY AUTOMATED COUNT: 16 % (ref 11.5–14.5)
FLOW DIFFERENTIAL: NORMAL
FLOW TEST ORDERED: NORMAL
GLUCOSE BLD MANUAL STRIP-MCNC: 135 MG/DL (ref 74–99)
GLUCOSE BLD MANUAL STRIP-MCNC: 141 MG/DL (ref 74–99)
GLUCOSE BLD MANUAL STRIP-MCNC: 155 MG/DL (ref 74–99)
GLUCOSE BLD MANUAL STRIP-MCNC: 180 MG/DL (ref 74–99)
GLUCOSE BLD MANUAL STRIP-MCNC: 206 MG/DL (ref 74–99)
GLUCOSE BLD MANUAL STRIP-MCNC: 225 MG/DL (ref 74–99)
GLUCOSE BLD MANUAL STRIP-MCNC: 245 MG/DL (ref 74–99)
GLUCOSE SERPL-MCNC: 154 MG/DL (ref 74–99)
GLUCOSE SERPL-MCNC: 199 MG/DL (ref 74–99)
GLUCOSE SERPL-MCNC: 232 MG/DL (ref 74–99)
GLUCOSE SERPL-MCNC: 235 MG/DL (ref 74–99)
GLUCOSE SERPL-MCNC: 240 MG/DL (ref 74–99)
HCO3 BLDA-SCNC: 26 MMOL/L (ref 22–26)
HCT VFR BLD AUTO: 26.1 % (ref 41–52)
HCT VFR BLD AUTO: 27.7 % (ref 41–52)
HGB BLD-MCNC: 9.1 G/DL (ref 13.5–17.5)
HGB BLD-MCNC: 9.5 G/DL (ref 13.5–17.5)
IMM GRANULOCYTES # BLD AUTO: 0.05 X10*3/UL (ref 0–0.7)
IMM GRANULOCYTES # BLD AUTO: 0.07 X10*3/UL (ref 0–0.7)
IMM GRANULOCYTES NFR BLD AUTO: 0.7 % (ref 0–0.9)
IMM GRANULOCYTES NFR BLD AUTO: 0.8 % (ref 0–0.9)
INHALED O2 CONCENTRATION: 50 %
LAB TEST METHOD: NORMAL
LDH SERPL L TO P-CCNC: 183 U/L (ref 84–246)
LDH SERPL L TO P-CCNC: 225 U/L (ref 84–246)
LDH SERPL L TO P-CCNC: 308 U/L (ref 84–246)
LYMPHOCYTES # BLD AUTO: 0.15 X10*3/UL (ref 1.2–4.8)
LYMPHOCYTES # BLD AUTO: 0.25 X10*3/UL (ref 1.2–4.8)
LYMPHOCYTES NFR BLD AUTO: 1.6 %
LYMPHOCYTES NFR BLD AUTO: 3.4 %
MAGNESIUM SERPL-MCNC: 2.09 MG/DL (ref 1.6–2.4)
MAGNESIUM SERPL-MCNC: 2.26 MG/DL (ref 1.6–2.4)
MCH RBC QN AUTO: 28.4 PG (ref 26–34)
MCH RBC QN AUTO: 29.4 PG (ref 26–34)
MCHC RBC AUTO-ENTMCNC: 34.3 G/DL (ref 32–36)
MCHC RBC AUTO-ENTMCNC: 34.9 G/DL (ref 32–36)
MCV RBC AUTO: 83 FL (ref 80–100)
MCV RBC AUTO: 84 FL (ref 80–100)
MONOCYTES # BLD AUTO: 0.08 X10*3/UL (ref 0.1–1)
MONOCYTES # BLD AUTO: 0.1 X10*3/UL (ref 0.1–1)
MONOCYTES NFR BLD AUTO: 1.1 %
MONOCYTES NFR BLD AUTO: 1.1 %
NEUTROPHILS # BLD AUTO: 6.99 X10*3/UL (ref 1.2–7.7)
NEUTROPHILS # BLD AUTO: 8.8 X10*3/UL (ref 1.2–7.7)
NEUTROPHILS NFR BLD AUTO: 94.6 %
NEUTROPHILS NFR BLD AUTO: 96.4 %
NRBC BLD-RTO: 0 /100 WBCS (ref 0–0)
NRBC BLD-RTO: 0 /100 WBCS (ref 0–0)
NUMBER OF CELLS COLLECTED: NORMAL
OXYHGB MFR BLDA: 93.6 % (ref 94–98)
P AXIS: 54 DEGREES
P AXIS: 67 DEGREES
P OFFSET: 184 MS
P OFFSET: 190 MS
P ONSET: 134 MS
P ONSET: 140 MS
PATH REPORT.TOTAL CANCER: NORMAL
PCO2 BLDA: 43 MM HG (ref 38–42)
PH BLDA: 7.39 PH (ref 7.38–7.42)
PHOSPHATE SERPL-MCNC: 2.8 MG/DL (ref 2.5–4.9)
PHOSPHATE SERPL-MCNC: 2.9 MG/DL (ref 2.5–4.9)
PHOSPHATE SERPL-MCNC: 3 MG/DL (ref 2.5–4.9)
PHOSPHATE SERPL-MCNC: 3.1 MG/DL (ref 2.5–4.9)
PLATELET # BLD AUTO: 316 X10*3/UL (ref 150–450)
PLATELET # BLD AUTO: 363 X10*3/UL (ref 150–450)
PO2 BLDA: 84 MM HG (ref 85–95)
POTASSIUM SERPL-SCNC: 4.6 MMOL/L (ref 3.5–5.3)
POTASSIUM SERPL-SCNC: 4.6 MMOL/L (ref 3.5–5.3)
POTASSIUM SERPL-SCNC: 4.9 MMOL/L (ref 3.5–5.3)
POTASSIUM SERPL-SCNC: 4.9 MMOL/L (ref 3.5–5.3)
POTASSIUM SERPL-SCNC: 5 MMOL/L (ref 3.5–5.3)
PR INTERVAL: 138 MS
PR INTERVAL: 146 MS
PROT SERPL-MCNC: 5.5 G/DL (ref 6.4–8.2)
Q ONSET: 207 MS
Q ONSET: 209 MS
Q ONSET: 209 MS
Q ONSET: 211 MS
QRS COUNT: 11 BEATS
QRS COUNT: 14 BEATS
QRS COUNT: 22 BEATS
QRS COUNT: 26 BEATS
QRS DURATION: 142 MS
QRS DURATION: 160 MS
QRS DURATION: 164 MS
QRS DURATION: 168 MS
QT INTERVAL: 322 MS
QT INTERVAL: 334 MS
QT INTERVAL: 434 MS
QT INTERVAL: 452 MS
QTC CALCULATION(BAZETT): 475 MS
QTC CALCULATION(BAZETT): 494 MS
QTC CALCULATION(BAZETT): 507 MS
QTC CALCULATION(BAZETT): 533 MS
QTC FREDERICIA: 418 MS
QTC FREDERICIA: 456 MS
QTC FREDERICIA: 480 MS
QTC FREDERICIA: 481 MS
R AXIS: 104 DEGREES
R AXIS: 43 DEGREES
R AXIS: 61 DEGREES
R AXIS: 66 DEGREES
RBC # BLD AUTO: 3.1 X10*6/UL (ref 4.5–5.9)
RBC # BLD AUTO: 3.35 X10*6/UL (ref 4.5–5.9)
RBC MORPH BLD: NORMAL
SAO2 % BLDA: 97 % (ref 94–100)
SIGNATURE COMMENT: NORMAL
SODIUM SERPL-SCNC: 131 MMOL/L (ref 136–145)
SPECIMEN VIABILITY: NORMAL
T AXIS: -20 DEGREES
T AXIS: -26 DEGREES
T AXIS: -71 DEGREES
T AXIS: 259 DEGREES
T OFFSET: 370 MS
T OFFSET: 378 MS
T OFFSET: 424 MS
T OFFSET: 435 MS
URATE SERPL-MCNC: 3.1 MG/DL (ref 4–7.5)
URATE SERPL-MCNC: 3.2 MG/DL (ref 4–7.5)
URATE SERPL-MCNC: 3.4 MG/DL (ref 4–7.5)
VENTRICULAR RATE: 131 BPM
VENTRICULAR RATE: 153 BPM
VENTRICULAR RATE: 72 BPM
VENTRICULAR RATE: 82 BPM
WBC # BLD AUTO: 7.4 X10*3/UL (ref 4.4–11.3)
WBC # BLD AUTO: 9.1 X10*3/UL (ref 4.4–11.3)

## 2024-09-17 PROCEDURE — 85025 COMPLETE CBC W/AUTO DIFF WBC: CPT

## 2024-09-17 PROCEDURE — 80053 COMPREHEN METABOLIC PANEL: CPT

## 2024-09-17 PROCEDURE — 80069 RENAL FUNCTION PANEL: CPT | Mod: CCI

## 2024-09-17 PROCEDURE — 2500000004 HC RX 250 GENERAL PHARMACY W/ HCPCS (ALT 636 FOR OP/ED)

## 2024-09-17 PROCEDURE — 83735 ASSAY OF MAGNESIUM: CPT

## 2024-09-17 PROCEDURE — 37799 UNLISTED PX VASCULAR SURGERY: CPT

## 2024-09-17 PROCEDURE — 99223 1ST HOSP IP/OBS HIGH 75: CPT

## 2024-09-17 PROCEDURE — 84550 ASSAY OF BLOOD/URIC ACID: CPT

## 2024-09-17 PROCEDURE — 99221 1ST HOSP IP/OBS SF/LOW 40: CPT | Performed by: STUDENT IN AN ORGANIZED HEALTH CARE EDUCATION/TRAINING PROGRAM

## 2024-09-17 PROCEDURE — 2500000002 HC RX 250 W HCPCS SELF ADMINISTERED DRUGS (ALT 637 FOR MEDICARE OP, ALT 636 FOR OP/ED)

## 2024-09-17 PROCEDURE — 2500000005 HC RX 250 GENERAL PHARMACY W/O HCPCS

## 2024-09-17 PROCEDURE — 2020000001 HC ICU ROOM DAILY

## 2024-09-17 PROCEDURE — 2500000001 HC RX 250 WO HCPCS SELF ADMINISTERED DRUGS (ALT 637 FOR MEDICARE OP)

## 2024-09-17 PROCEDURE — 2500000005 HC RX 250 GENERAL PHARMACY W/O HCPCS: Performed by: STUDENT IN AN ORGANIZED HEALTH CARE EDUCATION/TRAINING PROGRAM

## 2024-09-17 PROCEDURE — 2500000002 HC RX 250 W HCPCS SELF ADMINISTERED DRUGS (ALT 637 FOR MEDICARE OP, ALT 636 FOR OP/ED): Performed by: STUDENT IN AN ORGANIZED HEALTH CARE EDUCATION/TRAINING PROGRAM

## 2024-09-17 PROCEDURE — 83615 LACTATE (LD) (LDH) ENZYME: CPT

## 2024-09-17 PROCEDURE — 82805 BLOOD GASES W/O2 SATURATION: CPT

## 2024-09-17 PROCEDURE — 82947 ASSAY GLUCOSE BLOOD QUANT: CPT

## 2024-09-17 PROCEDURE — 99291 CRITICAL CARE FIRST HOUR: CPT

## 2024-09-17 PROCEDURE — 94003 VENT MGMT INPAT SUBQ DAY: CPT

## 2024-09-17 ASSESSMENT — PAIN - FUNCTIONAL ASSESSMENT
PAIN_FUNCTIONAL_ASSESSMENT: CPOT (CRITICAL CARE PAIN OBSERVATION TOOL)

## 2024-09-17 NOTE — PROGRESS NOTES
"Nutrition Follow Up Assessment:   Nutrition Assessment         Patient is a 69 y.o. male being treated for newly diagnosed SCLC.  Presented initially with respiratory failure with noted mediastinal mass.  Work-up showed mets/lesions to becca, liver, spleen, adrenals, pancreas, peritoneum, R gluteal, R acetabular.  Treated for post-obstructive PNA.  Started chemo on 9/14 and XRT on 9/13.  Currently intubated, sedated with fentanyl and propofol.  On levo and amio drips.    Pt with an OGT in place for enteral access.  He has Glucerna 1.5 running at 55mls/hr at visit.  Current propofol rate is 2.9mls/hr which provides 77kcals daily from fat.  Appears to be tolerating well with formed BMs but sugars are in the 200s (on lower carb TF).       Anthropometrics:  Height: 182.9 cm (6' 0.01\")   Weight: 99.3 kg (218 lb 14.7 oz)   BMI (Calculated): 29.68  IBW/kg (Dietitian Calculated): 81 kg  Percent of IBW: 122 %       Weight History:     9/17/24: 99.3kg  09/09/24 99.2 kg (218 lb 11.1 oz)   09/07/24 96.6 kg (213 lb) (admit)   01/15/24 114 kg (252 lb 3.2 oz)   01/13/23 105 kg (231 lb 6.4 oz)      H&P notes a 30-40# weight loss since Jan 2024.  Per EMR records, this would be a loss of 13% in about 8 months.      Weight Change %:       Nutrition Focused Physical Exam Findings:    Subcutaneous Fat Loss:   Orbital Fat Pads: Well nourished (slightly bulging fat pads)  Buccal Fat Pads: Well nourished (full, rounded cheeks)  Triceps: Well nourished (ample fat tissue)  Muscle Wasting:  Temporalis: Well nourished (well-defined muscle)  Pectoralis (Clavicular Region): Well nourished (clavicle not visible)  Deltoid/Trapezius: Well nourished (rounded appearance at arm, shoulder, neck)  Quadriceps: Well nourished (well developed, well rounded)  Gastrocnemius: Well nourished (well developed bulbous muscle)  Edema:  Edema: +3 moderate  Edema Location: Face  Physical Findings:  Skin: Positive (MASD sacrum, R face)    Nutrition Significant " "Labs:  BMP Trend:   Results from last 7 days   Lab Units 09/17/24  0801 09/17/24  0102 09/16/24  1653 09/16/24  0737   GLUCOSE mg/dL 199* 232*  240*  235* 186* 212*   CALCIUM mg/dL 7.8* 8.1*  8.1*  8.1* 8.2* 8.2*   SODIUM mmol/L 131* 131*  131*  131* 131* 131*   POTASSIUM mmol/L 4.6 4.9  4.9  4.6 4.7 4.9   CO2 mmol/L 27 25  27  27 26 27   CHLORIDE mmol/L 97* 95*  96*  96* 96* 95*   BUN mg/dL 45* 46*  45*  44* 43* 42*   CREATININE mg/dL 1.00 1.03  0.98  0.98 1.07 1.07    , A1C:  Lab Results   Component Value Date    HGBA1C 6.8 (H) 09/08/2024   , BG POCT trend:   Results from last 7 days   Lab Units 09/17/24  0726 09/17/24 0355 09/16/24 2358 09/16/24 2014 09/16/24  1553   POCT GLUCOSE mg/dL 206* 225* 245* 235* 198*    , Renal Lab Trend:   Results from last 7 days   Lab Units 09/17/24  0801 09/17/24 0102 09/16/24 1653 09/16/24  0737   POTASSIUM mmol/L 4.6 4.9  4.9  4.6 4.7 4.9   PHOSPHORUS mg/dL 2.9 3.1  2.8 3.2 3.7   SODIUM mmol/L 131* 131*  131*  131* 131* 131*   MAGNESIUM mg/dL 2.09 2.26  --   --    EGFR mL/min/1.73m*2 81 78  83  83 75 75   BUN mg/dL 45* 46*  45*  44* 43* 42*   CREATININE mg/dL 1.00 1.03  0.98  0.98 1.07 1.07    , Vit D: No results found for: \"VITD25\"     Nutrition Specific Medications:  Scheduled medications  allopurinol, 300 mg, oral, Daily  esomeprazole, 40 mg, nasoduodenal tube, Daily before breakfast   Or  pantoprazole, 40 mg, intravenous, Daily before breakfast  insulin lispro, 0-10 Units, subcutaneous, q4h  OLANZapine, 5 mg, oral, Nightly  perflutren protein A microsphere, 0.5 mL, intravenous, Once in imaging  polyethylene glycol, 17 g, nasogastric tube, Daily  sennosides-docusate sodium, 2 tablet, nasogastric tube, BID  sulfur hexafluoride microsphr, 2 mL, intravenous, Once in imaging      Continuous medications  amiodarone, 0.5-1 mg/min, Last Rate: 0.5 mg/min (09/17/24 0700)  fentaNYL, 0-300 mcg/hr, Last Rate: 75 mcg/hr (09/17/24 0700)  norepinephrine, 0-0.2 " mcg/kg/min, Last Rate: 0.05 mcg/kg/min (09/17/24 0900)  propofol, 5-50 mcg/kg/min, Last Rate: 5 mcg/kg/min (09/17/24 0900)      PRN medications  PRN medications: albuterol, dextrose, dextrose, dextrose, diphenhydrAMINE, EPINEPHrine HCl, famotidine, glucagon, glucagon, methylPREDNISolone sodium succinate (PF), oxygen, prochlorperazine, prochlorperazine, sodium chloride     I/O:    ; Stool Appearance: Formed (09/15/24 0800)        Dietary Orders (From admission, onward)       Start     Ordered    09/11/24 1041  Enteral feeding with NPO Glucerna 1.5; OG (orogastic tube); 55 (start at 15mls/hr and increase by 10mls q6hrs until goal rate reached)  Diet effective now        Question Answer Comment   Tube feeding formula: Glucerna 1.5    Feeding route: OG (orogastic tube)    Tube feeding continuous rate (mL/hr): 55 start at 15mls/hr and increase by 10mls q6hrs until goal rate reached       09/11/24 1042                     Estimated Needs:   Total Energy Estimated Needs (kCal):  (8893-3368)  Method for Estimating Needs: MV= 7.6, RMR= 1829 using 96.6kg  Total Protein Estimated Needs (g):  (120)  Method for Estimating Needs: 1.5 x 81kg            Nutrition Diagnosis   Malnutrition Diagnosis  Patient has Malnutrition Diagnosis: Yes  Diagnosis Status: Ongoing  Malnutrition Diagnosis: Moderate malnutrition related to chronic disease or condition  As Evidenced by: suspect pt with inadequate PO intake PTA in light of a 13% weight loss in the last 8 months; edema/swelling may be masking any wasting of fat/muscle          Nutrition Interventions/Recommendations         Nutrition Prescription:   Continue with Glucerna 1.5@ 55mls/hr.  Good blood sugar control with goal <200mgs/dL.    Check Vitamin D level.          Nutrition Interventions:    TF at goal= 1980kcals (2057kals with low rate propofol), 109grams protein       Nutrition Education:   Not appropriate       Nutrition Monitoring and Evaluation   Food/Nutrient Related History  Monitoring  Monitoring and Evaluation Plan: Enteral and parenteral nutrition intake  Criteria: TF at goal to meet 100% estimated needs         Time Spent/Follow-up Reminder:   Time Spent (min): 60 minutes  Last Date of Nutrition Visit: 09/17/24  Nutrition Follow-Up Needed?: Dietitian to reassess per policy  Follow up Comment: RADHA johnson-- TF recs

## 2024-09-17 NOTE — PROGRESS NOTES
"Medical Intensive Care - Daily Progress Note   Subjective    Khoa Mitchell is a 69 y.o. year old male patient admitted on 9/7/2024 with following ICU needs:   acute hypoxemic respiratory failure, worsening from severe metastatic burden requiring endotracheal intubation.     Interval History:  NAEON. Pt remained in NSR. Levo was reduced to 0.045. Continue with radiation and chemotherapy.     Meds    Scheduled medications  allopurinol, 300 mg, oral, Daily  esomeprazole, 40 mg, nasoduodenal tube, Daily before breakfast   Or  pantoprazole, 40 mg, intravenous, Daily before breakfast  insulin lispro, 0-10 Units, subcutaneous, q4h  OLANZapine, 5 mg, oral, Nightly  perflutren protein A microsphere, 0.5 mL, intravenous, Once in imaging  polyethylene glycol, 17 g, nasogastric tube, Daily  sennosides-docusate sodium, 2 tablet, nasogastric tube, BID  sulfur hexafluoride microsphr, 2 mL, intravenous, Once in imaging      Continuous medications  amiodarone, 0.5-1 mg/min, Last Rate: 0.5 mg/min (09/17/24 0700)  fentaNYL, 0-300 mcg/hr, Last Rate: 75 mcg/hr (09/17/24 0700)  norepinephrine, 0-0.2 mcg/kg/min, Last Rate: 0.045 mcg/kg/min (09/17/24 1100)  propofol, 5-50 mcg/kg/min, Last Rate: 5 mcg/kg/min (09/17/24 0900)      PRN medications  PRN medications: albuterol, dextrose, dextrose, dextrose, diphenhydrAMINE, EPINEPHrine HCl, famotidine, glucagon, glucagon, methylPREDNISolone sodium succinate (PF), oxygen, prochlorperazine, prochlorperazine, sodium chloride     Objective    Blood pressure 132/50, pulse 71, temperature 36 °C (96.8 °F), temperature source Temporal, resp. rate 16, height 1.829 m (6' 0.01\"), weight 99.3 kg (218 lb 14.7 oz), SpO2 94%.     Physical Exam  Constitutional:       General: He is awake.      Appearance: He is obese.      Interventions: Nasal cannula in place.      Comments: Patient intubated. Opening eyes to voice.   HENT:      Head:      Salivary Glands: Right salivary gland is diffusely enlarged.      " Comments: Significant plethora of the head including neck, face, and periorbital edema. Evidence of plum shaped mass on right jaw.   Cardiovascular:      Rate and Rhythm: Normal rate.      Heart sounds: Normal heart sounds, S1 normal and S2 normal.    Musculoskeletal:      Right upper arm: Swelling and edema present.      Left upper arm: Swelling and edema present.      Right lower le+ Pitting Edema present.      Left lower le+ Pitting Edema present.      Comments: The upper extremities are symmetrically edematous, hands are warm to touch.    Lymphadenopathy:      Cervical:      Right cervical: No superficial cervical adenopathy.     Left cervical: No superficial cervical adenopathy.      Upper Body:      Right upper body: No supraclavicular adenopathy.      Left upper body: No supraclavicular adenopathy.   Neurological:      Mental Status: He is alert.   Psychiatric:         Behavior: Behavior is cooperative.      Intake/Output Summary (Last 24 hours) at 2024 1147  Last data filed at 2024 1100  Gross per 24 hour   Intake 2934.72 ml   Output 1450 ml   Net 1484.72 ml     Labs:   Results from last 72 hours   Lab Units 24  0801 24  0102 24  1653   SODIUM mmol/L 131* 131*  131*  131* 131*   POTASSIUM mmol/L 4.6 4.9  4.9  4.6 4.7   CHLORIDE mmol/L 97* 95*  96*  96* 96*   CO2 mmol/L 27 25  27  27 26   BUN mg/dL 45* 46*  45*  44* 43*   CREATININE mg/dL 1.00 1.03  0.98  0.98 1.07   GLUCOSE mg/dL 199* 232*  240*  235* 186*   CALCIUM mg/dL 7.8* 8.1*  8.1*  8.1* 8.2*   ANION GAP mmol/L 12 16  13  13 14   EGFR mL/min/1.73m*2 81 78  83  83 75   PHOSPHORUS mg/dL 2.9 3.1  2.8 3.2      Results from last 72 hours   Lab Units 24  0836 24  0102 24  0413   WBC AUTO x10*3/uL 7.4 9.1 11.6*   HEMOGLOBIN g/dL 9.1* 9.5* 10.1*   HEMATOCRIT % 26.1* 27.7* 29.4*   PLATELETS AUTO x10*3/uL 316 363 363   NEUTROS PCT AUTO % 94.6 96.4 94.3   LYMPHS PCT AUTO % 3.4 1.6 1.5    MONOS PCT AUTO % 1.1 1.1 3.3   EOS PCT AUTO % 0.1 0.1 0.0      Results from last 72 hours   Lab Units 09/16/24  1245 09/16/24  0738 09/16/24  0626   POCT PH, ARTERIAL pH 7.43* 7.43* 7.44*   POCT PCO2, ARTERIAL mm Hg 40 41 39   POCT PO2, ARTERIAL mm Hg 79* 88 80*   POCT SO2, ARTERIAL % 97 98 97            Micro/ID:     Lab Results   Component Value Date    BLOODCULT No growth at 4 days -  FINAL REPORT 09/08/2024    BLOODCULT No growth at 4 days -  FINAL REPORT 09/08/2024       Summary of key imaging results from the last 24 hours  Near-complete opacification of the left hemithorax, similar to prior exam.  Interval increase in right basilar hazy opacification which could represent layering pleural effusion or atelectasis/consolidation.  ETT distal tip 6.7 cm from the jose antonio.    Assessment and Plan     Assessment: Khoa Mitchell is a 69 y.o. year old male patient admitted on 9/7/2024 with PMHx of T2DM, CKD2 (baseline Cr 1.2) originally admitted for acute hypoxic respiratory failure 2/2 to postobstructive pneumonia from SVC from new diagnosis of malignancy. Course complicated by worsening respiratory failure requiring intubation 9/8 with subsequent BAL with suctioning of thick green secretions. 9/9 TTE with moderate pericardial effusion, findings suggestive of early tamponade. Transferred to the CICU, underwent pericardiocentesis subsequently complicated by complete heart block requiring TVP, then with afib RVR which chemically converted with amiodarone. Repeat CXR with complete opacification of the left lung. Treated with ceftriaxone (9/8-9/10), broadened to zosyn (9/10-*) given worsening infiltrates, azithromycin (9/8-9/10). Now currently on ceftriaxone (9/10- *). Began radiation therapy on 9/13 and chemotherapy 9/14.     Mechanical Ventilation: > 10 days  Sedation/Analgesia:  Fentanyl, propofol   Restraints: Restraints indicated as alternative therapies have been attempted and have been ineffective.  Restrain with  soft wrist restraints and side rails up x4 until medical devices discontinued and/or patient able to participate with plan of care.     Summary for 09/17/24  :  No changes in management. Chemotherapy was completed yesterday.     Plan:  NEUROLOGY/PSYCH:  NEUROLOGIC  #Intubated, sedated  -fentanyl 75  -Daily SATs as oxygen requirements decrease     #Multiple brain lesions  ::CT head performed, notable for multiple hyperdense lesions concerning for hemorrhagic metastases, largest [id number]  ::Repeat CT head without evidence of hemorrhagic transformation  -Holding dvt ppx post-bleed day 2 (9/10 AM)  -Per NSGY, recommend MRI presurgical perfusion and fingerprinting brain, C/T/L w/ w/o contrast when able to lie flat; will page them when done     CARDIOVASCULAR  #Shock  Likely septic with concern for worsening post-obstructive pneumonia and mucus plugging as well as worsening pleural effusion  -Given worsening effusion and pneumonia, ceftriaxone (9/10-9/17)  -Fu Bcx, BAL cx, sputum cx  -Infectious workup as below     #Pericardial effusion  #Complete heart block  Likely malignant given evidence of multiple probably metastatic lesions  ::CT chest with pericardial effusion measuring 15mm in thickness  ::9/9 TTE with moderate pericardial effusion, brief RV and RA diastolic collapse, concerning for early tamponade  ::9/9 pericardiocentesis with 400cc serosanguinous fluid, complicated by CHB requiring TVP  :: cytology showing malignant pericardial effusion likely SCLC  -pericardial drain pulled  -Thoracic surgery does not recommend pericardial window due to futilit        #Afib  Likely 2/2 to worsening infection and effusion  ::s/p amio bolus  ::Intermittent Afib RVR w/ MAP 40-50, on amio gtt  - s/p dig 250 IV  -Continue amio gtt     PULMONARY  #Acute hypoxic respiratory failure  Originally likely 2/2 to post-obstructive pneumonia, underwent BAL with evidence of multiple secretions. Now complicated by complete opacification of  the left lung concerning for worsening mucus plugging vs worsening pleural effusion vs combination.  ::Baseline RA  ::ENT scoped - patent airway with gross aspiration of secretions; hypomobile left vocal cord  ::9/7 BCx 2/4 Strep salivarius, likely contaminant, very uncommon cause of bacteremia  ::9/9 CXR with complete opacification of the left lung and R mediastinal shift  -Fu BAL cx, BCx, sputum cx  -fu Aspergillus galactomannan, PJP PCR, AFB, fungal cx  -s/p ceftriaxone (9/10-9/17)  -Plan for bronch to evaluate mucus plug then thoracentesis later  -ENT following, appreciate recs    Vent Mode: Volume control/assist control  FiO2 (%):  [40 %-50 %] 40 %  S RR:  [16-20] 16  S VT:  [420 mL] 420 mL  PEEP/CPAP (cm H2O):  [5 cm H20-8 cm H20] 5 cm H20  MAP (cm H2O):  [8-10] 8      RENAL/  #Hyponatremia, resolved   Likely SIADH 2/2 to cancer, which would be consistent with lung SCC vs hypervolemic hyponatremia  ::Admission Na 133  ::Home salt tabs and water restriction, lasix 20mg per outpt nephro  ::Serum osm 294  ::Urine osm 785   ::Urine lytes WNL   -Continue to monitor      #CKD2a  ::Baseline Cr 1.1-1.2  -At baseline, CTM     GASTROINTESTINAL  -No acute issues     ENDOCRINE  #T2DM  ::9/8 A1C 6.8  -SSI q4h while NPO     HEME/ONC  #Metastatic cancer, SCLC  #SVC syndrome  ::CT imaging with multiple lesions - multiple brain mets, soft tissue mass in lung  ::CT AP with lesions in liver, spleen, both adrenal glands, pancreas, multiple peritoneal nodules, R gluteal mass, R acetabulum  ::CT head with multiple lesions  :: Pericardial fluid significant for malignant cells; BAL cytology  -Follow up with med onc- plan for chemo initiation 9/14  -Will begin 5 fraction treatment to the mediastinum starting 9/13   - rad onc following     #Normocytic anemia  Likely AOCD 2/2 to malignancy vs SARA given elevated RDW  ::Baseline 11-12, now 8-9   -Iron, TIBC, Tsat, ferritin      INFECTIOUS DISEASE  #Concern for post-obstructive  pneumonia  -Fu sputum cx, Bcx as above  -C/w ceftriaxone (9/10-*)    ICU Check List               ICU Liberation: Intervention:   Assess, Prevent, Manage Pain CPOT -     Both SAT and SBT [] SAT  [] SBT 30-60 min [] Extubate to NC  [] Extubate to NIV  [] Discuss Trach   Choice of analgesia and sedation RASS: -2  Fentanyl and Propofol     Delirium: Assess, prevent and manage CAM -     Early Mobility and Exercise   [] PT /OT consult   Family Engagement and Empowerment [x]Family updated [x]SW consult     FEN  Fluids: PRN  Electrolytes: PRN  Nutrition: NPO  Prophylaxis:  DVT ppx: contraindicated   GI ppx: none  Bowel care: none   Access: Peripheral IV (left), endotracheal tube (09/08-), A line  Hardware:         Arterial Line 09/10/24 Left Radial (Active)   Placement Date/Time: 09/10/24 (c) 0105   Size: 20 G  Orientation: Left  Location: Radial  Site Prep: Alcohol  Local Anesthetic: Injectable  Insertion attempts: 1  Securement Method: Transparent dressing  Patient Tolerance: Tolerated well   Number of days: 7       ETT  8.5 mm (Active)   Placement Date/Time: 09/08/24 1208   Hand Hygiene Completed: Yes  Mask Ventilation: Vent by mask + OA or adjuvant +/- NMBA  ETT Type: ETT - single  Single Lumen Tube Size: 8.5 mm  Cuffed: Yes  Laryngoscope: Jose  Blade Size: 4  Location: Oral  ...   Number of days: 8       Urethral Catheter Straight-tip 16 Fr. (Active)   Placement Date/Time: 09/16/24 0631   Placed by: mainor robles  Catheter Type: Straight-tip  Tube Size (Fr.): 16 Fr.  Catheter Balloon Size: 10 mL  Urine Returned: Yes   Number of days: 1       NG/OG/Feeding Tube OG - Annabella sump 18 Fr Center mouth (Active)   Placement Date/Time: 09/08/24 1230   Hand Hygiene Completed: Yes  Type of Tube: NG/OG Tube  Tube Length: 65 cm  Tube Type: OG - Annabella sump  NG/OG Tube Size: 18 Fr  Tube Location: Center mouth   Number of days: 8       Social:  Code: Full Code    HPOA: there is no HCPOA, only contact is girlfriend Zita  Adrian [phone] who is proximal decision maker   Disposition: Medicine ICU    Enid Chambers MD   09/17/24 at 11:47 AM     Disclaimer: Documentation completed with the information available at the time of input. The times in the chart may not be reflective of actual patient care times, interventions, or procedures. Documentation occurs after the physical care of the patient.

## 2024-09-17 NOTE — PROGRESS NOTES
SOCIAL WORK NOTE   -ICU Treatment Plan: SW met with team for interdisciplinary rounds.  Patient receiving treatment for cancer, addressing respiratory failure and heart issues   -Support System:  friend appointed as proxy   -Planned Disposition: TBD, PT/OT evaluation ongoing   -Additional information:  Social work to follow.  DOUG Childress, LISW-S ([id number])

## 2024-09-17 NOTE — CARE PLAN
Problem: Skin  Goal: Decreased wound size/increased tissue granulation at next dressing change  Flowsheets (Taken 9/17/2024 0007)  Decreased wound size/increased tissue granulation at next dressing change:   Promote sleep for wound healing   Protective dressings over bony prominences  Goal: Participates in plan/prevention/treatment measures  Outcome: Progressing  Goal: Promote/optimize nutrition  Outcome: Progressing  Goal: Promote skin healing  Outcome: Progressing  Flowsheets (Taken 9/17/2024 0007)  Promote skin healing:   Turn/reposition every 2 hours/use positioning/transfer devices   Protective dressings over bony prominences   Assess skin/pad under line(s)/device(s)     Problem: Safety - Medical Restraint  Goal: Remains free of injury from restraints (Restraint for Interference with Medical Device)  Outcome: Progressing  Flowsheets (Taken 9/17/2024 0007)  Remains free of injury from restraints (restraint for interference with medical device):   Determine that other, less restrictive measures have been tried or would not be effective before applying the restraint   Evaluate the patient's condition at the time of restraint application   Inform patient/family regarding the reason for restraint   Every 2 hours: Monitor safety, psychosocial status, comfort, nutrition and hydration  Goal: Free from restraint(s) (Restraint for Interference with Medical Device)  Outcome: Progressing   The patient's goals for the shift include      The clinical goals for the shift include pt will maintain MAPabove 65 while titrating pressors

## 2024-09-17 NOTE — PROGRESS NOTES
Communication Note    Patient Name: Khoa Mitchell  MRN: [id number]  Today's Date: 9/17/2024   Room: 18/18A    Discipline: Occupational Therapy      Missed Visit: Yes  Missed Visit Reason: Patient placed on medical hold at 0906 09/17/24 at 9:06 AM   Ester Covarrubias OT   Rehab Office: [phone]

## 2024-09-17 NOTE — CONSULTS
SUPPORTIVE AND PALLIATIVE ONCOLOGY CONSULT    Inpatient consult to [facility] Adult Supportive Oncology  Consult performed by: NICOLE Rust-CNP  Consult ordered by: NICOLE Calhoun-CNP        SERVICE DATE: 9/17/2024      PALLIATIVE MEDICINE OUTPATIENT PROVIDER:  None  CURRENT ATTENDING PROVIDER: Vitaly James MD     Medical Oncologist: Devang Mcwilliams MD   Radiation Oncologist: No care team member to display  Primary Physician: Savita Cerda  [phone]    REASON FOR CONSULT/CHIEF CONSULT COMPLAINT: goals of care discussion    Subjective   HISTORY OF PRESENT ILLNESS: Khoa Mitchell is a 69 y.o. male diagnosed with acute hypoxic respiratory failure 2/2 to postobstructive pneumonia from SVC from new diagnosis of malignancy. PMH significant for T2DM, CKD2 (baseline Cr 1.2) . Admitted 9/7/2024 as a transfer from [facility] for further evaluation and management of new mass in the left upper chest encasing the left chest wall, lymphadenopathy, lytic lesion in the right scapula.as well as 3 months of shoulder pain followed by 4 days of progressive RUE and facial swelling.  Course complicated by respiratory distress requiring intubation, pericardial effusion, complete heart block (converted back to NSR with amio gtt and 1 L of LR on 9/9) and cardiac tamponade. Supportive and Palliative Oncology is consulted for goals of care discussion.     Remains intubated: (intubated on 9/8; today is day 10)  - Vt: 420  - PEEP: 5  - Rate:16  - FiO2: 40%    Remains on pressure support and Fentanyl continuous infusion; propofol turned off this afternoon.     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    .   Pain Assessment:  Unable to assess; pt sleeping soundly and unable to arouse; appears comfortable  Critical Care Pain Observation Tool (CPOT) from [employer]  on 9/17/2024  ** All calculations should be rechecked by clinician prior to use **    RESULT SUMMARY:  0 points  Minimal or no pain present.      INPUTS:  Intubated? --> 1 = Yes  Compliance with ventilator --> 0 = Tolerating ventilator or movement  Facial expression --> 0 = Relaxed, neutral  Body movements --> 0 = Absence of movements  Muscle tension --> 0 = Relaxed        Opioid Use  Past 24 h opioid use: 9/16/24 0800-9/17/24 0800)  On Fentanyl continuous infusion (current rate 75 mcg/hr) =120 OME  Total OME in last 24 hrs=120 OME      Note: OME calculations based on equianalgesic table below. Please note this table is based on best available evidence but conversions are still approximate. These are NOT opioid DOSES for individual patient use; this is equivalency information.  Drug Parenteral Enteral   Morphine 10 25   Oxycodone N/A 20   Hydromorphone 2 5   Fentanyl 0.15 N/A   Tramadol N/A 120   Citation: Luz Elena MADDEN. Demystifying opioid conversion calculations: A guide for effective dosing, Second edition. MD Rosa M: American Society of Health-System Pharmacists, 2018.    OARRS/PDMP reviewed ; Unintentional Overdose Risk Score=0; no aberrant behavior noted.    Symptom Assessment:  Unable to obtain secondary to sedation/unable to arouse      Wt Readings from Last 3 Encounters:   09/17/24 99.3 kg (218 lb 14.7 oz)   09/07/24 96.6 kg (213 lb)   01/15/24 114 kg (252 lb 3.2 oz)         Information obtained from: chart review and discussion with primary team  ______________________________________________________________________     Oncology History   Small cell lung cancer (Multi)   9/13/2024 Initial Diagnosis    Small cell lung cancer (Multi)      9/14/2024 -  Chemotherapy    (INPT) CARBOplatin / Etoposide, 21 Day Cycles         Past Medical History:   Diagnosis Date    Other specified health status     No pertinent past medical history     Past Surgical History:   Procedure Laterality Date    CARDIAC CATHETERIZATION N/A 9/9/2024    Procedure: Pericardiocentesis;  Surgeon: Bryan Graham MD;  Location: [place] [address] Cardiac Cath Lab;  Service: Cardiovascular;  Laterality: N/A;    CARDIAC ELECTROPHYSIOLOGY PROCEDURE N/A 9/9/2024    Procedure: Temporary Pacemaker Insertion;  Surgeon: Bryan Graham MD;  Location: [place] [address] Cardiac Cath Lab;  Service: Cardiovascular;  Laterality: N/A;    OTHER SURGICAL HISTORY  05/16/2022    No history of surgery     No family history on file.     SOCIAL HISTORY:  Marital Status    Social History:  reports that he has been smoking cigarettes. He has never used smokeless tobacco. He reports that he does not drink alcohol and does not use drugs.    Moravian and Importance of Moravian:  Caodaism    REVIEW OF SYSTEMS:  Review of systems negative unless noted in HPI.       Objective       Lab Results   Component Value Date    WBC 7.4 09/17/2024    HGB 9.1 (L) 09/17/2024    HCT 26.1 (L) 09/17/2024    MCV 84 09/17/2024     09/17/2024      Lab Results   Component Value Date    GLUCOSE 199 (H) 09/17/2024    CALCIUM 7.8 (L) 09/17/2024     (L) 09/17/2024    K 4.6 09/17/2024    CO2 27 09/17/2024    CL 97 (L) 09/17/2024    BUN 45 (H) 09/17/2024    CREATININE 1.00 09/17/2024     Lab Results   Component Value Date    ALT 80 (H) 09/17/2024    AST 72 (H) 09/17/2024    ALKPHOS 158 (H) 09/17/2024    BILITOT 2.8 (H) 09/17/2024     Estimated Creatinine Clearance: 83.9 mL/min (by C-G formula based on SCr of 1 mg/dL).     Encounter Date: 09/07/24   Electrocardiogram, 12-lead PRN ACS symptoms   Result Value    Ventricular Rate 131    Atrial Rate 131    QRS Duration 160    QT Interval 322    QTC Calculation(Bazett) 475     R Axis 43    T Axis 259    QRS Count 22    Q Onset 209    T Offset 370    QTC Fredericia 418    Narrative    Atrial fibrillation with rapid ventricular response  Left bundle branch block  Abnormal ECG  When compared with ECG of 11-SEP-2024 16:55,  Atrial fibrillation has replaced Sinus rhythm  Vent. rate has increased BY  59 BPM  Questionable change in QRS axis  Nonspecific T wave abnormality has replaced inverted T waves in Inferior leads  Confirmed by True Espinosa ([id number]) on 9/17/2024 10:05:20 AM     Wt Readings from Last 5 Encounters:   09/17/24 99.3 kg (218 lb 14.7 oz)   09/07/24 96.6 kg (213 lb)   01/15/24 114 kg (252 lb 3.2 oz)   01/13/23 105 kg (231 lb 6.4 oz)   07/13/22 96 kg (211 lb 9.6 oz)       Current Outpatient Medications   Medication Instructions    acetaminophen (TYLENOL 8 HOUR) 650 mg, oral, Every 8 hours PRN, Do not crush, chew, or split.    furosemide (LASIX) 20 mg, oral, 2 times daily    ibuprofen 200 mg, oral, Every 8 hours PRN    mv-min/folic/K1/lycopen/lutein (CENTRUM SILVER MEN ORAL) 1 tablet, oral, Daily    sodium chloride 2 g, oral, 2 times daily     Scheduled medications   allopurinol, 300 mg, oral, Daily  esomeprazole, 40 mg, nasoduodenal tube, Daily before breakfast   Or  pantoprazole, 40 mg, intravenous, Daily before breakfast  insulin lispro, 0-10 Units, subcutaneous, q4h  OLANZapine, 5 mg, oral, Nightly  perflutren protein A microsphere, 0.5 mL, intravenous, Once in imaging  polyethylene glycol, 17 g, nasogastric tube, Daily  sennosides-docusate sodium, 2 tablet, nasogastric tube, BID  sulfur hexafluoride microsphr, 2 mL, intravenous, Once in imaging      Continuous medications  amiodarone, 0.5-1 mg/min, Last Rate: 0.5 mg/min (09/17/24 1200)  fentaNYL, 0-300 mcg/hr, Last Rate: 75 mcg/hr (09/17/24 1200)  norepinephrine, 0-0.2 mcg/kg/min, Last Rate: 0.04 mcg/kg/min (09/17/24 1300)  propofol, 5-50 mcg/kg/min, Last Rate: Stopped (09/17/24 1230)      PRN medications  albuterol, 3 mL,  PRN  dextrose, 500 mL, PRN  dextrose, 12.5 g, q15 min PRN  dextrose, 25 g, q15 min PRN  diphenhydrAMINE, 50 mg, PRN  EPINEPHrine HCl, 0.3 mg, q5 min PRN  famotidine, 20 mg, PRN  glucagon, 1 mg, q15 min PRN  glucagon, 1 mg, q15 min PRN  methylPREDNISolone sodium succinate (PF), 40 mg, PRN  oxygen, , Continuous PRN - O2/gases  prochlorperazine, 10 mg, q6h PRN  prochlorperazine, 10 mg, q6h PRN  sodium chloride, 500 mL, PRN         Allergies: No Known Allergies             PHYSICAL EXAMINATION:  Vital Signs:   Vital signs reviewed  Vitals:    09/17/24 1400   BP:    Pulse: 76   Resp: 17   Temp:    SpO2: 93%     Pain Score: 0 - No pain     Physical Exam  Vitals reviewed.   Constitutional:       General: He is in acute distress.      Appearance: Normal appearance. He is obese.      Comments: Sleeping; appears comfortable and to be in NAD.    HENT:      Head: Normocephalic and atraumatic.      Mouth/Throat:      Mouth: Mucous membranes are moist.   Cardiovascular:      Rate and Rhythm: Normal rate.   Pulmonary:      Effort: Pulmonary effort is normal. No respiratory distress.      Comments: On mechanical vent support: FO02 40%/PEEP 5  Abdominal:      Palpations: Abdomen is soft.      Comments: Rounded abdomen   Musculoskeletal:      Right lower leg: Edema present.      Left lower leg: Edema present.   Skin:     General: Skin is warm and dry.      Capillary Refill: Capillary refill takes less than 2 seconds.       ASSESSMENT/PLAN:  Khoa Mitchell is a 69 y.o. male diagnosed with acute hypoxic respiratory failure 2/2 to postobstructive pneumonia from [facility] from new diagnosis of malignancy. PMH significant for T2DM, CKD2 (baseline Cr 1.2) . Admitted 9/7/2024 as a transfer from [facility] for further evaluation and management of new mass in the left upper chest encasing the left chest wall, lymphadenopathy, lytic lesion in the right scapula.as well as 3 months of shoulder pain followed by 4 days of progressive RUE and facial  swelling.  Course complicated by respiratory distress requiring intubation, pericardial effusion, complete heart block (converted back to NSR with amio gtt and 1 L of LR on 9/9) and cardiac tamponade. Supportive and Palliative Oncology is consulted for goals of care discussion.       Pain:  Risk for pain related to new chest mass  Pain is: cancer related  Type: Likely visceral and neuropathic  Pain control: well-controlled  Home regimen:   none  Intolerances/previously tried: none  Personalized pain goal:  unable to ascertain  Total OME usage for the past 24 hours:  120 OME  Once weaned from Fentanyl, recommend starting Oxycodone 5 mg po q 4 hrs prn for moderate pain, 10 mg po q 4 hrs for severe pain, and Hydromorphone 0.2 mg q 2 hrs prn for breakthrough   Continue to monitor pain scores and administer PRN medications as appropriate  Continue/initiate nonpharmacologic pain management strategies including ice/heat therapy, distraction techniques, deep breathing/relaxation techniques, calming music, and repositioning  Continue to monitor for signs of opioid efficacy (pain scores, improved functionality) and toxicity (pinpoint pupils, excess sedation/drowsiness/confusion, respiratory depression, etc.)      Constipation  At risk for constipation related to opioids, currently not constipated  Usual bowel pattern:  unable to ascertain  Home regimen: none  LBM 9/17/24  Monitor BM frequency, adjust regimen as needed  Goal to have BM without straining q48-72h  Continue Miralax 17 g via NG tube daily  Continue senna 2 tabs via NG tube bid       Medical Decision Making/Goals of Care/Advance Care Planning:  Patient's current clinical condition, including diagnosis, prognosis, and management plan, and goals of care were discussed.   Life limiting disease: metastatic malignancy  Family: Supportive significant other (Zita); estranged from children  Performance status: Major  limitations due to disease  process  Joys/meaning/strength: unable to ascertain  Understanding of health: unable to ascertain information:unable to ascertain  Prognosis:poor overall from oncology standpoint  Goals:    unable to ascertain  Worries and fears now and future: unable to ascertain   Minimum acceptable outcome/QOL:  unable to ascertain  Code status discussion:  currently FULL CODE- unable to awaken pt for discussion today; team will wean down any sedation next visit for discussion so that pt can make well informed decision regarding code status    Advance Directives  Existence of Advance Directives:No - not interested  Decision maker: Surrogate decision maker is significant other Zita Campbell [phone] and alternate is friend Thang Gama [phone]  Code Status: Full code    Introduction to Supportive and Palliative Oncology:  Pt sleeping soundly, unable to arouse; will intro services formally next visit    Supportive Interventions: will offer to patient when awake    Disposition:  Please  start the process of having prior authorization with meds to beds deliver medications to patient prior to discharge via [facility] pharmacy. Prescriptions will need to be sent 48-72 hours prior to discharge so that a prior authorization can be completed.     Discharge date: unknown pending acute issues  Will assess if patient needs an appointment with Outpatient Supportive Oncology as appropriate      Signature and billing:  Thank you for allowing us to participate in the care of this patient. Recommendations will be communicated back to the consulting service by way of shared electronic medical record or face-to-face.    Medical complexity was high level due to due to complexity of problems, extensive data review, and high risk of management/treatment.    I spent 75 minutes in the care of this patient which included chart review, interviewing patient/family, discussion with primary team, coordination of care, and documentation.      DATA    Diagnostic tests and information reviewed for today's visit:  Conversation with primary team, Most recent labs and imaging results, Most recent EKG, Medications       Some elements copied from H&P note on 9/10/24, oncology note on 9/17/24, the elements have been updated and all reflect current decision making from today, 9/17/2024.    Plan of Care discussed with: Provider, RN, Patient    Thank you for asking Supportive and Palliative Oncology to assist with care of this patient.  Recommendations will be communicated back to the consulting service by way of shared electronic medical record/secure chat/email or face-to-face.   We will continue to follow.  Please contact us for additional questions or concerns.      SIGNATURE: NICOLE Rust-CNP  PAGER/CONTACT:  Contact information:  Supportive and Palliative Oncology  Monday-Friday 8 AM-5 PM  Epic Secure chat or pager [phone].  After hours and weekends:  pager [phone]

## 2024-09-17 NOTE — CARE PLAN
The patient's goals for the shift include    Problem: Pain - Adult  Goal: Verbalizes/displays adequate comfort level or baseline comfort level  Outcome: Progressing     Problem: Safety - Adult  Goal: Free from fall injury  Outcome: Progressing     Problem: Skin  Goal: Decreased wound size/increased tissue granulation at next dressing change  Outcome: Progressing  Flowsheets (Taken 9/17/2024 0942)  Decreased wound size/increased tissue granulation at next dressing change:   Promote sleep for wound healing   Protective dressings over bony prominences   Utilize specialty bed per algorithm  Goal: Participates in plan/prevention/treatment measures  Outcome: Progressing  Flowsheets (Taken 9/17/2024 0942)  Participates in plan/prevention/treatment measures:   Discuss with provider PT/OT consult   Elevate heels  Goal: Promote/optimize nutrition  Outcome: Progressing  Flowsheets (Taken 9/17/2024 0942)  Promote/optimize nutrition:   Assist with feeding   Monitor/record intake including meals  Goal: Promote skin healing  Outcome: Progressing  Flowsheets (Taken 9/17/2024 0942)  Promote skin healing:   Assess skin/pad under line(s)/device(s)   Protective dressings over bony prominences   Turn/reposition every 2 hours/use positioning/transfer devices   Ensure correct size (line/device) and apply per  instructions   Rotate device position/do not position patient on device     Problem: Safety - Medical Restraint  Goal: Remains free of injury from restraints (Restraint for Interference with Medical Device)  Outcome: Progressing  Goal: Free from restraint(s) (Restraint for Interference with Medical Device)  Outcome: Progressing     Problem: Mechanical Ventilation  Goal: Patient Will Maintain Patent Airway  Outcome: Progressing  Goal: Oral health is maintained or improved  Outcome: Progressing  Goal: ET tube will be managed safely  Outcome: Progressing       The clinical goals for the shift include Will wean down pressors  and keep MAP above 65    Pt is comfortable on current sedation with a RASS of -3. RN weaned Propofol which helped wean Levo down a little. Will continue to keep pt comfortable while on vent and attempt to wean Levo down more.

## 2024-09-17 NOTE — PROGRESS NOTES
Communication Note    Patient Name: Khoa Mitchell  MRN: [id number]  Today's Date: 9/17/2024   Room: 18/18-A    Discipline: Physical Therapy      Missed Visit: Yes  Missed Visit Reason:  (PT evaluation reviewed, patient RASS -3.  Will follow up as appropriate.)      09/17/24 at 8:49 AM   Emir Duffy, PT   Rehab Office: [phone]

## 2024-09-17 NOTE — PROGRESS NOTES
"Name: Khoa Mitchell  MRN: [id number]  Encounter Date: 2024  PCP: Savita Cerda, APRN-CNP, DNP  Heme-Onc: None    Reason for consult: SCLC  Attending Provider Dr. James    Hematology/ Oncology Consult Daily Progress Note    Overnight Events   Received D3 etoposide yesterday. Next chemotherapy will be on/around 10/05. Overall doing well without any side effects from treatment. Patient remains intubated and on levo 0.04. He is off sedation and awake today. Unable to communicate as ET tube is in place and has difficulty writing. Family is at bedside and all questions answered.     Review of Systems   12 Point ROS negative except HPI     Allergies   No Known Allergies    Medications     allopurinol, 300 mg, Daily  esomeprazole, 40 mg, Daily before breakfast   Or  pantoprazole, 40 mg, Daily before breakfast  insulin lispro, 0-10 Units, q4h  OLANZapine, 5 mg, Nightly  perflutren protein A microsphere, 0.5 mL, Once in imaging  polyethylene glycol, 17 g, Daily  sennosides-docusate sodium, 2 tablet, BID  sulfur hexafluoride microsphr, 2 mL, Once in imaging      amiodarone, Last Rate: 0.5 mg/min (24 1200)  fentaNYL, Last Rate: 75 mcg/hr (24 1200)  norepinephrine, Last Rate: 0.04 mcg/kg/min (24 1300)  propofol, Last Rate: Stopped (24 1230)      albuterol, 3 mL, PRN  dextrose, 500 mL, PRN  dextrose, 12.5 g, q15 min PRN  dextrose, 25 g, q15 min PRN  diphenhydrAMINE, 50 mg, PRN  EPINEPHrine HCl, 0.3 mg, q5 min PRN  famotidine, 20 mg, PRN  glucagon, 1 mg, q15 min PRN  glucagon, 1 mg, q15 min PRN  methylPREDNISolone sodium succinate (PF), 40 mg, PRN  oxygen, , Continuous PRN - O2/gases  prochlorperazine, 10 mg, q6h PRN  prochlorperazine, 10 mg, q6h PRN  sodium chloride, 500 mL, PRN        Physical Exam   Blood pressure 132/50, pulse 76, temperature 36 °C (96.8 °F), temperature source Temporal, resp. rate 17, height 1.829 m (6' 0.01\"), weight 99.3 kg (218 lb 14.7 oz), SpO2 93%.    ECO    Gen: intubated, " awake   HEENT:  large right face mass, facial plethora  CV: RRR, levo at 0.04  Pulm: ventilated breath sounds   Abd: soft, NT/ND   Ext: upper extremity edema     Labs     Lab Results   Component Value Date    GLUCOSE 199 (H) 09/17/2024    CALCIUM 7.8 (L) 09/17/2024     (L) 09/17/2024    K 4.6 09/17/2024    CO2 27 09/17/2024    CL 97 (L) 09/17/2024    BUN 45 (H) 09/17/2024    CREATININE 1.00 09/17/2024       Lab Results   Component Value Date    WBC 7.4 09/17/2024    HGB 9.1 (L) 09/17/2024    HCT 26.1 (L) 09/17/2024    MCV 84 09/17/2024     09/17/2024       Lab Results   Component Value Date    ALT 80 (H) 09/17/2024    AST 72 (H) 09/17/2024    ALKPHOS 158 (H) 09/17/2024    BILITOT 2.8 (H) 09/17/2024         Imaging   Reviewed    Assessment/Plan   Khoa Mitchell is a 69 y.o. male who presented 9/7/24 with respiratory failure and SVC syndrome with path from right cheek mass showing small cell carcinoma for which oncology is consulted.      Patient underwent urgent RT 8 Gy x 1 9/13.   S/p thoracentesis 9/13 showing exudative fluid without overt evidence of empyema. MICU team agreeable to proceed with systemic chemotherapy.   G6PD normal.      Recommendations:  - s/p C1 urgent carboplatin (AUC 5) / etoposide 50mg/m2 (50% dose reduced for hyperbilirubinemia) 09/14 to 09/16  - continue allopurinol 300mg daily for TLS prophlyaxis  - please obtain daily CBC, CMP  - can continue BID TLS labs (RFP, LDH, uric acid); if remaining stable over next 48hrs can move to daily  - tomorrow (> 24 hrs from last etoposide dose) can start daily filgrastim 300 mcg for 5 days  - rest of care per excellent primary team      Patient discussed with attending physician, Dr. Horowitz, who agrees with the above.      Purnima Geronimo MD  Hematology-Oncology Fellow, PGY5  Hematology Consult Pager: [phone]  Oncology Consult Pager: [phone]

## 2024-09-18 LAB
ALBUMIN SERPL BCP-MCNC: 2.3 G/DL (ref 3.4–5)
ALBUMIN SERPL BCP-MCNC: 2.3 G/DL (ref 3.4–5)
ALP SERPL-CCNC: 180 U/L (ref 33–136)
ALT SERPL W P-5'-P-CCNC: 70 U/L (ref 10–52)
ANION GAP SERPL CALC-SCNC: 12 MMOL/L (ref 10–20)
ANION GAP SERPL CALC-SCNC: 14 MMOL/L (ref 10–20)
AST SERPL W P-5'-P-CCNC: 62 U/L (ref 9–39)
ATRIAL RATE: 102 BPM
ATRIAL RATE: 150 BPM
ATRIAL RATE: 170 BPM
ATRIAL RATE: 96 BPM
BACTERIA UR CULT: NO GROWTH
BASOPHILS # BLD AUTO: 0.01 X10*3/UL (ref 0–0.1)
BASOPHILS NFR BLD AUTO: 0.1 %
BILIRUB SERPL-MCNC: 2.1 MG/DL (ref 0–1.2)
BUN SERPL-MCNC: 48 MG/DL (ref 6–23)
BUN SERPL-MCNC: 48 MG/DL (ref 6–23)
CALCIUM SERPL-MCNC: 7.5 MG/DL (ref 8.6–10.6)
CALCIUM SERPL-MCNC: 7.7 MG/DL (ref 8.6–10.6)
CELL COUNT (BLOOD): 0.91 X10*3/UL
CELL POPULATIONS: NORMAL
CHLORIDE SERPL-SCNC: 100 MMOL/L (ref 98–107)
CHLORIDE SERPL-SCNC: 98 MMOL/L (ref 98–107)
CO2 SERPL-SCNC: 26 MMOL/L (ref 21–32)
CO2 SERPL-SCNC: 26 MMOL/L (ref 21–32)
CREAT SERPL-MCNC: 0.81 MG/DL (ref 0.5–1.3)
CREAT SERPL-MCNC: 0.91 MG/DL (ref 0.5–1.3)
DIAGNOSIS: NORMAL
EGFRCR SERPLBLD CKD-EPI 2021: >90 ML/MIN/1.73M*2
EGFRCR SERPLBLD CKD-EPI 2021: >90 ML/MIN/1.73M*2
EOSINOPHIL # BLD AUTO: 0.09 X10*3/UL (ref 0–0.7)
EOSINOPHIL NFR BLD AUTO: 1.1 %
ERYTHROCYTE [DISTWIDTH] IN BLOOD BY AUTOMATED COUNT: 15.8 % (ref 11.5–14.5)
FLOW DIFFERENTIAL: NORMAL
FLOW TEST ORDERED: NORMAL
GLUCOSE BLD MANUAL STRIP-MCNC: 106 MG/DL (ref 74–99)
GLUCOSE BLD MANUAL STRIP-MCNC: 141 MG/DL (ref 74–99)
GLUCOSE BLD MANUAL STRIP-MCNC: 146 MG/DL (ref 74–99)
GLUCOSE BLD MANUAL STRIP-MCNC: 159 MG/DL (ref 74–99)
GLUCOSE BLD MANUAL STRIP-MCNC: 165 MG/DL (ref 74–99)
GLUCOSE SERPL-MCNC: 142 MG/DL (ref 74–99)
GLUCOSE SERPL-MCNC: 174 MG/DL (ref 74–99)
HCT VFR BLD AUTO: 25.5 % (ref 41–52)
HGB BLD-MCNC: 8.9 G/DL (ref 13.5–17.5)
IMM GRANULOCYTES # BLD AUTO: 0.06 X10*3/UL (ref 0–0.7)
IMM GRANULOCYTES NFR BLD AUTO: 0.8 % (ref 0–0.9)
LAB TEST METHOD: NORMAL
LDH SERPL L TO P-CCNC: 194 U/L (ref 84–246)
LDH SERPL L TO P-CCNC: 229 U/L (ref 84–246)
LYMPHOCYTES # BLD AUTO: 0.26 X10*3/UL (ref 1.2–4.8)
LYMPHOCYTES NFR BLD AUTO: 3.3 %
MAGNESIUM SERPL-MCNC: 2.16 MG/DL (ref 1.6–2.4)
MCH RBC QN AUTO: 29.6 PG (ref 26–34)
MCHC RBC AUTO-ENTMCNC: 34.9 G/DL (ref 32–36)
MCV RBC AUTO: 85 FL (ref 80–100)
MONOCYTES # BLD AUTO: 0.02 X10*3/UL (ref 0.1–1)
MONOCYTES NFR BLD AUTO: 0.3 %
NEUTROPHILS # BLD AUTO: 7.41 X10*3/UL (ref 1.2–7.7)
NEUTROPHILS NFR BLD AUTO: 94.4 %
NRBC BLD-RTO: 0 /100 WBCS (ref 0–0)
NUMBER OF CELLS COLLECTED: NORMAL PER TUBE
P AXIS: 66 DEGREES
P AXIS: 74 DEGREES
P OFFSET: 189 MS
P OFFSET: 194 MS
P ONSET: 136 MS
P ONSET: 142 MS
PATH REPORT.TOTAL CANCER: NORMAL
PHOSPHATE SERPL-MCNC: 2.7 MG/DL (ref 2.5–4.9)
PHOSPHATE SERPL-MCNC: 3 MG/DL (ref 2.5–4.9)
PLATELET # BLD AUTO: 295 X10*3/UL (ref 150–450)
POTASSIUM SERPL-SCNC: 4.3 MMOL/L (ref 3.5–5.3)
POTASSIUM SERPL-SCNC: 4.5 MMOL/L (ref 3.5–5.3)
PR INTERVAL: 138 MS
PR INTERVAL: 148 MS
PROT SERPL-MCNC: 4.8 G/DL (ref 6.4–8.2)
Q ONSET: 210 MS
Q ONSET: 211 MS
Q ONSET: 212 MS
Q ONSET: 212 MS
QRS COUNT: 16 BEATS
QRS COUNT: 17 BEATS
QRS COUNT: 24 BEATS
QRS COUNT: 27 BEATS
QRS DURATION: 140 MS
QRS DURATION: 146 MS
QRS DURATION: 156 MS
QRS DURATION: 162 MS
QT INTERVAL: 298 MS
QT INTERVAL: 304 MS
QT INTERVAL: 388 MS
QT INTERVAL: 418 MS
QTC CALCULATION(BAZETT): 466 MS
QTC CALCULATION(BAZETT): 497 MS
QTC CALCULATION(BAZETT): 505 MS
QTC CALCULATION(BAZETT): 528 MS
QTC FREDERICIA: 401 MS
QTC FREDERICIA: 422 MS
QTC FREDERICIA: 463 MS
QTC FREDERICIA: 489 MS
R AXIS: 67 DEGREES
R AXIS: 75 DEGREES
R AXIS: 93 DEGREES
R AXIS: 99 DEGREES
RBC # BLD AUTO: 3.01 X10*6/UL (ref 4.5–5.9)
SIGNATURE COMMENT: NORMAL
SODIUM SERPL-SCNC: 133 MMOL/L (ref 136–145)
SODIUM SERPL-SCNC: 134 MMOL/L (ref 136–145)
SPECIMEN VIABILITY: NORMAL
T AXIS: -3 DEGREES
T AXIS: -52 DEGREES
T AXIS: -56 DEGREES
T AXIS: -68 DEGREES
T OFFSET: 361 MS
T OFFSET: 364 MS
T OFFSET: 405 MS
T OFFSET: 419 MS
URATE SERPL-MCNC: 3.5 MG/DL (ref 4–7.5)
URATE SERPL-MCNC: 3.7 MG/DL (ref 4–7.5)
VENTRICULAR RATE: 102 BPM
VENTRICULAR RATE: 147 BPM
VENTRICULAR RATE: 161 BPM
VENTRICULAR RATE: 96 BPM
WBC # BLD AUTO: 7.9 X10*3/UL (ref 4.4–11.3)

## 2024-09-18 PROCEDURE — 83615 LACTATE (LD) (LDH) ENZYME: CPT

## 2024-09-18 PROCEDURE — 82947 ASSAY GLUCOSE BLOOD QUANT: CPT

## 2024-09-18 PROCEDURE — 2020000001 HC ICU ROOM DAILY

## 2024-09-18 PROCEDURE — 37799 UNLISTED PX VASCULAR SURGERY: CPT

## 2024-09-18 PROCEDURE — 84100 ASSAY OF PHOSPHORUS: CPT

## 2024-09-18 PROCEDURE — 80053 COMPREHEN METABOLIC PANEL: CPT

## 2024-09-18 PROCEDURE — 80069 RENAL FUNCTION PANEL: CPT | Mod: CCI

## 2024-09-18 PROCEDURE — 2500000001 HC RX 250 WO HCPCS SELF ADMINISTERED DRUGS (ALT 637 FOR MEDICARE OP)

## 2024-09-18 PROCEDURE — 99291 CRITICAL CARE FIRST HOUR: CPT

## 2024-09-18 PROCEDURE — 83735 ASSAY OF MAGNESIUM: CPT

## 2024-09-18 PROCEDURE — 84550 ASSAY OF BLOOD/URIC ACID: CPT

## 2024-09-18 PROCEDURE — 94003 VENT MGMT INPAT SUBQ DAY: CPT

## 2024-09-18 PROCEDURE — 2500000004 HC RX 250 GENERAL PHARMACY W/ HCPCS (ALT 636 FOR OP/ED)

## 2024-09-18 PROCEDURE — 2500000004 HC RX 250 GENERAL PHARMACY W/ HCPCS (ALT 636 FOR OP/ED): Mod: JZ

## 2024-09-18 PROCEDURE — 2500000005 HC RX 250 GENERAL PHARMACY W/O HCPCS: Performed by: STUDENT IN AN ORGANIZED HEALTH CARE EDUCATION/TRAINING PROGRAM

## 2024-09-18 PROCEDURE — 2500000002 HC RX 250 W HCPCS SELF ADMINISTERED DRUGS (ALT 637 FOR MEDICARE OP, ALT 636 FOR OP/ED)

## 2024-09-18 PROCEDURE — 71045 X-RAY EXAM CHEST 1 VIEW: CPT | Performed by: RADIOLOGY

## 2024-09-18 PROCEDURE — 85025 COMPLETE CBC W/AUTO DIFF WBC: CPT

## 2024-09-18 ASSESSMENT — PAIN - FUNCTIONAL ASSESSMENT
PAIN_FUNCTIONAL_ASSESSMENT: 0-10

## 2024-09-18 ASSESSMENT — PAIN SCALES - GENERAL
PAINLEVEL_OUTOF10: 0 - NO PAIN

## 2024-09-18 NOTE — PROGRESS NOTES
"Medical Intensive Care - Daily Progress Note   Subjective    Khoa Mitchell is a 69 y.o. year old male patient admitted on 9/7/2024 with following ICU needs: acute hypoxemic respiratory failure, worsening from severe metastatic burden requiring endotracheal intubation.        Interval History:  NAEON. Pt remained in NSR. Levo was reduced to 0.044.     Meds    Scheduled medications  allopurinol, 300 mg, oral, Daily  esomeprazole, 40 mg, nasoduodenal tube, Daily before breakfast   Or  pantoprazole, 40 mg, intravenous, Daily before breakfast  filgrastim or biosimilar, 5 mcg/kg, subcutaneous, q24h  insulin lispro, 0-10 Units, subcutaneous, q4h  perflutren protein A microsphere, 0.5 mL, intravenous, Once in imaging  polyethylene glycol, 17 g, nasogastric tube, Daily  sennosides-docusate sodium, 2 tablet, nasogastric tube, BID  sulfur hexafluoride microsphr, 2 mL, intravenous, Once in imaging      Continuous medications  amiodarone, 0.5-1 mg/min, Last Rate: 0.5 mg/min (09/18/24 1200)  fentaNYL, 0-300 mcg/hr, Last Rate: 75 mcg/hr (09/18/24 1200)  norepinephrine, 0-0.2 mcg/kg/min, Last Rate: 0.044 mcg/kg/min (09/18/24 1200)  propofol, 5-50 mcg/kg/min, Last Rate: Stopped (09/17/24 1230)      PRN medications  PRN medications: albuterol, dextrose, dextrose, dextrose, diphenhydrAMINE, EPINEPHrine HCl, famotidine, glucagon, glucagon, methylPREDNISolone sodium succinate (PF), oxygen, prochlorperazine, prochlorperazine, sodium chloride     Objective    Blood pressure 132/50, pulse 80, temperature 35.9 °C (96.6 °F), resp. rate 17, height 1.829 m (6' 0.01\"), weight 99.8 kg (220 lb 0.3 oz), SpO2 97%.     Physical Exam  Constitutional:       General: He is awake.      Appearance: He is obese.      Interventions: Nasal cannula in place.      Comments: Patient intubated. Opening eyes to voice.   HENT:      Head:      Salivary Glands: Right salivary gland is diffusely enlarged.      Comments: Significant plethora of the head including " neck, face, and periorbital edema. Evidence of plum shaped mass on right jaw.   Cardiovascular:      Rate and Rhythm: Normal rate.      Heart sounds: Normal heart sounds, S1 normal and S2 normal.    Musculoskeletal:      Right upper arm: Swelling and edema present.      Left upper arm: Swelling and edema present.      Right lower le+ Pitting Edema present.      Left lower le+ Pitting Edema present.      Comments: The upper extremities are symmetrically edematous, hands are warm to touch.    Lymphadenopathy:      Cervical:      Right cervical: No superficial cervical adenopathy.     Left cervical: No superficial cervical adenopathy.      Upper Body:      Right upper body: No supraclavicular adenopathy.      Left upper body: No supraclavicular adenopathy.   Neurological:      Mental Status: He is alert.   Psychiatric:         Behavior: Behavior is cooperative.      Intake/Output Summary (Last 24 hours) at 2024 1403  Last data filed at 2024 1300  Gross per 24 hour   Intake 2163.16 ml   Output 1210 ml   Net 953.16 ml     Labs:   Results from last 72 hours   Lab Units 24  0457 24  1552 24  0801   SODIUM mmol/L 133* 131* 131*   POTASSIUM mmol/L 4.5 5.0 4.6   CHLORIDE mmol/L 98 97* 97*   CO2 mmol/L 26 28 27   BUN mg/dL 48* 46* 45*   CREATININE mg/dL 0.91 0.91 1.00   GLUCOSE mg/dL 142* 154* 199*   CALCIUM mg/dL 7.7* 7.8* 7.8*   ANION GAP mmol/L 14 11 12   EGFR mL/min/1.73m*2 >90 >90 81   PHOSPHORUS mg/dL 3.0 3.0 2.9      Results from last 72 hours   Lab Units 24  0457 24  0836 24  0102   WBC AUTO x10*3/uL 7.9 7.4 9.1   HEMOGLOBIN g/dL 8.9* 9.1* 9.5*   HEMATOCRIT % 25.5* 26.1* 27.7*   PLATELETS AUTO x10*3/uL 295 316 363   NEUTROS PCT AUTO % 94.4 94.6 96.4   LYMPHS PCT AUTO % 3.3 3.4 1.6   MONOS PCT AUTO % 0.3 1.1 1.1   EOS PCT AUTO % 1.1 0.1 0.1      Results from last 72 hours   Lab Units 24  1247 24  1245 24  0738   POCT PH, ARTERIAL pH 7.39 7.43* 7.43*    POCT PCO2, ARTERIAL mm Hg 43* 40 41   POCT PO2, ARTERIAL mm Hg 84* 79* 88   POCT SO2, ARTERIAL % 97 97 98            Micro/ID:     Lab Results   Component Value Date    URINECULTURE No growth 09/16/2024    BLOODCULT No growth at 4 days -  FINAL REPORT 09/08/2024    BLOODCULT No growth at 4 days -  FINAL REPORT 09/08/2024       Summary of key imaging results from the last 24 hours  Stable appearance of the lungs with complete opacification of the left hemithorax well as right basilar pleural effusion and atelectasis/infiltrate.    Assessment and Plan     Assessment: Khoa Mitchell is a 69 y.o. year old male patient admitted on 9/7/2024 with PMHx of T2DM, CKD2 (baseline Cr 1.2) originally admitted for acute hypoxic respiratory failure 2/2 to postobstructive pneumonia from SVC from new diagnosis of malignancy. Course complicated by worsening respiratory failure requiring intubation 9/8 with subsequent BAL with suctioning of thick green secretions. 9/9 TTE with moderate pericardial effusion, findings suggestive of early tamponade. Transferred to the CICU, underwent pericardiocentesis subsequently complicated by complete heart block requiring TVP, then with afib RVR which chemically converted with amiodarone. Repeat CXR with complete opacification of the left lung. Treated with ceftriaxone (9/8-9/10), broadened to zosyn (9/10-*) given worsening infiltrates, azithromycin (9/8-9/10). Now currently on ceftriaxone (9/10- 9/18). Completed radiation therapy and chemotherapy 9/14.     Mechanical Ventilation: > 10 days  Sedation/Analgesia:  Fentanyl  Restraints: Restraints indicated as alternative therapies have been attempted and have been ineffective.  Restrain with soft wrist restraints and side rails up x4 until medical devices discontinued and/or patient able to participate with plan of care.     Summary for 09/18/24  :  No changes in management. Rad onc plans to reevaluate patient for possible additional treatment on 9/23.    Starts filgrastim treatment today.   Plan for GOC Friday with Zita.       Plan:  NEUROLOGY/PSYCH:  NEUROLOGIC  #Intubated, sedated  -fentanyl 75  -Daily SATs as oxygen requirements decrease     #Multiple brain lesions  ::CT head performed, notable for multiple hyperdense lesions concerning for hemorrhagic metastases, largest [id number]  ::Repeat CT head without evidence of hemorrhagic transformation  -Holding dvt ppx post-bleed day 2 (9/10 AM)  -Per NSGY, recommend MRI presurgical perfusion and fingerprinting brain, C/T/L w/ w/o contrast when able to lie flat; will page them when done     CARDIOVASCULAR  #Shock  Likely septic with concern for worsening post-obstructive pneumonia and mucus plugging as well as worsening pleural effusion  -Given worsening effusion and pneumonia, ceftriaxone (9/10-9/17)       #Pericardial effusion  #Complete heart block  Likely malignant given evidence of multiple probably metastatic lesions  ::CT chest with pericardial effusion measuring 15mm in thickness  ::9/9 TTE with moderate pericardial effusion, brief RV and RA diastolic collapse, concerning for early tamponade  ::9/9 pericardiocentesis with 400cc serosanguinous fluid, complicated by CHB requiring TVP  :: cytology showing malignant pericardial effusion likely SCLC  -Pericardial drain pulled  -Thoracic surgery does not recommend pericardial window due to futilit   -continue to monitor       #Afib  Likely 2/2 to worsening infection and effusion  ::s/p amio bolus  ::Intermittent Afib RVR w/ MAP 40-50, on amio gtt  - s/p dig 250 IV  - Continue amio gtt     PULMONARY  #Acute hypoxic respiratory failure  Originally likely 2/2 to post-obstructive pneumonia, underwent BAL with evidence of multiple secretions. Now complicated by complete opacification of the left lung concerning for worsening mucus plugging vs worsening pleural effusion vs combination.  ::Baseline RA  ::ENT scoped - patent airway with gross aspiration of secretions;  hypomobile left vocal cord  ::9/7 BCx 2/4 Strep salivarius, likely contaminant, very uncommon cause of bacteremia  ::9/9 CXR with complete opacification of the left lung and R mediastinal shift  -s/p ceftriaxone (9/10-9/17)  -ENT following, appreciate recs     Vent Mode: Volume control/assist control  FiO2 (%):  [40 %] 40 %  S RR:  [16] 16  S VT:  [420 mL] 420 mL  PEEP/CPAP (cm H2O):  [5 cm H20] 5 cm H20  MAP (cm H2O):  [8-9] 9     RENAL/  #Hyponatremia, resolved   Likely SIADH 2/2 to cancer, which would be consistent with lung SCC vs hypervolemic hyponatremia  ::Admission Na 133  ::Home salt tabs and water restriction, lasix 20mg per outpt nephro  ::Serum osm 294  ::Urine osm 785   ::Urine lytes WNL   -Continue to monitor      #CKD2a  ::Baseline Cr 1.1-1.2  -At baseline, CTM     GASTROINTESTINAL  -No acute issues     ENDOCRINE  #T2DM  ::9/8 A1C 6.8  -SSI q4h while NPO     HEME/ONC  #Metastatic cancer, SCLC  #SVC syndrome  ::CT imaging with multiple lesions - multiple brain mets, soft tissue mass in lung  ::CT AP with lesions in liver, spleen, both adrenal glands, pancreas, multiple peritoneal nodules, R gluteal mass, R acetabulum  ::CT head with multiple lesions  :: Pericardial fluid significant for malignant cells; BAL cytology  -Chemo and radiation therapy completed 9/16 and 9/13 respectively.   -Rad onc will follow up on Monday to monitor mediastinal mass size.   - Initiate filgrastim       #Normocytic anemia  Likely AOCD 2/2 to malignancy vs SARA given elevated RDW  ::Baseline 11-12, now 8-9   -Iron, TIBC, Tsat, ferritin      INFECTIOUS DISEASE  #Concern for post-obstructive pneumonia  -Fu sputum cx, Bcx as above  -C/w ceftriaxone (9/10-9/18)    ICU Check List               ICU Liberation: Intervention:   Assess, Prevent, Manage Pain CPOT -     Both SAT and SBT [] SAT  [] SBT 30-60 min [] Extubate to NC  [] Extubate to NIV  [] Discuss Trach   Choice of analgesia and sedation RASS: -2  Fentanyl and Propofol      Delirium: Assess, prevent and manage CAM -     Early Mobility and Exercise   [] PT /OT consult   Family Engagement and Empowerment [x]Family updated [x]SW consult     FEN  Fluids: PRN  Electrolytes: PRN  Nutrition: NPO  Prophylaxis:  DVT ppx: contraindicated   GI ppx: none  Bowel care: none   Access: Peripheral IV (left), endotracheal tube (09/08-), A line  Hardware:         Arterial Line 09/10/24 Left Radial (Active)   Placement Date/Time: 09/10/24 (c) 0105   Size: 20 G  Orientation: Left  Location: Radial  Site Prep: Alcohol  Local Anesthetic: Injectable  Insertion attempts: 1  Securement Method: Transparent dressing  Patient Tolerance: Tolerated well   Number of days: 8       ETT  8.5 mm (Active)   Placement Date/Time: 09/08/24 1208   Hand Hygiene Completed: Yes  Mask Ventilation: Vent by mask + OA or adjuvant +/- NMBA  ETT Type: ETT - single  Single Lumen Tube Size: 8.5 mm  Cuffed: Yes  Laryngoscope: Jose  Blade Size: 4  Location: Oral  ...   Number of days: 10       Urethral Catheter Straight-tip 16 Fr. (Active)   Placement Date/Time: 09/16/24 0631   Placed by: mainor gaminoion  Catheter Type: Straight-tip  Tube Size (Fr.): 16 Fr.  Catheter Balloon Size: 10 mL  Urine Returned: Yes   Number of days: 2       NG/OG/Feeding Tube OG - Ravia sump 18 Fr Center mouth (Active)   Placement Date/Time: 09/08/24 1230   Hand Hygiene Completed: Yes  Type of Tube: NG/OG Tube  Tube Length: 65 cm  Tube Type: OG - Ravia sump  NG/OG Tube Size: 18 Fr  Tube Location: Center mouth   Number of days: 10       Social:  Code: Full Code    HPOA: there is no HCPOA, only contact is girlfriend Zita Campbell [phone] who is proximal decision maker   Disposition: Medicine ICU    Enid Chambers MD   09/18/24 at 2:03 PM     Disclaimer: Documentation completed with the information available at the time of input. The times in the chart may not be reflective of actual patient care times, interventions, or procedures.  Documentation occurs after the physical care of the patient.

## 2024-09-18 NOTE — CARE PLAN
Problem: Pain - Adult  Goal: Verbalizes/displays adequate comfort level or baseline comfort level  Outcome: Progressing     Problem: Safety - Adult  Goal: Free from fall injury  Outcome: Progressing     Problem: Skin  Goal: Decreased wound size/increased tissue granulation at next dressing change  Flowsheets (Taken 9/18/2024 1615)  Decreased wound size/increased tissue granulation at next dressing change: Protective dressings over bony prominences  Goal: Promote skin healing  Flowsheets (Taken 9/18/2024 0305 by Amadou Holland RN)  Promote skin healing:   Assess skin/pad under line(s)/device(s)   Ensure correct size (line/device) and apply per  instructions   Protective dressings over bony prominences   Rotate device position/do not position patient on device   Turn/reposition every 2 hours/use positioning/transfer devices   The patient's goals for the shift include      The clinical goals for the shift include patient will maintain map >65 and have levo weaned

## 2024-09-18 NOTE — CARE PLAN
Problem: Skin  Goal: Participates in plan/prevention/treatment measures  Outcome: Not Progressing  Flowsheets (Taken 9/18/2024 0305)  Participates in plan/prevention/treatment measures: Elevate heels   The patient's goals for the shift include      The clinical goals for the shift include VS within MD ordered parameters    Over the shift, the patient did not make progress toward the following goals. Barriers to progression include critical illness. Recommendations to address these barriers include per MD order.    Problem: Skin  Goal: Participates in plan/prevention/treatment measures  Outcome: Not Progressing  Flowsheets (Taken 9/18/2024 0305)  Participates in plan/prevention/treatment measures: Elevate heels

## 2024-09-19 LAB
ACID FAST STN SPEC: NORMAL
ACID FAST STN SPEC: NORMAL
ALBUMIN SERPL BCP-MCNC: 2.4 G/DL (ref 3.4–5)
ALP SERPL-CCNC: 201 U/L (ref 33–136)
ALT SERPL W P-5'-P-CCNC: 73 U/L (ref 10–52)
ANION GAP SERPL CALC-SCNC: 12 MMOL/L (ref 10–20)
AST SERPL W P-5'-P-CCNC: 54 U/L (ref 9–39)
BASOPHILS # BLD AUTO: 0.07 X10*3/UL (ref 0–0.1)
BASOPHILS NFR BLD AUTO: 0.9 %
BILIRUB SERPL-MCNC: 1.9 MG/DL (ref 0–1.2)
BUN SERPL-MCNC: 46 MG/DL (ref 6–23)
C DIF TOX TCDA+TCDB STL QL NAA+PROBE: DETECTED
C DIFF TOX A+B STL QL IA: NEGATIVE
CALCIUM SERPL-MCNC: 7.9 MG/DL (ref 8.6–10.6)
CHLORIDE SERPL-SCNC: 99 MMOL/L (ref 98–107)
CO2 SERPL-SCNC: 27 MMOL/L (ref 21–32)
CREAT SERPL-MCNC: 0.79 MG/DL (ref 0.5–1.3)
EGFRCR SERPLBLD CKD-EPI 2021: >90 ML/MIN/1.73M*2
EOSINOPHIL # BLD AUTO: 0.05 X10*3/UL (ref 0–0.7)
EOSINOPHIL NFR BLD AUTO: 0.6 %
ERYTHROCYTE [DISTWIDTH] IN BLOOD BY AUTOMATED COUNT: 16.3 % (ref 11.5–14.5)
FUNGUS SPEC CULT: NORMAL
FUNGUS SPEC FUNGUS STN: NORMAL
GLUCOSE BLD MANUAL STRIP-MCNC: 131 MG/DL (ref 74–99)
GLUCOSE BLD MANUAL STRIP-MCNC: 141 MG/DL (ref 74–99)
GLUCOSE BLD MANUAL STRIP-MCNC: 145 MG/DL (ref 74–99)
GLUCOSE BLD MANUAL STRIP-MCNC: 163 MG/DL (ref 74–99)
GLUCOSE SERPL-MCNC: 176 MG/DL (ref 74–99)
HCT VFR BLD AUTO: 27.4 % (ref 41–52)
HGB BLD-MCNC: 8.9 G/DL (ref 13.5–17.5)
IMM GRANULOCYTES # BLD AUTO: 0.19 X10*3/UL (ref 0–0.7)
IMM GRANULOCYTES NFR BLD AUTO: 2.4 % (ref 0–0.9)
LAB AP ASR DISCLAIMER: NORMAL
LABORATORY COMMENT REPORT: NORMAL
LABORATORY COMMENT REPORT: NORMAL
LDH SERPL L TO P-CCNC: 191 U/L (ref 84–246)
LYMPHOCYTES # BLD AUTO: 0.15 X10*3/UL (ref 1.2–4.8)
LYMPHOCYTES NFR BLD AUTO: 1.9 %
MAGNESIUM SERPL-MCNC: 2.2 MG/DL (ref 1.6–2.4)
MCH RBC QN AUTO: 29.3 PG (ref 26–34)
MCHC RBC AUTO-ENTMCNC: 32.5 G/DL (ref 32–36)
MCV RBC AUTO: 90 FL (ref 80–100)
MONOCYTES # BLD AUTO: 0.01 X10*3/UL (ref 0.1–1)
MONOCYTES NFR BLD AUTO: 0.1 %
MYCOBACTERIUM SPEC CULT: NORMAL
MYCOBACTERIUM SPEC CULT: NORMAL
NEUTROPHILS # BLD AUTO: 7.38 X10*3/UL (ref 1.2–7.7)
NEUTROPHILS NFR BLD AUTO: 94.1 %
NRBC BLD-RTO: 0 /100 WBCS (ref 0–0)
PATH REPORT.FINAL DX SPEC: NORMAL
PATH REPORT.GROSS SPEC: NORMAL
PATH REPORT.RELEVANT HX SPEC: NORMAL
PATH REPORT.TOTAL CANCER: NORMAL
PHOSPHATE SERPL-MCNC: 2.7 MG/DL (ref 2.5–4.9)
PLATELET # BLD AUTO: 255 X10*3/UL (ref 150–450)
POTASSIUM SERPL-SCNC: 4.4 MMOL/L (ref 3.5–5.3)
PROT SERPL-MCNC: 4.9 G/DL (ref 6.4–8.2)
RBC # BLD AUTO: 3.04 X10*6/UL (ref 4.5–5.9)
RBC MORPH BLD: NORMAL
RESIDENT REVIEW: NORMAL
SODIUM SERPL-SCNC: 134 MMOL/L (ref 136–145)
URATE SERPL-MCNC: 3.6 MG/DL (ref 4–7.5)
WBC # BLD AUTO: 7.9 X10*3/UL (ref 4.4–11.3)

## 2024-09-19 PROCEDURE — 2500000005 HC RX 250 GENERAL PHARMACY W/O HCPCS: Performed by: STUDENT IN AN ORGANIZED HEALTH CARE EDUCATION/TRAINING PROGRAM

## 2024-09-19 PROCEDURE — 94003 VENT MGMT INPAT SUBQ DAY: CPT

## 2024-09-19 PROCEDURE — 2500000004 HC RX 250 GENERAL PHARMACY W/ HCPCS (ALT 636 FOR OP/ED)

## 2024-09-19 PROCEDURE — 80053 COMPREHEN METABOLIC PANEL: CPT

## 2024-09-19 PROCEDURE — 2500000002 HC RX 250 W HCPCS SELF ADMINISTERED DRUGS (ALT 637 FOR MEDICARE OP, ALT 636 FOR OP/ED)

## 2024-09-19 PROCEDURE — 2500000001 HC RX 250 WO HCPCS SELF ADMINISTERED DRUGS (ALT 637 FOR MEDICARE OP)

## 2024-09-19 PROCEDURE — 2500000005 HC RX 250 GENERAL PHARMACY W/O HCPCS

## 2024-09-19 PROCEDURE — 83735 ASSAY OF MAGNESIUM: CPT

## 2024-09-19 PROCEDURE — 82947 ASSAY GLUCOSE BLOOD QUANT: CPT

## 2024-09-19 PROCEDURE — 87493 C DIFF AMPLIFIED PROBE: CPT | Performed by: INTERNAL MEDICINE

## 2024-09-19 PROCEDURE — 85025 COMPLETE CBC W/AUTO DIFF WBC: CPT

## 2024-09-19 PROCEDURE — 2500000004 HC RX 250 GENERAL PHARMACY W/ HCPCS (ALT 636 FOR OP/ED): Mod: JZ

## 2024-09-19 PROCEDURE — 2020000001 HC ICU ROOM DAILY

## 2024-09-19 PROCEDURE — 84550 ASSAY OF BLOOD/URIC ACID: CPT

## 2024-09-19 PROCEDURE — 99291 CRITICAL CARE FIRST HOUR: CPT

## 2024-09-19 PROCEDURE — 83615 LACTATE (LD) (LDH) ENZYME: CPT

## 2024-09-19 PROCEDURE — 87324 CLOSTRIDIUM AG IA: CPT | Performed by: INTERNAL MEDICINE

## 2024-09-19 PROCEDURE — 84100 ASSAY OF PHOSPHORUS: CPT

## 2024-09-19 PROCEDURE — 37799 UNLISTED PX VASCULAR SURGERY: CPT

## 2024-09-19 RX ORDER — VANCOMYCIN HCL 50 MG/ML
125 SOLUTION, RECONSTITUTED, ORAL ORAL 4 TIMES DAILY
Status: DISCONTINUED | OUTPATIENT
Start: 2024-09-19 | End: 2024-09-24

## 2024-09-19 RX ORDER — POLYETHYLENE GLYCOL 3350 17 G/17G
17 POWDER, FOR SOLUTION ORAL DAILY
Status: DISCONTINUED | OUTPATIENT
Start: 2024-09-19 | End: 2024-09-20

## 2024-09-19 RX ORDER — PROPOFOL 10 MG/ML
5-50 INJECTION, EMULSION INTRAVENOUS CONTINUOUS
Status: DISCONTINUED | OUTPATIENT
Start: 2024-09-19 | End: 2024-09-20

## 2024-09-19 ASSESSMENT — PAIN - FUNCTIONAL ASSESSMENT
PAIN_FUNCTIONAL_ASSESSMENT: 0-10

## 2024-09-19 ASSESSMENT — COGNITIVE AND FUNCTIONAL STATUS - GENERAL
DRESSING REGULAR UPPER BODY CLOTHING: A LITTLE
MOVING FROM LYING ON BACK TO SITTING ON SIDE OF FLAT BED WITH BEDRAILS: A LITTLE
DRESSING REGULAR LOWER BODY CLOTHING: A LITTLE
DAILY ACTIVITIY SCORE: 18
HELP NEEDED FOR BATHING: A LITTLE
STANDING UP FROM CHAIR USING ARMS: A LITTLE
MOBILITY SCORE: 18
CLIMB 3 TO 5 STEPS WITH RAILING: A LITTLE
TOILETING: A LITTLE
MOVING TO AND FROM BED TO CHAIR: A LITTLE
TURNING FROM BACK TO SIDE WHILE IN FLAT BAD: A LITTLE
EATING MEALS: A LITTLE
WALKING IN HOSPITAL ROOM: A LITTLE
PERSONAL GROOMING: A LITTLE

## 2024-09-19 ASSESSMENT — PAIN SCALES - GENERAL
PAINLEVEL_OUTOF10: 0 - NO PAIN
PAINLEVEL_OUTOF10: 2
PAINLEVEL_OUTOF10: 0 - NO PAIN

## 2024-09-19 NOTE — PROGRESS NOTES
RADIATION COMPLETION OF THERAPY NOTE    Patient Name:  Khoa Mitchell  MRN:  [id number]  :  1955    Radiation Oncologist: No care team member to display   Referring Provider: No ref. provider found  Primary Care Provider: Savita Cerda, NICOLE-CNP, DNP    Brief History: Khoa Mitchell is a 69 y.o. male with No matching staging information was found for the patient..  The patient completed radiotherapy as outlined below.    Radiation Treatment Summary:    Radiation Oncology   Radiation Treatments       Active   No active radiation treatments to show.     Completed   Lung (Started on 2024)   Most recent fraction: 800 cGy given on 2024   Total given: 800 cGy / 800 cGy  (1 of 1 fractions)   Elapsed Days: 0   Technique: 3D                       Concurrent Chemotherapy:  (INPT) CARBOplatin / Etoposide, 21 Day Cycles   Treatment goal Palliative   Treatment line First Line   Status Active   Start Date 2024   End Date 2024   Treatment Medications CARBOplatin (Paraplatin) 600 mg in sodium chloride 0.9% 170 mL IV, 600 mg, intravenous, Once, 1 of 1 cycle  Administration: 600 mg (2024)    etoposide (Toposar) 110 mg in sodium chloride 0.9% 552.5 mL IV, 50 mg/m2 = 110 mg (50 % of original dose 100 mg/m2), intravenous, Once, 1 of 1 cycle  Dose modification: 50 mg/m2 (original dose 100 mg/m2, Cycle 1)  Administration: 110 mg (2024), 110 mg (9/15/2024), 110 mg (2024)    methylPREDNISolone sod succinate (SOLU-Medrol) 40 mg/mL injection 40 mg, 40 mg, intravenous, As needed, 1 of 1 cycle         CTCAE Toxicity Overview:   Toxicity Assessment          2024    17:28   Toxicity Assessment   Adverse Events Reviewed (WDL) Yes (Within Defined Limits)   Treatment Site Bone   Anorexia Grade 0   Anxiety Grade 0   Dehydration Grade 0   Depression Grade 0   Dermatitis Radiation Grade 0   Diarrhea Grade 0   Fatigue Grade 0       pt intubated   Nausea Grade 0   Pain Grade 0   Vomiting Grade 0   Joint Range of  Motion Decreased Grade 0   Bone Pain Grade 0   Edema Limbs Grade 0   Alopecia Grade 0   Erythroderma Grade 0   Pain of Skin Grade 0   Pruritus Grade 0   Rash Acneiform Grade 0   Skin Hyperpigmentation Grade 0   Skin Hypopigmentation Grade 0   Skin Induration Grade 0   Skin Ulceration Grade 0   Telangiectasia Grade 0     Patient Disposition: Pt currently admitted. Pt tolerated RT dose well and without complications    Future Appointments       Date / Time Provider Department Dept Phone    1/13/2025 11:00 AM NICOLE Dobbs-CNP, DNP [facility] [phone]

## 2024-09-19 NOTE — SIGNIFICANT EVENT
09/19/24 1243   Invasive Vent Information   Vent Mode PS   Settings   PEEP/CPAP (cm H2O) 0 cm H20   Pressure Support (cm H2O) 0 cm H20   Daily Screen   Total RSBI (S)  71   Weaning Parameters   Weaning Start Time 1200   Weaning Vital Capacity (S)  585 mL   Negative Inspiratory Force (NIF) (S)  -23   Respiratory Depth/Rhythm Regular   Respiratory Effort Unlabored   Weaning Tolerance Good   Extubation   Leak Test (S)  Positive

## 2024-09-19 NOTE — PROGRESS NOTES
"Medical Intensive Care - Daily Progress Note   Subjective    Khoa Mitchell is a 69 y.o. year old male patient admitted on 9/7/2024 with following ICU needs:  acute hypoxemic respiratory failure, worsening from severe metastatic burden requiring endotracheal intubation.     Interval History:  NAEON. Pt remained in NSR. Levo was reduced to 0.044.     Meds    Scheduled medications  allopurinol, 300 mg, oral, Daily  esomeprazole, 40 mg, nasoduodenal tube, Daily before breakfast   Or  pantoprazole, 40 mg, intravenous, Daily before breakfast  filgrastim or biosimilar, 5 mcg/kg, subcutaneous, q24h  insulin lispro, 0-10 Units, subcutaneous, q4h  perflutren protein A microsphere, 0.5 mL, intravenous, Once in imaging  polyethylene glycol, 17 g, nasogastric tube, Daily  sennosides-docusate sodium, 2 tablet, nasogastric tube, BID  sulfur hexafluoride microsphr, 2 mL, intravenous, Once in imaging      Continuous medications  amiodarone, 0.5-1 mg/min, Last Rate: 0.5 mg/min (09/19/24 0542)  fentaNYL, 0-300 mcg/hr, Last Rate: 125 mcg/hr (09/19/24 0144)  norepinephrine, 0-0.2 mcg/kg/min, Last Rate: Stopped (09/18/24 1900)  propofol, 5-50 mcg/kg/min, Last Rate: Stopped (09/17/24 1230)      PRN medications  PRN medications: albuterol, dextrose, dextrose, dextrose, diphenhydrAMINE, EPINEPHrine HCl, famotidine, glucagon, glucagon, methylPREDNISolone sodium succinate (PF), oxygen, prochlorperazine, prochlorperazine, sodium chloride     Objective    Blood pressure 132/50, pulse 69, temperature 36.8 °C (98.2 °F), temperature source Temporal, resp. rate 15, height 1.829 m (6' 0.01\"), weight 99.8 kg (220 lb 0.3 oz), SpO2 98%.     Physical Exam  Constitutional:       General: He is awake.      Appearance: He is obese.      Interventions: Nasal cannula in place.      Comments: Patient intubated. Opening eyes to voice.   HENT:      Head:      Salivary Glands: Right salivary gland is diffusely enlarged.      Comments: Significant plethora of the " head including neck, face, and periorbital edema. Evidence of plum shaped mass on right jaw.   Cardiovascular:      Rate and Rhythm: Normal rate.      Heart sounds: Normal heart sounds, S1 normal and S2 normal.    Musculoskeletal:      Right upper arm: Swelling and edema present.      Left upper arm: Swelling and edema present.      Right lower le+ Pitting Edema present.      Left lower le+ Pitting Edema present.      Comments: The upper extremities are symmetrically edematous, hands are warm to touch.    Lymphadenopathy:      Cervical:      Right cervical: No superficial cervical adenopathy.     Left cervical: No superficial cervical adenopathy.      Upper Body:      Right upper body: No supraclavicular adenopathy.      Left upper body: No supraclavicular adenopathy.   Neurological:      Mental Status: He is alert.   Psychiatric:         Behavior: Behavior is cooperative.      Intake/Output Summary (Last 24 hours) at 2024 0715  Last data filed at 2024 0600  Gross per 24 hour   Intake 1714.41 ml   Output 1300 ml   Net 414.41 ml     Labs:   Results from last 72 hours   Lab Units 24  0458 24  1702 24  0457   SODIUM mmol/L 134* 134* 133*   POTASSIUM mmol/L 4.4 4.3 4.5   CHLORIDE mmol/L 99 100 98   CO2 mmol/L 27 26 26   BUN mg/dL 46* 48* 48*   CREATININE mg/dL 0.79 0.81 0.91   GLUCOSE mg/dL 176* 174* 142*   CALCIUM mg/dL 7.9* 7.5* 7.7*   ANION GAP mmol/L 12 12 14   EGFR mL/min/1.73m*2 >90 >90 >90   PHOSPHORUS mg/dL 2.7 2.7 3.0      Results from last 72 hours   Lab Units 24  0458 24  0457 24  0836   WBC AUTO x10*3/uL 7.9 7.9 7.4   HEMOGLOBIN g/dL 8.9* 8.9* 9.1*   HEMATOCRIT % 27.4* 25.5* 26.1*   PLATELETS AUTO x10*3/uL 255 295 316   NEUTROS PCT AUTO % 94.1 94.4 94.6   LYMPHS PCT AUTO % 1.9 3.3 3.4   MONOS PCT AUTO % 0.1 0.3 1.1   EOS PCT AUTO % 0.6 1.1 0.1      Results from last 72 hours   Lab Units 24  1247 24  1245 24  0738   POCT PH, ARTERIAL pH  7.39 7.43* 7.43*   POCT PCO2, ARTERIAL mm Hg 43* 40 41   POCT PO2, ARTERIAL mm Hg 84* 79* 88   POCT SO2, ARTERIAL % 97 97 98            Micro/ID:     Lab Results   Component Value Date    URINECULTURE No growth 09/16/2024    BLOODCULT No growth at 4 days -  FINAL REPORT 09/08/2024    BLOODCULT No growth at 4 days -  FINAL REPORT 09/08/2024       Summary of key imaging results from the last 24 hours  None    Assessment and Plan     Assessment: Khoa Mitchell is a 69 y.o. year old male patient admitted on 9/7/2024 with PMHx of T2DM, CKD2 (baseline Cr 1.2) originally admitted for acute hypoxic respiratory failure 2/2 to postobstructive pneumonia from SVC from new diagnosis of malignancy. Course complicated by worsening respiratory failure requiring intubation 9/8 with subsequent BAL with suctioning of thick green secretions. 9/9 TTE with moderate pericardial effusion, findings suggestive of early tamponade. Transferred to the CICU, underwent pericardiocentesis subsequently complicated by complete heart block requiring TVP, then with afib RVR which chemically converted with amiodarone. Repeat CXR with complete opacification of the left lung. Treated with ceftriaxone (9/8-9/10), broadened to zosyn (9/10-*) given worsening infiltrates, azithromycin (9/8-9/10). Now currently on ceftriaxone (9/10- 9/18). Completed radiation therapy and chemotherapy 9/14.     Mechanical Ventilation: > 10 days  Sedation/Analgesia:  Fentanyl  Restraints: Restraints indicated as alternative therapies have been attempted and have been ineffective.  Restrain with soft wrist restraints and side rails up x4 until medical devices discontinued and/or patient able to participate with plan of care.       Summary for 09/19/24  :  No management changes. Plan to attempt extubation tomorrow. Plan to have GOC discussion with patient and Zita.     Plan:  NEUROLOGY/PSYCH:  NEUROLOGIC  #Intubated, sedated  -fentanyl 75  -Daily SATs as oxygen requirements  decrease     #Multiple brain lesions  ::CT head performed, notable for multiple hyperdense lesions concerning for hemorrhagic metastases, largest [id number]  ::Repeat CT head without evidence of hemorrhagic transformation  -Holding dvt ppx post-bleed day 2 (9/10 AM)  -Per NSGY, recommend MRI presurgical perfusion and fingerprinting brain, C/T/L w/ w/o contrast when able to lie flat; will page them when done     CARDIOVASCULAR  #Shock  Likely septic with concern for worsening post-obstructive pneumonia and mucus plugging as well as worsening pleural effusion  -Given worsening effusion and pneumonia, ceftriaxone (9/10-9/17)        #Pericardial effusion  #Complete heart block  Likely malignant given evidence of multiple probably metastatic lesions  ::CT chest with pericardial effusion measuring 15mm in thickness  ::9/9 TTE with moderate pericardial effusion, brief RV and RA diastolic collapse, concerning for early tamponade  ::9/9 pericardiocentesis with 400cc serosanguinous fluid, complicated by CHB requiring TVP  :: cytology showing malignant pericardial effusion likely SCLC  -Pericardial drain pulled  -Thoracic surgery does not recommend pericardial window due to futilit   -continue to monitor       #Afib  Likely 2/2 to worsening infection and effusion  ::s/p amio bolus  ::Intermittent Afib RVR w/ MAP 40-50, on amio gtt  - s/p dig 250 IV  - Continue amio gtt     PULMONARY  #Acute hypoxic respiratory failure  Originally likely 2/2 to post-obstructive pneumonia, underwent BAL with evidence of multiple secretions. Now complicated by complete opacification of the left lung concerning for worsening mucus plugging vs worsening pleural effusion vs combination.  ::Baseline RA  ::ENT scoped - patent airway with gross aspiration of secretions; hypomobile left vocal cord  ::9/7 BCx 2/4 Strep salivarius, likely contaminant, very uncommon cause of bacteremia  ::9/9 CXR with complete opacification of the left lung and R  mediastinal shift  -s/p ceftriaxone (9/10-9/17)  -ENT following, appreciate recs     Vent Mode: Volume control/assist control  FiO2 (%):  [40 %] 40 %  S RR:  [16] 16  S VT:  [420 mL] 420 mL  PEEP/CPAP (cm H2O):  [5 cm H20] 5 cm H20  MAP (cm H2O):  [8-9] 8     RENAL/  #Hyponatremia, resolved   Likely SIADH 2/2 to cancer, which would be consistent with lung SCC vs hypervolemic hyponatremia  ::Admission Na 133  ::Home salt tabs and water restriction, lasix 20mg per outpt nephro  ::Serum osm 294  ::Urine osm 785   ::Urine lytes WNL   -Continue to monitor      #CKD2a  ::Baseline Cr 1.1-1.2  -At baseline, CTM     GASTROINTESTINAL  -No acute issues     ENDOCRINE  #T2DM  ::9/8 A1C 6.8  -SSI q4h while NPO     HEME/ONC  #Metastatic cancer, SCLC  #SVC syndrome  ::CT imaging with multiple lesions - multiple brain mets, soft tissue mass in lung  ::CT AP with lesions in liver, spleen, both adrenal glands, pancreas, multiple peritoneal nodules, R gluteal mass, R acetabulum  ::CT head with multiple lesions  :: Pericardial fluid significant for malignant cells; BAL cytology  -Chemo and radiation therapy completed 9/16 and 9/13 respectively.   -Rad onc will follow up on Monday to monitor mediastinal mass size.   - Initiate filgrastim       #Normocytic anemia  Likely AOCD 2/2 to malignancy vs SARA given elevated RDW  ::Baseline 11-12, now 8-9   -Iron, TIBC, Tsat, ferritin      INFECTIOUS DISEASE  #Concern for post-obstructive pneumonia  -Fu sputum cx, Bcx as above  -C/w ceftriaxone (9/10-9/18)    ICU Check List               ICU Liberation: Intervention:   Assess, Prevent, Manage Pain CPOT -     Both SAT and SBT [] SAT  [] SBT 30-60 min [] Extubate to NC  [] Extubate to NIV  [] Discuss Trach   Choice of analgesia and sedation RASS: -2  Fentanyl and Propofol     Delirium: Assess, prevent and manage CAM -     Early Mobility and Exercise   [] PT /OT consult   Family Engagement and Empowerment [x]Family updated [x]SW consult      FEN  Fluids: PRN  Electrolytes: PRN  Nutrition: NPO  Prophylaxis:  DVT ppx: contraindicated   GI ppx: none  Bowel care: none   Access: Peripheral IV (left), endotracheal tube (09/08-), A line  Hardware:         Arterial Line 09/10/24 Left Radial (Active)   Placement Date/Time: 09/10/24 (c) 0105   Size: 20 G  Orientation: Left  Location: Radial  Site Prep: Alcohol  Local Anesthetic: Injectable  Insertion attempts: 1  Securement Method: Transparent dressing  Patient Tolerance: Tolerated well   Number of days: 9       ETT  8.5 mm (Active)   Placement Date/Time: 09/08/24 1208   Hand Hygiene Completed: Yes  Mask Ventilation: Vent by mask + OA or adjuvant +/- NMBA  ETT Type: ETT - single  Single Lumen Tube Size: 8.5 mm  Cuffed: Yes  Laryngoscope: Jose  Blade Size: 4  Location: Oral  ...   Number of days: 10       Urethral Catheter Straight-tip 16 Fr. (Active)   Placement Date/Time: 09/16/24 0631   Placed by: mainor robles  Catheter Type: Straight-tip  Tube Size (Fr.): 16 Fr.  Catheter Balloon Size: 10 mL  Urine Returned: Yes   Number of days: 3       NG/OG/Feeding Tube OG - [place] sump 18 Fr Center mouth (Active)   Placement Date/Time: 09/08/24 1230   Hand Hygiene Completed: Yes  Type of Tube: NG/OG Tube  Tube Length: 65 cm  Tube Type: OG - [place] sump  NG/OG Tube Size: 18 Fr  Tube Location: Center mouth   Number of days: 10       Social:  Code: Full Code    HPOA: there is no HCPOA, only contact is girlfriend Zita Campbell [phone] who is proximal decision maker   Disposition: Medicine ICU       Enid Chambers MD   09/19/24 at 7:15 AM     Disclaimer: Documentation completed with the information available at the time of input. The times in the chart may not be reflective of actual patient care times, interventions, or procedures. Documentation occurs after the physical care of the patient.

## 2024-09-19 NOTE — CARE PLAN
Problem: Pain - Adult  Goal: Verbalizes/displays adequate comfort level or baseline comfort level  Outcome: Progressing     Problem: Safety - Adult  Goal: Free from fall injury  Outcome: Progressing     Problem: Discharge Planning  Goal: Discharge to home or other facility with appropriate resources  Outcome: Progressing     Problem: Chronic Conditions and Co-morbidities  Goal: Patient's chronic conditions and co-morbidity symptoms are monitored and maintained or improved  Outcome: Progressing     Problem: Skin  Goal: Decreased wound size/increased tissue granulation at next dressing change  Outcome: Progressing  Goal: Participates in plan/prevention/treatment measures  Outcome: Progressing  Goal: Promote/optimize nutrition  Outcome: Progressing  Goal: Promote skin healing  Outcome: Progressing     Problem: Safety - Medical Restraint  Goal: Remains free of injury from restraints (Restraint for Interference with Medical Device)  Outcome: Progressing  Goal: Free from restraint(s) (Restraint for Interference with Medical Device)  Outcome: Progressing     Problem: Knowledge Deficit  Goal: Patient/family/caregiver demonstrates understanding of disease process, treatment plan, medications, and discharge instructions  Outcome: Progressing     Problem: Mechanical Ventilation  Goal: Patient Will Maintain Patent Airway  Outcome: Progressing  Goal: Oral health is maintained or improved  Outcome: Progressing  Goal: Tracheostomy will be managed safely  Outcome: Progressing  Goal: ET tube will be managed safely  Outcome: Progressing  Goal: Ability to express needs and understand communication  Outcome: Progressing  Goal: Mobility/activity is maintained at optimum level for patient  Outcome: Progressing     Problem: Fall/Injury  Goal: Not fall by end of shift  Outcome: Progressing  Goal: Be free from injury by end of the shift  Outcome: Progressing  Goal: Verbalize understanding of personal risk factors for fall in the  hospital  Outcome: Progressing  Goal: Verbalize understanding of risk factor reduction measures to prevent injury from fall in the home  Outcome: Progressing  Goal: Use assistive devices by end of the shift  Outcome: Progressing  Goal: Pace activities to prevent fatigue by end of the shift  Outcome: Progressing

## 2024-09-20 LAB
ALBUMIN SERPL BCP-MCNC: 2.3 G/DL (ref 3.4–5)
ALP SERPL-CCNC: 161 U/L (ref 33–136)
ALT SERPL W P-5'-P-CCNC: 64 U/L (ref 10–52)
ANION GAP SERPL CALC-SCNC: 11 MMOL/L (ref 10–20)
AST SERPL W P-5'-P-CCNC: 33 U/L (ref 9–39)
BASOPHILS # BLD MANUAL: 0 X10*3/UL (ref 0–0.1)
BASOPHILS NFR BLD MANUAL: 0 %
BILIRUB SERPL-MCNC: 2 MG/DL (ref 0–1.2)
BUN SERPL-MCNC: 41 MG/DL (ref 6–23)
CALCIUM SERPL-MCNC: 7.9 MG/DL (ref 8.6–10.6)
CHLORIDE SERPL-SCNC: 100 MMOL/L (ref 98–107)
CO2 SERPL-SCNC: 29 MMOL/L (ref 21–32)
CREAT SERPL-MCNC: 0.64 MG/DL (ref 0.5–1.3)
EGFRCR SERPLBLD CKD-EPI 2021: >90 ML/MIN/1.73M*2
EOSINOPHIL # BLD MANUAL: 0.06 X10*3/UL (ref 0–0.7)
EOSINOPHIL NFR BLD MANUAL: 1.7 %
ERYTHROCYTE [DISTWIDTH] IN BLOOD BY AUTOMATED COUNT: 15.9 % (ref 11.5–14.5)
GLUCOSE BLD MANUAL STRIP-MCNC: 123 MG/DL (ref 74–99)
GLUCOSE BLD MANUAL STRIP-MCNC: 128 MG/DL (ref 74–99)
GLUCOSE BLD MANUAL STRIP-MCNC: 128 MG/DL (ref 74–99)
GLUCOSE BLD MANUAL STRIP-MCNC: 133 MG/DL (ref 74–99)
GLUCOSE BLD MANUAL STRIP-MCNC: 141 MG/DL (ref 74–99)
GLUCOSE BLD MANUAL STRIP-MCNC: 149 MG/DL (ref 74–99)
GLUCOSE SERPL-MCNC: 159 MG/DL (ref 74–99)
HCT VFR BLD AUTO: 25.3 % (ref 41–52)
HGB BLD-MCNC: 8.2 G/DL (ref 13.5–17.5)
IMM GRANULOCYTES # BLD AUTO: 0.45 X10*3/UL (ref 0–0.7)
IMM GRANULOCYTES NFR BLD AUTO: 12 % (ref 0–0.9)
LDH SERPL L TO P-CCNC: 164 U/L (ref 84–246)
LYMPHOCYTES # BLD MANUAL: 0.13 X10*3/UL (ref 1.2–4.8)
LYMPHOCYTES NFR BLD MANUAL: 3.5 %
MAGNESIUM SERPL-MCNC: 2.22 MG/DL (ref 1.6–2.4)
MCH RBC QN AUTO: 28.8 PG (ref 26–34)
MCHC RBC AUTO-ENTMCNC: 32.4 G/DL (ref 32–36)
MCV RBC AUTO: 89 FL (ref 80–100)
MONOCYTES # BLD MANUAL: 0 X10*3/UL (ref 0.1–1)
MONOCYTES NFR BLD MANUAL: 0 %
NEUTROPHILS # BLD MANUAL: 3.6 X10*3/UL (ref 1.2–7.7)
NEUTS BAND # BLD MANUAL: 0.36 X10*3/UL (ref 0–0.7)
NEUTS BAND NFR BLD MANUAL: 9.5 %
NEUTS SEG # BLD MANUAL: 3.24 X10*3/UL (ref 1.2–7)
NEUTS SEG NFR BLD MANUAL: 85.3 %
NRBC BLD-RTO: 0 /100 WBCS (ref 0–0)
PHOSPHATE SERPL-MCNC: 2.3 MG/DL (ref 2.5–4.9)
PLATELET # BLD AUTO: 241 X10*3/UL (ref 150–450)
POTASSIUM SERPL-SCNC: 4.3 MMOL/L (ref 3.5–5.3)
PROT SERPL-MCNC: 4.7 G/DL (ref 6.4–8.2)
RBC # BLD AUTO: 2.85 X10*6/UL (ref 4.5–5.9)
RBC MORPH BLD: ABNORMAL
SODIUM SERPL-SCNC: 136 MMOL/L (ref 136–145)
TOTAL CELLS COUNTED BLD: 116
URATE SERPL-MCNC: 2.9 MG/DL (ref 4–7.5)
WBC # BLD AUTO: 3.8 X10*3/UL (ref 4.4–11.3)

## 2024-09-20 PROCEDURE — 37799 UNLISTED PX VASCULAR SURGERY: CPT

## 2024-09-20 PROCEDURE — 74018 RADEX ABDOMEN 1 VIEW: CPT | Performed by: RADIOLOGY

## 2024-09-20 PROCEDURE — 85007 BL SMEAR W/DIFF WBC COUNT: CPT

## 2024-09-20 PROCEDURE — 2500000001 HC RX 250 WO HCPCS SELF ADMINISTERED DRUGS (ALT 637 FOR MEDICARE OP)

## 2024-09-20 PROCEDURE — 83735 ASSAY OF MAGNESIUM: CPT

## 2024-09-20 PROCEDURE — 2500000004 HC RX 250 GENERAL PHARMACY W/ HCPCS (ALT 636 FOR OP/ED): Mod: JZ

## 2024-09-20 PROCEDURE — 2020000001 HC ICU ROOM DAILY

## 2024-09-20 PROCEDURE — 99291 CRITICAL CARE FIRST HOUR: CPT | Performed by: NURSE PRACTITIONER

## 2024-09-20 PROCEDURE — 84550 ASSAY OF BLOOD/URIC ACID: CPT

## 2024-09-20 PROCEDURE — 99497 ADVNCD CARE PLAN 30 MIN: CPT

## 2024-09-20 PROCEDURE — 94003 VENT MGMT INPAT SUBQ DAY: CPT

## 2024-09-20 PROCEDURE — 43761 REPOSITION GASTROSTOMY TUBE: CPT | Performed by: NURSE PRACTITIONER

## 2024-09-20 PROCEDURE — 2500000004 HC RX 250 GENERAL PHARMACY W/ HCPCS (ALT 636 FOR OP/ED)

## 2024-09-20 PROCEDURE — 80053 COMPREHEN METABOLIC PANEL: CPT

## 2024-09-20 PROCEDURE — 2500000005 HC RX 250 GENERAL PHARMACY W/O HCPCS: Performed by: STUDENT IN AN ORGANIZED HEALTH CARE EDUCATION/TRAINING PROGRAM

## 2024-09-20 PROCEDURE — 2500000002 HC RX 250 W HCPCS SELF ADMINISTERED DRUGS (ALT 637 FOR MEDICARE OP, ALT 636 FOR OP/ED)

## 2024-09-20 PROCEDURE — 2500000005 HC RX 250 GENERAL PHARMACY W/O HCPCS

## 2024-09-20 PROCEDURE — 85027 COMPLETE CBC AUTOMATED: CPT

## 2024-09-20 PROCEDURE — 84100 ASSAY OF PHOSPHORUS: CPT

## 2024-09-20 PROCEDURE — 83615 LACTATE (LD) (LDH) ENZYME: CPT

## 2024-09-20 PROCEDURE — 82947 ASSAY GLUCOSE BLOOD QUANT: CPT

## 2024-09-20 PROCEDURE — 2500000005 HC RX 250 GENERAL PHARMACY W/O HCPCS: Performed by: NURSE PRACTITIONER

## 2024-09-20 PROCEDURE — 99498 ADVNCD CARE PLAN ADDL 30 MIN: CPT

## 2024-09-20 RX ORDER — POLYETHYLENE GLYCOL 3350 17 G/17G
POWDER, FOR SOLUTION ORAL
Status: DISPENSED
Start: 2024-09-20 | End: 2024-09-20

## 2024-09-20 RX ORDER — AMIODARONE HYDROCHLORIDE 200 MG/1
200 TABLET ORAL DAILY
Status: DISCONTINUED | OUTPATIENT
Start: 2024-09-20 | End: 2024-09-21

## 2024-09-20 ASSESSMENT — PAIN SCALES - GENERAL
PAINLEVEL_OUTOF10: 0 - NO PAIN

## 2024-09-20 ASSESSMENT — PAIN - FUNCTIONAL ASSESSMENT
PAIN_FUNCTIONAL_ASSESSMENT: 0-10

## 2024-09-20 NOTE — PROGRESS NOTES
"Medical Intensive Care - Daily Progress Note   Subjective    Khoa Mitchell is a 69 y.o. year old male patient admitted on 9/7/2024 with following ICU needs: high flow oxygen    Interval History:  Extubated to AirVo 40/50    Meds    Scheduled medications  allopurinol, 300 mg, oral, Daily  amiodarone, 200 mg, oral, Daily  esomeprazole, 40 mg, nasoduodenal tube, Daily before breakfast   Or  pantoprazole, 40 mg, intravenous, Daily before breakfast  filgrastim or biosimilar, 5 mcg/kg, subcutaneous, q24h  insulin lispro, 0-10 Units, subcutaneous, q4h  vancomycin, 125 mg, oral, 4x daily      Continuous medications     PRN medications  PRN medications: albuterol, dextrose, dextrose, dextrose, diphenhydrAMINE, EPINEPHrine HCl, famotidine, glucagon, glucagon, methylPREDNISolone sodium succinate (PF), oxygen, oxygen, prochlorperazine, prochlorperazine, sodium chloride     Objective    Blood pressure 132/50, pulse 84, temperature 36.1 °C (97 °F), resp. rate 21, height 1.829 m (6' 0.01\"), weight 99.9 kg (220 lb 3.8 oz), SpO2 91%.     Physical Exam  Constitutional:       Appearance: He is ill-appearing.   HENT:      Nose: Rhinorrhea present.      Mouth/Throat:      Mouth: Mucous membranes are moist.   Eyes:      Conjunctiva/sclera: Conjunctivae normal.   Neck:      Comments: swelling  Cardiovascular:      Rate and Rhythm: Normal rate and regular rhythm.   Pulmonary:      Breath sounds: Rhonchi present.   Abdominal:      General: Abdomen is flat. Bowel sounds are normal.      Palpations: Abdomen is soft.   Musculoskeletal:      Comments: BUE edema +3-4   Neurological:      Mental Status: He is alert and oriented to person, place, and time.      GCS: GCS eye subscore is 4. GCS verbal subscore is 5. GCS motor subscore is 6.          Intake/Output Summary (Last 24 hours) at 9/20/2024 1546  Last data filed at 9/20/2024 1535  Gross per 24 hour   Intake 1788.48 ml   Output 1175 ml   Net 613.48 ml     Labs:   Results from last 72 hours "   Lab Units 09/20/24  0512 09/19/24  0458 09/18/24  1702   SODIUM mmol/L 136 134* 134*   POTASSIUM mmol/L 4.3 4.4 4.3   CHLORIDE mmol/L 100 99 100   CO2 mmol/L 29 27 26   BUN mg/dL 41* 46* 48*   CREATININE mg/dL 0.64 0.79 0.81   GLUCOSE mg/dL 159* 176* 174*   CALCIUM mg/dL 7.9* 7.9* 7.5*   ANION GAP mmol/L 11 12 12   EGFR mL/min/1.73m*2 >90 >90 >90   PHOSPHORUS mg/dL 2.3* 2.7 2.7      Results from last 72 hours   Lab Units 09/20/24  0512 09/19/24 0458 09/18/24  0457   WBC AUTO x10*3/uL 3.8* 7.9 7.9   HEMOGLOBIN g/dL 8.2* 8.9* 8.9*   HEMATOCRIT % 25.3* 27.4* 25.5*   PLATELETS AUTO x10*3/uL 241 255 295   NEUTROS PCT AUTO %  --  94.1 94.4   LYMPHO PCT MAN % 3.5  --   --    LYMPHS PCT AUTO %  --  1.9 3.3   MONO PCT MAN % 0.0  --   --    MONOS PCT AUTO %  --  0.1 0.3   EOSINO PCT MAN % 1.7  --   --    EOS PCT AUTO %  --  0.6 1.1        Micro/ID:     Lab Results   Component Value Date    URINECULTURE No growth 09/16/2024    BLOODCULT No growth at 4 days -  FINAL REPORT 09/08/2024    BLOODCULT No growth at 4 days -  FINAL REPORT 09/08/2024       Summary of key imaging results from the last 24 hours  KUB 9/20  corpak in stomach    Assessment and Plan     Assessment: Khoa Mitchell is a 69 y.o. year old male patient admitted on 9/7/2024 with SVC syndrome requiring intubation for airway compromise, c/f postobstructive pneumonia.      Mechanical Ventilation: > 10 days  Sedation/Analgesia:  none  Restraints: no    Summary for 09/20/24  :  Eztubate to AirVo  Amio to daily   GOC discussions    Plan:  NEUROLOGY/PSYCH:  Dx:  Brain mets  Off sedation this am  Management:  PT/OT    CARDIOVASCULAR:  Dx:  Pericardial effusion, A-fib, Complete Heart block requiring TVP now resolved, Shock now resolved  Off pressors this am, on Amio gtt since 9/9.  S/p pericardiocentesis on 9/9.  Management:  Change amio to daily maintenance dose 200mg daily    PULMONARY:  Dx:  Acute hypoxic resp. Failure 2/2 postobstructive pneumonia  Completed  antibiotics, now on AirVo  Management:  Bronchopulmonary hygiene  Wean O2 as tolerated    RENAL/GENITOURINARY:  Dx:  No issues  Perea in place  Management:  Strict I/O    GASTROENTEROLOGY:  Dx:  C-diff infection  FMS in place  Management:  PPI  Vanco    ENDOCRINOLOGY:  Dx:  Type II DM  Glucose stable  Management:  SSI    HEMATOLOGY:  Dx:  Metastatic SCLC with SVC syndrome  Post chemo and radiation  Management:  Follow up with rad onc on Monday for plan  Onc following  Daily CBC  DVT ppx on hold (need to readdress)    MUSCULOSKELETAL/ SKIN:  Dx:  Moisture associated sacral wound  Management:  Wound care    INFECTIOUS DISEASE:  Dx:  S/p treatment for postobstructive pneumonia, C-diff positive.  WBC trending down  Management:  Cont Filgastrim  Vanco for c-diff  CBC daily    ICU Check List       ICU Liberation: Intervention:   Assess, Prevent, Manage Pain CPOT - NA   Both SAT and SBT [x] SAT  [x] SBT 30-60 min [x] Extubate to NC  [] Extubate to NIV  [] Discuss Trach   Choice of analgesia and sedation RASS: 0  none NA   Delirium: Assess, prevent and manage CAM - NA   Early Mobility and Exercise  [x] PT /OT consult   Family Engagement and Empowerment [x]Family updated [x]SW consult     FEN  Fluids: none  Electrolytes: replete  Nutrition: enteral  Prophylaxis:  DVT ppx: SCDs  GI ppx: PPI  Bowel care: none  Hardware:  Catheter: Pe[place]  Access: PIV  Drains: FMS  Lines: a-line  Social:  Code: Full Code    HPOA: Zita [phone]  Disposition: CHICO Pedroza   09/20/24 at 3:46 PM     Disclaimer: Documentation completed with the information available at the time of input. The times in the chart may not be reflective of actual patient care times, interventions, or procedures. Documentation occurs after the physical care of the patient.

## 2024-09-20 NOTE — ACP (ADVANCE CARE PLANNING)
"Confirming Previous Code Status: FULL CODE  Advance Care Planning Note     Discussion Date: 09/20/24   Discussion Participants: patient and significant other    The patient wishes to discuss Advance Care Planning today and the following is a brief summary of our discussion.     Patient has capacity to make their own medical decisions: Yes  Health Care Agent/Surrogate Decision Maker documented in chart: Yes    Documents on file and valid:  Advance Directive/Living Will: No   Health Care Power of : No    Communication of Medical Status/Prognosis:   Discussed recent diagnosis of SCLC with widespread mets; Explained difference between palliative and curative and that pt's advanced cancer is life-limiting with overall poor prognosis. Explained concerns about prolonged intubation (currently on day 11) iso SVC syndrome. Concerns of pt not being able to maintain his airway if/when extubated; pt appropriately emotional and tearful. Became increasingly agitated (large portion being d/t frustration of not being able to communicate easily) as discussion continued. Pt made it very clear that his short term goal for today was to become extubated. Also discussed with pt that early next week oncology will re-evaluate tumor burden following emergent radiation and chemo. Briefly discussed concern that pt not be considered for further systemic therapy due to his compromised resp/cardiac status and declining functional status as well.     Communication of Treatment Goals/Options:   Unable to obtain clear goals and decisions from pt; pt was very clear that he does want to be extubated today.  Pt waxing and waning throughout conversation-mostly that he wanted to \"live\" and have all resuscitative measures in place and continue current plan of care but at end of conversation, pt communicated that he would like to be home and spend time with Zita, his significant other.  Offered to contact any family on his behalf for additional " support, including his estranged children but pt was admanant that he did not want anyone contacted.  Due to pt's growing agitation and frustration from conversation, it was decided that we would let pt think over content of our discussion and talk things over with Zita. Primary team to return to determine pt's wishes moving forward and if he would want to re-intubated if unable to maintain his airway once extubated.      Treatment Decisions  Goals of Care: survival is paramount regardless of prognosis, treatment outcome, or burden   No decision made at end of conversation; pt remains full code at this time    Follow Up Plan  Primary team to re-visit pt's wishes/goals/values and how he would like to proceed moving forward later today    Team Members  Rebecca GOLDEN (MICU)  Maite AGUILAR (MICU)  CHICO Owen (supp onc)    Time Statement: Total face to face time spent on advance care planning was 60 minutes with 60 minutes spent in counseling, including the explanation.    CHICO Rust  PAGER/CONTACT:  Inpatient Supportive and  Palliative Oncology  [facility]  Monday-Friday 8 AM-5 PM  Epic Secure chat or pager [phone].  After hours and weekends:  pager [phone]

## 2024-09-20 NOTE — PROGRESS NOTES
SOCIAL WORK NOTE   SW met with patient at bedside for GOC meeting with MICU NICOLE Fernandez and NICOLE Hutchinson with supportive oncology. Discission for plan post extubation was presented. Patient utilized communication board. He was tearful and frustrated by inability to participate appropriately. When prompted, patient continues to want Zita involved and did not request SW to continue attempting to reach children. Further discussion to follow.   Maite Paul, DOUG, LISW-S ([id number])

## 2024-09-20 NOTE — PROCEDURES
#12 Fr, small bore feeding tube inserted into R nare with Enteral Access System CORTRAK 2 per provider orders, including insertion of nasal bridle and removal of wire stylet; patient tolerated procedure; feeding tube secured at 80 cm; bedside nurse notified; no bleeding or adverse reaction noted; KUB activated for placement verification.

## 2024-09-21 LAB
ALBUMIN SERPL BCP-MCNC: 2.6 G/DL (ref 3.4–5)
ALP SERPL-CCNC: 145 U/L (ref 33–136)
ALT SERPL W P-5'-P-CCNC: 58 U/L (ref 10–52)
ANION GAP BLDA CALCULATED.4IONS-SCNC: 10 MMO/L (ref 10–25)
ANION GAP SERPL CALC-SCNC: 12 MMOL/L (ref 10–20)
AST SERPL W P-5'-P-CCNC: 29 U/L (ref 9–39)
BASE EXCESS BLDA CALC-SCNC: 2.5 MMOL/L (ref -2–3)
BASOPHILS # BLD MANUAL: 0.03 X10*3/UL (ref 0–0.1)
BASOPHILS NFR BLD MANUAL: 1.7 %
BILIRUB SERPL-MCNC: 2.6 MG/DL (ref 0–1.2)
BODY TEMPERATURE: 37 DEGREES CELSIUS
BUN SERPL-MCNC: 34 MG/DL (ref 6–23)
CA-I BLDA-SCNC: 1.22 MMOL/L (ref 1.1–1.33)
CALCIUM SERPL-MCNC: 8.1 MG/DL (ref 8.6–10.6)
CHLORIDE BLDA-SCNC: 100 MMOL/L (ref 98–107)
CHLORIDE SERPL-SCNC: 99 MMOL/L (ref 98–107)
CO2 SERPL-SCNC: 29 MMOL/L (ref 21–32)
CREAT SERPL-MCNC: 0.56 MG/DL (ref 0.5–1.3)
EGFRCR SERPLBLD CKD-EPI 2021: >90 ML/MIN/1.73M*2
EOSINOPHIL # BLD MANUAL: 0.01 X10*3/UL (ref 0–0.7)
EOSINOPHIL NFR BLD MANUAL: 0.9 %
ERYTHROCYTE [DISTWIDTH] IN BLOOD BY AUTOMATED COUNT: 16.1 % (ref 11.5–14.5)
GLUCOSE BLD MANUAL STRIP-MCNC: 112 MG/DL (ref 74–99)
GLUCOSE BLD MANUAL STRIP-MCNC: 130 MG/DL (ref 74–99)
GLUCOSE BLD MANUAL STRIP-MCNC: 148 MG/DL (ref 74–99)
GLUCOSE BLD MANUAL STRIP-MCNC: 149 MG/DL (ref 74–99)
GLUCOSE BLD MANUAL STRIP-MCNC: 176 MG/DL (ref 74–99)
GLUCOSE BLD MANUAL STRIP-MCNC: 211 MG/DL (ref 74–99)
GLUCOSE BLDA-MCNC: 184 MG/DL (ref 74–99)
GLUCOSE SERPL-MCNC: 127 MG/DL (ref 74–99)
HCO3 BLDA-SCNC: 27.8 MMOL/L (ref 22–26)
HCT VFR BLD AUTO: 25.2 % (ref 41–52)
HCT VFR BLD EST: 26 % (ref 41–52)
HGB BLD-MCNC: 8.1 G/DL (ref 13.5–17.5)
HGB BLDA-MCNC: 8.7 G/DL (ref 13.5–17.5)
IMM GRANULOCYTES # BLD AUTO: 0.19 X10*3/UL (ref 0–0.7)
IMM GRANULOCYTES NFR BLD AUTO: 12.3 % (ref 0–0.9)
INHALED O2 CONCENTRATION: 50 %
LACTATE BLDA-SCNC: 1.2 MMOL/L (ref 0.4–2)
LDH SERPL L TO P-CCNC: 161 U/L (ref 84–246)
LYMPHOCYTES # BLD MANUAL: 0.14 X10*3/UL (ref 1.2–4.8)
LYMPHOCYTES NFR BLD MANUAL: 8.7 %
MAGNESIUM SERPL-MCNC: 2.11 MG/DL (ref 1.6–2.4)
MCH RBC QN AUTO: 28.8 PG (ref 26–34)
MCHC RBC AUTO-ENTMCNC: 32.1 G/DL (ref 32–36)
MCV RBC AUTO: 90 FL (ref 80–100)
MONOCYTES # BLD MANUAL: 0.01 X10*3/UL (ref 0.1–1)
MONOCYTES NFR BLD MANUAL: 0.9 %
NEUTS SEG # BLD MANUAL: 1.4 X10*3/UL (ref 1.2–7)
NEUTS SEG NFR BLD MANUAL: 87.8 %
NRBC BLD-RTO: 0 /100 WBCS (ref 0–0)
OXYHGB MFR BLDA: 92 % (ref 94–98)
PCO2 BLDA: 46 MM HG (ref 38–42)
PH BLDA: 7.39 PH (ref 7.38–7.42)
PLATELET # BLD AUTO: 232 X10*3/UL (ref 150–450)
PO2 BLDA: 70 MM HG (ref 85–95)
POTASSIUM BLDA-SCNC: 4.8 MMOL/L (ref 3.5–5.3)
POTASSIUM SERPL-SCNC: 4.6 MMOL/L (ref 3.5–5.3)
PROT SERPL-MCNC: 5.2 G/DL (ref 6.4–8.2)
RBC # BLD AUTO: 2.81 X10*6/UL (ref 4.5–5.9)
RBC MORPH BLD: ABNORMAL
SAO2 % BLDA: 95 % (ref 94–100)
SODIUM BLDA-SCNC: 133 MMOL/L (ref 136–145)
SODIUM SERPL-SCNC: 135 MMOL/L (ref 136–145)
STOMATOCYTES BLD QL SMEAR: ABNORMAL
TOTAL CELLS COUNTED BLD: 115
URATE SERPL-MCNC: 2.4 MG/DL (ref 4–7.5)
WBC # BLD AUTO: 1.6 X10*3/UL (ref 4.4–11.3)

## 2024-09-21 PROCEDURE — 2500000005 HC RX 250 GENERAL PHARMACY W/O HCPCS: Performed by: NURSE PRACTITIONER

## 2024-09-21 PROCEDURE — 2500000004 HC RX 250 GENERAL PHARMACY W/ HCPCS (ALT 636 FOR OP/ED): Performed by: NURSE PRACTITIONER

## 2024-09-21 PROCEDURE — 37799 UNLISTED PX VASCULAR SURGERY: CPT

## 2024-09-21 PROCEDURE — 2500000004 HC RX 250 GENERAL PHARMACY W/ HCPCS (ALT 636 FOR OP/ED)

## 2024-09-21 PROCEDURE — 2500000001 HC RX 250 WO HCPCS SELF ADMINISTERED DRUGS (ALT 637 FOR MEDICARE OP)

## 2024-09-21 PROCEDURE — 2500000002 HC RX 250 W HCPCS SELF ADMINISTERED DRUGS (ALT 637 FOR MEDICARE OP, ALT 636 FOR OP/ED)

## 2024-09-21 PROCEDURE — 84550 ASSAY OF BLOOD/URIC ACID: CPT

## 2024-09-21 PROCEDURE — 99291 CRITICAL CARE FIRST HOUR: CPT | Performed by: NURSE PRACTITIONER

## 2024-09-21 PROCEDURE — 92610 EVALUATE SWALLOWING FUNCTION: CPT | Mod: GN

## 2024-09-21 PROCEDURE — 82947 ASSAY GLUCOSE BLOOD QUANT: CPT

## 2024-09-21 PROCEDURE — 85027 COMPLETE CBC AUTOMATED: CPT

## 2024-09-21 PROCEDURE — 84075 ASSAY ALKALINE PHOSPHATASE: CPT

## 2024-09-21 PROCEDURE — 83735 ASSAY OF MAGNESIUM: CPT

## 2024-09-21 PROCEDURE — 83615 LACTATE (LD) (LDH) ENZYME: CPT

## 2024-09-21 PROCEDURE — 80053 COMPREHEN METABOLIC PANEL: CPT

## 2024-09-21 PROCEDURE — 84132 ASSAY OF SERUM POTASSIUM: CPT | Performed by: NURSE PRACTITIONER

## 2024-09-21 PROCEDURE — 71045 X-RAY EXAM CHEST 1 VIEW: CPT | Performed by: RADIOLOGY

## 2024-09-21 PROCEDURE — 85007 BL SMEAR W/DIFF WBC COUNT: CPT

## 2024-09-21 PROCEDURE — 2020000001 HC ICU ROOM DAILY

## 2024-09-21 RX ORDER — FUROSEMIDE 10 MG/ML
20 INJECTION INTRAMUSCULAR; INTRAVENOUS ONCE
Status: COMPLETED | OUTPATIENT
Start: 2024-09-21 | End: 2024-09-21

## 2024-09-21 RX ORDER — SODIUM CHLORIDE 0.9 % (FLUSH) 0.9 %
10 SYRINGE (ML) INJECTION AS NEEDED
Status: DISCONTINUED | OUTPATIENT
Start: 2024-09-21 | End: 2024-09-24

## 2024-09-21 RX ORDER — QUETIAPINE FUMARATE 25 MG/1
12.5 TABLET, FILM COATED ORAL NIGHTLY PRN
Status: DISCONTINUED | OUTPATIENT
Start: 2024-09-21 | End: 2024-09-22

## 2024-09-21 RX ORDER — AMIODARONE HYDROCHLORIDE 150 MG/3ML
150 INJECTION, SOLUTION INTRAVENOUS ONCE
Status: DISCONTINUED | OUTPATIENT
Start: 2024-09-21 | End: 2024-09-21

## 2024-09-21 RX ORDER — SODIUM CHLORIDE 0.9 % (FLUSH) 0.9 %
10 SYRINGE (ML) INJECTION AS NEEDED
Status: DISCONTINUED | OUTPATIENT
Start: 2024-09-21 | End: 2024-09-24 | Stop reason: HOSPADM

## 2024-09-21 RX ORDER — LIDOCAINE HYDROCHLORIDE 10 MG/ML
5 INJECTION, SOLUTION INFILTRATION; PERINEURAL ONCE
Status: DISCONTINUED | OUTPATIENT
Start: 2024-09-21 | End: 2024-09-24

## 2024-09-21 RX ORDER — DIGOXIN 0.25 MG/ML
125 INJECTION INTRAMUSCULAR; INTRAVENOUS DAILY
Status: DISCONTINUED | OUTPATIENT
Start: 2024-09-21 | End: 2024-09-21

## 2024-09-21 RX ORDER — DIGOXIN 0.25 MG/ML
250 INJECTION INTRAMUSCULAR; INTRAVENOUS ONCE
Status: COMPLETED | OUTPATIENT
Start: 2024-09-21 | End: 2024-09-21

## 2024-09-21 ASSESSMENT — PAIN - FUNCTIONAL ASSESSMENT
PAIN_FUNCTIONAL_ASSESSMENT: 0-10

## 2024-09-21 ASSESSMENT — PAIN SCALES - GENERAL
PAINLEVEL_OUTOF10: 0 - NO PAIN

## 2024-09-21 NOTE — PROGRESS NOTES
"Medical Intensive Care - Daily Progress Note   Subjective    Khoa Mitchell is a 69 y.o. year old male patient admitted on 9/7/2024 with following ICU needs: high flow oxygen    Interval History:  AirVo  Back in a-fib RVR    Meds    Scheduled medications  allopurinol, 300 mg, oral, Daily  amiodarone, 200 mg, oral, Daily  esomeprazole, 40 mg, nasoduodenal tube, Daily before breakfast   Or  pantoprazole, 40 mg, intravenous, Daily before breakfast  filgrastim or biosimilar, 5 mcg/kg, subcutaneous, q24h  furosemide, 20 mg, intravenous, Once  insulin lispro, 0-10 Units, subcutaneous, q4h  vancomycin, 125 mg, oral, 4x daily      Continuous medications     PRN medications  PRN medications: albuterol, dextrose, dextrose, dextrose, diphenhydrAMINE, EPINEPHrine HCl, famotidine, glucagon, glucagon, methylPREDNISolone sodium succinate (PF), oxygen, oxygen, prochlorperazine, prochlorperazine, sodium chloride     Objective    Blood pressure 132/50, pulse 81, temperature 35.6 °C (96.1 °F), temperature source Temporal, resp. rate 21, height 1.829 m (6' 0.01\"), weight 96.2 kg (212 lb 1.3 oz), SpO2 91%.     Physical Exam  Constitutional:       Appearance: He is ill-appearing.   HENT:      Nose: Rhinorrhea present.      Mouth/Throat:      Mouth: Mucous membranes are moist.   Eyes:      Conjunctiva/sclera: Conjunctivae normal.   Neck:      Comments: swelling  Cardiovascular:      Rate and Rhythm: Normal rate and regular rhythm.   Pulmonary:      Breath sounds: Rhonchi present.   Abdominal:      General: Abdomen is flat. Bowel sounds are normal.      Palpations: Abdomen is soft.   Musculoskeletal:      Comments: BUE edema +3-4   Neurological:      Mental Status: He is alert and oriented to person, place, and time.      GCS: GCS eye subscore is 4. GCS verbal subscore is 5. GCS motor subscore is 6.            Intake/Output Summary (Last 24 hours) at 9/21/2024 0717  Last data filed at 9/21/2024 0600  Gross per 24 hour   Intake 505 ml "   Output 1270 ml   Net -765 ml     Labs:   Results from last 72 hours   Lab Units 09/21/24  0439 09/20/24  0512 09/19/24  0458 09/18/24  1702   SODIUM mmol/L 135* 136 134* 134*   POTASSIUM mmol/L 4.6 4.3 4.4 4.3   CHLORIDE mmol/L 99 100 99 100   CO2 mmol/L 29 29 27 26   BUN mg/dL 34* 41* 46* 48*   CREATININE mg/dL 0.56 0.64 0.79 0.81   GLUCOSE mg/dL 127* 159* 176* 174*   CALCIUM mg/dL 8.1* 7.9* 7.9* 7.5*   ANION GAP mmol/L 12 11 12 12   EGFR mL/min/1.73m*2 >90 >90 >90 >90   PHOSPHORUS mg/dL  --  2.3* 2.7 2.7      Results from last 72 hours   Lab Units 09/21/24 0439 09/20/24 0512 09/19/24  0458   WBC AUTO x10*3/uL 1.6* 3.8* 7.9   HEMOGLOBIN g/dL 8.1* 8.2* 8.9*   HEMATOCRIT % 25.2* 25.3* 27.4*   PLATELETS AUTO x10*3/uL 232 241 255   NEUTROS PCT AUTO %  --   --  94.1   LYMPHO PCT MAN % 8.7 3.5  --    LYMPHS PCT AUTO %  --   --  1.9   MONO PCT MAN % 0.9 0.0  --    MONOS PCT AUTO %  --   --  0.1   EOSINO PCT MAN % 0.9 1.7  --    EOS PCT AUTO %  --   --  0.6        Micro/ID:     Lab Results   Component Value Date    URINECULTURE No growth 09/16/2024    BLOODCULT No growth at 4 days -  FINAL REPORT 09/08/2024    BLOODCULT No growth at 4 days -  FINAL REPORT 09/08/2024       Summary of key imaging results from the last 24 hours  CXR   1.  Continued opacification left hemithorax and correlate with   worsening fluid/volume loss. Correlate with endobronchial pathology.   Right basilar atelectasis and effusions.     Assessment and Plan     Assessment: Khoa Mitchell is a 69 y.o. year old male patient admitted on 9/7/2024 with SVC syndrome requiring intubation for airway compromise, c/f postobstructive pneumonia.      Mechanical Ventilation: > 10 days  Sedation/Analgesia:  none  Restraints: no    Summary for 09/21/24  :  Diurese  Remove arterial line  Resume amio gtt    Plan:  NEUROLOGY/PSYCH:  Dx:  Brain mets  Management:  PT/OT  Will need to re-engage neuro once able to lay flat for MRI     CARDIOVASCULAR:  Dx:  Pericardial  effusion, A-fib, Complete Heart block requiring TVP now resolved, Shock now resolved  Back in A-fib RVR.  S/p pericardiocentesis on 9/9.  Management:  Amio gtt resumed    PULMONARY:  Dx:  Acute hypoxic resp. Failure 2/2 postobstructive pneumonia  AirVo 40/50  Management:  Bronchopulmonary hygiene  Wean O2 as tolerated  diurese    RENAL/GENITOURINARY:  Dx:  No issues  Perea in place  Management:  Strict I/O    GASTROENTEROLOGY:  Dx:  C-diff infection  FMS in place  Management:  PPI  Vanco  SLP to eval when oxygen requirement is stable  Enteral feeding    ENDOCRINOLOGY:  Dx:  Type II DM  Glucose stable  Management:  SSI    HEMATOLOGY:  Dx:  Metastatic SCLC with SVC syndrome  Post chemo and radiation  Management:  Follow up with rad onc on Monday for plan  Onc following  Daily CBC  DVT ppx on hold (had brain mets c/f hemorrhage at this time - would need MRI for more clarification)    MUSCULOSKELETAL/ SKIN:  Dx:  Moisture associated sacral wound  Management:  Wound care    INFECTIOUS DISEASE:  Dx:  S/p treatment for postobstructive pneumonia, C-diff positive.  WBC trending down  Management:  Cont Filgastrim  Vanco for c-diff  CBC daily    ICU Check List       ICU Liberation: Intervention:   Assess, Prevent, Manage Pain CPOT - NA   Both SAT and SBT [] SAT  [] SBT 30-60 min [] Extubate to NC  [] Extubate to NIV  [] Discuss Trach   Choice of analgesia and sedation RASS: 0  none NA   Delirium: Assess, prevent and manage CAM - NA   Early Mobility and Exercise  [x] PT /OT consult   Family Engagement and Empowerment [x]Family updated [x]SW consult     FEN  Fluids: none  Electrolytes: replete  Nutrition: enteral  Prophylaxis:  DVT ppx: SCDs  GI ppx: PPI  Bowel care: none  Hardware:  Catheter: Perea  Access: PIV  Drains: FMS  Lines: a-line (remove)  Social:  Code: Full Code    HPOA: Zita [phone]  Disposition: MICU, POA paperwork completed today with social work.  Discussions about goals were had with no clear decision.  Pt.  "at this time did not want to talk about anything \"negative\" and this provider was asked to leave room.  Patient remains full code.    CHICO Hammond   09/21/24 at 7:17 AM     Disclaimer: Documentation completed with the information available at the time of input. The times in the chart may not be reflective of actual patient care times, interventions, or procedures. Documentation occurs after the physical care of the patient.      "

## 2024-09-21 NOTE — PROGRESS NOTES
Speech-Language Pathology  Adult Inpatient Clinical Bedside Swallow Evaluation    Patient Name: Khoa Mitchell  MRN: [id number]  Today's Date: 9/21/2024   Start Time: 1255  Stop Time: 1315  Time Calculation (min): 20    History of Present Illness:   Khoa Mitchell is a 68 yo male with PMHx of T2DM, CKD2 (baseline Cr 1.2) originally admitted for acute hypoxic respiratory failure 2/2 to postobstructive pneumonia from SVC from new diagnosis of malignancy.     Originally presented 9/7AM to [facility] with 3 months of shoulder pain followed by 4 days of progressive RUE and facial swelling. CT PE with new mass in the left upper chest encasing the left chest wall, lymphadenopathy, lytic lesion in the right scapula. CT AP with metastatic lesions in the liver, spleen, adrenal glands, pancreas, multiple peritoneal nodules, soft tissue mass in the right gluteal region and lytic lesions in the right acetabulum. Transferred to [facility] for escalation of care.     AT [facility], admitted to the MICU for concern for SVC syndrome. ENT scoped, patent airway but evidence of gross aspiration. CT surgery consulted, no surgical evaluation at this time. CT soft tissue with multiple soft tissue masses within the parotid gland, enlarged cervical lymph nodes, redemonstration of large soft tissue mass in the left lung extending to the thyroid gland. CT head performed, notable for multiple hyperdense lesions concerning for hemorrhagic metastases, largest [id number]. NSGY also engaged, repeat CT head with stable hyperattenuating masses. Had worsening respiratory distress, intubated 9/8 with subsequent BAL during which there was suctioning of thick green secretions, primarily in the left main bronchus and left lower lobe bronchus. 9/9 TTE with evidence of moderate pericardial effusion with brief RV and RA diastolic collapse concerning for early cardiac tamponade. Transferred to the CICU, underwent pericardioentesis with 400cc serous drainage, complicated by  complete heart block, started on TVP.      Around 9PM 9/9, had wide complex tachycardia to the 170s, bolus'ed 300 of amio, underwent synchronized cardioversion at 200J without significant changes in HR. ABG 7.48/40/93, lactate 1.5. EKG showed afib with LBBB and RVR. CXR with significant white out of the left lung, worsened from prior. Converted back to NSR with amio gtt and 1L LR. Transferred back to the MICU given resolution of tamponade.    Assessment:   Clinical bedside swallow evaluation completed. Pt cleared for evaluation by RN who reports pt requiring NT suctioning this AM. Received awake/alert and upright in bed for session; spouse at bedside. On Airvo + w/ dobhoff in place. A&O to self only. Oral mechanism examination unremarkable. Cooperative and largely able to follow commands for eval. Denies any h/o dysphagia. Significantly weak volitional cough prior to administration of PO trials.     Trials of ice chips x3 and tsp sip water x1 were given. Normal oral management of all trials. Consistent overt clinical s/s aspiration characterized by immediate weak/wet coughing and/or throat clearing w/ ice chips and tsp sip water + fluctuating respiratory status (SpO2 decrease from 99 to 92 throughout eval/intermittently tachypneic). No further trials given 2/2 c/f aspiration.     Pt not appropriate for initiation of PO diet + instrumental swallow assessment contraindicated given severity of swallow function; suspect 2/2 prolonged intubation. Recommend NPO w/ frequent aggressive oral care. 1-2 ice chips/hr allowed w/ nursing for pleasure, safe swallow stimulation, and after oral care to prevent buildup of bacteria within the oropharynx. SLP will continue to follow w/ swallow re-assessment as pt appropriate/schedule permits. RN/MD aware.      Recommendations:  NPO with frequent aggressive oral care.  1-2 ice chips/hr allowed w/ nursing for pleasure, safe swallow stimulation, and after oral care to prevent buildup of  bacteria within the oropharynx  SLP will continue to follow w/ swallow re-assessment as pt appropriate/schedule permits    Goal:   Pt will tolerate least restrictive diet with no overt clinical s/s aspiration 100% of the time.   Start Date: 9/21/2024  Expected Time Frame to Meet Goal: 2-3 weeks       Plan:  SLP Services Indicated: Yes  Frequency: 2x week  Discussed POC with patient and spouse  SLP - OK to Discharge    Pain:   0-10  0 = No pain.     Inpatient Education:  Extensive education provided to patient and spouse regarding current swallow function, recommendations/results, and POC.      Consultations/Referrals/Coordination of Services:   N/A

## 2024-09-21 NOTE — CARE PLAN
The patient's goals for the shift include    Problem: Pain - Adult  Goal: Verbalizes/displays adequate comfort level or baseline comfort level  Outcome: Progressing     Problem: Skin  Goal: Decreased wound size/increased tissue granulation at next dressing change  Outcome: Progressing  Flowsheets (Taken 9/19/2024 1207 by Amadou Dubose RN)  Decreased wound size/increased tissue granulation at next dressing change:   Promote sleep for wound healing   Protective dressings over bony prominences  Goal: Participates in plan/prevention/treatment measures  Outcome: Progressing  Flowsheets (Taken 9/19/2024 1207 by Amadou Dubose RN)  Participates in plan/prevention/treatment measures:   Elevate heels   Discuss with provider PT/OT consult  Goal: Promote/optimize nutrition  Outcome: Progressing  Flowsheets (Taken 9/21/2024 0452)  Promote/optimize nutrition:   Assist with feeding   Monitor/record intake including meals   Consume > 50% meals/supplements  Goal: Promote skin healing  Outcome: Progressing  Flowsheets (Taken 9/21/2024 0452)  Promote skin healing:   Assess skin/pad under line(s)/device(s)   Protective dressings over bony prominences   Turn/reposition every 2 hours/use positioning/transfer devices   Ensure correct size (line/device) and apply per  instructions   Rotate device position/do not position patient on device     Problem: Safety - Medical Restraint  Goal: Remains free of injury from restraints (Restraint for Interference with Medical Device)  Outcome: Met  Goal: Free from restraint(s) (Restraint for Interference with Medical Device)  Outcome: Met     Problem: Fall/Injury  Goal: Not fall by end of shift  Outcome: Progressing  Goal: Be free from injury by end of the shift  Outcome: Progressing       The clinical goals for the shift include pt will maintain spo2>90% throughut the shift     Pt met the goal.

## 2024-09-21 NOTE — PROGRESS NOTES
Khoa Mitchell is a 69 y.o. male on day 14 of admission presenting with Metastatic malignant neoplasm, unspecified site (Multi).    SW was called to pt bedside as he wants his significant other Zita [phone] to be his HCPOA. They were under the impression that they had already been through the process so they were familiar with the document. No one was listed as an alternate.  Pt is right handed and that hand is very swollen; pt joked that once he is recovered, he would like to do the paperwork again so that his handwriting looks better.  Original copy of HCPOA paperwork returned to pt with a photocopy added to pt's paper chart.  SW will follow. [facility]icenc[facility], LSW

## 2024-09-21 NOTE — CARE PLAN
The patient's goals for the shift include      The clinical goals for the shift include pt will maintain spo2>90% throughut the shift      Problem: Pain - Adult  Goal: Verbalizes/displays adequate comfort level or baseline comfort level  Outcome: Progressing     Problem: Safety - Adult  Goal: Free from fall injury  Outcome: Progressing     Problem: Discharge Planning  Goal: Discharge to home or other facility with appropriate resources  Outcome: Progressing     Problem: Chronic Conditions and Co-morbidities  Goal: Patient's chronic conditions and co-morbidity symptoms are monitored and maintained or improved  Outcome: Progressing     Problem: Skin  Goal: Decreased wound size/increased tissue granulation at next dressing change  Outcome: Progressing  Goal: Participates in plan/prevention/treatment measures  Outcome: Progressing  Goal: Promote/optimize nutrition  Outcome: Progressing  Goal: Promote skin healing  Outcome: Progressing     Problem: Knowledge Deficit  Goal: Patient/family/caregiver demonstrates understanding of disease process, treatment plan, medications, and discharge instructions  Outcome: Progressing     Problem: Fall/Injury  Goal: Not fall by end of shift  Outcome: Progressing  Goal: Be free from injury by end of the shift  Outcome: Progressing  Goal: Verbalize understanding of personal risk factors for fall in the hospital  Outcome: Progressing  Goal: Verbalize understanding of risk factor reduction measures to prevent injury from fall in the home  Outcome: Progressing  Goal: Use assistive devices by end of the shift  Outcome: Progressing  Goal: Pace activities to prevent fatigue by end of the shift  Outcome: Progressing

## 2024-09-21 NOTE — PROGRESS NOTES
"Subjective   EP is reengaged due to AF w/ RVR after attempt to discontinue amiodarone gtt and transition to oral amiodarone 200mg daily in accordance with prior EP recommendations from 9/12. Pt is extubated and off pressors. Currently on airvo.      Objective   Visit Vitals  /50   Pulse (!) 123 Comment: team notified, EKG taken   Temp 37.1 °C (98.8 °F)   Resp 26   Ht 1.829 m (6' 0.01\")   Wt 96.2 kg (212 lb 1.3 oz)   SpO2 93%   BMI 28.76 kg/m²   Smoking Status Every Day   BSA 2.21 m²      Physical Exam  General: awake, alert, no acute distress  HEENT: no scleral icterus, no JVD  CV: RRR, no MRG  Resp: CTA b/l, no wheezes, rales, or rhonchi  Abd: soft, NT/ND  LE: no edema, no cyanosis  Neuo: grossly normal  Psych: pleasant      Transthoracic Echo (TTE) Limited    [facility], [address] [place] [address]  Tel [phone] and Fax [phone]    TRANSTHORACIC ECHOCARDIOGRAM REPORT      Patient Name:      APPLE Conklin Physician:    [id number] Varinder Wilson MD  Study Date:        9/16/2024            Ordering Provider:    [id number] DEANNE JOHNSON  MRN/PID:           [id number]             Fellow:  Accession#:        [id number]         Nurse:  Date of Birth/Age: 1955 / 69 years Sonographer:          VERNON Orozco RDCS  Gender:            M                    Additional Staff:  Height:            182.88 cm            Admit Date:           9/7/2024  Weight:            98.88 kg             Admission Status:     Inpatient -  Routine  BSA / BMI:         2.21 m2 / 29.57      Encounter#:           [id number]  kg/m2  Blood Pressure:    81/56 mmHg           Department Location:  [address]    Study Type:    TRANSTHORACIC ECHO (TTE) LIMITED  Diagnosis/ICD: Other pericardial effusion (noninflammatory)-I31.39  Indication:    s/p drain removal  CPT Code:      Echo Limited-30166; Doppler Limited-22453    Patient History:  Pertinent History: Pericardial effusion s/p " centesis 9/9/24, HB, CKD, small cell  lung cancer.    Study Detail: The following Echo studies were performed: 2D, M-Mode and Doppler.  Technically challenging study due to patient lying in supine  position and body habitus. The patient is intubated. Definity used  as a contrast agent for endocardial border definition. Total  contrast used for this procedure was 2 mL via IV push.      PHYSICIAN INTERPRETATION:  Left Ventricle: The left ventricular systolic function is moderately decreased, with a visually estimated ejection fraction of 35%. Left venticular wall motion is abnormal. The left ventricular cavity size is normal. There is mildly increased left ventricular posterior wall thickness. Abnormal (paradoxical) septal motion, consistent with left bundle branch block. Left ventricular diastolic filling was not assessed. There is septal-apical dyskinesia.  Left Atrium: The left atrium is normal in size.  Right Ventricle: The right ventricle is normal in size. There is normal right ventricular global systolic function.  Right Atrium: The right atrium is mildly dilated.  Aortic Valve: The aortic valve is trileaflet. There is mild to moderate aortic valve cusp calcification. There is no evidence of aortic valve regurgitation.  Mitral Valve: The mitral valve was not well visualized. There is no evidence of mitral valve regurgitation.  Tricuspid Valve: The tricuspid valve was not well visualized. Tricuspid regurgitation was not assessed.  Pulmonic Valve: The pulmonic valve was not assessed. Pulmonic valve regurgitation was not assessed.  Pericardium: There is a trivial pericardial effusion. There are respiratory variations across the AV valves that may be due to a large left pleural effusion or the effects of mechanical ventilation. There is an anterior clear space.  Pleural: There is a large left pleural effusion.  Aorta: The aortic root is normal.  In comparison to the previous echocardiogram(s): Compared with study  dated 9/14/2024, no significant change.      CONCLUSIONS:  1. Poorly visualized anatomical structures due to suboptimal image quality.  2. The left ventricular systolic function is moderately decreased, with a visually estimated ejection fraction of 35%.  3. Abnormal left venticular wall motion.  4. Abnormal septal motion consistent with left bundle branch block.  5. There is septal-apical dyskinesia.  6. There is normal right ventricular global systolic function.  7. There is a large pleural effusion.  8. There are respiratory variations across the AV valves that may be due to a large left pleural effusion or the effects of mechanical ventilation.    QUANTITATIVE DATA SUMMARY:    2D MEASUREMENTS:         Normal Ranges:  Ao Root d:       3.10 cm (2.0-3.7cm)  LAs:             2.90 cm (2.7-4.0cm)  IVSd:            1.00 cm (0.6-1.1cm)  LVPWd:           1.20 cm (0.6-1.1cm)  LVIDd:           4.20 cm (3.9-5.9cm)  LVIDs:           3.10 cm  LV Mass Index:   71 g/m2  LV % FS          26.2 %      LA VOLUME:                    Normal Ranges:  LA Vol A4C:        49.7 ml    (22+/-6mL/m2)  LA Vol A2C:        81.1 ml  LA Vol BP:         64.5 ml  LA Vol Index A4C:  22.5ml/m2  LA Vol Index A2C:  36.7 ml/m2  LA Vol Index BP:   29.2 ml/m2  LA Area A4C:       18.2 cm2  LA Area A2C:       23.6 cm2  LA Major Axis A4C: 5.7 cm  LA Major Axis A2C: 5.8 cm  LA Volume Index:   29.2 ml/m2      RA VOLUME BY A/L METHOD:          Normal Ranges:  RA Area A4C:             21.3 cm2      LV SYSTOLIC FUNCTION BY 2D PLANIMETRY (MOD):  Normal Ranges:  EF-Visual:      35 %  LV EF Reported: 35 %      AORTIC VALVE:          Normal Ranges:  LVOT Diameter: 2.30 cm (1.8-2.4cm)      RIGHT VENTRICLE:  RV Basal 3.50 cm  RV Mid   2.30 cm  RV Major 8.0 cm  TAPSE:   20.8 mm      TRICUSPID VALVE/RVSP:         Normal Ranges:  IVC Diam:             2.35 cm      [id number] Varinder Wilson MD  Electronically signed on 9/16/2024 at 4:33:03 PM        ** Final **     Lab Review   Lab  Results   Component Value Date     (L) 09/21/2024    K 4.6 09/21/2024    CL 99 09/21/2024    CO2 29 09/21/2024    BUN 34 (H) 09/21/2024    CREATININE 0.56 09/21/2024    GLUCOSE 127 (H) 09/21/2024    CALCIUM 8.1 (L) 09/21/2024     Lab Results   Component Value Date    WBC 1.6 (L) 09/21/2024    HGB 8.1 (L) 09/21/2024    HCT 25.2 (L) 09/21/2024    MCV 90 09/21/2024     09/21/2024       Troponin I, High Sensitivity   Date/Time Value Ref Range Status   09/07/2024 09:27 AM 10 0 - 20 ng/L Final     Troponin I, High Sensitivity (CMC)   Date/Time Value Ref Range Status   09/11/2024 04:43 AM 20 0 - 53 ng/L Final     BNP   Date/Time Value Ref Range Status   09/07/2024 09:27  (H) 0 - 99 pg/mL Final          Assessment/Plan   69 y.o. male w/ hx of T2DM, CKD2 (baseline Cr 1.2) admitted for concern for SVC syndrome likely 2/2 to new diagnosis of metastatic small cell carcinoma. Course c/b acute hypoxic resp failure s/p intubation and moderate pericardial effusion with early tamponade physiology s/p pericardiocentesis and post-procedure development of CHB requiring TVP. Course c/b Afib with RVR (failed electrical cardioversion attempts x 3), transitioned to amio gtt, with subsequent NSR. EP reengaged due to the development of AF following transition from amiodarone gtt (started 9/9) to PO amiodarone 200mg daily on 9/21. He has remained off of anticoagulation due to multiple hemorrhagic intracranial masses. Of note, pt was both extubated to airvo and vasopressors were discontinued on 9/20. Given sepsis is still convalescing, he likely has elevated catecholamine levels that are driving AF.      Impression:  #AF w/ RVR    Recommendations:  -Ideally should be anticoagulated, however intracranial masses need to be assessed with MRI per neurosurgery. This is still pending pt's ability to lie flat.   -Please bolus 150mg amiodarone followed by gtt at 1mg/hr. We will reassess for potential DCCV.     Modesta Kurtz,  MD  PGY-5 Cardiovascular Medicine Fellow    Thank you for the opportunity to contribute to the care of this patient. Discussed with Dr. Bearden.     If further questions arise, please page the EP consult pager at [phone] on weekdays 7AM - 6PM and weekends 7AM - 2PM, or at [phone] at all other times. The EP device nurse can be reached at pager [phone] during regular business hours M-F.

## 2024-09-22 VITALS
TEMPERATURE: 97.9 F | OXYGEN SATURATION: 91 % | DIASTOLIC BLOOD PRESSURE: 74 MMHG | WEIGHT: 212.08 LBS | HEIGHT: 72 IN | HEART RATE: 93 BPM | BODY MASS INDEX: 28.73 KG/M2 | RESPIRATION RATE: 24 BRPM | SYSTOLIC BLOOD PRESSURE: 111 MMHG

## 2024-09-22 LAB
ABO GROUP (TYPE) IN BLOOD: NORMAL
ALBUMIN SERPL BCP-MCNC: 2.7 G/DL (ref 3.4–5)
ALP SERPL-CCNC: 168 U/L (ref 33–136)
ALT SERPL W P-5'-P-CCNC: 52 U/L (ref 10–52)
ANION GAP BLDA CALCULATED.4IONS-SCNC: 6 MMO/L (ref 10–25)
ANION GAP BLDA CALCULATED.4IONS-SCNC: 8 MMO/L (ref 10–25)
ANION GAP SERPL CALC-SCNC: 10 MMOL/L (ref 10–20)
ANTIBODY SCREEN: NORMAL
APTT PPP: 28 SECONDS (ref 27–38)
AST SERPL W P-5'-P-CCNC: 28 U/L (ref 9–39)
ATRIAL RATE: 144 BPM
BASE EXCESS BLDA CALC-SCNC: 2.6 MMOL/L (ref -2–3)
BASE EXCESS BLDA CALC-SCNC: 4.2 MMOL/L (ref -2–3)
BASOPHILS # BLD AUTO: 0.01 X10*3/UL (ref 0–0.1)
BASOPHILS NFR BLD AUTO: 1.2 %
BILIRUB SERPL-MCNC: 1.5 MG/DL (ref 0–1.2)
BODY TEMPERATURE: 37 DEGREES CELSIUS
BODY TEMPERATURE: 37 DEGREES CELSIUS
BUN SERPL-MCNC: 45 MG/DL (ref 6–23)
CA-I BLDA-SCNC: 1.22 MMOL/L (ref 1.1–1.33)
CA-I BLDA-SCNC: 1.25 MMOL/L (ref 1.1–1.33)
CALCIUM SERPL-MCNC: 8.2 MG/DL (ref 8.6–10.6)
CHLORIDE BLDA-SCNC: 100 MMOL/L (ref 98–107)
CHLORIDE BLDA-SCNC: 100 MMOL/L (ref 98–107)
CHLORIDE SERPL-SCNC: 100 MMOL/L (ref 98–107)
CO2 SERPL-SCNC: 30 MMOL/L (ref 21–32)
CREAT SERPL-MCNC: 0.78 MG/DL (ref 0.5–1.3)
EGFRCR SERPLBLD CKD-EPI 2021: >90 ML/MIN/1.73M*2
EOSINOPHIL # BLD AUTO: 0 X10*3/UL (ref 0–0.7)
EOSINOPHIL NFR BLD AUTO: 0 %
ERYTHROCYTE [DISTWIDTH] IN BLOOD BY AUTOMATED COUNT: 15.9 % (ref 11.5–14.5)
GLUCOSE BLD MANUAL STRIP-MCNC: 138 MG/DL (ref 74–99)
GLUCOSE BLD MANUAL STRIP-MCNC: 143 MG/DL (ref 74–99)
GLUCOSE BLD MANUAL STRIP-MCNC: 168 MG/DL (ref 74–99)
GLUCOSE BLD MANUAL STRIP-MCNC: 188 MG/DL (ref 74–99)
GLUCOSE BLDA-MCNC: 162 MG/DL (ref 74–99)
GLUCOSE BLDA-MCNC: 170 MG/DL (ref 74–99)
GLUCOSE SERPL-MCNC: 189 MG/DL (ref 74–99)
HCO3 BLDA-SCNC: 29.1 MMOL/L (ref 22–26)
HCO3 BLDA-SCNC: 30.5 MMOL/L (ref 22–26)
HCT VFR BLD AUTO: 25.5 % (ref 41–52)
HCT VFR BLD EST: 28 % (ref 41–52)
HCT VFR BLD EST: 29 % (ref 41–52)
HGB BLD-MCNC: 8.5 G/DL (ref 13.5–17.5)
HGB BLDA-MCNC: 9.3 G/DL (ref 13.5–17.5)
HGB BLDA-MCNC: 9.7 G/DL (ref 13.5–17.5)
IMM GRANULOCYTES # BLD AUTO: 0.25 X10*3/UL (ref 0–0.7)
IMM GRANULOCYTES NFR BLD AUTO: 29.8 % (ref 0–0.9)
INHALED O2 CONCENTRATION: 70 %
INHALED O2 CONCENTRATION: 80 %
INR PPP: 0.9 (ref 0.9–1.1)
LACTATE BLDA-SCNC: 0.8 MMOL/L (ref 0.4–2)
LACTATE BLDA-SCNC: 1 MMOL/L (ref 0.4–2)
LDH SERPL L TO P-CCNC: 152 U/L (ref 84–246)
LYMPHOCYTES # BLD AUTO: 0.1 X10*3/UL (ref 1.2–4.8)
LYMPHOCYTES NFR BLD AUTO: 11.9 %
MAGNESIUM SERPL-MCNC: 2.24 MG/DL (ref 1.6–2.4)
MCH RBC QN AUTO: 29.1 PG (ref 26–34)
MCHC RBC AUTO-ENTMCNC: 33.3 G/DL (ref 32–36)
MCV RBC AUTO: 87 FL (ref 80–100)
MONOCYTES # BLD AUTO: 0.04 X10*3/UL (ref 0.1–1)
MONOCYTES NFR BLD AUTO: 4.8 %
NEUTROPHILS # BLD AUTO: 0.44 X10*3/UL (ref 1.2–7.7)
NEUTROPHILS NFR BLD AUTO: 52.3 %
NRBC BLD-RTO: 0 /100 WBCS (ref 0–0)
OXYHGB MFR BLDA: 88.5 % (ref 94–98)
OXYHGB MFR BLDA: 92.1 % (ref 94–98)
PCO2 BLDA: 54 MM HG (ref 38–42)
PCO2 BLDA: 54 MM HG (ref 38–42)
PH BLDA: 7.34 PH (ref 7.38–7.42)
PH BLDA: 7.36 PH (ref 7.38–7.42)
PHOSPHATE SERPL-MCNC: 3.2 MG/DL (ref 2.5–4.9)
PLATELET # BLD AUTO: 187 X10*3/UL (ref 150–450)
PO2 BLDA: 63 MM HG (ref 85–95)
PO2 BLDA: 68 MM HG (ref 85–95)
POTASSIUM BLDA-SCNC: 4.7 MMOL/L (ref 3.5–5.3)
POTASSIUM BLDA-SCNC: 4.7 MMOL/L (ref 3.5–5.3)
POTASSIUM SERPL-SCNC: 4.6 MMOL/L (ref 3.5–5.3)
PROT SERPL-MCNC: 5.7 G/DL (ref 6.4–8.2)
PROTHROMBIN TIME: 10.4 SECONDS (ref 9.8–12.8)
Q ONSET: 208 MS
QRS COUNT: 23 BEATS
QRS DURATION: 164 MS
QT INTERVAL: 364 MS
QTC CALCULATION(BAZETT): 563 MS
QTC FREDERICIA: 487 MS
R AXIS: 60 DEGREES
RBC # BLD AUTO: 2.92 X10*6/UL (ref 4.5–5.9)
RBC MORPH BLD: NORMAL
RH FACTOR (ANTIGEN D): NORMAL
SAO2 % BLDA: 92 % (ref 94–100)
SAO2 % BLDA: 95 % (ref 94–100)
SODIUM BLDA-SCNC: 132 MMOL/L (ref 136–145)
SODIUM BLDA-SCNC: 132 MMOL/L (ref 136–145)
SODIUM SERPL-SCNC: 135 MMOL/L (ref 136–145)
T AXIS: 258 DEGREES
T OFFSET: 390 MS
URATE SERPL-MCNC: 3 MG/DL (ref 4–7.5)
VENTRICULAR RATE: 144 BPM
WBC # BLD AUTO: 0.8 X10*3/UL (ref 4.4–11.3)

## 2024-09-22 PROCEDURE — 2500000002 HC RX 250 W HCPCS SELF ADMINISTERED DRUGS (ALT 637 FOR MEDICARE OP, ALT 636 FOR OP/ED): Performed by: NURSE PRACTITIONER

## 2024-09-22 PROCEDURE — 2500000002 HC RX 250 W HCPCS SELF ADMINISTERED DRUGS (ALT 637 FOR MEDICARE OP, ALT 636 FOR OP/ED)

## 2024-09-22 PROCEDURE — 82947 ASSAY GLUCOSE BLOOD QUANT: CPT

## 2024-09-22 PROCEDURE — 2500000004 HC RX 250 GENERAL PHARMACY W/ HCPCS (ALT 636 FOR OP/ED): Mod: JZ

## 2024-09-22 PROCEDURE — 86901 BLOOD TYPING SEROLOGIC RH(D): CPT

## 2024-09-22 PROCEDURE — 80053 COMPREHEN METABOLIC PANEL: CPT

## 2024-09-22 PROCEDURE — 84100 ASSAY OF PHOSPHORUS: CPT | Performed by: STUDENT IN AN ORGANIZED HEALTH CARE EDUCATION/TRAINING PROGRAM

## 2024-09-22 PROCEDURE — 37799 UNLISTED PX VASCULAR SURGERY: CPT

## 2024-09-22 PROCEDURE — 2500000001 HC RX 250 WO HCPCS SELF ADMINISTERED DRUGS (ALT 637 FOR MEDICARE OP): Performed by: NURSE PRACTITIONER

## 2024-09-22 PROCEDURE — 84550 ASSAY OF BLOOD/URIC ACID: CPT

## 2024-09-22 PROCEDURE — 85025 COMPLETE CBC W/AUTO DIFF WBC: CPT

## 2024-09-22 PROCEDURE — 84132 ASSAY OF SERUM POTASSIUM: CPT

## 2024-09-22 PROCEDURE — 2500000001 HC RX 250 WO HCPCS SELF ADMINISTERED DRUGS (ALT 637 FOR MEDICARE OP)

## 2024-09-22 PROCEDURE — 2020000001 HC ICU ROOM DAILY

## 2024-09-22 PROCEDURE — 2500000005 HC RX 250 GENERAL PHARMACY W/O HCPCS: Performed by: NURSE PRACTITIONER

## 2024-09-22 PROCEDURE — 84132 ASSAY OF SERUM POTASSIUM: CPT | Performed by: NURSE PRACTITIONER

## 2024-09-22 PROCEDURE — 99291 CRITICAL CARE FIRST HOUR: CPT | Performed by: NURSE PRACTITIONER

## 2024-09-22 PROCEDURE — 83735 ASSAY OF MAGNESIUM: CPT

## 2024-09-22 PROCEDURE — 83615 LACTATE (LD) (LDH) ENZYME: CPT

## 2024-09-22 PROCEDURE — 85610 PROTHROMBIN TIME: CPT

## 2024-09-22 PROCEDURE — 2500000004 HC RX 250 GENERAL PHARMACY W/ HCPCS (ALT 636 FOR OP/ED): Performed by: NURSE PRACTITIONER

## 2024-09-22 RX ORDER — QUETIAPINE FUMARATE 25 MG/1
50 TABLET, FILM COATED ORAL EVERY 6 HOURS PRN
Status: DISCONTINUED | OUTPATIENT
Start: 2024-09-22 | End: 2024-09-24 | Stop reason: HOSPADM

## 2024-09-22 RX ORDER — DIGOXIN 0.25 MG/ML
500 INJECTION INTRAMUSCULAR; INTRAVENOUS ONCE
Status: COMPLETED | OUTPATIENT
Start: 2024-09-22 | End: 2024-09-22

## 2024-09-22 RX ORDER — DIGOXIN 0.25 MG/ML
250 INJECTION INTRAMUSCULAR; INTRAVENOUS ONCE
Status: COMPLETED | OUTPATIENT
Start: 2024-09-23 | End: 2024-09-23

## 2024-09-22 RX ORDER — DIGOXIN 0.25 MG/ML
250 INJECTION INTRAMUSCULAR; INTRAVENOUS ONCE
Status: COMPLETED | OUTPATIENT
Start: 2024-09-22 | End: 2024-09-22

## 2024-09-22 RX ORDER — HALOPERIDOL 5 MG/1
5 TABLET ORAL ONCE
Status: COMPLETED | OUTPATIENT
Start: 2024-09-22 | End: 2024-09-22

## 2024-09-22 RX ORDER — IPRATROPIUM BROMIDE AND ALBUTEROL SULFATE 2.5; .5 MG/3ML; MG/3ML
3 SOLUTION RESPIRATORY (INHALATION) EVERY 6 HOURS PRN
Status: DISCONTINUED | OUTPATIENT
Start: 2024-09-22 | End: 2024-09-24

## 2024-09-22 ASSESSMENT — PAIN - FUNCTIONAL ASSESSMENT
PAIN_FUNCTIONAL_ASSESSMENT: 0-10

## 2024-09-22 ASSESSMENT — PAIN SCALES - GENERAL
PAINLEVEL_OUTOF10: 0 - NO PAIN

## 2024-09-22 NOTE — PROGRESS NOTES
"Subjective   Overnight, IV access was lost resulting in inability to continue amiodarone gtt. IV access reestablished this morning and drip restarted. Per review of telemetry and ECG, he is now in AFL. He remains on airvo, no new acute complaints.      Objective   Visit Vitals  /74   Pulse (!) 127   Temp 35.4 °C (95.7 °F) (Temporal)   Resp 25   Ht 1.829 m (6' 0.01\")   Wt 96.2 kg (212 lb 1.3 oz)   SpO2 95%   BMI 28.76 kg/m²   Smoking Status Every Day   BSA 2.21 m²      Physical Exam  General: awake, alert, no acute distress  HEENT: no scleral icterus, no JVD  CV: tachycardic, no MRG  Resp: CTA b/l, no wheezes, rales, or rhonchi  Abd: soft, NT/ND  LE: no edema, no cyanosis  Neuo: grossly normal  Psych: pleasant      Transthoracic Echo (TTE) Limited    [facility], [address] [place] [address]  Tel [phone] and Fax [phone]    TRANSTHORACIC ECHOCARDIOGRAM REPORT      Patient Name:      APPLE MELVIN Conklin Physician:    [id number] Varinder Wilson MD  Study Date:        9/16/2024            Ordering Provider:    [id number] DEANNE JOHNSON  MRN/PID:           [id number]             Fellow:  Accession#:        [id number]         Nurse:  Date of Birth/Age: 1955 / 69 years Sonographer:          VERNON Orozco RDCS  Gender:            M                    Additional Staff:  Height:            182.88 cm            Admit Date:           9/7/2024  Weight:            98.88 kg             Admission Status:     Inpatient -  Routine  BSA / BMI:         2.21 m2 / 29.57      Encounter#:           [id number]  kg/m2  Blood Pressure:    81/56 mmHg           Department Location:  [address]    Study Type:    TRANSTHORACIC ECHO (TTE) LIMITED  Diagnosis/ICD: Other pericardial effusion (noninflammatory)-I31.39  Indication:    s/p drain removal  CPT Code:      Echo Limited-42837; Doppler Limited-06993    Patient History:  Pertinent History: Pericardial effusion s/p centesis " 9/9/24, HB, CKD, small cell  lung cancer.    Study Detail: The following Echo studies were performed: 2D, M-Mode and Doppler.  Technically challenging study due to patient lying in supine  position and body habitus. The patient is intubated. Definity used  as a contrast agent for endocardial border definition. Total  contrast used for this procedure was 2 mL via IV push.      PHYSICIAN INTERPRETATION:  Left Ventricle: The left ventricular systolic function is moderately decreased, with a visually estimated ejection fraction of 35%. Left venticular wall motion is abnormal. The left ventricular cavity size is normal. There is mildly increased left ventricular posterior wall thickness. Abnormal (paradoxical) septal motion, consistent with left bundle branch block. Left ventricular diastolic filling was not assessed. There is septal-apical dyskinesia.  Left Atrium: The left atrium is normal in size.  Right Ventricle: The right ventricle is normal in size. There is normal right ventricular global systolic function.  Right Atrium: The right atrium is mildly dilated.  Aortic Valve: The aortic valve is trileaflet. There is mild to moderate aortic valve cusp calcification. There is no evidence of aortic valve regurgitation.  Mitral Valve: The mitral valve was not well visualized. There is no evidence of mitral valve regurgitation.  Tricuspid Valve: The tricuspid valve was not well visualized. Tricuspid regurgitation was not assessed.  Pulmonic Valve: The pulmonic valve was not assessed. Pulmonic valve regurgitation was not assessed.  Pericardium: There is a trivial pericardial effusion. There are respiratory variations across the AV valves that may be due to a large left pleural effusion or the effects of mechanical ventilation. There is an anterior clear space.  Pleural: There is a large left pleural effusion.  Aorta: The aortic root is normal.  In comparison to the previous echocardiogram(s): Compared with study dated  9/14/2024, no significant change.      CONCLUSIONS:  1. Poorly visualized anatomical structures due to suboptimal image quality.  2. The left ventricular systolic function is moderately decreased, with a visually estimated ejection fraction of 35%.  3. Abnormal left venticular wall motion.  4. Abnormal septal motion consistent with left bundle branch block.  5. There is septal-apical dyskinesia.  6. There is normal right ventricular global systolic function.  7. There is a large pleural effusion.  8. There are respiratory variations across the AV valves that may be due to a large left pleural effusion or the effects of mechanical ventilation.    QUANTITATIVE DATA SUMMARY:    2D MEASUREMENTS:         Normal Ranges:  Ao Root d:       3.10 cm (2.0-3.7cm)  LAs:             2.90 cm (2.7-4.0cm)  IVSd:            1.00 cm (0.6-1.1cm)  LVPWd:           1.20 cm (0.6-1.1cm)  LVIDd:           4.20 cm (3.9-5.9cm)  LVIDs:           3.10 cm  LV Mass Index:   71 g/m2  LV % FS          26.2 %      LA VOLUME:                    Normal Ranges:  LA Vol A4C:        49.7 ml    (22+/-6mL/m2)  LA Vol A2C:        81.1 ml  LA Vol BP:         64.5 ml  LA Vol Index A4C:  22.5ml/m2  LA Vol Index A2C:  36.7 ml/m2  LA Vol Index BP:   29.2 ml/m2  LA Area A4C:       18.2 cm2  LA Area A2C:       23.6 cm2  LA Major Axis A4C: 5.7 cm  LA Major Axis A2C: 5.8 cm  LA Volume Index:   29.2 ml/m2      RA VOLUME BY A/L METHOD:          Normal Ranges:  RA Area A4C:             21.3 cm2      LV SYSTOLIC FUNCTION BY 2D PLANIMETRY (MOD):  Normal Ranges:  EF-Visual:      35 %  LV EF Reported: 35 %      AORTIC VALVE:          Normal Ranges:  LVOT Diameter: 2.30 cm (1.8-2.4cm)      RIGHT VENTRICLE:  RV Basal 3.50 cm  RV Mid   2.30 cm  RV Major 8.0 cm  TAPSE:   20.8 mm      TRICUSPID VALVE/RVSP:         Normal Ranges:  IVC Diam:             2.35 cm      [id number] Varinder Wilson MD  Electronically signed on 9/16/2024 at 4:33:03 PM        ** Final **     Lab Review   Lab  Results   Component Value Date     (L) 09/22/2024    K 4.6 09/22/2024     09/22/2024    CO2 30 09/22/2024    BUN 45 (H) 09/22/2024    CREATININE 0.78 09/22/2024    GLUCOSE 189 (H) 09/22/2024    CALCIUM 8.2 (L) 09/22/2024     Lab Results   Component Value Date    WBC 0.8 (LL) 09/22/2024    HGB 8.5 (L) 09/22/2024    HCT 25.5 (L) 09/22/2024    MCV 87 09/22/2024     09/22/2024       Troponin I, High Sensitivity   Date/Time Value Ref Range Status   09/07/2024 09:27 AM 10 0 - 20 ng/L Final     Troponin I, High Sensitivity (CMC)   Date/Time Value Ref Range Status   09/11/2024 04:43 AM 20 0 - 53 ng/L Final     BNP   Date/Time Value Ref Range Status   09/07/2024 09:27  (H) 0 - 99 pg/mL Final          Assessment/Plan   69 y.o. male w/ hx of T2DM, CKD2 (baseline Cr 1.2) admitted for concern for SVC syndrome likely 2/2 to new diagnosis of metastatic small cell carcinoma. Course c/b acute hypoxic resp failure s/p intubation and moderate pericardial effusion with early tamponade physiology s/p pericardiocentesis and post-procedure development of CHB requiring TVP. Course c/b Afib with RVR (failed electrical cardioversion attempts x 3), transitioned to amio gtt, with subsequent NSR. EP reengaged due to the development of AF following transition from amiodarone gtt (started 9/9) to PO amiodarone 200mg daily on 9/21. He has remained off of anticoagulation due to multiple hemorrhagic intracranial masses. Of note, pt was both extubated to airvo and vasopressors were discontinued on 9/20. Given sepsis is still convalescing, he likely has elevated catecholamine levels that are driving AF. He is now in AFL.      Impression:  #AF w/ RVR, resolved  #AFL     Recommendations:  -Ideally should be anticoagulated, however intracranial masses need to be assessed with MRI per neurosurgery. This is still pending pt's ability to lie flat.   -Stop amiodarone  -Load with digoxin (0.5mg x1, then 0.25mg q6 for total of  1.5mg)  -If he remains significantly tachycardic (HR>130), can add low dose beta blocker (metoprolol tartrate 12.5mg BID) with caution as vasopressors were recently discontinued     EP will follow.     Modesta Kurtz MD  PGY-5 Cardiovascular Medicine Fellow    Thank you for the opportunity to contribute to the care of this patient. Discussed with Dr. Bearden.     If further questions arise, please page the EP consult pager at [phone] on weekdays 7AM - 6PM and weekends 7AM - 2PM, or at [phone] at all other times. The EP device nurse can be reached at pager [phone] during regular business hours M-F.

## 2024-09-22 NOTE — PROGRESS NOTES
"Medical Intensive Care - Daily Progress Note   Subjective    Khoa Mitchell is a 69 y.o. year old male patient admitted on 9/7/2024 with following ICU needs: high flow oxygen    Interval History:  AirVo  Tachycardic, likely atrial flutter    Meds    Scheduled medications  allopurinol, 300 mg, oral, Daily  esomeprazole, 40 mg, nasoduodenal tube, Daily before breakfast   Or  pantoprazole, 40 mg, intravenous, Daily before breakfast  filgrastim or biosimilar, 5 mcg/kg, subcutaneous, q24h  insulin lispro, 0-10 Units, subcutaneous, q4h  lidocaine, 5 mL, infiltration, Once  vancomycin, 125 mg, oral, 4x daily      Continuous medications  amiodarone, 1 mg/min, Last Rate: 1 mg/min (09/22/24 0945)      PRN medications  PRN medications: albuterol, dextrose, dextrose, dextrose, diphenhydrAMINE, EPINEPHrine HCl, famotidine, glucagon, glucagon, methylPREDNISolone sodium succinate (PF), oxygen, oxygen, prochlorperazine, prochlorperazine, QUEtiapine, sodium chloride, sodium chloride 0.9%, sodium chloride 0.9%     Objective    Blood pressure 111/74, pulse (!) 127, temperature 35.4 °C (95.7 °F), temperature source Temporal, resp. rate 25, height 1.829 m (6' 0.01\"), weight 96.2 kg (212 lb 1.3 oz), SpO2 95%.     Physical Exam  Constitutional:       Appearance: He is ill-appearing.   HENT:      Mouth/Throat:      Mouth: Mucous membranes are moist.   Eyes:      Conjunctiva/sclera: Conjunctivae normal.   Neck:      Comments: swelling  Cardiovascular:      Rate and Rhythm: Tachycardia present. Rhythm irregular.   Pulmonary:      Breath sounds: Rhonchi present.   Abdominal:      General: Abdomen is flat. Bowel sounds are normal.      Palpations: Abdomen is soft.   Musculoskeletal:      Comments: BUE edema +3-4   Neurological:      Mental Status: He is alert. He is confused.      Comments: Delirium          Intake/Output Summary (Last 24 hours) at 9/22/2024 1106  Last data filed at 9/22/2024 1000  Gross per 24 hour   Intake 1916.76 ml   Output " 996.7 ml   Net 920.06 ml     Labs:   Results from last 72 hours   Lab Units 09/22/24 0312 09/21/24 0439 09/20/24  0512   SODIUM mmol/L 135* 135* 136   POTASSIUM mmol/L 4.6 4.6 4.3   CHLORIDE mmol/L 100 99 100   CO2 mmol/L 30 29 29   BUN mg/dL 45* 34* 41*   CREATININE mg/dL 0.78 0.56 0.64   GLUCOSE mg/dL 189* 127* 159*   CALCIUM mg/dL 8.2* 8.1* 7.9*   ANION GAP mmol/L 10 12 11   EGFR mL/min/1.73m*2 >90 >90 >90   PHOSPHORUS mg/dL 3.2  --  2.3*      Results from last 72 hours   Lab Units 09/22/24 0312 09/21/24 0439 09/20/24 0512   WBC AUTO x10*3/uL 0.8* 1.6* 3.8*   HEMOGLOBIN g/dL 8.5* 8.1* 8.2*   HEMATOCRIT % 25.5* 25.2* 25.3*   PLATELETS AUTO x10*3/uL 187 232 241   NEUTROS PCT AUTO % 52.3  --   --    LYMPHO PCT MAN %  --  8.7 3.5   LYMPHS PCT AUTO % 11.9  --   --    MONO PCT MAN %  --  0.9 0.0   MONOS PCT AUTO % 4.8  --   --    EOSINO PCT MAN %  --  0.9 1.7   EOS PCT AUTO % 0.0  --   --         Micro/ID:     Lab Results   Component Value Date    URINECULTURE No growth 09/16/2024    BLOODCULT No growth at 4 days -  FINAL REPORT 09/08/2024    BLOODCULT No growth at 4 days -  FINAL REPORT 09/08/2024       Summary of key imaging results from the last 24 hours      Assessment and Plan     Assessment: Khoa Mitchell is a 69 y.o. year old male patient admitted on 9/7/2024 with SVC syndrome requiring intubation for airway compromise, c/f postobstructive pneumonia.      Mechanical Ventilation: > 10 days  Sedation/Analgesia:  none  Restraints: no    Summary for 09/22/24  :  Diurese  Remove arterial line  Resume amio gtt    Plan:  NEUROLOGY/PSYCH:  Dx:  Brain mets    ~delirium with agitation today, removed PIV overnight, now in restraints  Management:  PT/OT  Will need to re-engage neuro once able to lay flat for MRI     CARDIOVASCULAR:  Dx:  Pericardial effusion, A-fib, Complete Heart block requiring TVP now resolved, Shock now resolved  Atrial flutter. S/p pericardiocentesis on 9/9.  Management:  Stop amio drip  Dig  load    PULMONARY:  Dx:  Acute hypoxic resp. Failure 2/2 postobstructive pneumonia  AirVo 40/70  Management:  Bronchopulmonary hygiene  Wean O2 as tolerated  Consider Thora vs. Pleurix placement for left pleural effusion    RENAL/GENITOURINARY:  Dx:  No issues  Perea in place  Management:  Strict I/O    GASTROENTEROLOGY:  Dx:  C-diff infection  FMS in place  Management:  PPI  Vanco  SLP to eval when oxygen requirement is stable - a couple ice chips per hour only  Enteral feeding    ENDOCRINOLOGY:  Dx:  Type II DM  Glucose stable  Management:  SSI    HEMATOLOGY:  Dx:  Metastatic SCLC with SVC syndrome  Post chemo and radiation  Management:  Follow up with rad onc on Monday for plan  Onc following  Daily CBC  DVT ppx on hold (had brain mets c/f hemorrhage at this time - would need MRI for more clarification)    MUSCULOSKELETAL/ SKIN:  Dx:  Moisture associated sacral wound  Management:  Wound care    INFECTIOUS DISEASE:  Dx:  S/p treatment for postobstructive pneumonia, C-diff positive.  Neutropenia  Management:  Cont Filgastrim  Vanco for c-diff  CBC daily    ICU Check List       ICU Liberation: Intervention:   Assess, Prevent, Manage Pain CPOT - NA   Both SAT and SBT [] SAT  [] SBT 30-60 min [] Extubate to NC  [] Extubate to NIV  [] Discuss Trach   Choice of analgesia and sedation RASS: 0  none NA   Delirium: Assess, prevent and manage CAM - NA   Early Mobility and Exercise  [x] PT /OT consult   Family Engagement and Empowerment [x]Family updated [x]SW consult     FEN  Fluids: none  Electrolytes: replete  Nutrition: enteral  Prophylaxis:  DVT ppx: SCDs  GI ppx: PPI  Bowel care: none  Hardware:  Catheter: Perea  Access: PIV  Drains: FMS  Lines: a-line (remove)  Social:  Code: DNR/DNI    HPOA: Zita [phone]  Disposition: MICU, POA agreed to DNR/DNI.      NICOLE Hammond-CNP   09/22/24 at 11:06 AM     Disclaimer: Documentation completed with the information available at the time of input. The times in the  chart may not be reflective of actual patient care times, interventions, or procedures. Documentation occurs after the physical care of the patient.

## 2024-09-22 NOTE — CARE PLAN
Problem: Safety - Medical Restraint  Goal: Remains free of injury from restraints (Restraint for Interference with Medical Device)  Outcome: Not Progressing  Goal: Free from restraint(s) (Restraint for Interference with Medical Device)  Outcome: Not Progressing   The patient's goals for the shift include  feel better    The clinical goals for the shift include pt will maintain spo2>90% throughut the shift    Over the shift, the patient did not make progress toward the following goals. Barriers to progression include pt becoming more confused, agitated, and restless and pulling out lines and oxygen. Recommendations to address these barriers include reorientation, promote normal sleep and wake cycle.    Problem: Safety - Medical Restraint  Goal: Remains free of injury from restraints (Restraint for Interference with Medical Device)  Outcome: Not Progressing  Goal: Free from restraint(s) (Restraint for Interference with Medical Device)  Outcome: Not Progressing

## 2024-09-22 NOTE — PROGRESS NOTES
09/22/24 1616   Unsuccessful Insertion   Name of Attempting Clinician Silvana Zepeda RN   Number of Attempts 2   Line Type PICC   Attempted Insertion Site (Vein) Left;Brachial vein;Basilic vein   Additional Information Unable to advance catheter, catheter coiled in bsilic vein, unable to advance wire in brachial vein     Right arm scanned, basilic, cephalic, and brachial veins unable to be accessed due to narrowing and small size. Primary team and RN informed, suggested IR consult.      Silvana Zepeda RN  Vascular Access Specialty Team

## 2024-09-22 NOTE — ACP (ADVANCE CARE PLANNING)
"Advance Care Planning Note     Discussion Date: 09/22/24   Discussion Participants: patient and POA Zita Adrian    The patient wishes to discuss Advance Care Planning today and the following is a brief summary of our discussion.     Patient has capacity to make their own medical decisions: No  Health Care Agent/Surrogate Decision Maker documented in chart: Yes    Documents on file and valid:  Advance Directive/Living Will: No   Health Care Power of : Yes  Other: Zita Campbell    Communication of Medical Status/Prognosis:   Discussed with POA the following:  Progressing hypoxic respiratory failure  Delirium  Atrial flutter  Underlying stage IV SCLC.     Communication of Treatment Goals/Options:   Zita acknowledged that patient \"is dying\".  She agreed at this time that DNR/DNI would be appropriate.  The next steps is hearing ultimate plan from oncology; that she recognizes that may not even include palliative treatment.  Inpatient hospice would be the next discussion due to the increased oxygen requirement and inability to transport to [place].  Zita agreed.    Treatment Decisions  Goals of Care: comfort is paramount; other goals are not relevant or achievable     Follow Up Plan  After final discussions with oncology, plan would be to move forward with inpatient hospice.    Team Members  Rebecca Sandy CNP    Time Statement: Total face to face time spent on advance care planning was 40 minutes with 30 minutes spent in counseling, including the explanation.    CHICO Hammond  9/22/2024 3:20 PM  "

## 2024-09-23 LAB
ALBUMIN SERPL BCP-MCNC: 2.6 G/DL (ref 3.4–5)
ALP SERPL-CCNC: 142 U/L (ref 33–136)
ALT SERPL W P-5'-P-CCNC: 53 U/L (ref 10–52)
ANION GAP BLDA CALCULATED.4IONS-SCNC: 7 MMO/L (ref 10–25)
ANION GAP BLDA CALCULATED.4IONS-SCNC: 8 MMO/L (ref 10–25)
ANION GAP BLDA CALCULATED.4IONS-SCNC: 9 MMO/L (ref 10–25)
ANION GAP SERPL CALC-SCNC: 10 MMOL/L (ref 10–20)
AST SERPL W P-5'-P-CCNC: 20 U/L (ref 9–39)
BASE EXCESS BLDA CALC-SCNC: 0.4 MMOL/L (ref -2–3)
BASE EXCESS BLDA CALC-SCNC: 1.9 MMOL/L (ref -2–3)
BASE EXCESS BLDA CALC-SCNC: 3.2 MMOL/L (ref -2–3)
BASOPHILS # BLD MANUAL: 0.01 X10*3/UL (ref 0–0.1)
BASOPHILS NFR BLD MANUAL: 2.8 %
BILIRUB SERPL-MCNC: 1.4 MG/DL (ref 0–1.2)
BODY TEMPERATURE: 37 DEGREES CELSIUS
BUN SERPL-MCNC: 47 MG/DL (ref 6–23)
CA-I BLDA-SCNC: 1.23 MMOL/L (ref 1.1–1.33)
CA-I BLDA-SCNC: 1.27 MMOL/L (ref 1.1–1.33)
CA-I BLDA-SCNC: 1.28 MMOL/L (ref 1.1–1.33)
CALCIUM SERPL-MCNC: 8.2 MG/DL (ref 8.6–10.6)
CHLORIDE BLDA-SCNC: 101 MMOL/L (ref 98–107)
CHLORIDE BLDA-SCNC: 102 MMOL/L (ref 98–107)
CHLORIDE BLDA-SCNC: 103 MMOL/L (ref 98–107)
CHLORIDE SERPL-SCNC: 101 MMOL/L (ref 98–107)
CO2 SERPL-SCNC: 33 MMOL/L (ref 21–32)
CREAT SERPL-MCNC: 0.78 MG/DL (ref 0.5–1.3)
EGFRCR SERPLBLD CKD-EPI 2021: >90 ML/MIN/1.73M*2
EOSINOPHIL # BLD MANUAL: 0 X10*3/UL (ref 0–0.7)
EOSINOPHIL NFR BLD MANUAL: 0 %
ERYTHROCYTE [DISTWIDTH] IN BLOOD BY AUTOMATED COUNT: 15.5 % (ref 11.5–14.5)
GLUCOSE BLD MANUAL STRIP-MCNC: 114 MG/DL (ref 74–99)
GLUCOSE BLD MANUAL STRIP-MCNC: 118 MG/DL (ref 74–99)
GLUCOSE BLD MANUAL STRIP-MCNC: 143 MG/DL (ref 74–99)
GLUCOSE BLD MANUAL STRIP-MCNC: 148 MG/DL (ref 74–99)
GLUCOSE BLD MANUAL STRIP-MCNC: 167 MG/DL (ref 74–99)
GLUCOSE BLD MANUAL STRIP-MCNC: 178 MG/DL (ref 74–99)
GLUCOSE BLDA-MCNC: 156 MG/DL (ref 74–99)
GLUCOSE BLDA-MCNC: 166 MG/DL (ref 74–99)
GLUCOSE BLDA-MCNC: 173 MG/DL (ref 74–99)
GLUCOSE SERPL-MCNC: 142 MG/DL (ref 74–99)
HCO3 BLDA-SCNC: 29.9 MMOL/L (ref 22–26)
HCO3 BLDA-SCNC: 30.3 MMOL/L (ref 22–26)
HCO3 BLDA-SCNC: 31.9 MMOL/L (ref 22–26)
HCT VFR BLD AUTO: 25 % (ref 41–52)
HCT VFR BLD EST: 23 % (ref 41–52)
HCT VFR BLD EST: 29 % (ref 41–52)
HCT VFR BLD EST: 29 % (ref 41–52)
HGB BLD-MCNC: 8.2 G/DL (ref 13.5–17.5)
HGB BLDA-MCNC: 7.8 G/DL (ref 13.5–17.5)
HGB BLDA-MCNC: 9.6 G/DL (ref 13.5–17.5)
HGB BLDA-MCNC: 9.8 G/DL (ref 13.5–17.5)
IMM GRANULOCYTES # BLD AUTO: 0.01 X10*3/UL (ref 0–0.7)
IMM GRANULOCYTES NFR BLD AUTO: 2.9 % (ref 0–0.9)
INHALED O2 CONCENTRATION: 100 %
INHALED O2 CONCENTRATION: 100 %
INHALED O2 CONCENTRATION: 95 %
LACTATE BLDA-SCNC: 0.8 MMOL/L (ref 0.4–2)
LACTATE BLDA-SCNC: 0.9 MMOL/L (ref 0.4–2)
LACTATE BLDA-SCNC: 1.3 MMOL/L (ref 0.4–2)
LDH SERPL L TO P-CCNC: 132 U/L (ref 84–246)
LYMPHOCYTES # BLD MANUAL: 0.11 X10*3/UL (ref 1.2–4.8)
LYMPHOCYTES NFR BLD MANUAL: 28.6 %
MAGNESIUM SERPL-MCNC: 2.39 MG/DL (ref 1.6–2.4)
MCH RBC QN AUTO: 29.2 PG (ref 26–34)
MCHC RBC AUTO-ENTMCNC: 32.8 G/DL (ref 32–36)
MCV RBC AUTO: 89 FL (ref 80–100)
MONOCYTES # BLD MANUAL: 0.02 X10*3/UL (ref 0.1–1)
MONOCYTES NFR BLD MANUAL: 5.7 %
NEUTROPHILS # BLD MANUAL: 0.24 X10*3/UL (ref 1.2–7.7)
NEUTS BAND # BLD MANUAL: 0.1 X10*3/UL (ref 0–0.7)
NEUTS BAND NFR BLD MANUAL: 24.3 %
NEUTS SEG # BLD MANUAL: 0.14 X10*3/UL (ref 1.2–7)
NEUTS SEG NFR BLD MANUAL: 35.7 %
NRBC BLD-RTO: 0 /100 WBCS (ref 0–0)
OXYHGB MFR BLDA: 86.6 % (ref 94–98)
OXYHGB MFR BLDA: 86.7 % (ref 94–98)
OXYHGB MFR BLDA: 87.5 % (ref 94–98)
PCO2 BLDA: 105 MM HG (ref 38–42)
PCO2 BLDA: 58 MM HG (ref 38–42)
PCO2 BLDA: 69 MM HG (ref 38–42)
PH BLDA: 7.09 PH (ref 7.38–7.42)
PH BLDA: 7.25 PH (ref 7.38–7.42)
PH BLDA: 7.32 PH (ref 7.38–7.42)
PLATELET # BLD AUTO: 120 X10*3/UL (ref 150–450)
PO2 BLDA: 57 MM HG (ref 85–95)
PO2 BLDA: 62 MM HG (ref 85–95)
PO2 BLDA: 64 MM HG (ref 85–95)
POTASSIUM BLDA-SCNC: 4.7 MMOL/L (ref 3.5–5.3)
POTASSIUM BLDA-SCNC: 5.3 MMOL/L (ref 3.5–5.3)
POTASSIUM BLDA-SCNC: 5.5 MMOL/L (ref 3.5–5.3)
POTASSIUM SERPL-SCNC: 4.7 MMOL/L (ref 3.5–5.3)
PROT SERPL-MCNC: 5.1 G/DL (ref 6.4–8.2)
RBC # BLD AUTO: 2.81 X10*6/UL (ref 4.5–5.9)
RBC MORPH BLD: ABNORMAL
SAO2 % BLDA: 89 % (ref 94–100)
SAO2 % BLDA: 90 % (ref 94–100)
SAO2 % BLDA: 90 % (ref 94–100)
SODIUM BLDA-SCNC: 135 MMOL/L (ref 136–145)
SODIUM BLDA-SCNC: 136 MMOL/L (ref 136–145)
SODIUM BLDA-SCNC: 136 MMOL/L (ref 136–145)
SODIUM SERPL-SCNC: 139 MMOL/L (ref 136–145)
STOMATOCYTES BLD QL SMEAR: ABNORMAL
TOTAL CELLS COUNTED BLD: 70
URATE SERPL-MCNC: 3.2 MG/DL (ref 4–7.5)
VARIANT LYMPHS # BLD MANUAL: 0.01 X10*3/UL (ref 0–0.5)
VARIANT LYMPHS NFR BLD: 2.9 %
WBC # BLD AUTO: 0.4 X10*3/UL (ref 4.4–11.3)

## 2024-09-23 PROCEDURE — 83735 ASSAY OF MAGNESIUM: CPT

## 2024-09-23 PROCEDURE — 83615 LACTATE (LD) (LDH) ENZYME: CPT

## 2024-09-23 PROCEDURE — 2500000001 HC RX 250 WO HCPCS SELF ADMINISTERED DRUGS (ALT 637 FOR MEDICARE OP)

## 2024-09-23 PROCEDURE — 2500000004 HC RX 250 GENERAL PHARMACY W/ HCPCS (ALT 636 FOR OP/ED)

## 2024-09-23 PROCEDURE — 71045 X-RAY EXAM CHEST 1 VIEW: CPT | Performed by: RADIOLOGY

## 2024-09-23 PROCEDURE — 2500000002 HC RX 250 W HCPCS SELF ADMINISTERED DRUGS (ALT 637 FOR MEDICARE OP, ALT 636 FOR OP/ED)

## 2024-09-23 PROCEDURE — 2500000004 HC RX 250 GENERAL PHARMACY W/ HCPCS (ALT 636 FOR OP/ED): Performed by: NURSE PRACTITIONER

## 2024-09-23 PROCEDURE — 99291 CRITICAL CARE FIRST HOUR: CPT

## 2024-09-23 PROCEDURE — 82947 ASSAY GLUCOSE BLOOD QUANT: CPT

## 2024-09-23 PROCEDURE — 85027 COMPLETE CBC AUTOMATED: CPT

## 2024-09-23 PROCEDURE — 93010 ELECTROCARDIOGRAM REPORT: CPT | Performed by: INTERNAL MEDICINE

## 2024-09-23 PROCEDURE — 84132 ASSAY OF SERUM POTASSIUM: CPT

## 2024-09-23 PROCEDURE — 2500000005 HC RX 250 GENERAL PHARMACY W/O HCPCS

## 2024-09-23 PROCEDURE — 2500000005 HC RX 250 GENERAL PHARMACY W/O HCPCS: Performed by: NURSE PRACTITIONER

## 2024-09-23 PROCEDURE — 94660 CPAP INITIATION&MGMT: CPT

## 2024-09-23 PROCEDURE — 2020000001 HC ICU ROOM DAILY

## 2024-09-23 PROCEDURE — 85007 BL SMEAR W/DIFF WBC COUNT: CPT

## 2024-09-23 PROCEDURE — 37799 UNLISTED PX VASCULAR SURGERY: CPT | Performed by: INTERNAL MEDICINE

## 2024-09-23 PROCEDURE — 84550 ASSAY OF BLOOD/URIC ACID: CPT

## 2024-09-23 PROCEDURE — 31720 CLEARANCE OF AIRWAYS: CPT

## 2024-09-23 PROCEDURE — 2500000005 HC RX 250 GENERAL PHARMACY W/O HCPCS: Performed by: INTERNAL MEDICINE

## 2024-09-23 PROCEDURE — 37799 UNLISTED PX VASCULAR SURGERY: CPT

## 2024-09-23 PROCEDURE — 84132 ASSAY OF SERUM POTASSIUM: CPT | Performed by: INTERNAL MEDICINE

## 2024-09-23 RX ORDER — FUROSEMIDE 10 MG/ML
40 INJECTION INTRAMUSCULAR; INTRAVENOUS ONCE
Status: COMPLETED | OUTPATIENT
Start: 2024-09-23 | End: 2024-09-23

## 2024-09-23 RX ORDER — DEXMEDETOMIDINE HYDROCHLORIDE 4 UG/ML
.2-1.5 INJECTION, SOLUTION INTRAVENOUS CONTINUOUS
Status: DISCONTINUED | OUTPATIENT
Start: 2024-09-23 | End: 2024-09-24

## 2024-09-23 RX ORDER — METOPROLOL TARTRATE 1 MG/ML
2.5 INJECTION, SOLUTION INTRAVENOUS ONCE
Status: COMPLETED | OUTPATIENT
Start: 2024-09-23 | End: 2024-09-23

## 2024-09-23 RX ORDER — DIGOXIN 0.25 MG/ML
250 INJECTION INTRAMUSCULAR; INTRAVENOUS EVERY 6 HOURS
Status: COMPLETED | OUTPATIENT
Start: 2024-09-23 | End: 2024-09-23

## 2024-09-23 RX ORDER — NOREPINEPHRINE BITARTRATE/D5W 8 MG/250ML
0-.2 PLASTIC BAG, INJECTION (ML) INTRAVENOUS CONTINUOUS
Status: DISCONTINUED | OUTPATIENT
Start: 2024-09-23 | End: 2024-09-24

## 2024-09-23 RX ORDER — METOPROLOL TARTRATE 25 MG/1
12.5 TABLET, FILM COATED ORAL 2 TIMES DAILY
Status: DISCONTINUED | OUTPATIENT
Start: 2024-09-23 | End: 2024-09-24

## 2024-09-23 ASSESSMENT — PAIN SCALES - GENERAL
PAINLEVEL_OUTOF10: 0 - NO PAIN
PAINLEVEL_OUTOF10: 0 - NO PAIN

## 2024-09-23 NOTE — PROGRESS NOTES
"Subjective   NAOE. Is NSR this morning after receiving total of 1mg of digoxin (0.25, 0.5, 0.25). Scheduled for 2 more doses of 0.25. Transitioned to DNR/DNI with ongoing GOC.      Objective   Visit Vitals  /74   Pulse 85   Temp 36.3 °C (97.3 °F)   Resp 24   Ht 1.829 m (6' 0.01\")   Wt 98.2 kg (216 lb 7.9 oz)   SpO2 93%   BMI 29.36 kg/m²   Smoking Status Every Day   BSA 2.23 m²      Physical Exam  General: awake, alert, no acute distress  HEENT: no scleral icterus, no JVD  CV: tachycardic, no MRG  Resp: CTA b/l, no wheezes, rales, or rhonchi  Abd: soft, NT/ND  LE: no edema, no cyanosis  Neuo: grossly normal  Psych: pleasant      Transthoracic Echo (TTE) Limited    [facility], [address] [place] [address]  Tel [phone] and Fax [phone]    TRANSTHORACIC ECHOCARDIOGRAM REPORT      Patient Name:      APPLE Conklin Physician:    [id number] Varinder Wilson MD  Study Date:        9/16/2024            Ordering Provider:    [id number] DEANNE JOHNSON  MRN/PID:           [id number]             Fellow:  Accession#:        [id number]         Nurse:  Date of Birth/Age: 1955 / 69 years Sonographer:          VERNON Orozco RDCS  Gender:            M                    Additional Staff:  Height:            182.88 cm            Admit Date:           9/7/2024  Weight:            98.88 kg             Admission Status:     Inpatient -  Routine  BSA / BMI:         2.21 m2 / 29.57      Encounter#:           [id number]  kg/m2  Blood Pressure:    81/56 mmHg           Department Location:  [address]    Study Type:    TRANSTHORACIC ECHO (TTE) LIMITED  Diagnosis/ICD: Other pericardial effusion (noninflammatory)-I31.39  Indication:    s/p drain removal  CPT Code:      Echo Limited-22297; Doppler Limited-24264    Patient History:  Pertinent History: Pericardial effusion s/p centesis 9/9/24, HB, CKD, small cell  lung cancer.    Study Detail: The following Echo studies were " performed: 2D, M-Mode and Doppler.  Technically challenging study due to patient lying in supine  position and body habitus. The patient is intubated. Definity used  as a contrast agent for endocardial border definition. Total  contrast used for this procedure was 2 mL via IV push.      PHYSICIAN INTERPRETATION:  Left Ventricle: The left ventricular systolic function is moderately decreased, with a visually estimated ejection fraction of 35%. Left venticular wall motion is abnormal. The left ventricular cavity size is normal. There is mildly increased left ventricular posterior wall thickness. Abnormal (paradoxical) septal motion, consistent with left bundle branch block. Left ventricular diastolic filling was not assessed. There is septal-apical dyskinesia.  Left Atrium: The left atrium is normal in size.  Right Ventricle: The right ventricle is normal in size. There is normal right ventricular global systolic function.  Right Atrium: The right atrium is mildly dilated.  Aortic Valve: The aortic valve is trileaflet. There is mild to moderate aortic valve cusp calcification. There is no evidence of aortic valve regurgitation.  Mitral Valve: The mitral valve was not well visualized. There is no evidence of mitral valve regurgitation.  Tricuspid Valve: The tricuspid valve was not well visualized. Tricuspid regurgitation was not assessed.  Pulmonic Valve: The pulmonic valve was not assessed. Pulmonic valve regurgitation was not assessed.  Pericardium: There is a trivial pericardial effusion. There are respiratory variations across the AV valves that may be due to a large left pleural effusion or the effects of mechanical ventilation. There is an anterior clear space.  Pleural: There is a large left pleural effusion.  Aorta: The aortic root is normal.  In comparison to the previous echocardiogram(s): Compared with study dated 9/14/2024, no significant change.      CONCLUSIONS:  1. Poorly visualized anatomical structures  due to suboptimal image quality.  2. The left ventricular systolic function is moderately decreased, with a visually estimated ejection fraction of 35%.  3. Abnormal left venticular wall motion.  4. Abnormal septal motion consistent with left bundle branch block.  5. There is septal-apical dyskinesia.  6. There is normal right ventricular global systolic function.  7. There is a large pleural effusion.  8. There are respiratory variations across the AV valves that may be due to a large left pleural effusion or the effects of mechanical ventilation.    QUANTITATIVE DATA SUMMARY:    2D MEASUREMENTS:         Normal Ranges:  Ao Root d:       3.10 cm (2.0-3.7cm)  LAs:             2.90 cm (2.7-4.0cm)  IVSd:            1.00 cm (0.6-1.1cm)  LVPWd:           1.20 cm (0.6-1.1cm)  LVIDd:           4.20 cm (3.9-5.9cm)  LVIDs:           3.10 cm  LV Mass Index:   71 g/m2  LV % FS          26.2 %      LA VOLUME:                    Normal Ranges:  LA Vol A4C:        49.7 ml    (22+/-6mL/m2)  LA Vol A2C:        81.1 ml  LA Vol BP:         64.5 ml  LA Vol Index A4C:  22.5ml/m2  LA Vol Index A2C:  36.7 ml/m2  LA Vol Index BP:   29.2 ml/m2  LA Area A4C:       18.2 cm2  LA Area A2C:       23.6 cm2  LA Major Axis A4C: 5.7 cm  LA Major Axis A2C: 5.8 cm  LA Volume Index:   29.2 ml/m2      RA VOLUME BY A/L METHOD:          Normal Ranges:  RA Area A4C:             21.3 cm2      LV SYSTOLIC FUNCTION BY 2D PLANIMETRY (MOD):  Normal Ranges:  EF-Visual:      35 %  LV EF Reported: 35 %      AORTIC VALVE:          Normal Ranges:  LVOT Diameter: 2.30 cm (1.8-2.4cm)      RIGHT VENTRICLE:  RV Basal 3.50 cm  RV Mid   2.30 cm  RV Major 8.0 cm  TAPSE:   20.8 mm      TRICUSPID VALVE/RVSP:         Normal Ranges:  IVC Diam:             2.35 cm      [id number] Varinder Wilson MD  Electronically signed on 9/16/2024 at 4:33:03 PM        ** Final **     Lab Review   Lab Results   Component Value Date     09/23/2024    K 4.7 09/23/2024     09/23/2024    CO2  33 (H) 09/23/2024    BUN 47 (H) 09/23/2024    CREATININE 0.78 09/23/2024    GLUCOSE 142 (H) 09/23/2024    CALCIUM 8.2 (L) 09/23/2024     Lab Results   Component Value Date    WBC 0.4 (LL) 09/23/2024    HGB 8.2 (L) 09/23/2024    HCT 25.0 (L) 09/23/2024    MCV 89 09/23/2024     (L) 09/23/2024       Troponin I, High Sensitivity   Date/Time Value Ref Range Status   09/07/2024 09:27 AM 10 0 - 20 ng/L Final     Troponin I, High Sensitivity (CMC)   Date/Time Value Ref Range Status   09/11/2024 04:43 AM 20 0 - 53 ng/L Final     BNP   Date/Time Value Ref Range Status   09/07/2024 09:27  (H) 0 - 99 pg/mL Final          Assessment/Plan   69 y.o. male w/ hx of T2DM, CKD2 (baseline Cr 1.2) admitted for concern for SVC syndrome likely 2/2 to new diagnosis of metastatic small cell carcinoma. Course c/b acute hypoxic resp failure s/p intubation and moderate pericardial effusion with early tamponade physiology s/p pericardiocentesis and post-procedure development of CHB requiring TVP. Course c/b Afib with RVR (failed electrical cardioversion attempts x 3), transitioned to amio gtt, with subsequent NSR. EP reengaged due to the development of AF following transition from amiodarone gtt (started 9/9) to PO amiodarone 200mg daily on 9/21. He has remained off of anticoagulation due to multiple hemorrhagic intracranial masses. Of note, pt was both extubated to airvo and vasopressors were discontinued on 9/20. After transitioning back to amiodarone gtt, he developed AFL. Amiodarone was stopped and transitioned to digoxin, with subsequent NSR. Ongoing GOC with recent transition to DNR/DNI.      Impression:  #AF w/ RVR, resolved  #AFL, resolved     Recommendations:  -Ideally should be anticoagulated, however intracranial masses need to be assessed with MRI per neurosurgery. This is still pending pt's ability to lie flat.   -Start low dose beta blocker to prevent recurrent arrhythmia (metop tartrate 12.5mg BID)    EP will sign  off.     Modesta Kurtz MD  PGY-5 Cardiovascular Medicine Fellow    Thank you for the opportunity to contribute to the care of this patient. Discussed with Dr. Varela.     If further questions arise, please page the EP consult pager at [phone] on weekdays 7AM - 6PM and weekends 7AM - 2PM, or at [phone] at all other times. The EP device nurse can be reached at pager [phone] during regular business hours M-F.

## 2024-09-23 NOTE — CARE PLAN
Problem: Pain - Adult  Goal: Verbalizes/displays adequate comfort level or baseline comfort level  Outcome: Progressing     Problem: Skin  Goal: Decreased wound size/increased tissue granulation at next dressing change  Outcome: Progressing  Flowsheets (Taken 9/23/2024 0321)  Decreased wound size/increased tissue granulation at next dressing change:   Promote sleep for wound healing   Protective dressings over bony prominences   Utilize specialty bed per algorithm  Goal: Participates in plan/prevention/treatment measures  Outcome: Progressing  Flowsheets (Taken 9/23/2024 0321)  Participates in plan/prevention/treatment measures: Elevate heels  Goal: Promote/optimize nutrition  Outcome: Progressing  Flowsheets (Taken 9/23/2024 0321)  Promote/optimize nutrition: Monitor/record intake including meals  Goal: Promote skin healing  Outcome: Progressing  Flowsheets (Taken 9/23/2024 0321)  Promote skin healing:   Assess skin/pad under line(s)/device(s)   Protective dressings over bony prominences   Turn/reposition every 2 hours/use positioning/transfer devices   Ensure correct size (line/device) and apply per  instructions   Rotate device position/do not position patient on device     Problem: Safety - Medical Restraint  Goal: Remains free of injury from restraints (Restraint for Interference with Medical Device)  Outcome: Progressing  Goal: Free from restraint(s) (Restraint for Interference with Medical Device)  Outcome: Progressing     Problem: Fall/Injury  Goal: Be free from injury by end of the shift  Outcome: Progressing

## 2024-09-23 NOTE — PROGRESS NOTES
"Medical Intensive Care - Daily Progress Note   Subjective    Khoa Mitchell is a 69 y.o. year old male patient admitted on 9/7/2024 with following ICU needs: HFNC     Interval History:  O/n pt was agitated and pulled out PIVs, recedex was started and stopped at 0600; amio gtt load completed not s/p dig load pending EP recs.  Tele appears consistent with Atrial flutter.      Meds    Scheduled medications  allopurinol, 300 mg, oral, Daily  esomeprazole, 40 mg, nasoduodenal tube, Daily before breakfast   Or  pantoprazole, 40 mg, intravenous, Daily before breakfast  insulin lispro, 0-10 Units, subcutaneous, q4h  lidocaine, 5 mL, infiltration, Once  vancomycin, 125 mg, oral, 4x daily      Continuous medications  dexmedeTOMIDine, 0.2-1.5 mcg/kg/hr, Last Rate: Stopped (09/23/24 0600)      PRN medications  PRN medications: albuterol, dextrose, dextrose, dextrose, diphenhydrAMINE, EPINEPHrine HCl, famotidine, glucagon, glucagon, ipratropium-albuteroL, methylPREDNISolone sodium succinate (PF), oxygen, prochlorperazine, prochlorperazine, QUEtiapine, sodium chloride, sodium chloride 0.9%, sodium chloride 0.9%     Objective    Blood pressure 111/74, pulse 74, temperature 36.4 °C (97.5 °F), temperature source Temporal, resp. rate 22, height 1.829 m (6' 0.01\"), weight 98.2 kg (216 lb 7.9 oz), SpO2 95%.     Physical Exam   Constitutional: Ill-appearing elderly male  Eyes: Anicteric  ENMT: MMM  Head/Neck: Right cheek swelling  Respiratory/Thorax: Coarse breath sounds heard bilaterally   Cardiovascular: Irregular rate and rhythm.  No obvious MRG.  Gastrointestinal: Abdomen soft nondistended.  No rebound tenderness or guarding. +4 BS.  Extremities: +3-4 BUE edema  Neurological: A&O to self.  Following simple commands.  Skin: Warm and dry      Intake/Output Summary (Last 24 hours) at 9/23/2024 0736  Last data filed at 9/23/2024 0600  Gross per 24 hour   Intake 1289.67 ml   Output 790 ml   Net 499.67 ml     Labs:   Results from last 72 " hours   Lab Units 09/23/24  0426 09/22/24 0312 09/21/24  0439   SODIUM mmol/L 139 135* 135*   POTASSIUM mmol/L 4.7 4.6 4.6   CHLORIDE mmol/L 101 100 99   CO2 mmol/L 33* 30 29   BUN mg/dL 47* 45* 34*   CREATININE mg/dL 0.78 0.78 0.56   GLUCOSE mg/dL 142* 189* 127*   CALCIUM mg/dL 8.2* 8.2* 8.1*   ANION GAP mmol/L 10 10 12   EGFR mL/min/1.73m*2 >90 >90 >90   PHOSPHORUS mg/dL  --  3.2  --       Results from last 72 hours   Lab Units 09/23/24 0426 09/22/24 0312 09/21/24  0439   WBC AUTO x10*3/uL 0.4* 0.8* 1.6*   HEMOGLOBIN g/dL 8.2* 8.5* 8.1*   HEMATOCRIT % 25.0* 25.5* 25.2*   PLATELETS AUTO x10*3/uL 120* 187 232   NEUTROS PCT AUTO %  --  52.3  --    LYMPHO PCT MAN % 28.6  --  8.7   LYMPHS PCT AUTO %  --  11.9  --    MONO PCT MAN % 5.7  --  0.9   MONOS PCT AUTO %  --  4.8  --    EOSINO PCT MAN % 0.0  --  0.9   EOS PCT AUTO %  --  0.0  --       Results from last 72 hours   Lab Units 09/23/24  0034 09/22/24  1839 09/22/24  0800   POCT PH, ARTERIAL pH 7.32* 7.36* 7.34*   POCT PCO2, ARTERIAL mm Hg 58* 54* 54*   POCT PO2, ARTERIAL mm Hg 62* 63* 68*   POCT SO2, ARTERIAL % 90* 92* 95            Micro/ID:     Lab Results   Component Value Date    URINECULTURE No growth 09/16/2024    BLOODCULT No growth at 4 days -  FINAL REPORT 09/08/2024    BLOODCULT No growth at 4 days -  FINAL REPORT 09/08/2024       Summary of key imaging results from the last 24 hours  XR CHEST 1 VIEW; 9/23/2024 12:55 am     IMPRESSION:  1.  Persistent complete opacification of the left hemithorax.  2. Interval slight worsening right pleural effusion with  atelectasis/infiltrate.      Assessment and Plan     Assessment: Khoa Mitchell is a 69 y.o. year old male patient admitted on 9/7/2024 with  SVC syndrome requiring intubation for airway compromise, c/f postobstructive pneumonia.       Mechanical Ventilation: none  Sedation/Analgesia:  none  Restraints: Restraints indicated as alternative therapies have been attempted and have been ineffective.   Restrain with soft wrist restraints and side rails up x4 until medical devices discontinued and/or patient able to participate with plan of care.     Summary for 09/23/24  :  S/p amio gtt and dig load; pending EP recs   Pending GOC with Oncology and HCPOA Zita     Plan:  NEUROLOGY/PSYCH:  Dx:  Hyperactive delirium  Brain mets  Management:  Continue with restraints due to concern for interference with medical treatment  PT/OT  GOC  Precedex for agitation    CARDIOVASCULAR:  Dx:  Pericardial effusion  A-fib/A-flutter  Complete heart block requiring TVP now resolved  Septic shock 2/2 PNA  Management:  EP consulted appreciate recs  S/p amnio gtt.  S/p dig load  Not requiring any pressors    PULMONARY:  FiO2 (%):  [70 %-100 %] 90 %  PEEP/CPAP (cm H2O):  [5 cm H20] 5 cm H20   Dx:  Acute apostle respiratory failure 2/2 postobstructive pneumonia  Management:  Continue with aggressive bronchopulmonary hygiene  Currently on Airvo 50 L at 90%  Maintain SaO2 greater than 90%  Consider Thora versus Pleurx placement for left pleural effusion    RENAL/GENITOURINARY:  Dx:  N/A  Management:  Strict I's and O's  Avoid nephrotoxic medications    GASTROENTEROLOGY:  Dx:  C. difficile infection  Management:  Discontinue FMS on 9/23  Continue with Vanco p.o.  Continue with PPI  Continue with tube feed    ENDOCRINOLOGY:  Dx:  DM type II  Management:  Continue with SSI  Hypoglycemia protocol    HEMATOLOGY:  Dx:  Metastatic SCLC with SVC syndrome  Management:  S/p chemo and radiation treatment  Pending discussion with oncology for long-term treatment plans  Continue with CBC daily  Continue to hold DVT prophylaxis due to concern for cranial hemorrhage ISO brain mets; patient will need MRI for more clarification but however cannot lay flat due to hypoxia)    SKIN:  Dx:  Skin Failure: Moisture associated sacral wound  Management:  Wound care following appreciate recs    INFECTIOUS DISEASE:  Dx:  Neutropenia  S/p treatment for postobstructive  pneumonia  C. difficile positive  Management:  Continue with Vanco for C. Difficile  Continue with full gastro  CBCs daily    ICU Check List       ICU Liberation: Intervention:   Assess, Prevent, Manage Pain CPOT - N/A   Both SAT and SBT [] SAT  [] SBT 30-60 min [] Extubate to NC  [] Extubate to NIV  [] Discuss Trach   Choice of analgesia and sedation RASS: 0  none N/A   Delirium: Assess, prevent and manage CAM + Delirium precautions   Early Mobility and Exercise  [x] PT /OT consult   Family Engagement and Empowerment [x]Family updated [x]SW consult     FEN  Fluids: As needed  Electrolytes: Replete as needed  Nutrition: Tube feed  Prophylaxis:  DVT ppx: SCDs  GI ppx: PPI  Bowel care: None  Hardware:         Arterial Line 09/10/24 Left Radial (Active)   Placement Date/Time: 09/10/24 (c) 0105   Size: 20 G  Orientation: Left  Location: Radial  Site Prep: Alcohol  Local Anesthetic: Injectable  Insertion attempts: 1  Securement Method: Transparent dressing  Patient Tolerance: Tolerated well   Number of days: 13       Urethral Catheter Straight-tip 16 Fr. (Active)   Placement Date/Time: 09/16/24 0631   Placed by: mainor robles  Catheter Type: Straight-tip  Tube Size (Fr.): 16 Fr.  Catheter Balloon Size: 10 mL  Urine Returned: Yes   Number of days: 7       NG/OG/Feeding Tube Right nostril (Active)   Placement Date/Time: 09/20/24 1600   Type of Tube: Feeding Tube  Tube Length: 80 cm  Tube Location: Right nostril   Number of days: 2       Rectal Tube/Fecal Management (Active)   Placement Date/Time: 09/19/24 1200     Number of days: 3       Social:  Code: DNR and No Intubation    HPOA: Zita [phone]   Disposition: MICU pending C discussions with Onc and HCPOA.    Vasquez Ansari, APRN-CNP   09/23/24 at 7:36 AM     Disclaimer: Documentation completed with the information available at the time of input. The times in the chart may not be reflective of actual patient care times, interventions, or procedures.  Documentation occurs after the physical care of the patient.

## 2024-09-23 NOTE — PROGRESS NOTES
"Name: Khoa Mitchell  MRN: [id number]  Encounter Date: 2024  PCP: Savita Cerda, APRN-CNP, DNP  Heme-Onc: None    Reason for consult: SCLC  Attending Provider Dr. James     Oncology Consult Daily Progress Note    Subjective   Since last evaluation () patient has been extubated to HFNC - currently 50L 90%. Also has been weaned off pressors. Intermittent delirium. On evaluation patient is appropriate and is able to state why he is in the hospital. Reports that his breathing feels okay.     Review of Systems   12 Point ROS negative except HPI     Allergies   No Known Allergies    Medications     allopurinol, 300 mg, Daily  esomeprazole, 40 mg, Daily before breakfast   Or  pantoprazole, 40 mg, Daily before breakfast  insulin lispro, 0-10 Units, q4h  lidocaine, 5 mL, Once  metoprolol tartrate, 12.5 mg, BID  vancomycin, 125 mg, 4x daily      dexmedeTOMIDine, Last Rate: Stopped (24 0600)      albuterol, 3 mL, PRN  dextrose, 500 mL, PRN  dextrose, 12.5 g, q15 min PRN  dextrose, 25 g, q15 min PRN  diphenhydrAMINE, 50 mg, PRN  EPINEPHrine HCl, 0.3 mg, q5 min PRN  famotidine, 20 mg, PRN  glucagon, 1 mg, q15 min PRN  glucagon, 1 mg, q15 min PRN  ipratropium-albuteroL, 3 mL, q6h PRN  methylPREDNISolone sodium succinate (PF), 40 mg, PRN  oxygen, , q6h PRN  prochlorperazine, 10 mg, q6h PRN  prochlorperazine, 10 mg, q6h PRN  QUEtiapine, 50 mg, q6h PRN  sodium chloride, 500 mL, PRN  sodium chloride 0.9%, 10 mL, PRN  sodium chloride 0.9%, 10 mL, PRN        Physical Exam   Blood pressure 111/74, pulse 71, temperature 36.5 °C (97.7 °F), resp. rate 26, height 1.829 m (6' 0.01\"), weight 98.2 kg (216 lb 7.9 oz), SpO2 (!) 89%.    ECO    Gen: awake, no acute distress  HEENT: right face mass  CV: regular rate  Pulm: on HFNC  Abd: soft, NT/ND   Ext: upper extremity edema     Labs     Lab Results   Component Value Date    GLUCOSE 142 (H) 2024    CALCIUM 8.2 (L) 2024     2024    K 4.7 2024    CO2 33 " "(H) 09/23/2024     09/23/2024    BUN 47 (H) 09/23/2024    CREATININE 0.78 09/23/2024       Lab Results   Component Value Date    WBC 0.4 (LL) 09/23/2024    HGB 8.2 (L) 09/23/2024    HCT 25.0 (L) 09/23/2024    MCV 89 09/23/2024     (L) 09/23/2024       Lab Results   Component Value Date    ALT 53 (H) 09/23/2024    AST 20 09/23/2024    ALKPHOS 142 (H) 09/23/2024    BILITOT 1.4 (H) 09/23/2024         Imaging   XR CHEST 1 VIEW; 9/23/2024 12:55 am     IMPRESSION:  1.  Persistent complete opacification of the left hemithorax.  2. Interval slight worsening right pleural effusion with  atelectasis/infiltrate.      Assessment/Plan   Khoa Mitchell is a 69 y.o. male who presented 9/7/24 with respiratory failure and SVC syndrome with path from right cheek mass showing small cell carcinoma for which oncology is consulted.      Patient underwent urgent RT 8 Gy x 1 9/13 followed by C1 carboplatin (AUC 5)/etoposide 50mg/m2 (50% dose reduced for hyperbilirubinemia) from 9/14 - 9/16.     Goals of care conversations have been ongoing while patient has been in the MICU. Per 9/20 conversation with patient, overall goals were that \"he wanted to \"live\" and have all resuscitative measures in place and continue current plan of care but at end of conversation, pt communicated that he would like to be home and spend time with Zita, his significant other.\" Per 9/22 conversation with POA, \"Zita acknowledged that patient \"is dying\". She agreed at this time that DNR/DNI would be appropriate. The next steps is hearing ultimate plan from oncology; that she recognizes that may not even include palliative treatment. Inpatient hospice would be the next discussion due to the increased oxygen requirement and inability to transport to [place].\"    On 9/23 oncology was asked to discuss appropriate next steps. Overall, since last evaluation patient has been extubated and weaned off pressors. While oxygen needs are still high he has shown some " improvement. He is currently on day 10 of 21-day cycle. Given the limited time interval since starting cancer-directed treatment, it is appropriate to allow more time for chemotherapy and radiation to exert their effect. Therefore, we feel it may be premature to instate comfort care/hospice at this time (however if this were requested by patient/POA we would certainly support their decision). Ultimately, a second cycle of chemotherapy could be given inpatient (approx 10/05) with the goal of further reducing tumor and symptom burden. DNR/DNI code status remains appropriate and if significant complications occur in the interim, re-evaluation of comfort-focused care can be re-addressed. I discussed the above with POAZita, via telephone and patient at bedside.        Patient discussed with attending physician, Dr. Christy, who agrees with the above.      Purnima Geronimo MD  Hematology-Oncology Fellow, PGY5  Hematology Consult Pager: [phone]  Oncology Consult Pager: [phone]

## 2024-09-23 NOTE — PROGRESS NOTES
SOCIAL WORK NOTE   GOC ongoing. Anticipate transition to comfort care. No HCPOA paperwork, conferred with ethics. Ethicist Eliseo confirmed he could sign if needed for hospice. However, per medical team high o2 needs make discharge unlikely. Social work to follow.  Maite Paul, MSW, LISW-S ([id number])

## 2024-09-23 NOTE — CARE PLAN
The patient's goals for the shift include  demonstrate ability to remain restraint free and maintain O2 >90%      Over the shift, the patient did not make progress toward the following goals. Barriers to progression include medical prognosis - condition and mentation. Recommendations to address these barriers include removal of uneeded medical devices, frequent observation, and patient comfort.    Problem: Knowledge Deficit  Goal: Patient/family/caregiver demonstrates understanding of disease process, treatment plan, medications, and discharge instructions  Outcome: Not Progressing     Problem: Fall/Injury  Goal: Verbalize understanding of personal risk factors for fall in the hospital  Outcome: Not Progressing  Goal: Verbalize understanding of risk factor reduction measures to prevent injury from fall in the home  Outcome: Not Progressing  Goal: Use assistive devices by end of the shift  Outcome: Not Progressing  Goal: Pace activities to prevent fatigue by end of the shift  Outcome: Not Progressing

## 2024-09-24 VITALS
OXYGEN SATURATION: 75 % | WEIGHT: 216.49 LBS | SYSTOLIC BLOOD PRESSURE: 111 MMHG | HEIGHT: 72 IN | TEMPERATURE: 96.1 F | BODY MASS INDEX: 29.32 KG/M2 | DIASTOLIC BLOOD PRESSURE: 74 MMHG

## 2024-09-24 LAB
ALBUMIN SERPL BCP-MCNC: 2.8 G/DL (ref 3.4–5)
ANION GAP SERPL CALC-SCNC: 17 MMOL/L (ref 10–20)
BUN SERPL-MCNC: 51 MG/DL (ref 6–23)
CALCIUM SERPL-MCNC: 8.2 MG/DL (ref 8.6–10.6)
CHLORIDE SERPL-SCNC: 102 MMOL/L (ref 98–107)
CO2 SERPL-SCNC: 27 MMOL/L (ref 21–32)
CREAT SERPL-MCNC: 0.98 MG/DL (ref 0.5–1.3)
EGFRCR SERPLBLD CKD-EPI 2021: 83 ML/MIN/1.73M*2
FUNGUS SPEC CULT: NORMAL
FUNGUS SPEC FUNGUS STN: NORMAL
GLUCOSE SERPL-MCNC: 152 MG/DL (ref 74–99)
PHOSPHATE SERPL-MCNC: 4.9 MG/DL (ref 2.5–4.9)
POTASSIUM SERPL-SCNC: 5.5 MMOL/L (ref 3.5–5.3)
SODIUM SERPL-SCNC: 140 MMOL/L (ref 136–145)

## 2024-09-24 PROCEDURE — 99238 HOSP IP/OBS DSCHRG MGMT 30/<: CPT | Performed by: NURSE PRACTITIONER

## 2024-09-24 PROCEDURE — 2500000004 HC RX 250 GENERAL PHARMACY W/ HCPCS (ALT 636 FOR OP/ED)

## 2024-09-24 RX ORDER — LORAZEPAM 2 MG/ML
0.5 INJECTION INTRAMUSCULAR EVERY 4 HOURS PRN
Status: DISCONTINUED | OUTPATIENT
Start: 2024-09-24 | End: 2024-09-24 | Stop reason: HOSPADM

## 2024-09-24 RX ORDER — GLYCOPYRROLATE 0.2 MG/ML
0.2 INJECTION INTRAMUSCULAR; INTRAVENOUS EVERY 4 HOURS PRN
Status: DISCONTINUED | OUTPATIENT
Start: 2024-09-24 | End: 2024-09-24 | Stop reason: HOSPADM

## 2024-09-24 RX ORDER — MORPHINE SULFATE 4 MG/ML
2 INJECTION INTRAVENOUS
Status: DISCONTINUED | OUTPATIENT
Start: 2024-09-24 | End: 2024-09-24 | Stop reason: HOSPADM

## 2024-09-24 RX ORDER — HALOPERIDOL 5 MG/ML
1 INJECTION INTRAMUSCULAR EVERY 4 HOURS PRN
Status: DISCONTINUED | OUTPATIENT
Start: 2024-09-24 | End: 2024-09-24

## 2024-09-24 RX ORDER — ALBUTEROL SULFATE 0.83 MG/ML
2.5 SOLUTION RESPIRATORY (INHALATION) EVERY 4 HOURS PRN
Status: DISCONTINUED | OUTPATIENT
Start: 2024-09-24 | End: 2024-09-24 | Stop reason: HOSPADM

## 2024-09-24 RX ORDER — MORPHINE SULFATE 4 MG/ML
4 INJECTION INTRAVENOUS
Status: DISCONTINUED | OUTPATIENT
Start: 2024-09-24 | End: 2024-09-24 | Stop reason: HOSPADM

## 2024-09-24 ASSESSMENT — RESPIRATORY DISTRESS OBSERVATION SCALE (RDOS)
RESTLESS NONPURPOSEFUL MOVEMENTS: 0 - NONE
RESPIRATORY RATE PER MINUTE: 1 - 19-30 BREATHS
RDOS TOTAL SCORE: 7
PARADOXICAL BREATHING PATTERN: 2 - PRESENT
PARADOXICAL BREATHING PATTERN: 0 - NONE
PARADOXICAL BREATHING PATTERN: 2 - PRESENT
RDOS TOTAL SCORE: 2
RDOS TOTAL SCORE: 6
ACCESSORY MUSCLE RISE IN CLAVICLE DURING INSPIRATION: 2 - PRONOUNCED RISE
INVOLUNTARY NASAL FLARING: 0 - NONE
LOOK OF FEAR: 0 - NONE
LOOK OF FEAR: 0 - NONE
ACCESSORY MUSCLE RISE IN CLAVICLE DURING INSPIRATION: 1 - SLIGHT RISE
LOOK OF FEAR: 0 - NONE
RESPIRATORY RATE PER MINUTE: 1 - 19-30 BREATHS
GRUNTING AT END OF EXPIRATION: 0 - NONE
RESTLESS NONPURPOSEFUL MOVEMENTS: 0 - NONE
GRUNTING AT END OF EXPIRATION: 0 - NONE
RDOS TOTAL SCORE: 6
INVOLUNTARY NASAL FLARING: 0 - NONE
PARADOXICAL BREATHING PATTERN: 2 - PRESENT
INVOLUNTARY NASAL FLARING: 0 - NONE
RESTLESS NONPURPOSEFUL MOVEMENTS: 1 - OCCASIONAL, SLIGHT MOVEMENTS
RESPIRATORY RATE PER MINUTE: 1 - 19-30 BREATHS
INVOLUNTARY NASAL FLARING: 0 - NONE
GRUNTING AT END OF EXPIRATION: 0 - NONE
HEART RATE PER MINUTE: 0 - <90 BEATS
ACCESSORY MUSCLE RISE IN CLAVICLE DURING INSPIRATION: 2 - PRONOUNCED RISE
LOOK OF FEAR: 0 - NONE
HEART RATE PER MINUTE: 0 - <90 BEATS
HEART RATE PER MINUTE: 0 - <90 BEATS
RESPIRATORY RATE PER MINUTE: 1 - 19-30 BREATHS
RESTLESS NONPURPOSEFUL MOVEMENTS: 1 - OCCASIONAL, SLIGHT MOVEMENTS
ACCESSORY MUSCLE RISE IN CLAVICLE DURING INSPIRATION: 2 - PRONOUNCED RISE
HEART RATE PER MINUTE: 0 - <90 BEATS
GRUNTING AT END OF EXPIRATION: 2 - PRESENT

## 2024-09-24 NOTE — CARE PLAN
Problem: Pain - Adult  Goal: Verbalizes/displays adequate comfort level or baseline comfort level  Outcome: Progressing     Problem: Safety - Adult  Goal: Free from fall injury  Outcome: Progressing     Problem: Discharge Planning  Goal: Discharge to home or other facility with appropriate resources  Outcome: Progressing     Problem: Chronic Conditions and Co-morbidities  Goal: Patient's chronic conditions and co-morbidity symptoms are monitored and maintained or improved  Outcome: Progressing     Problem: Skin  Goal: Decreased wound size/increased tissue granulation at next dressing change  Outcome: Progressing  Flowsheets (Taken 9/23/2024 2255)  Decreased wound size/increased tissue granulation at next dressing change: Protective dressings over bony prominences  Goal: Participates in plan/prevention/treatment measures  Outcome: Progressing  Flowsheets (Taken 9/23/2024 2255)  Participates in plan/prevention/treatment measures: Discuss with provider PT/OT consult  Goal: Promote/optimize nutrition  Outcome: Progressing  Flowsheets (Taken 9/23/2024 2255)  Promote/optimize nutrition: Assist with feeding  Goal: Promote skin healing  Outcome: Progressing  Flowsheets (Taken 9/23/2024 2255)  Promote skin healing: Protective dressings over bony prominences     Problem: Knowledge Deficit  Goal: Patient/family/caregiver demonstrates understanding of disease process, treatment plan, medications, and discharge instructions  Outcome: Progressing     Problem: Fall/Injury  Goal: Verbalize understanding of personal risk factors for fall in the hospital  Outcome: Progressing  Goal: Verbalize understanding of risk factor reduction measures to prevent injury from fall in the home  Outcome: Progressing  Goal: Use assistive devices by end of the shift  Outcome: Progressing  Goal: Pace activities to prevent fatigue by end of the shift  Outcome: Progressing     Problem: Safety - Medical Restraint  Goal: Remains free of injury from  restraints (Restraint for Interference with Medical Device)  Outcome: Progressing  Goal: Free from restraint(s) (Restraint for Interference with Medical Device)  Outcome: Progressing   The patient's goals for the shift include      The clinical goals for the shift include pt will maintain spO2 >90% throughout shift

## 2024-09-24 NOTE — DISCHARGE SUMMARY
Discharge Diagnosis  Metastatic malignant neoplasm, unspecified site (Multi)    Issues Requiring Follow-Up  NA    Test Results Pending At Discharge  Pending Labs       Order Current Status    Body Fluid Cell Count Collected (09/13/24 1819)    Body Fluid Differential Collected (09/13/24 1819)    Glucose, Fluid Collected (09/13/24 1819)    Lactate Dehydrogenase, Fluid Collected (09/13/24 1819)    Protein, Total Fluid Collected (09/13/24 1819)    Triglycerides, Fluid Collected (09/13/24 1819)    AFB Culture/Smear Preliminary result    AFB Culture/Smear Preliminary result    Fungal Culture/Smear Preliminary result            Hospital Course  Patient is a 69-year-old male with a past medical history of diabetes mellitus type 2, chronic kidney disease stage II, and hyponatremia that initially presented to an outside hospital for respiratory distress but was found to have metastatic cancer burden in addition to 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     Admitted to the medicine ICU on 09/07 due to continued respiratory distress. Imaging performed prior to admission notable for significant left lung cancer with compression of trachea and jose antonio. There is also lesions found cerebral hemispheres, right parotid gland, cervical lymphadenopathy, right scapula, lesion in the liver, pancreas, spleen, both adrenal glands, peritoneal nodules, right gluteus muscle, and right acetabulum. Blood cultures from 09/07 growing Streptococcus salivarius, started on Rocephin (09/07-) and azithromycin (09/07-). Otolaryngology and thoracic surgery consulted regarding airway compression and superior vena cava syndrome, both signed off recommending biopsy for staging. Consulted neurosurgery regarding hemorraghic lesions in the brain. Underwent endotracheal intubation on 09/08 due to respiratory distress and profound secretions. Started on Levophed (09/08-) due to drop in MAPs. Tentative plan to obtain biopsy for evaluation  of small cell carcinoma versus lymphoma. On 9/09 TTE suggested brief RV diastolic collapse and brief RA collapse with concern for early tamponade. Cardiology consult placed and pericardiocentesis was administered.     Pericardiocentesis completed, 400 ml serous drainage, pericardial drain in place. Experienced transient heart block. Underwent TVP for 24 hours. Patient went into afib RVR with heart rate peaking to 170s. He was cardioverted twice with 200 J. He remained on amio gtt and his HR eventually reduced to low 100s. Cytology of pericardial fluid was significant for malignant cells. Patient went for IR biopsy, obtaining 4 cores from his right cheek soft tissue mass. Small cell lung carcinoma confirmed on biopsy. Rad onc administered one fraction of radiation therapy on 9/13. Thoracentesis was completed on 9/13 to rule out infectious pleural effusion. 1100 ml of fluid removed and found to be exudative. Med onc proceeded with chemotherapy. Started urgent palliative chemotherapy on 9/14 with carboplatin and etoposide. Also started on allopurinol daily for TLS prophylaxis. Pericardial drain pulled on 9/15 to prevent infection.     Extubated to AirVo on 9/20.  Amio changed to maintenance dose of 20mg daily.  Supportive onc and primary team met with patient and POA Zita to discuss diagnosis and code status.  Pt. Wished to remain full code at that time.    Pt. Back in RVR on 9/21 and amio gtt resumed.  EP re consulted.    LEANDRO Cortesna at bedside on 9/22.  Discussion was had regarding worsening mentation and oxygenation.  Zita agreed at that time to make pt. DNR/DNI.    On 9/23 respiratory status continued to decline with patient requiring noninvasive ventilation.  After discussions with Zita (POA), he was transitioned to comfort care.  Time of death 0535 on 9/24/24.    Pertinent Physical Exam At Time of Discharge  Physical Exam  Deferred    Home Medications     Medication List      ASK your doctor about these medications      acetaminophen 650 mg ER tablet; Commonly known as: Tylenol 8 HOUR   CENTRUM SILVER MEN ORAL   furosemide 20 mg tablet; Commonly known as: Lasix; take 1 tablet by   mouth twice a day   ibuprofen 200 mg tablet   sodium chloride 1,000 mg tablet; Take 2 tablets (2 g) by mouth 2 times a   day.       Outpatient Follow-Up  Future Appointments   Date Time Provider Department Center   1/13/2025 11:00 AM CHICO Dobbs, DNP [id number] [facility]       CHICO Hammond

## 2024-09-24 NOTE — SIGNIFICANT EVENT
Death Note    Date of Death:  09/24/24   Time of Death:  0535   Preliminary Cause of Death:  Multi-system organ failure     Notified by RN that patient was seen in asystole on the monitor.   On physical exam pt not responsive to stimuli.  Pupils fixed and dilated. Absent corneal reflexes.  Absent cardiopulmonary sounds, auscultated for >60 seconds.  Absent pulses, palpated for >60 seconds.    Patient's significant other present in room and condolences were provided. Attending physician notified of patients status.         David Melchor MD  Internal Medicine, PGY-2

## 2024-09-24 NOTE — ACP (ADVANCE CARE PLANNING)
Advance Care Planning Note     Discussion Date: 09/24/24   Discussion Participants: significant other    The patient wishes to discuss Advance Care Planning today and the following is a brief summary of our discussion.     Patient has capacity to make their own medical decisions: No  Health Care Agent/Surrogate Decision Maker documented in chart: Yes    Documents on file and valid:  Advance Directive/Living Will: No   Health Care Power of : No  Other: Zita, significant other, named HCPOA    Communication of Medical Status/Prognosis:   Discussed with Zita events since she was last here on Sunday 9/22. Discussed that his respiratory status has worsened, and he has intermittently become confused to the point that he is interfering with medical care. We went over that he is not getting enough oxygen when on Airvo, and he is also retaining CO2 requiring escalation of respiratory support to BiPAP. However, his delirium posed a challenge requiring restraints and sedating medications in order to administer BiPAP. Despite these interventions, his respiratory status remained guarded in the setting of DNR/DNI. We also put his acutely progressing respiratory failure in the context of his metastatic SCLC. We acknowledged that when he initially discussed his goals of care prior to intubation, he likely did not know the degree of disease.     Communication of Treatment Goals/Options:   We discussed the recent chemotherapy and radiation therapy, and that there is still some time for the treatment to exert their effect as well as potential for a second cycle on 10/5. However, given his delirium and tenuous respiratory status largely unaffected by advanced respiratory support, Zita felt that if he were able to make his own decisions, he would have elected to focus on comfort rather than continued efforts at treating his underlying disease.    Treatment Decisions  Goals of Care: comfort is paramount; other goals are not  relevant or achievable     Follow Up Plan  Code status will be changed to Comfort Care to reflect goals of care conversation.    Team Members  David Melchor MD    Time Statement: Total face to face time spent on advance care planning was 50 minutes with 50 minutes spent in counseling, including the explanation.    David Melchor MD  Internal Medicine, PGY-2

## 2024-09-25 LAB
ACID FAST STN SPEC: NORMAL
ACID FAST STN SPEC: NORMAL
MYCOBACTERIUM SPEC CULT: NORMAL
MYCOBACTERIUM SPEC CULT: NORMAL

## 2024-09-25 NOTE — SIGNIFICANT EVENT
Death Within 24 Hours of Soft Wrist Restraint Utilization    Death within 24 hours of soft wrist restraint logged at 9/25/2024 at 9:31 AM.    Diamond Snow RN  Date: 9/25/2024  Time: 9:31 AM

## 2024-09-27 LAB
ATRIAL RATE: 80 BPM
P AXIS: 28 DEGREES
P OFFSET: 172 MS
P ONSET: 134 MS
PR INTERVAL: 146 MS
Q ONSET: 207 MS
QRS COUNT: 13 BEATS
QRS DURATION: 174 MS
QT INTERVAL: 432 MS
QTC CALCULATION(BAZETT): 498 MS
QTC FREDERICIA: 475 MS
R AXIS: 25 DEGREES
T AXIS: 145 DEGREES
T OFFSET: 423 MS
VENTRICULAR RATE: 80 BPM

## 2024-09-30 LAB
FUNGUS SPEC CULT: NORMAL
FUNGUS SPEC FUNGUS STN: NORMAL

## 2024-11-06 LAB
ACID FAST STN SPEC: NORMAL
MYCOBACTERIUM SPEC CULT: NORMAL
